# Patient Record
Sex: MALE | Race: WHITE | Employment: OTHER | URBAN - METROPOLITAN AREA
[De-identification: names, ages, dates, MRNs, and addresses within clinical notes are randomized per-mention and may not be internally consistent; named-entity substitution may affect disease eponyms.]

---

## 2023-08-01 ENCOUNTER — OFFICE VISIT (OUTPATIENT)
Age: 78
End: 2023-08-01
Payer: OTHER GOVERNMENT

## 2023-08-01 VITALS
BODY MASS INDEX: 22.98 KG/M2 | HEIGHT: 66 IN | OXYGEN SATURATION: 95 % | DIASTOLIC BLOOD PRESSURE: 66 MMHG | SYSTOLIC BLOOD PRESSURE: 134 MMHG | HEART RATE: 71 BPM | RESPIRATION RATE: 16 BRPM | WEIGHT: 143 LBS

## 2023-08-01 DIAGNOSIS — E11.51 TYPE 2 DIABETES MELLITUS WITH DIABETIC PERIPHERAL ANGIOPATHY WITHOUT GANGRENE, WITHOUT LONG-TERM CURRENT USE OF INSULIN (HCC): ICD-10-CM

## 2023-08-01 DIAGNOSIS — I10 PRIMARY HYPERTENSION: ICD-10-CM

## 2023-08-01 DIAGNOSIS — Z12.5 PROSTATE CANCER SCREENING: ICD-10-CM

## 2023-08-01 DIAGNOSIS — Z00.00 MEDICARE ANNUAL WELLNESS VISIT, SUBSEQUENT: Primary | ICD-10-CM

## 2023-08-01 DIAGNOSIS — Z87.891 PERSONAL HISTORY OF TOBACCO USE: ICD-10-CM

## 2023-08-01 DIAGNOSIS — Z11.59 ENCOUNTER FOR HEPATITIS C SCREENING TEST FOR LOW RISK PATIENT: ICD-10-CM

## 2023-08-01 DIAGNOSIS — I73.9 PAD (PERIPHERAL ARTERY DISEASE) (HCC): ICD-10-CM

## 2023-08-01 DIAGNOSIS — E78.2 MIXED HYPERLIPIDEMIA: ICD-10-CM

## 2023-08-01 DIAGNOSIS — N40.0 BENIGN PROSTATIC HYPERPLASIA WITHOUT LOWER URINARY TRACT SYMPTOMS: ICD-10-CM

## 2023-08-01 DIAGNOSIS — Z79.899 LONG TERM USE OF DRUG: ICD-10-CM

## 2023-08-01 LAB
CREAT UR-MCNC: 53.2 MG/DL
HBA1C MFR BLD: 5.3 %
MICROALBUMIN UR-MCNC: 2.22 MG/DL
MICROALBUMIN/CREAT UR-RTO: 42 MG/G (ref 0–30)

## 2023-08-01 PROCEDURE — G0439 PPPS, SUBSEQ VISIT: HCPCS

## 2023-08-01 PROCEDURE — G0296 VISIT TO DETERM LDCT ELIG: HCPCS

## 2023-08-01 PROCEDURE — 3078F DIAST BP <80 MM HG: CPT

## 2023-08-01 PROCEDURE — 83036 HEMOGLOBIN GLYCOSYLATED A1C: CPT

## 2023-08-01 PROCEDURE — 36415 COLL VENOUS BLD VENIPUNCTURE: CPT

## 2023-08-01 PROCEDURE — 3075F SYST BP GE 130 - 139MM HG: CPT

## 2023-08-01 PROCEDURE — 1123F ACP DISCUSS/DSCN MKR DOCD: CPT

## 2023-08-01 RX ORDER — ATORVASTATIN CALCIUM 40 MG/1
40 TABLET, FILM COATED ORAL DAILY
Qty: 90 TABLET | Refills: 1 | Status: SHIPPED | OUTPATIENT
Start: 2023-08-01

## 2023-08-01 RX ORDER — GABAPENTIN 300 MG/1
CAPSULE ORAL
COMMUNITY
Start: 2021-01-19 | End: 2023-08-01 | Stop reason: SDUPTHER

## 2023-08-01 RX ORDER — CLOPIDOGREL BISULFATE 75 MG/1
75 TABLET ORAL DAILY
Qty: 90 TABLET | Refills: 1
Start: 2023-08-01

## 2023-08-01 RX ORDER — LISINOPRIL 20 MG/1
TABLET ORAL
COMMUNITY
End: 2023-08-01 | Stop reason: SDUPTHER

## 2023-08-01 RX ORDER — TAMSULOSIN HYDROCHLORIDE 0.4 MG/1
0.4 CAPSULE ORAL DAILY
Qty: 90 CAPSULE | Refills: 1
Start: 2023-08-01

## 2023-08-01 RX ORDER — FINASTERIDE 5 MG/1
TABLET, FILM COATED ORAL
COMMUNITY
End: 2023-08-01 | Stop reason: SDUPTHER

## 2023-08-01 RX ORDER — FINASTERIDE 5 MG/1
5 TABLET, FILM COATED ORAL DAILY
Qty: 90 TABLET | Refills: 1
Start: 2023-08-01

## 2023-08-01 RX ORDER — CLOPIDOGREL BISULFATE 75 MG/1
TABLET ORAL
COMMUNITY
End: 2023-08-01 | Stop reason: SDUPTHER

## 2023-08-01 RX ORDER — ATORVASTATIN CALCIUM 40 MG/1
TABLET, FILM COATED ORAL
COMMUNITY
End: 2023-08-01 | Stop reason: SDUPTHER

## 2023-08-01 RX ORDER — TAMSULOSIN HYDROCHLORIDE 0.4 MG/1
0.4 CAPSULE ORAL DAILY
COMMUNITY
End: 2023-08-01 | Stop reason: SDUPTHER

## 2023-08-01 RX ORDER — POLYVINYL ALCOHOL 14 MG/ML
1 SOLUTION/ DROPS OPHTHALMIC PRN
COMMUNITY

## 2023-08-01 RX ORDER — LISINOPRIL 20 MG/1
20 TABLET ORAL DAILY
Qty: 90 TABLET | Refills: 1 | Status: SHIPPED | OUTPATIENT
Start: 2023-08-01

## 2023-08-01 RX ORDER — KETOTIFEN FUMARATE 0.35 MG/ML
1 SOLUTION/ DROPS OPHTHALMIC 2 TIMES DAILY
COMMUNITY

## 2023-08-01 RX ORDER — CALCIUM CARBONATE 750 MG/1
TABLET, CHEWABLE ORAL
COMMUNITY

## 2023-08-01 RX ORDER — GABAPENTIN 300 MG/1
900 CAPSULE ORAL 2 TIMES DAILY
Qty: 540 CAPSULE | Refills: 1
Start: 2023-08-01 | End: 2024-01-28

## 2023-08-01 SDOH — ECONOMIC STABILITY: INCOME INSECURITY: IN THE LAST 12 MONTHS, WAS THERE A TIME WHEN YOU WERE NOT ABLE TO PAY THE MORTGAGE OR RENT ON TIME?: NO

## 2023-08-01 SDOH — ECONOMIC STABILITY: HOUSING INSECURITY
IN THE LAST 12 MONTHS, WAS THERE A TIME WHEN YOU DID NOT HAVE A STEADY PLACE TO SLEEP OR SLEPT IN A SHELTER (INCLUDING NOW)?: NO

## 2023-08-01 SDOH — ECONOMIC STABILITY: FOOD INSECURITY: WITHIN THE PAST 12 MONTHS, THE FOOD YOU BOUGHT JUST DIDN'T LAST AND YOU DIDN'T HAVE MONEY TO GET MORE.: NEVER TRUE

## 2023-08-01 SDOH — ECONOMIC STABILITY: FOOD INSECURITY: WITHIN THE PAST 12 MONTHS, YOU WORRIED THAT YOUR FOOD WOULD RUN OUT BEFORE YOU GOT MONEY TO BUY MORE.: NEVER TRUE

## 2023-08-01 SDOH — ECONOMIC STABILITY: TRANSPORTATION INSECURITY
IN THE PAST 12 MONTHS, HAS LACK OF TRANSPORTATION KEPT YOU FROM MEETINGS, WORK, OR FROM GETTING THINGS NEEDED FOR DAILY LIVING?: NO

## 2023-08-01 SDOH — ECONOMIC STABILITY: TRANSPORTATION INSECURITY
IN THE PAST 12 MONTHS, HAS THE LACK OF TRANSPORTATION KEPT YOU FROM MEDICAL APPOINTMENTS OR FROM GETTING MEDICATIONS?: NO

## 2023-08-01 ASSESSMENT — ENCOUNTER SYMPTOMS
EYES NEGATIVE: 1
WHEEZING: 0
SHORTNESS OF BREATH: 0
BLOOD IN STOOL: 0
CONSTIPATION: 0
RESPIRATORY NEGATIVE: 1
ALLERGIC/IMMUNOLOGIC NEGATIVE: 1
DIARRHEA: 0
COUGH: 0

## 2023-08-01 ASSESSMENT — SOCIAL DETERMINANTS OF HEALTH (SDOH)
WITHIN THE LAST YEAR, HAVE YOU BEEN AFRAID OF YOUR PARTNER OR EX-PARTNER?: NO
WITHIN THE LAST YEAR, HAVE YOU BEEN KICKED, HIT, SLAPPED, OR OTHERWISE PHYSICALLY HURT BY YOUR PARTNER OR EX-PARTNER?: NO
WITHIN THE LAST YEAR, HAVE TO BEEN RAPED OR FORCED TO HAVE ANY KIND OF SEXUAL ACTIVITY BY YOUR PARTNER OR EX-PARTNER?: NO
WITHIN THE LAST YEAR, HAVE YOU BEEN HUMILIATED OR EMOTIONALLY ABUSED IN OTHER WAYS BY YOUR PARTNER OR EX-PARTNER?: NO
HOW HARD IS IT FOR YOU TO PAY FOR THE VERY BASICS LIKE FOOD, HOUSING, MEDICAL CARE, AND HEATING?: NOT VERY HARD

## 2023-08-01 ASSESSMENT — LIFESTYLE VARIABLES
HOW OFTEN DO YOU HAVE A DRINK CONTAINING ALCOHOL: NEVER
HOW MANY STANDARD DRINKS CONTAINING ALCOHOL DO YOU HAVE ON A TYPICAL DAY: PATIENT DOES NOT DRINK

## 2023-08-01 ASSESSMENT — PATIENT HEALTH QUESTIONNAIRE - PHQ9
SUM OF ALL RESPONSES TO PHQ QUESTIONS 1-9: 0
SUM OF ALL RESPONSES TO PHQ QUESTIONS 1-9: 0
1. LITTLE INTEREST OR PLEASURE IN DOING THINGS: 0
SUM OF ALL RESPONSES TO PHQ QUESTIONS 1-9: 0
2. FEELING DOWN, DEPRESSED OR HOPELESS: 0
SUM OF ALL RESPONSES TO PHQ QUESTIONS 1-9: 0
SUM OF ALL RESPONSES TO PHQ9 QUESTIONS 1 & 2: 0

## 2023-08-01 NOTE — PROGRESS NOTES
Chief Complaint   Patient presents with    Medicare AWV    Establish Care       HPI:    Cathie Dorsey is a 69 y.o. male who presents as a new patient for [facility]. He is a   who has been receiving care through the [place]; he moved to this area from the [facility] to be close to his niece. HTN:  Good control on lisinopril; home BP 130s/70s. PAD:  Hx left fem-pop bypass about 3 years ago per his report; no longer follows with vascular specialist but remains on Plavix. DM:  Very well controlled on metformin; does not check sugars at home. Suffers from neuropathy in his feet and legs, managed with gabapentin. HLD:  Compliant with atorvastatin. BPH:  LUTS with nocturia, improved on tamsulosin and finasteride. Allergies   Allergen Reactions    Penicillins Rash       Current Outpatient Medications   Medication Sig Dispense Refill    ASPIRIN 81 PO Take 1 tablet every day by oral route. calcium carbonate (TUMS EX) 750 MG chewable tablet Take by mouth      polyvinyl alcohol (LIQUIFILM TEARS) 1.4 % ophthalmic solution 1 drop as needed      ketotifen ( KETOTIFEN FUMARATE) 0.025 % ophthalmic solution 1 drop 2 times daily      gabapentin (NEURONTIN) 300 MG capsule Take 3 capsules by mouth in the morning and at bedtime for 180 days. Max Daily Amount: 1,800 mg 540 capsule 1    lisinopril (PRINIVIL;ZESTRIL) 20 MG tablet Take 1 tablet by mouth daily 90 tablet 1    atorvastatin (LIPITOR) 40 MG tablet Take 1 tablet by mouth daily 90 tablet 1    metFORMIN (GLUCOPHAGE) 1000 MG tablet Take 1 tablet by mouth 2 times daily (with meals) 180 tablet 1    tamsulosin (FLOMAX) 0.4 MG capsule Take 1 capsule by mouth daily 90 capsule 1    finasteride (PROSCAR) 5 MG tablet Take 1 tablet by mouth daily 90 tablet 1    clopidogrel (PLAVIX) 75 MG tablet Take 1 tablet by mouth daily 90 tablet 1     No current facility-administered medications for this visit. History reviewed.  No pertinent past medical

## 2023-08-01 NOTE — PROGRESS NOTES
1. \"Have you been to the ER, urgent care clinic since your last visit? Hospitalized since your last visit? \" No    2. \"Have you seen or consulted any other health care providers outside of the [address] [place] [address] since your last visit? \" No     3. For patients aged 43-73: Has the patient had a colonoscopy / FIT/ Cologuard? Yes - no Care Gap present     If the patient is female:    4. For patients aged 43-66: Has the patient had a mammogram within the past 2 years? NA - based on age    11. For patients aged 21-65: Has the patient had a pap smear? NA - based on age    Chief Complaint   Patient presents with    Medicare AWV    Establish Care       Vitals:    08/01/23 0755   BP: (!) 178/80   Pulse: 71   Resp: 16   SpO2: 95%     Labs drawn in left arm per Ena's orders. Patient tolerated well.     Results for orders placed or performed in visit on 08/01/23   AMB POC HEMOGLOBIN A1C   Result Value Ref Range    Hemoglobin A1C, POC 5.3 %

## 2023-08-01 NOTE — PROGRESS NOTES
Medicare Annual Wellness Visit    Matheus Fink is here for Medicare AWV and Establish Care    Assessment & Plan   Medicare annual wellness visit, subsequent  Type 2 diabetes mellitus without complication, without long-term current use of insulin ([address])  -     NC COLLECTION VENOUS BLOOD VENIPUNCTURE  -     AMB POC HEMOGLOBIN A1C  -     CBC with Auto Differential; Future  -     Comprehensive Metabolic Panel; Future  -     TSH; Future  -     HM DIABETES FOOT EXAM  -     Microalbumin / Creatinine Urine Ratio; Future  Primary hypertension  -     NC COLLECTION VENOUS BLOOD VENIPUNCTURE  -     CBC with Auto Differential; Future  -     Comprehensive Metabolic Panel; Future  -     TSH; Future  Mixed hyperlipidemia  -     NC COLLECTION VENOUS BLOOD VENIPUNCTURE  -     Lipid Panel; Future  Prostate cancer screening  -     NC COLLECTION VENOUS BLOOD VENIPUNCTURE  -     PSA Screening; Future  Encounter for hepatitis C screening test for low risk patient  -     NC COLLECTION VENOUS BLOOD VENIPUNCTURE  -     Hepatitis C Antibody; Future    Recommendations for Preventive Services Due: see orders and patient instructions/AVS.  Recommended screening schedule for the next 5-10 years is provided to the patient in written form: see Patient Instructions/AVS.     Return in 3 months (on 11/1/2023). Subjective     Patient's complete Health Risk Assessment and screening values have been reviewed and are found in Flowsheets. The following problems were reviewed today and where indicated follow up appointments were made and/or referrals ordered.     Positive Risk Factor Screenings with Interventions:    Fall Risk:  Do you feel unsteady or are you worried about falling? : (!) yes  2 or more falls in past year?: (!) yes  Fall with injury in past year?: no     Interventions:    See AVS for additional education material                 Vision Screen:  Do you have difficulty driving, watching TV, or doing any of your daily activities because of

## 2023-08-01 NOTE — PATIENT INSTRUCTIONS
the doctor takes a sample of tissue from inside your lung so it can be looked at under a microscope. A biopsy is the only way to tell if you have lung cancer. If the biopsy finds cancer, you and your doctor will have to decide how or whether to treat it. Some lung cancers found on CT scans are harmless and would not have caused a problem if they had not been found through screening. But because doctors can't tell which ones will turn out to be harmless, most will be treated. This means that you may get treatment--including surgery, radiation, or chemotherapy--that you don't need. There is a risk of damage to cells or tissue from being exposed to radiation, including the small amounts used in CTs, X-rays, and other medical tests. Over time, exposure to radiation may cause cancer and other health problems. But in most cases, the risk of getting cancer from being exposed to small amounts of radiation is low. It's not a reason to avoid these tests for most people. What are the benefits of screening? Your scan may be normal (negative). For some people who are at higher risk, screening lowers the chance of dying of lung cancer. How much and how long you smoked helps to determine your risk level. Screening can find some cancers early, when treatment may be more likely to work. What happens after screening? The results of your CT scan will be sent to your doctor. Someone from your care team will explain the results of your scan and answer any questions you may have. If you need any follow-up, he or she will help you understand what to do next. After a lung cancer screening, you can go back to your usual activities right away. A lung cancer screening test can't tell if you have lung cancer. If your results are positive, your doctor can't tell whether an abnormal finding is a harmless nodule, cancer, or something else without doing more tests. What can you do to help prevent lung cancer?   Some lung cancers can't be

## 2023-08-02 LAB
ALBUMIN SERPL-MCNC: 4.1 G/DL (ref 3.5–5)
ALBUMIN/GLOB SERPL: 1.2 (ref 1.1–2.2)
ALP SERPL-CCNC: 77 U/L (ref 45–117)
ALT SERPL-CCNC: 15 U/L (ref 12–78)
ANION GAP SERPL CALC-SCNC: 11 MMOL/L (ref 5–15)
AST SERPL-CCNC: 13 U/L (ref 15–37)
BASOPHILS # BLD: 0 K/UL (ref 0–0.1)
BASOPHILS NFR BLD: 1 % (ref 0–1)
BILIRUB SERPL-MCNC: 0.3 MG/DL (ref 0.2–1)
BUN SERPL-MCNC: 16 MG/DL (ref 6–20)
BUN/CREAT SERPL: 10 (ref 12–20)
CALCIUM SERPL-MCNC: 9.8 MG/DL (ref 8.5–10.1)
CHLORIDE SERPL-SCNC: 97 MMOL/L (ref 97–108)
CHOLEST SERPL-MCNC: 145 MG/DL
CO2 SERPL-SCNC: 23 MMOL/L (ref 21–32)
CREAT SERPL-MCNC: 1.67 MG/DL (ref 0.7–1.3)
DIFFERENTIAL METHOD BLD: ABNORMAL
EOSINOPHIL # BLD: 0.1 K/UL (ref 0–0.4)
EOSINOPHIL NFR BLD: 2 % (ref 0–7)
ERYTHROCYTE [DISTWIDTH] IN BLOOD BY AUTOMATED COUNT: 15.9 % (ref 11.5–14.5)
GLOBULIN SER CALC-MCNC: 3.3 G/DL (ref 2–4)
GLUCOSE SERPL-MCNC: 60 MG/DL (ref 65–100)
HCT VFR BLD AUTO: 31.6 % (ref 36.6–50.3)
HCV AB SERPL QL IA: NONREACTIVE
HDLC SERPL-MCNC: 71 MG/DL
HDLC SERPL: 2 (ref 0–5)
HGB BLD-MCNC: 10.1 G/DL (ref 12.1–17)
IMM GRANULOCYTES # BLD AUTO: 0 K/UL (ref 0–0.04)
IMM GRANULOCYTES NFR BLD AUTO: 0 % (ref 0–0.5)
LDLC SERPL CALC-MCNC: 51.6 MG/DL (ref 0–100)
LYMPHOCYTES # BLD: 0.9 K/UL (ref 0.8–3.5)
LYMPHOCYTES NFR BLD: 16 % (ref 12–49)
MCH RBC QN AUTO: 28.5 PG (ref 26–34)
MCHC RBC AUTO-ENTMCNC: 32 G/DL (ref 30–36.5)
MCV RBC AUTO: 89 FL (ref 80–99)
MONOCYTES # BLD: 0.6 K/UL (ref 0–1)
MONOCYTES NFR BLD: 11 % (ref 5–13)
NEUTS SEG # BLD: 3.6 K/UL (ref 1.8–8)
NEUTS SEG NFR BLD: 70 % (ref 32–75)
NRBC # BLD: 0 K/UL (ref 0–0.01)
NRBC BLD-RTO: 0 PER 100 WBC
PLATELET # BLD AUTO: 280 K/UL (ref 150–400)
PMV BLD AUTO: 10 FL (ref 8.9–12.9)
POTASSIUM SERPL-SCNC: 4.8 MMOL/L (ref 3.5–5.1)
PROT SERPL-MCNC: 7.4 G/DL (ref 6.4–8.2)
PSA SERPL-MCNC: 16.3 NG/ML (ref 0.01–4)
RBC # BLD AUTO: 3.55 M/UL (ref 4.1–5.7)
SODIUM SERPL-SCNC: 131 MMOL/L (ref 136–145)
TRIGL SERPL-MCNC: 112 MG/DL
TSH SERPL DL<=0.05 MIU/L-ACNC: 1.67 UIU/ML (ref 0.36–3.74)
VLDLC SERPL CALC-MCNC: 22.4 MG/DL
WBC # BLD AUTO: 5.2 K/UL (ref 4.1–11.1)

## 2023-08-04 DIAGNOSIS — N18.32 STAGE 3B CHRONIC KIDNEY DISEASE (HCC): Primary | ICD-10-CM

## 2023-08-04 DIAGNOSIS — I10 PRIMARY HYPERTENSION: ICD-10-CM

## 2023-08-04 DIAGNOSIS — E11.51 TYPE 2 DIABETES MELLITUS WITH DIABETIC PERIPHERAL ANGIOPATHY WITHOUT GANGRENE, WITHOUT LONG-TERM CURRENT USE OF INSULIN (HCC): ICD-10-CM

## 2023-08-08 ENCOUNTER — TELEPHONE (OUTPATIENT)
Age: 78
End: 2023-08-08

## 2023-08-08 NOTE — TELEPHONE ENCOUNTER
Matthieu Marie picked up his lisinopril 20 mg. He has been taking 1 x daily and on the bottle it states 1/2 a pill daily. He would just like some clarification of what he should be taking.

## 2023-08-31 ENCOUNTER — PATIENT MESSAGE (OUTPATIENT)
Age: 78
End: 2023-08-31

## 2023-08-31 DIAGNOSIS — I10 PRIMARY HYPERTENSION: ICD-10-CM

## 2023-09-01 NOTE — TELEPHONE ENCOUNTER
From: Marita Khan  To: April Nivia  Sent: 8/31/2023 8:33 PM EDT  Subject: LISINOPRIL    THE DOSAGE ON MY RECENT RECEIPT DELIVERY OF THIS MED. IS WRONG. IT SHOULD BE ONE TABLET DAILY NOT ONE HALF TABLET DAILY AS STATED ON THE LABEL. I CALLED THE [facility] ABOUT THIS BUT THEY HAVE NOT MADE THE CORRECTION. SO THEY OWE ME THE OTHER HALF OF THIS MED. I HAVE BEEN TAKING ONE TABLET DAILY SINCE IT WAS PRESCRIBED BY DR. Danisha Evans AT [address], [place].   THANK YOU  KRISTINE ARANA

## 2023-09-05 RX ORDER — LISINOPRIL 40 MG/1
40 TABLET ORAL DAILY
Qty: 90 TABLET | Refills: 1 | Status: SHIPPED | OUTPATIENT
Start: 2023-09-05

## 2023-09-06 ENCOUNTER — HOSPITAL ENCOUNTER (OUTPATIENT)
Facility: HOSPITAL | Age: 78
Discharge: HOME | End: 2023-09-09

## 2023-09-10 DIAGNOSIS — E11.51 TYPE 2 DIABETES MELLITUS WITH DIABETIC PERIPHERAL ANGIOPATHY WITHOUT GANGRENE, WITHOUT LONG-TERM CURRENT USE OF INSULIN (HCC): ICD-10-CM

## 2023-09-11 RX ORDER — HYDROCHLOROTHIAZIDE 25 MG/1
25 TABLET ORAL DAILY
COMMUNITY
End: 2023-09-12 | Stop reason: SDUPTHER

## 2023-09-11 NOTE — TELEPHONE ENCOUNTER
CELSA LE,  I HAD TO REORDER MEDS THIS MORNING AND I ORDERED TWO BUT TWO THAT I NEED TO REORDER I WAS UNABLE  TO ORDER. CALCIUM CARBONATE WOULDN'T ALLOW ME TO ORDER AND THE OTHER ONE HYDROCHLOROTHIAZIDE ISN'T ON THE LIST.   THANK YOU  KRISTINE ARANA

## 2023-09-12 RX ORDER — HYDROCHLOROTHIAZIDE 25 MG/1
12.5 TABLET ORAL DAILY
Qty: 45 TABLET | Refills: 1 | Status: SHIPPED | OUTPATIENT
Start: 2023-09-12

## 2023-09-12 RX ORDER — CALCIUM CARBONATE 750 MG/1
2 TABLET, CHEWABLE ORAL DAILY
Qty: 360 TABLET | Refills: 1 | Status: SHIPPED | OUTPATIENT
Start: 2023-09-12

## 2023-09-14 RX ORDER — GABAPENTIN 300 MG/1
900 CAPSULE ORAL 2 TIMES DAILY
Qty: 540 CAPSULE | Refills: 1 | Status: SHIPPED | OUTPATIENT
Start: 2023-09-14 | End: 2024-03-12

## 2023-09-29 DIAGNOSIS — E11.51 TYPE 2 DIABETES MELLITUS WITH DIABETIC PERIPHERAL ANGIOPATHY WITHOUT GANGRENE, WITHOUT LONG-TERM CURRENT USE OF INSULIN (HCC): ICD-10-CM

## 2023-09-29 DIAGNOSIS — I73.9 PAD (PERIPHERAL ARTERY DISEASE) (HCC): ICD-10-CM

## 2023-10-02 RX ORDER — FINASTERIDE 5 MG/1
5 TABLET, FILM COATED ORAL DAILY
Qty: 90 TABLET | Refills: 1 | Status: SHIPPED | OUTPATIENT
Start: 2023-10-02

## 2023-10-02 RX ORDER — CLOPIDOGREL BISULFATE 75 MG/1
75 TABLET ORAL DAILY
Qty: 90 TABLET | Refills: 1 | Status: SHIPPED | OUTPATIENT
Start: 2023-10-02

## 2023-10-25 ENCOUNTER — TELEPHONE (OUTPATIENT)
Age: 78
End: 2023-10-25

## 2023-10-25 NOTE — TELEPHONE ENCOUNTER
Pt states he has sent 2 my chart messages and neither have been responded to .  Please review and someone get back to patient

## 2023-10-29 DIAGNOSIS — N40.0 BENIGN PROSTATIC HYPERPLASIA WITHOUT LOWER URINARY TRACT SYMPTOMS: ICD-10-CM

## 2023-10-30 RX ORDER — TAMSULOSIN HYDROCHLORIDE 0.4 MG/1
0.4 CAPSULE ORAL DAILY
Qty: 90 CAPSULE | Refills: 1 | Status: SHIPPED | OUTPATIENT
Start: 2023-10-30

## 2023-11-02 ENCOUNTER — TELEPHONE (OUTPATIENT)
Age: 78
End: 2023-11-02

## 2023-11-02 DIAGNOSIS — N40.0 BENIGN PROSTATIC HYPERPLASIA WITHOUT LOWER URINARY TRACT SYMPTOMS: ICD-10-CM

## 2023-11-02 NOTE — TELEPHONE ENCOUNTER
Keny Houser received his RX for tamsulosin. Dosage said take 1 daily. He had been taking 2 per Urologist. [facility] only sent him 30 of the 90 pills due to a shortage. He wants to know should he cut his dosage in half or is there something else that can be called in.

## 2023-11-08 RX ORDER — TAMSULOSIN HYDROCHLORIDE 0.4 MG/1
0.8 CAPSULE ORAL DAILY
Qty: 180 CAPSULE | Refills: 1 | Status: SHIPPED | OUTPATIENT
Start: 2023-11-08 | End: 2023-11-16 | Stop reason: SDUPTHER

## 2023-11-09 NOTE — TELEPHONE ENCOUNTER
JAMES Mcclelland Cisco Pierre, informed of tamsulosin Rx sent in, OK and thanks. He stated he was taking a calcium carbonate antacid tablet for yrs before coming here, but was changed to Tums. He now ends up taking up to 21 Tums within a week.

## 2023-11-16 DIAGNOSIS — E78.2 MIXED HYPERLIPIDEMIA: ICD-10-CM

## 2023-11-16 DIAGNOSIS — N40.0 BENIGN PROSTATIC HYPERPLASIA WITHOUT LOWER URINARY TRACT SYMPTOMS: ICD-10-CM

## 2023-11-17 RX ORDER — ATORVASTATIN CALCIUM 40 MG/1
40 TABLET, FILM COATED ORAL DAILY
Qty: 90 TABLET | Refills: 1 | Status: SHIPPED | OUTPATIENT
Start: 2023-11-17

## 2023-11-17 RX ORDER — TAMSULOSIN HYDROCHLORIDE 0.4 MG/1
0.8 CAPSULE ORAL DAILY
Qty: 180 CAPSULE | Refills: 1 | Status: SHIPPED | OUTPATIENT
Start: 2023-11-17

## 2023-11-22 RX ORDER — ALENDRONATE SODIUM 70 MG/1
70 TABLET ORAL WEEKLY
Qty: 12 TABLET | Refills: 1 | Status: SHIPPED | OUTPATIENT
Start: 2023-11-22

## 2023-11-28 RX ORDER — ERGOCALCIFEROL 1.25 MG/1
50000 CAPSULE ORAL WEEKLY
Qty: 12 CAPSULE | Refills: 0 | Status: SHIPPED | OUTPATIENT
Start: 2023-11-28

## 2023-12-12 ENCOUNTER — TELEPHONE (OUTPATIENT)
Age: 78
End: 2023-12-12

## 2023-12-12 NOTE — TELEPHONE ENCOUNTER
----- Message from Gonzalo Avendaño sent at 12/12/2023  9:10 AM EST -----  Subject: Message to Provider    QUESTIONS  Information for Provider? Patient called states that he has a form that   the Urologist sent that needs to be completed by his PCP prior to his Jan 4th doctor's visit. He would like to know if he can just drop off the   form. ---------------------------------------------------------------------------  --------------  Desean KENNY  [phone]; OK to leave message on voicemail  ---------------------------------------------------------------------------  --------------  SCRIPT ANSWERS  Relationship to Patient?  Self

## 2023-12-12 NOTE — TELEPHONE ENCOUNTER
PC VM left to rt the call. Will attempt to get more information regarding the form &  send to Ena to confirm if an appointment is needed.

## 2023-12-13 ENCOUNTER — TELEPHONE (OUTPATIENT)
Age: 78
End: 2023-12-13

## 2023-12-13 NOTE — TELEPHONE ENCOUNTER
Pt spoke with office on 12.12 requesting RFS from [facility] office for lab services at [facility] for labs ordered from urologist. Was called back on 12.13 and told that because the labs were order by the urologist which he has [address] [place] [address] referral to he will need to have that office submit RFS for those lab services because the [facility] office is not involved with this service. Pt stated that because he was sent here by the [address] [place] [address] he needs it to come from this office. I repeated that because we are not involved in the service it would need to be from the specialist that it was coming from. If he would like to get more information on exactly what needs to be done he could call his community [facility] coordinator with the [address] [place] [address]. Pt states that he was not going to do that and he is just going to get the blood work done at [facility] and if he gets a bill he is going to be upset and will be calling back. I repeated his options on finding out more information on what exactly he needed to do to avoid getting a bill but he just repeated that he was not going to do any of that and that he would be calling if he got a bill.

## 2023-12-15 DIAGNOSIS — E11.51 TYPE 2 DIABETES MELLITUS WITH DIABETIC PERIPHERAL ANGIOPATHY WITHOUT GANGRENE, WITHOUT LONG-TERM CURRENT USE OF INSULIN (HCC): ICD-10-CM

## 2023-12-15 DIAGNOSIS — I73.9 PAD (PERIPHERAL ARTERY DISEASE) (HCC): ICD-10-CM

## 2023-12-15 RX ORDER — CLOPIDOGREL BISULFATE 75 MG/1
75 TABLET ORAL DAILY
Qty: 90 TABLET | Refills: 0 | Status: SHIPPED | OUTPATIENT
Start: 2023-12-15

## 2023-12-15 RX ORDER — GABAPENTIN 300 MG/1
900 CAPSULE ORAL 2 TIMES DAILY
Qty: 540 CAPSULE | Refills: 0 | Status: SHIPPED | OUTPATIENT
Start: 2023-12-15 | End: 2024-03-14

## 2023-12-15 RX ORDER — HYDROCHLOROTHIAZIDE 25 MG/1
12.5 TABLET ORAL DAILY
Qty: 45 TABLET | Refills: 0 | Status: SHIPPED | OUTPATIENT
Start: 2023-12-15

## 2023-12-15 NOTE — TELEPHONE ENCOUNTER
Patient requesting refill on     Requested Prescriptions     Pending Prescriptions Disp Refills    gabapentin (NEURONTIN) 300 MG capsule 540 capsule 1     Sig: Take 3 capsules by mouth in the morning and at bedtime for 180 days.  Max Daily Amount: 1,800 mg    metFORMIN (GLUCOPHAGE) 1000 MG tablet 180 tablet 1     Sig: Take 1 tablet by mouth 2 times daily (with meals)    hydroCHLOROthiazide (HYDRODIURIL) 25 MG tablet 45 tablet 1     Sig: Take 0.5 tablets by mouth daily Take 1/2 tablet daily    clopidogrel (PLAVIX) 75 MG tablet 90 tablet 1     Sig: Take 1 tablet by mouth daily        Last OV 8/1/2023

## 2023-12-27 NOTE — TELEPHONE ENCOUNTER
PC FERNANDEZ Mcclelland Eric Kruger, he had spoken with the urologist office regarding the BW. He did not want to do anything without an order. But did also state, he had received a form with stating a PSA to be done. I did inform him, I believe that would be his order for the PSA BW. He stated OK of understanding, will have it done at [address] [place] [address] on tomorrow, but still would like an appointment with Ena for other issues and a Referral order for a study he needs to have done, transferred to front staff.

## 2023-12-28 ENCOUNTER — HOSPITAL ENCOUNTER (OUTPATIENT)
Facility: HOSPITAL | Age: 78
Discharge: HOME | End: 2023-12-31

## 2024-01-02 ENCOUNTER — TELEPHONE (OUTPATIENT)
Age: 79
End: 2024-01-02

## 2024-01-02 ENCOUNTER — OFFICE VISIT (OUTPATIENT)
Age: 79
End: 2024-01-02
Payer: MEDICARE

## 2024-01-02 VITALS
HEART RATE: 84 BPM | SYSTOLIC BLOOD PRESSURE: 136 MMHG | WEIGHT: 137 LBS | DIASTOLIC BLOOD PRESSURE: 80 MMHG | RESPIRATION RATE: 20 BRPM | HEIGHT: 66 IN | BODY MASS INDEX: 22.02 KG/M2 | OXYGEN SATURATION: 98 % | TEMPERATURE: 97.9 F

## 2024-01-02 DIAGNOSIS — M25.552 LEFT HIP PAIN: ICD-10-CM

## 2024-01-02 DIAGNOSIS — M40.00 ACQUIRED POSTURAL KYPHOSIS: ICD-10-CM

## 2024-01-02 DIAGNOSIS — J40 BRONCHITIS: Primary | ICD-10-CM

## 2024-01-02 DIAGNOSIS — E11.51 TYPE 2 DIABETES MELLITUS WITH DIABETIC PERIPHERAL ANGIOPATHY WITHOUT GANGRENE, WITHOUT LONG-TERM CURRENT USE OF INSULIN (HCC): ICD-10-CM

## 2024-01-02 PROCEDURE — G8420 CALC BMI NORM PARAMETERS: HCPCS

## 2024-01-02 PROCEDURE — 99214 OFFICE O/P EST MOD 30 MIN: CPT

## 2024-01-02 PROCEDURE — 4004F PT TOBACCO SCREEN RCVD TLK: CPT

## 2024-01-02 PROCEDURE — G8427 DOCREV CUR MEDS BY ELIG CLIN: HCPCS

## 2024-01-02 PROCEDURE — G8484 FLU IMMUNIZE NO ADMIN: HCPCS

## 2024-01-02 PROCEDURE — 1123F ACP DISCUSS/DSCN MKR DOCD: CPT

## 2024-01-02 RX ORDER — METHYLPREDNISOLONE 4 MG/1
TABLET ORAL
Qty: 1 KIT | Refills: 0 | Status: SHIPPED | OUTPATIENT
Start: 2024-01-02 | End: 2024-01-04 | Stop reason: SDUPTHER

## 2024-01-02 RX ORDER — ALBUTEROL SULFATE 90 UG/1
2 AEROSOL, METERED RESPIRATORY (INHALATION) EVERY 6 HOURS PRN
Qty: 18 G | Refills: 5 | Status: SHIPPED | OUTPATIENT
Start: 2024-01-02

## 2024-01-02 ASSESSMENT — ENCOUNTER SYMPTOMS
WHEEZING: 1
ALLERGIC/IMMUNOLOGIC NEGATIVE: 1
DIARRHEA: 0
CONSTIPATION: 0
BLOOD IN STOOL: 0
EYES NEGATIVE: 1
COUGH: 1
SHORTNESS OF BREATH: 1

## 2024-01-02 ASSESSMENT — PATIENT HEALTH QUESTIONNAIRE - PHQ9
SUM OF ALL RESPONSES TO PHQ9 QUESTIONS 1 & 2: 0
SUM OF ALL RESPONSES TO PHQ QUESTIONS 1-9: 0
2. FEELING DOWN, DEPRESSED OR HOPELESS: 0
1. LITTLE INTEREST OR PLEASURE IN DOING THINGS: 0
SUM OF ALL RESPONSES TO PHQ QUESTIONS 1-9: 0

## 2024-01-02 NOTE — TELEPHONE ENCOUNTER
----- Message from Nicole Olvio sent at 1/2/2024  1:02 PM EST -----  Subject: Referral Request    Reason for referral request? CT Lung Screen  Provider patient wants to be referred to(if known):     Provider Phone Number(if known):    Additional Information for Provider? Patient needs to change it to   [facility]. Please contact   patient to let him know that is was changed. Please advise.  ---------------------------------------------------------------------------  --------------  CALL BACK INFO    [phone]; OK to leave message on voicemail  ---------------------------------------------------------------------------  --------------

## 2024-01-02 NOTE — PROGRESS NOTES
\"Have you been to the ER, urgent care clinic since your last visit?  Hospitalized since your last visit?\"    NO    “Have you seen or consulted any other health care providers outside of [facility] since your last visit?”      Dr Sofia 9/23 nephrologisy   Dr. Patiño urologist 9/23      Chief Complaint   Patient presents with    Cough     Nasal drainage, body aches, x 2 mnths    Hip Pain     With left leg weakness x 2 wks        Vitals:    01/02/24 1012   BP: 136/80   Pulse: 84   Resp: 20   Temp: 97.9 °F (36.6 °C)   SpO2: 98%     
Yes  Patient Past Records were reviewed:  Yes    Patient aware of plan of care and verbalized understanding. Questions answered. RTC PRN or sooner if needed.    On this date 1/2/2024 I have spent 30 minutes reviewing previous notes, test results and face to face with the patient discussing the diagnosis and importance of compliance with the treatment plan as well as documenting on the day of the visit.    JENNIFER Vazquez - NP

## 2024-01-04 DIAGNOSIS — J40 BRONCHITIS: ICD-10-CM

## 2024-01-04 DIAGNOSIS — M25.552 LEFT HIP PAIN: ICD-10-CM

## 2024-01-04 RX ORDER — METHYLPREDNISOLONE 4 MG/1
TABLET ORAL
Qty: 1 KIT | Refills: 0 | Status: SHIPPED | OUTPATIENT
Start: 2024-01-04 | End: 2024-01-10

## 2024-01-07 DIAGNOSIS — I10 PRIMARY HYPERTENSION: ICD-10-CM

## 2024-01-08 NOTE — TELEPHONE ENCOUNTER
Patient requesting refill on     Requested Prescriptions     Pending Prescriptions Disp Refills    finasteride (PROSCAR) 5 MG tablet 90 tablet 1     Sig: Take 1 tablet by mouth daily        Last OV 1/2/2024

## 2024-01-08 NOTE — TELEPHONE ENCOUNTER
Patient requesting refill on     Requested Prescriptions     Pending Prescriptions Disp Refills    lisinopril (PRINIVIL;ZESTRIL) 40 MG tablet 90 tablet 1     Sig: Take 1 tablet by mouth daily        Last OV Visit date not found

## 2024-01-09 RX ORDER — FINASTERIDE 5 MG/1
5 TABLET, FILM COATED ORAL DAILY
Qty: 90 TABLET | Refills: 1 | Status: SHIPPED | OUTPATIENT
Start: 2024-01-09

## 2024-01-09 RX ORDER — LISINOPRIL 40 MG/1
40 TABLET ORAL DAILY
Qty: 90 TABLET | Refills: 1 | Status: SHIPPED | OUTPATIENT
Start: 2024-01-09

## 2024-01-15 ENCOUNTER — TELEPHONE (OUTPATIENT)
Age: 79
End: 2024-01-15

## 2024-01-15 NOTE — TELEPHONE ENCOUNTER
----- Message from Matthew Gill sent at 1/15/2024 10:15 AM EST -----  Subject: Referral Request    Reason for referral request? lung screen  Provider patient wants to be referred to(if known):     Provider Phone Number(if known):    Additional Information for Provider? pt needs the referral for the CT lung   screen sent to [facility] tel# [phone] FAX:[phone] please   call pt once referral has been sent  ---------------------------------------------------------------------------  --------------  CALL BACK INFO    [phone]; OK to leave message on voicemail  ---------------------------------------------------------------------------  --------------

## 2024-01-16 NOTE — TELEPHONE ENCOUNTER
JAMES PRESLEY pt, informed of lung screen order request has been done, being faxed to [facility].  If he does not hear from them by later this afternoon, pn was given to pt to give them a call to schedule, OK of understanding and thanks.  Fax done with transmisson confirmation received.

## 2024-01-17 ENCOUNTER — TELEPHONE (OUTPATIENT)
Age: 79
End: 2024-01-17

## 2024-01-17 DIAGNOSIS — F17.200 CURRENT SMOKER: Primary | ICD-10-CM

## 2024-01-17 NOTE — TELEPHONE ENCOUNTER
Olga Lidia  states the Lung Screening diagnosis says Z87.891 for history of tobacco use. Please change to [id number] for current smoker and it needs to be just Baseline since this will be the first time they have seen him. Fax back to Olga Lidia at [phone]

## 2024-01-18 ENCOUNTER — PATIENT MESSAGE (OUTPATIENT)
Age: 79
End: 2024-01-18

## 2024-01-18 DIAGNOSIS — J40 BRONCHITIS: Primary | ICD-10-CM

## 2024-01-18 RX ORDER — AZITHROMYCIN 250 MG/1
250 TABLET, FILM COATED ORAL SEE ADMIN INSTRUCTIONS
Qty: 6 TABLET | Refills: 0 | Status: SHIPPED | OUTPATIENT
Start: 2024-01-18 | End: 2024-01-23

## 2024-01-18 NOTE — TELEPHONE ENCOUNTER
From: Scooter Dickerson  To: Ena Henderson  Sent: 1/18/2024 10:06 AM EST  Subject: MEDICINE    IS THERE ANY ANTIBIOTIC YOU CAN PRESCRIBE TO KNOCK THIS STUFF OUT OF MY SYSTEM.  THANKS  KRISTINE DICKERSON

## 2024-01-19 ENCOUNTER — TELEPHONE (OUTPATIENT)
Age: 79
End: 2024-01-19

## 2024-01-19 NOTE — TELEPHONE ENCOUNTER
Several attempts made to reach \"Olga Lidia\" regarding CT Lung screening order. Fax number given is not the correct number.

## 2024-01-19 NOTE — TELEPHONE ENCOUNTER
Olga Lidia states the Lung Screening was changed on 1-17 but it still Initial/Annual. It needs to be changed to just Initial since this is his first Lung Screening. Please fax new order to Attn: Olga Lidia at [phone]

## 2024-02-01 ENCOUNTER — TELEPHONE (OUTPATIENT)
Age: 79
End: 2024-02-01

## 2024-02-01 DIAGNOSIS — Z87.891 PERSONAL HISTORY OF TOBACCO USE: ICD-10-CM

## 2024-02-01 DIAGNOSIS — F17.200 CURRENT SMOKER: Primary | ICD-10-CM

## 2024-02-01 NOTE — TELEPHONE ENCOUNTER
Olga Lidia from [facility] is calling again to have pt ct screening order corrected .. The order should not be annual is needs to be Baseline... he has never had one so therefore it is not a annual order. Once the new order has been but in please fax to Olga Lidia at [facility]  [phone]

## 2024-02-02 ENCOUNTER — OFFICE VISIT (OUTPATIENT)
Age: 79
End: 2024-02-02
Payer: OTHER GOVERNMENT

## 2024-02-02 VITALS
TEMPERATURE: 97.8 F | HEIGHT: 66 IN | SYSTOLIC BLOOD PRESSURE: 110 MMHG | WEIGHT: 139.8 LBS | DIASTOLIC BLOOD PRESSURE: 70 MMHG | OXYGEN SATURATION: 100 % | HEART RATE: 79 BPM | RESPIRATION RATE: 18 BRPM | BODY MASS INDEX: 22.47 KG/M2

## 2024-02-02 DIAGNOSIS — R97.20 ELEVATED PSA, BETWEEN 10 AND LESS THAN 20 NG/ML: ICD-10-CM

## 2024-02-02 DIAGNOSIS — I10 PRIMARY HYPERTENSION: Primary | ICD-10-CM

## 2024-02-02 DIAGNOSIS — E78.2 MIXED HYPERLIPIDEMIA: ICD-10-CM

## 2024-02-02 DIAGNOSIS — J30.89 NON-SEASONAL ALLERGIC RHINITIS DUE TO OTHER ALLERGIC TRIGGER: ICD-10-CM

## 2024-02-02 DIAGNOSIS — Z79.899 LONG TERM USE OF DRUG: ICD-10-CM

## 2024-02-02 DIAGNOSIS — E11.51 TYPE 2 DIABETES MELLITUS WITH DIABETIC PERIPHERAL ANGIOPATHY WITHOUT GANGRENE, WITHOUT LONG-TERM CURRENT USE OF INSULIN (HCC): ICD-10-CM

## 2024-02-02 LAB — HBA1C MFR BLD: 4.9 %

## 2024-02-02 PROCEDURE — 99214 OFFICE O/P EST MOD 30 MIN: CPT

## 2024-02-02 PROCEDURE — 83036 HEMOGLOBIN GLYCOSYLATED A1C: CPT

## 2024-02-02 PROCEDURE — 1123F ACP DISCUSS/DSCN MKR DOCD: CPT

## 2024-02-02 PROCEDURE — 36415 COLL VENOUS BLD VENIPUNCTURE: CPT

## 2024-02-02 PROCEDURE — 3074F SYST BP LT 130 MM HG: CPT

## 2024-02-02 PROCEDURE — 3078F DIAST BP <80 MM HG: CPT

## 2024-02-02 RX ORDER — AZELASTINE 1 MG/ML
1 SPRAY, METERED NASAL 2 TIMES DAILY
Qty: 60 ML | Refills: 1 | Status: SHIPPED | OUTPATIENT
Start: 2024-02-02

## 2024-02-02 ASSESSMENT — PATIENT HEALTH QUESTIONNAIRE - PHQ9
SUM OF ALL RESPONSES TO PHQ QUESTIONS 1-9: 0
SUM OF ALL RESPONSES TO PHQ9 QUESTIONS 1 & 2: 0
SUM OF ALL RESPONSES TO PHQ QUESTIONS 1-9: 0
2. FEELING DOWN, DEPRESSED OR HOPELESS: 0
SUM OF ALL RESPONSES TO PHQ QUESTIONS 1-9: 0
SUM OF ALL RESPONSES TO PHQ QUESTIONS 1-9: 0
1. LITTLE INTEREST OR PLEASURE IN DOING THINGS: 0

## 2024-02-02 ASSESSMENT — ENCOUNTER SYMPTOMS
BLOOD IN STOOL: 0
SHORTNESS OF BREATH: 0
WHEEZING: 0
DIARRHEA: 0
EYES NEGATIVE: 1
CONSTIPATION: 0
COUGH: 0
RESPIRATORY NEGATIVE: 1
ALLERGIC/IMMUNOLOGIC NEGATIVE: 1

## 2024-02-02 NOTE — PROGRESS NOTES
\"Have you been to the ER, urgent care clinic since your last visit?  Hospitalized since your last visit?\"    NO    “Have you seen or consulted any other health care providers outside of [facility] since your last visit?”    NO       Chief Complaint   Patient presents with    Hypertension    Diabetes    Cough     Better with inhaler but still c/o congestion and nasal drainage x 1 mnth       Vitals:    02/02/24 0736   BP: 110/70   Pulse: 79   Resp: 18   Temp: 97.8 °F (36.6 °C)   SpO2: 100%       Labs drawn from left arm per Ena Henderson NP's orders. Patient tolerated well.  
Type 2 diabetes mellitus with diabetic peripheral angiopathy without gangrene, without long-term current use of insulin (HCC)  [place] COLLECTION VENOUS BLOOD VENIPUNCTURE    Basic Metabolic Panel    Compliance Drug Analysis, Urine    AMB POC HEMOGLOBIN A1C    Compliance Drug Analysis, Urine    Basic Metabolic Panel      3. Mixed hyperlipidemia        4. Long term use of drug  HI COLLECTION VENOUS BLOOD VENIPUNCTURE    Compliance Drug Analysis, Urine    Compliance Drug Analysis, Urine      5. Non-seasonal allergic rhinitis due to other allergic trigger  azelastine (ASTELIN) 0.1 % nasal spray      6. Elevated PSA, between 10 and less than 20 ng/ml  ALBERTO - Dustin Jackson MD, Urology, [place]          Normotensive today in office; continue current regimen without changes.  A1C 4.9% in office today; continue current treatment regimen without changes.    Reviewed urology notes and recent PSA; recommend f/u with Dr. Jackson to discuss next steps.      Azelastine spray for chronic rhinitis.    Medication Side Effects and Warnings were discussed with patient: Yes  Patient Labs were reviewed:  Yes  Patient Past Records were reviewed:  Yes    Patient aware of plan of care and verbalized understanding. Questions answered. RTC 6 months or sooner if needed.    On this date 2/2/2024 I have spent 30 minutes reviewing previous notes, test results and face to face with the patient discussing the diagnosis and importance of compliance with the treatment plan as well as documenting on the day of the visit.    JENNIFER Vazquez - CRISTOBAL

## 2024-02-03 LAB
ANION GAP SERPL CALC-SCNC: 8 MMOL/L (ref 5–15)
BUN SERPL-MCNC: 37 MG/DL (ref 6–20)
BUN/CREAT SERPL: 21 (ref 12–20)
CALCIUM SERPL-MCNC: 8.3 MG/DL (ref 8.5–10.1)
CHLORIDE SERPL-SCNC: 100 MMOL/L (ref 97–108)
CO2 SERPL-SCNC: 24 MMOL/L (ref 21–32)
CREAT SERPL-MCNC: 1.77 MG/DL (ref 0.7–1.3)
GLUCOSE SERPL-MCNC: 65 MG/DL (ref 65–100)
POTASSIUM SERPL-SCNC: 5.3 MMOL/L (ref 3.5–5.1)
SODIUM SERPL-SCNC: 132 MMOL/L (ref 136–145)

## 2024-02-05 LAB
COMMENT:: NORMAL
SPECIMEN HOLD: NORMAL

## 2024-02-06 LAB — DRUGS UR: NORMAL

## 2024-02-13 DIAGNOSIS — E78.2 MIXED HYPERLIPIDEMIA: ICD-10-CM

## 2024-02-13 RX ORDER — ALENDRONATE SODIUM 70 MG/1
70 TABLET ORAL WEEKLY
Qty: 12 TABLET | Refills: 0 | Status: SHIPPED | OUTPATIENT
Start: 2024-02-13

## 2024-02-13 RX ORDER — ATORVASTATIN CALCIUM 40 MG/1
40 TABLET, FILM COATED ORAL DAILY
Qty: 90 TABLET | Refills: 0 | Status: SHIPPED | OUTPATIENT
Start: 2024-02-13

## 2024-02-13 RX ORDER — ERGOCALCIFEROL 1.25 MG/1
50000 CAPSULE ORAL WEEKLY
Qty: 12 CAPSULE | Refills: 0 | Status: SHIPPED | OUTPATIENT
Start: 2024-02-13

## 2024-02-22 DIAGNOSIS — E78.2 MIXED HYPERLIPIDEMIA: ICD-10-CM

## 2024-02-23 RX ORDER — ERGOCALCIFEROL 1.25 MG/1
50000 CAPSULE ORAL WEEKLY
Qty: 12 CAPSULE | Refills: 0 | OUTPATIENT
Start: 2024-02-23

## 2024-02-23 RX ORDER — ATORVASTATIN CALCIUM 40 MG/1
40 TABLET, FILM COATED ORAL DAILY
Qty: 90 TABLET | Refills: 0 | OUTPATIENT
Start: 2024-02-23

## 2024-02-23 RX ORDER — ALENDRONATE SODIUM 70 MG/1
70 TABLET ORAL WEEKLY
Qty: 12 TABLET | Refills: 0 | OUTPATIENT
Start: 2024-02-23

## 2024-03-01 NOTE — TELEPHONE ENCOUNTER
Patient requesting refill on     Requested Prescriptions     Pending Prescriptions Disp Refills    calcium carbonate (TUMS EX) 750 MG chewable tablet 360 tablet 1     Sig: Take 2 tablets by mouth daily        Last OV 2/2/2024

## 2024-03-04 RX ORDER — CALCIUM CARBONATE 750 MG/1
2 TABLET, CHEWABLE ORAL DAILY
Qty: 360 TABLET | Refills: 1 | Status: SHIPPED | OUTPATIENT
Start: 2024-03-04

## 2024-03-12 DIAGNOSIS — E11.51 TYPE 2 DIABETES MELLITUS WITH DIABETIC PERIPHERAL ANGIOPATHY WITHOUT GANGRENE, WITHOUT LONG-TERM CURRENT USE OF INSULIN (HCC): ICD-10-CM

## 2024-03-12 NOTE — TELEPHONE ENCOUNTER
Patient requesting refill on     Requested Prescriptions     Pending Prescriptions Disp Refills    metFORMIN (GLUCOPHAGE) 1000 MG tablet 180 tablet 0     Sig: Take 1 tablet by mouth 2 times daily (with meals)        Last OV 2/2/2024

## 2024-03-22 DIAGNOSIS — I73.9 PAD (PERIPHERAL ARTERY DISEASE) (HCC): ICD-10-CM

## 2024-03-22 DIAGNOSIS — E11.51 TYPE 2 DIABETES MELLITUS WITH DIABETIC PERIPHERAL ANGIOPATHY WITHOUT GANGRENE, WITHOUT LONG-TERM CURRENT USE OF INSULIN (HCC): ICD-10-CM

## 2024-03-22 NOTE — TELEPHONE ENCOUNTER
Patient requesting refill on     Requested Prescriptions     Pending Prescriptions Disp Refills    gabapentin (NEURONTIN) 300 MG capsule 540 capsule 0     Sig: Take 3 capsules by mouth in the morning and at bedtime for 90 days. Max Daily Amount: 1,800 mg    hydroCHLOROthiazide (HYDRODIURIL) 25 MG tablet 45 tablet 0     Sig: Take 0.5 tablets by mouth daily    clopidogrel (PLAVIX) 75 MG tablet 90 tablet 0     Sig: Take 1 tablet by mouth daily        Last OV 2/2/2024

## 2024-03-25 RX ORDER — GABAPENTIN 300 MG/1
900 CAPSULE ORAL 2 TIMES DAILY
Qty: 540 CAPSULE | Refills: 0 | Status: SHIPPED | OUTPATIENT
Start: 2024-03-25 | End: 2024-06-23

## 2024-03-25 RX ORDER — HYDROCHLOROTHIAZIDE 25 MG/1
12.5 TABLET ORAL DAILY
Qty: 45 TABLET | Refills: 0 | Status: SHIPPED | OUTPATIENT
Start: 2024-03-25

## 2024-03-25 RX ORDER — CLOPIDOGREL BISULFATE 75 MG/1
75 TABLET ORAL DAILY
Qty: 90 TABLET | Refills: 0 | Status: SHIPPED | OUTPATIENT
Start: 2024-03-25

## 2024-04-18 DIAGNOSIS — I10 PRIMARY HYPERTENSION: ICD-10-CM

## 2024-04-19 RX ORDER — LISINOPRIL 40 MG/1
40 TABLET ORAL DAILY
Qty: 90 TABLET | Refills: 0 | Status: SHIPPED | OUTPATIENT
Start: 2024-04-19

## 2024-04-19 RX ORDER — FINASTERIDE 5 MG/1
5 TABLET, FILM COATED ORAL DAILY
Qty: 90 TABLET | Refills: 0 | Status: SHIPPED | OUTPATIENT
Start: 2024-04-19

## 2024-04-30 RX ORDER — ERGOCALCIFEROL 1.25 MG/1
50000 CAPSULE ORAL WEEKLY
Qty: 12 CAPSULE | Refills: 0 | Status: SHIPPED | OUTPATIENT
Start: 2024-04-30

## 2024-04-30 RX ORDER — ALENDRONATE SODIUM 70 MG/1
70 TABLET ORAL WEEKLY
Qty: 12 TABLET | Refills: 0 | Status: SHIPPED | OUTPATIENT
Start: 2024-04-30

## 2024-04-30 NOTE — TELEPHONE ENCOUNTER
Patient requesting refill on     Requested Prescriptions     Pending Prescriptions Disp Refills    alendronate (FOSAMAX) 70 MG tablet 12 tablet 0     Sig: Take 1 tablet by mouth once a week    vitamin D (ERGOCALCIFEROL) 1.25 MG (52278 UT) CAPS capsule 12 capsule 0     Sig: Take 1 capsule by mouth once a week        Last OV 2/2/2024

## 2024-05-10 ENCOUNTER — TELEPHONE (OUTPATIENT)
Age: 79
End: 2024-05-10

## 2024-05-10 NOTE — TELEPHONE ENCOUNTER
JAMES PRESLEY Dalila regarding Lung screen information result.  Per Dalila, transferred to , per the recording, no one available to take the call at this time, return contact information was left.

## 2024-05-10 NOTE — TELEPHONE ENCOUNTER
----- Message from JENNIFER Vazquez NP sent at 5/10/2024 10:43 AM EDT -----  Regarding: RE: LUNG SCAN  Contact: [phone]  Please call [facility] to request results.  ----- Message -----  From: Peri Bo MA  Sent: 5/9/2024   4:24 PM EDT  To: JENNIFER Vazquez NP  Subject: FW: LUNG SCAN                                      ----- Message -----  From: Scooter Dickerson  Sent: 5/9/2024   4:22 PM EDT  To: #  Subject: LUNG SCAN                                        HAS ANYONE REVIEWED MY RECENT LUNG SCAN YET?  THANKS  KRISTINE DICKERSON

## 2024-05-10 NOTE — TELEPHONE ENCOUNTER
PC to question per Dalila / [facility], when exactly was the test done?  Per Jean-Paul Dickerson, it was 04/03/2023, informed of will be back in touch when Ena has that result, OK of understanding and thanks.

## 2024-05-13 NOTE — TELEPHONE ENCOUNTER
Jose D returned the PC, a note was put in the system today, regarding the MR being faxed, not sure exactly when.  A nurse had called for it as well.

## 2024-05-14 DIAGNOSIS — F17.200 CURRENT SMOKER: ICD-10-CM

## 2024-05-17 DIAGNOSIS — N40.0 BENIGN PROSTATIC HYPERPLASIA WITHOUT LOWER URINARY TRACT SYMPTOMS: ICD-10-CM

## 2024-05-17 DIAGNOSIS — E78.2 MIXED HYPERLIPIDEMIA: ICD-10-CM

## 2024-05-17 RX ORDER — TAMSULOSIN HYDROCHLORIDE 0.4 MG/1
0.8 CAPSULE ORAL DAILY
Qty: 180 CAPSULE | Refills: 0 | Status: SHIPPED | OUTPATIENT
Start: 2024-05-17

## 2024-05-17 RX ORDER — ATORVASTATIN CALCIUM 40 MG/1
40 TABLET, FILM COATED ORAL DAILY
Qty: 90 TABLET | Refills: 0 | Status: SHIPPED | OUTPATIENT
Start: 2024-05-17

## 2024-05-30 ENCOUNTER — TELEPHONE (OUTPATIENT)
Age: 79
End: 2024-05-30

## 2024-05-30 DIAGNOSIS — R93.89 ABNORMAL CHEST CT: Primary | ICD-10-CM

## 2024-05-30 DIAGNOSIS — Z87.891 PERSONAL HISTORY OF TOBACCO USE: ICD-10-CM

## 2024-05-30 DIAGNOSIS — F17.200 CURRENT SMOKER: ICD-10-CM

## 2024-05-30 NOTE — TELEPHONE ENCOUNTER
[facility] called need new order for pt ct lung  he has already had the intial baseline one they need the other ordered not the initial test that the order is for

## 2024-06-03 ENCOUNTER — TELEPHONE (OUTPATIENT)
Age: 79
End: 2024-06-03

## 2024-06-03 ENCOUNTER — HOSPITAL ENCOUNTER (OUTPATIENT)
Facility: HOSPITAL | Age: 79
Discharge: HOME | End: 2024-06-06
Payer: MEDICARE

## 2024-06-03 PROCEDURE — 36415 COLL VENOUS BLD VENIPUNCTURE: CPT

## 2024-06-03 PROCEDURE — 84153 ASSAY OF PSA TOTAL: CPT

## 2024-06-03 NOTE — TELEPHONE ENCOUNTER
----- Message from Linette Merino MA sent at 6/3/2024 11:32 AM EDT -----  Subject: Message to Provider    QUESTIONS  Information for Provider? Patient is calling and states that he received a   message that he needed to schedule a CT chest scan but he had one done on   5/31/24 but never heard anything in regards to results. Does patient need   another one? Please advise   ---------------------------------------------------------------------------  --------------  CALL BACK INFO  [phone]; OK to leave message on voicemail  ---------------------------------------------------------------------------  --------------  SCRIPT ANSWERS  Relationship to Patient? Self

## 2024-06-04 DIAGNOSIS — E11.51 TYPE 2 DIABETES MELLITUS WITH DIABETIC PERIPHERAL ANGIOPATHY WITHOUT GANGRENE, WITHOUT LONG-TERM CURRENT USE OF INSULIN (HCC): ICD-10-CM

## 2024-06-04 LAB — PSA SERPL-MCNC: 15.9 NG/ML (ref 0.01–4)

## 2024-06-06 ENCOUNTER — HOSPITAL ENCOUNTER (OUTPATIENT)
Facility: HOSPITAL | Age: 79
Discharge: HOME | End: 2024-06-06
Payer: MEDICARE

## 2024-06-06 DIAGNOSIS — R93.89 ABNORMAL CHEST CT: ICD-10-CM

## 2024-06-06 DIAGNOSIS — F17.200 CURRENT SMOKER: ICD-10-CM

## 2024-06-06 DIAGNOSIS — Z87.891 PERSONAL HISTORY OF TOBACCO USE: ICD-10-CM

## 2024-06-06 LAB
BUN SERPL-MCNC: 27 MG/DL (ref 6–20)
CREAT SERPL-MCNC: 1.76 MG/DL (ref 0.7–1.3)

## 2024-06-06 PROCEDURE — 36415 COLL VENOUS BLD VENIPUNCTURE: CPT

## 2024-06-06 PROCEDURE — 84520 ASSAY OF UREA NITROGEN: CPT

## 2024-06-06 PROCEDURE — 6360000004 HC RX CONTRAST MEDICATION

## 2024-06-06 PROCEDURE — 82565 ASSAY OF CREATININE: CPT

## 2024-06-06 PROCEDURE — 71260 CT THORAX DX C+: CPT

## 2024-06-06 RX ADMIN — IOPAMIDOL 100 ML: 612 INJECTION, SOLUTION INTRAVENOUS at 08:45

## 2024-06-19 DIAGNOSIS — I73.9 PAD (PERIPHERAL ARTERY DISEASE) (HCC): ICD-10-CM

## 2024-06-19 DIAGNOSIS — E11.51 TYPE 2 DIABETES MELLITUS WITH DIABETIC PERIPHERAL ANGIOPATHY WITHOUT GANGRENE, WITHOUT LONG-TERM CURRENT USE OF INSULIN (HCC): ICD-10-CM

## 2024-06-19 RX ORDER — HYDROCHLOROTHIAZIDE 25 MG/1
12.5 TABLET ORAL DAILY
Qty: 45 TABLET | Refills: 0 | Status: SHIPPED | OUTPATIENT
Start: 2024-06-19

## 2024-06-19 RX ORDER — CLOPIDOGREL BISULFATE 75 MG/1
75 TABLET ORAL DAILY
Qty: 90 TABLET | Refills: 0 | Status: SHIPPED | OUTPATIENT
Start: 2024-06-19

## 2024-06-19 NOTE — TELEPHONE ENCOUNTER
Patient requesting refill on     Requested Prescriptions     Pending Prescriptions Disp Refills    hydroCHLOROthiazide (HYDRODIURIL) 25 MG tablet 45 tablet 0     Sig: Take 0.5 tablets by mouth daily    clopidogrel (PLAVIX) 75 MG tablet 90 tablet 0     Sig: Take 1 tablet by mouth daily        Last OV 2/2/2024

## 2024-06-20 SDOH — HEALTH STABILITY: PHYSICAL HEALTH: ON AVERAGE, HOW MANY DAYS PER WEEK DO YOU ENGAGE IN MODERATE TO STRENUOUS EXERCISE (LIKE A BRISK WALK)?: 0 DAYS

## 2024-06-20 ASSESSMENT — PATIENT HEALTH QUESTIONNAIRE - PHQ9
SUM OF ALL RESPONSES TO PHQ QUESTIONS 1-9: 6
SUM OF ALL RESPONSES TO PHQ QUESTIONS 1-9: 6
9. THOUGHTS THAT YOU WOULD BE BETTER OFF DEAD, OR OF HURTING YOURSELF: NOT AT ALL
1. LITTLE INTEREST OR PLEASURE IN DOING THINGS: NEARLY EVERY DAY
SUM OF ALL RESPONSES TO PHQ9 QUESTIONS 1 & 2: 3
6. FEELING BAD ABOUT YOURSELF - OR THAT YOU ARE A FAILURE OR HAVE LET YOURSELF OR YOUR FAMILY DOWN: NOT AT ALL
SUM OF ALL RESPONSES TO PHQ QUESTIONS 1-9: 6
2. FEELING DOWN, DEPRESSED OR HOPELESS: NOT AT ALL
10. IF YOU CHECKED OFF ANY PROBLEMS, HOW DIFFICULT HAVE THESE PROBLEMS MADE IT FOR YOU TO DO YOUR WORK, TAKE CARE OF THINGS AT HOME, OR GET ALONG WITH OTHER PEOPLE: NOT DIFFICULT AT ALL
SUM OF ALL RESPONSES TO PHQ QUESTIONS 1-9: 6
5. POOR APPETITE OR OVEREATING: SEVERAL DAYS
7. TROUBLE CONCENTRATING ON THINGS, SUCH AS READING THE NEWSPAPER OR WATCHING TELEVISION: NOT AT ALL
8. MOVING OR SPEAKING SO SLOWLY THAT OTHER PEOPLE COULD HAVE NOTICED. OR THE OPPOSITE, BEING SO FIGETY OR RESTLESS THAT YOU HAVE BEEN MOVING AROUND A LOT MORE THAN USUAL: NOT AT ALL
4. FEELING TIRED OR HAVING LITTLE ENERGY: SEVERAL DAYS
3. TROUBLE FALLING OR STAYING ASLEEP: SEVERAL DAYS

## 2024-06-20 ASSESSMENT — LIFESTYLE VARIABLES
HOW MANY STANDARD DRINKS CONTAINING ALCOHOL DO YOU HAVE ON A TYPICAL DAY: PATIENT DOES NOT DRINK
HOW OFTEN DO YOU HAVE A DRINK CONTAINING ALCOHOL: NEVER

## 2024-06-28 ENCOUNTER — OFFICE VISIT (OUTPATIENT)
Age: 79
End: 2024-06-28
Payer: OTHER GOVERNMENT

## 2024-06-28 VITALS
RESPIRATION RATE: 22 BRPM | HEART RATE: 85 BPM | OXYGEN SATURATION: 98 % | TEMPERATURE: 98 F | HEIGHT: 66 IN | WEIGHT: 143 LBS | SYSTOLIC BLOOD PRESSURE: 110 MMHG | BODY MASS INDEX: 22.98 KG/M2 | DIASTOLIC BLOOD PRESSURE: 60 MMHG

## 2024-06-28 DIAGNOSIS — N18.32 CHRONIC KIDNEY DISEASE, STAGE 3B (HCC): ICD-10-CM

## 2024-06-28 DIAGNOSIS — K80.20 GALLSTONES: ICD-10-CM

## 2024-06-28 DIAGNOSIS — J44.9 CHRONIC OBSTRUCTIVE PULMONARY DISEASE, UNSPECIFIED COPD TYPE (HCC): ICD-10-CM

## 2024-06-28 DIAGNOSIS — J44.9 CHRONIC OBSTRUCTIVE PULMONARY DISEASE, UNSPECIFIED COPD TYPE (HCC): Primary | ICD-10-CM

## 2024-06-28 PROCEDURE — 99213 OFFICE O/P EST LOW 20 MIN: CPT

## 2024-06-28 PROCEDURE — 1123F ACP DISCUSS/DSCN MKR DOCD: CPT

## 2024-06-28 RX ORDER — FLUTICASONE FUROATE, UMECLIDINIUM BROMIDE AND VILANTEROL TRIFENATATE 200; 62.5; 25 UG/1; UG/1; UG/1
1 POWDER RESPIRATORY (INHALATION) DAILY
Qty: 60 EACH | Refills: 5 | Status: SHIPPED | OUTPATIENT
Start: 2024-06-28

## 2024-06-28 RX ORDER — TIOTROPIUM BROMIDE 18 UG/1
CAPSULE ORAL; RESPIRATORY (INHALATION)
OUTPATIENT
Start: 2024-06-28

## 2024-06-28 RX ORDER — GUAIFENESIN 600 MG/1
600 TABLET, EXTENDED RELEASE ORAL 2 TIMES DAILY
Qty: 180 TABLET | Refills: 1 | Status: SHIPPED | OUTPATIENT
Start: 2024-06-28

## 2024-06-28 RX ORDER — TIOTROPIUM BROMIDE 18 UG/1
18 CAPSULE ORAL; RESPIRATORY (INHALATION) DAILY
Qty: 90 CAPSULE | Refills: 3 | Status: SHIPPED | OUTPATIENT
Start: 2024-06-28 | End: 2024-07-08

## 2024-06-28 RX ORDER — FLUTICASONE FUROATE, UMECLIDINIUM BROMIDE AND VILANTEROL TRIFENATATE 200; 62.5; 25 UG/1; UG/1; UG/1
POWDER RESPIRATORY (INHALATION)
OUTPATIENT
Start: 2024-06-28

## 2024-06-28 RX ORDER — FLUTICASONE PROPIONATE AND SALMETEROL 250; 50 UG/1; UG/1
1 POWDER RESPIRATORY (INHALATION) EVERY 12 HOURS
Qty: 60 EACH | Refills: 3 | Status: SHIPPED | OUTPATIENT
Start: 2024-06-28 | End: 2024-08-06

## 2024-06-28 ASSESSMENT — ENCOUNTER SYMPTOMS
DIARRHEA: 0
SHORTNESS OF BREATH: 1
ALLERGIC/IMMUNOLOGIC NEGATIVE: 1
WHEEZING: 1
CONSTIPATION: 0
ABDOMINAL PAIN: 0
BLOOD IN STOOL: 0
COUGH: 1
EYES NEGATIVE: 1

## 2024-06-28 ASSESSMENT — LIFESTYLE VARIABLES
HOW MANY STANDARD DRINKS CONTAINING ALCOHOL DO YOU HAVE ON A TYPICAL DAY: 0
HOW OFTEN DO YOU HAVE A DRINK CONTAINING ALCOHOL: 1
HOW OFTEN DO YOU HAVE SIX OR MORE DRINKS ON ONE OCCASION: 1

## 2024-06-28 NOTE — PROGRESS NOTES
\"Have you been to the ER, urgent care clinic since your last visit?  Hospitalized since your last visit?\"    NO    “Have you seen or consulted any other health care providers outside of [facility] since your last visit?”    NO    Chief Complaint   Patient presents with    Results     Discuss CT        Vitals:    06/28/24 0803   BP: 110/60   Pulse: 85   Resp: 22   Temp: 98 °F (36.7 °C)   SpO2: 98%         Click Here for Release of Records Request    
energy 1   Poor appetite or overeating 1   Feeling bad about yourself - or that you are a failure or have let yourself or your family down 0   Trouble concentrating on things, such as reading the newspaper or watching television 0   Moving or speaking so slowly that other people could have noticed. Or the opposite - being so fidgety or restless that you have been moving around a lot more than usual 0   Thoughts that you would be better off dead, or of hurting yourself in some way 0   PHQ-2 Score 3    3   PHQ-9 Total Score 6    6   If you checked off any problems, how difficult have these problems made it for you to do your work, take care of things at home, or get along with other people? 0        Physical Exam  Vitals and nursing note reviewed.   Constitutional:       General: He is not in acute distress.     Appearance: Normal appearance.   HENT:      Head: Normocephalic and atraumatic.   Eyes:      Extraocular Movements: Extraocular movements intact.      Conjunctiva/sclera: Conjunctivae normal.      Pupils: Pupils are equal, round, and reactive to light.   Cardiovascular:      Rate and Rhythm: Normal rate and regular rhythm.      Pulses: Normal pulses.      Heart sounds: Normal heart sounds. No murmur heard.     No friction rub. No gallop.   Pulmonary:      Effort: Pulmonary effort is normal. No respiratory distress.      Breath sounds: Decreased air movement present. Wheezing present. No rhonchi or rales.      Comments: I&E wheezes throughout all lung fields  Skin:     General: Skin is warm and dry.   Neurological:      General: No focal deficit present.      Mental Status: He is alert and oriented to person, place, and time.   Psychiatric:         Mood and Affect: Mood normal.         Behavior: Behavior normal.         Thought Content: Thought content normal.         Judgment: Judgment normal.          ASSESSMENT AND PLAN:     1. Chronic obstructive pulmonary disease, unspecified COPD type (HCC)  -

## 2024-07-08 DIAGNOSIS — E11.51 TYPE 2 DIABETES MELLITUS WITH DIABETIC PERIPHERAL ANGIOPATHY WITHOUT GANGRENE, WITHOUT LONG-TERM CURRENT USE OF INSULIN (HCC): ICD-10-CM

## 2024-07-08 DIAGNOSIS — I10 PRIMARY HYPERTENSION: ICD-10-CM

## 2024-07-08 RX ORDER — FINASTERIDE 5 MG/1
5 TABLET, FILM COATED ORAL DAILY
Qty: 90 TABLET | Refills: 0 | Status: SHIPPED | OUTPATIENT
Start: 2024-07-08

## 2024-07-08 RX ORDER — GABAPENTIN 300 MG/1
900 CAPSULE ORAL 2 TIMES DAILY
Qty: 540 CAPSULE | Refills: 0 | Status: SHIPPED | OUTPATIENT
Start: 2024-07-08 | End: 2024-10-06

## 2024-07-08 RX ORDER — LISINOPRIL 40 MG/1
40 TABLET ORAL DAILY
Qty: 90 TABLET | Refills: 0 | Status: SHIPPED | OUTPATIENT
Start: 2024-07-08

## 2024-07-08 NOTE — TELEPHONE ENCOUNTER
Patient requesting refill on     Requested Prescriptions     Pending Prescriptions Disp Refills    gabapentin (NEURONTIN) 300 MG capsule 540 capsule 0     Sig: Take 3 capsules by mouth in the morning and at bedtime for 90 days. Max Daily Amount: 1,800 mg    lisinopril (PRINIVIL;ZESTRIL) 40 MG tablet 90 tablet 0     Sig: Take 1 tablet by mouth daily    finasteride (PROSCAR) 5 MG tablet 90 tablet 0     Sig: Take 1 tablet by mouth daily        Last OV 6/28/2024

## 2024-07-09 DIAGNOSIS — E11.51 TYPE 2 DIABETES MELLITUS WITH DIABETIC PERIPHERAL ANGIOPATHY WITHOUT GANGRENE, WITHOUT LONG-TERM CURRENT USE OF INSULIN (HCC): ICD-10-CM

## 2024-07-09 RX ORDER — GABAPENTIN 300 MG/1
CAPSULE ORAL
OUTPATIENT
Start: 2024-07-09

## 2024-07-15 DIAGNOSIS — E11.51 TYPE 2 DIABETES MELLITUS WITH DIABETIC PERIPHERAL ANGIOPATHY WITHOUT GANGRENE, WITHOUT LONG-TERM CURRENT USE OF INSULIN (HCC): ICD-10-CM

## 2024-07-16 RX ORDER — GABAPENTIN 300 MG/1
900 CAPSULE ORAL 2 TIMES DAILY
Qty: 540 CAPSULE | Refills: 0 | OUTPATIENT
Start: 2024-07-16 | End: 2024-10-14

## 2024-07-22 ENCOUNTER — TELEPHONE (OUTPATIENT)
Age: 79
End: 2024-07-22

## 2024-07-22 NOTE — TELEPHONE ENCOUNTER
Advised patient [facility] does have the refill, but there was a question which was answered today. They should getting the rx ready to be sent out.

## 2024-07-22 NOTE — TELEPHONE ENCOUNTER
Pt states that [facility] - [place], [place] - [address] - P [phone] - F [phone] [address] [address]  Phone: [phone]  Fax: [phone]    Is telling him that they never received his refill for gabapentin (NEURONTIN) 300 MG capsule that was sent on 7/8. Please advise

## 2024-07-26 ENCOUNTER — APPOINTMENT (OUTPATIENT)
Facility: HOSPITAL | Age: 79
DRG: 872 | End: 2024-07-26
Payer: OTHER GOVERNMENT

## 2024-07-26 ENCOUNTER — HOSPITAL ENCOUNTER (INPATIENT)
Facility: HOSPITAL | Age: 79
LOS: 3 days | Discharge: HOME HEALTH | DRG: 872 | End: 2024-07-29
Attending: EMERGENCY MEDICINE | Admitting: INTERNAL MEDICINE
Payer: OTHER GOVERNMENT

## 2024-07-26 DIAGNOSIS — N17.9 AKI (ACUTE KIDNEY INJURY) (HCC): ICD-10-CM

## 2024-07-26 DIAGNOSIS — R33.8 ACUTE URINARY RETENTION: ICD-10-CM

## 2024-07-26 DIAGNOSIS — A41.9 SEPTIC SHOCK (HCC): Primary | ICD-10-CM

## 2024-07-26 DIAGNOSIS — N40.0 BENIGN PROSTATIC HYPERPLASIA WITHOUT LOWER URINARY TRACT SYMPTOMS: ICD-10-CM

## 2024-07-26 DIAGNOSIS — R65.21 SEPTIC SHOCK (HCC): Primary | ICD-10-CM

## 2024-07-26 LAB
ALBUMIN SERPL-MCNC: 4 G/DL (ref 3.5–5)
ALBUMIN/GLOB SERPL: 1 (ref 1.1–2.2)
ALP SERPL-CCNC: 91 U/L (ref 45–117)
ALT SERPL-CCNC: 11 U/L (ref 12–78)
ANION GAP SERPL CALC-SCNC: 14 MMOL/L (ref 5–15)
APPEARANCE UR: CLEAR
AST SERPL-CCNC: 14 U/L (ref 15–37)
BACTERIA URNS QL MICRO: NEGATIVE /HPF
BASOPHILS # BLD: 0.1 K/UL (ref 0–0.1)
BASOPHILS NFR BLD: 1 % (ref 0–1)
BILIRUB SERPL-MCNC: 0.4 MG/DL (ref 0.2–1)
BILIRUB UR QL: NEGATIVE
BUN SERPL-MCNC: 35 MG/DL (ref 6–20)
BUN/CREAT SERPL: 17 (ref 12–20)
CALCIUM SERPL-MCNC: 9.4 MG/DL (ref 8.5–10.1)
CHLORIDE SERPL-SCNC: 93 MMOL/L (ref 97–108)
CO2 SERPL-SCNC: 22 MMOL/L (ref 21–32)
COLOR UR: NORMAL
CREAT SERPL-MCNC: 2.09 MG/DL (ref 0.7–1.3)
DIFFERENTIAL METHOD BLD: ABNORMAL
EOSINOPHIL # BLD: 0.1 K/UL (ref 0–0.4)
EOSINOPHIL NFR BLD: 1 % (ref 0–7)
EPITH CASTS URNS QL MICRO: NORMAL /LPF
ERYTHROCYTE [DISTWIDTH] IN BLOOD BY AUTOMATED COUNT: 15.9 % (ref 11.5–14.5)
FLUAV RNA SPEC QL NAA+PROBE: NOT DETECTED
FLUBV RNA SPEC QL NAA+PROBE: NOT DETECTED
GLOBULIN SER CALC-MCNC: 3.9 G/DL (ref 2–4)
GLUCOSE BLD STRIP.AUTO-MCNC: 163 MG/DL (ref 65–117)
GLUCOSE BLD STRIP.AUTO-MCNC: 167 MG/DL (ref 65–117)
GLUCOSE BLD STRIP.AUTO-MCNC: 177 MG/DL (ref 65–117)
GLUCOSE SERPL-MCNC: 154 MG/DL (ref 65–100)
GLUCOSE UR STRIP.AUTO-MCNC: NEGATIVE MG/DL
HCT VFR BLD AUTO: 33.1 % (ref 36.6–50.3)
HGB BLD-MCNC: 11 G/DL (ref 12.1–17)
HGB UR QL STRIP: NEGATIVE
IMM GRANULOCYTES # BLD AUTO: 0 K/UL (ref 0–0.04)
IMM GRANULOCYTES NFR BLD AUTO: 0 % (ref 0–0.5)
KETONES UR QL STRIP.AUTO: NEGATIVE MG/DL
LACTATE SERPL-SCNC: 2.9 MMOL/L (ref 0.4–2)
LACTATE SERPL-SCNC: 4.3 MMOL/L (ref 0.4–2)
LEUKOCYTE ESTERASE UR QL STRIP.AUTO: NEGATIVE
LYMPHOCYTES # BLD: 1 K/UL (ref 0.8–3.5)
LYMPHOCYTES NFR BLD: 11 % (ref 12–49)
MAGNESIUM SERPL-MCNC: 1.7 MG/DL (ref 1.6–2.4)
MCH RBC QN AUTO: 27.9 PG (ref 26–34)
MCHC RBC AUTO-ENTMCNC: 33.2 G/DL (ref 30–36.5)
MCV RBC AUTO: 84 FL (ref 80–99)
MONOCYTES # BLD: 0.7 K/UL (ref 0–1)
MONOCYTES NFR BLD: 7 % (ref 5–13)
NEUTS SEG # BLD: 7.3 K/UL (ref 1.8–8)
NEUTS SEG NFR BLD: 80 % (ref 32–75)
NITRITE UR QL STRIP.AUTO: NEGATIVE
NRBC # BLD: 0 K/UL (ref 0–0.01)
NRBC BLD-RTO: 0 PER 100 WBC
PH UR STRIP: 5.5 (ref 5–8)
PLATELET # BLD AUTO: 286 K/UL (ref 150–400)
PMV BLD AUTO: 9.6 FL (ref 8.9–12.9)
POTASSIUM SERPL-SCNC: 4.6 MMOL/L (ref 3.5–5.1)
PROCALCITONIN SERPL-MCNC: <0.05 NG/ML
PROT SERPL-MCNC: 7.9 G/DL (ref 6.4–8.2)
PROT UR STRIP-MCNC: NEGATIVE MG/DL
RBC # BLD AUTO: 3.94 M/UL (ref 4.1–5.7)
RBC #/AREA URNS HPF: NORMAL /HPF (ref 0–5)
SARS-COV-2 RNA RESP QL NAA+PROBE: NOT DETECTED
SERVICE CMNT-IMP: ABNORMAL
SODIUM SERPL-SCNC: 129 MMOL/L (ref 136–145)
SP GR UR REFRACTOMETRY: 1.01 (ref 1–1.03)
TROPONIN I SERPL HS-MCNC: 7 NG/L (ref 0–76)
TSH SERPL DL<=0.05 MIU/L-ACNC: 0.9 UIU/ML (ref 0.36–3.74)
URINE CULTURE IF INDICATED: NORMAL
UROBILINOGEN UR QL STRIP.AUTO: 0.2 EU/DL (ref 0.2–1)
WBC # BLD AUTO: 9.1 K/UL (ref 4.1–11.1)
WBC URNS QL MICRO: NORMAL /HPF (ref 0–4)

## 2024-07-26 PROCEDURE — 87040 BLOOD CULTURE FOR BACTERIA: CPT

## 2024-07-26 PROCEDURE — 36415 COLL VENOUS BLD VENIPUNCTURE: CPT

## 2024-07-26 PROCEDURE — 94640 AIRWAY INHALATION TREATMENT: CPT

## 2024-07-26 PROCEDURE — 71045 X-RAY EXAM CHEST 1 VIEW: CPT

## 2024-07-26 PROCEDURE — 74176 CT ABD & PELVIS W/O CONTRAST: CPT

## 2024-07-26 PROCEDURE — 71250 CT THORAX DX C-: CPT

## 2024-07-26 PROCEDURE — 1100000000 HC RM PRIVATE

## 2024-07-26 PROCEDURE — 80053 COMPREHEN METABOLIC PANEL: CPT

## 2024-07-26 PROCEDURE — 96366 THER/PROPH/DIAG IV INF ADDON: CPT

## 2024-07-26 PROCEDURE — 83605 ASSAY OF LACTIC ACID: CPT

## 2024-07-26 PROCEDURE — 97110 THERAPEUTIC EXERCISES: CPT

## 2024-07-26 PROCEDURE — 93005 ELECTROCARDIOGRAM TRACING: CPT

## 2024-07-26 PROCEDURE — 87636 SARSCOV2 & INF A&B AMP PRB: CPT

## 2024-07-26 PROCEDURE — 2580000003 HC RX 258: Performed by: INTERNAL MEDICINE

## 2024-07-26 PROCEDURE — 81001 URINALYSIS AUTO W/SCOPE: CPT

## 2024-07-26 PROCEDURE — 2580000003 HC RX 258: Performed by: EMERGENCY MEDICINE

## 2024-07-26 PROCEDURE — 85025 COMPLETE CBC W/AUTO DIFF WBC: CPT

## 2024-07-26 PROCEDURE — 82962 GLUCOSE BLOOD TEST: CPT

## 2024-07-26 PROCEDURE — 84484 ASSAY OF TROPONIN QUANT: CPT

## 2024-07-26 PROCEDURE — 87086 URINE CULTURE/COLONY COUNT: CPT

## 2024-07-26 PROCEDURE — 51702 INSERT TEMP BLADDER CATH: CPT

## 2024-07-26 PROCEDURE — 96365 THER/PROPH/DIAG IV INF INIT: CPT

## 2024-07-26 PROCEDURE — 96367 TX/PROPH/DG ADDL SEQ IV INF: CPT

## 2024-07-26 PROCEDURE — 99285 EMERGENCY DEPT VISIT HI MDM: CPT

## 2024-07-26 PROCEDURE — 6370000000 HC RX 637 (ALT 250 FOR IP): Performed by: INTERNAL MEDICINE

## 2024-07-26 PROCEDURE — 97161 PT EVAL LOW COMPLEX 20 MIN: CPT

## 2024-07-26 PROCEDURE — 6360000002 HC RX W HCPCS: Performed by: INTERNAL MEDICINE

## 2024-07-26 PROCEDURE — 6360000002 HC RX W HCPCS: Performed by: EMERGENCY MEDICINE

## 2024-07-26 PROCEDURE — 84443 ASSAY THYROID STIM HORMONE: CPT

## 2024-07-26 PROCEDURE — 84145 PROCALCITONIN (PCT): CPT

## 2024-07-26 PROCEDURE — 83735 ASSAY OF MAGNESIUM: CPT

## 2024-07-26 RX ORDER — GABAPENTIN 300 MG/1
900 CAPSULE ORAL 2 TIMES DAILY
Status: DISCONTINUED | OUTPATIENT
Start: 2024-07-26 | End: 2024-07-29 | Stop reason: HOSPADM

## 2024-07-26 RX ORDER — SODIUM CHLORIDE 0.9 % (FLUSH) 0.9 %
5-40 SYRINGE (ML) INJECTION PRN
Status: DISCONTINUED | OUTPATIENT
Start: 2024-07-26 | End: 2024-07-29 | Stop reason: HOSPADM

## 2024-07-26 RX ORDER — ARFORMOTEROL TARTRATE 15 UG/2ML
15 SOLUTION RESPIRATORY (INHALATION)
Status: DISCONTINUED | OUTPATIENT
Start: 2024-07-26 | End: 2024-07-29 | Stop reason: HOSPADM

## 2024-07-26 RX ORDER — SODIUM CHLORIDE 9 MG/ML
INJECTION, SOLUTION INTRAVENOUS PRN
Status: DISCONTINUED | OUTPATIENT
Start: 2024-07-26 | End: 2024-07-29 | Stop reason: HOSPADM

## 2024-07-26 RX ORDER — ATORVASTATIN CALCIUM 40 MG/1
40 TABLET, FILM COATED ORAL NIGHTLY
Status: DISCONTINUED | OUTPATIENT
Start: 2024-07-26 | End: 2024-07-29 | Stop reason: HOSPADM

## 2024-07-26 RX ORDER — SODIUM CHLORIDE 0.9 % (FLUSH) 0.9 %
5-40 SYRINGE (ML) INJECTION EVERY 12 HOURS SCHEDULED
Status: DISCONTINUED | OUTPATIENT
Start: 2024-07-26 | End: 2024-07-29 | Stop reason: HOSPADM

## 2024-07-26 RX ORDER — FINASTERIDE 5 MG/1
5 TABLET, FILM COATED ORAL DAILY
Status: DISCONTINUED | OUTPATIENT
Start: 2024-07-26 | End: 2024-07-29 | Stop reason: HOSPADM

## 2024-07-26 RX ORDER — SODIUM CHLORIDE 9 MG/ML
INJECTION, SOLUTION INTRAVENOUS CONTINUOUS
Status: DISCONTINUED | OUTPATIENT
Start: 2024-07-26 | End: 2024-07-29

## 2024-07-26 RX ORDER — POLYETHYLENE GLYCOL 3350 17 G/17G
17 POWDER, FOR SOLUTION ORAL DAILY PRN
Status: DISCONTINUED | OUTPATIENT
Start: 2024-07-26 | End: 2024-07-29 | Stop reason: HOSPADM

## 2024-07-26 RX ORDER — ONDANSETRON 4 MG/1
4 TABLET, ORALLY DISINTEGRATING ORAL EVERY 8 HOURS PRN
Status: DISCONTINUED | OUTPATIENT
Start: 2024-07-26 | End: 2024-07-29 | Stop reason: HOSPADM

## 2024-07-26 RX ORDER — ENOXAPARIN SODIUM 100 MG/ML
30 INJECTION SUBCUTANEOUS DAILY
Status: DISCONTINUED | OUTPATIENT
Start: 2024-07-26 | End: 2024-07-29 | Stop reason: HOSPADM

## 2024-07-26 RX ORDER — ASPIRIN 81 MG/1
81 TABLET ORAL DAILY
Status: DISCONTINUED | OUTPATIENT
Start: 2024-07-27 | End: 2024-07-29 | Stop reason: HOSPADM

## 2024-07-26 RX ORDER — CLONIDINE HYDROCHLORIDE 0.1 MG/1
0.1 TABLET ORAL EVERY 8 HOURS PRN
Status: DISCONTINUED | OUTPATIENT
Start: 2024-07-26 | End: 2024-07-29 | Stop reason: HOSPADM

## 2024-07-26 RX ORDER — TAMSULOSIN HYDROCHLORIDE 0.4 MG/1
0.8 CAPSULE ORAL DAILY
Status: DISCONTINUED | OUTPATIENT
Start: 2024-07-27 | End: 2024-07-29 | Stop reason: HOSPADM

## 2024-07-26 RX ORDER — ACETAMINOPHEN 650 MG/1
650 SUPPOSITORY RECTAL EVERY 6 HOURS PRN
Status: DISCONTINUED | OUTPATIENT
Start: 2024-07-26 | End: 2024-07-29 | Stop reason: HOSPADM

## 2024-07-26 RX ORDER — 0.9 % SODIUM CHLORIDE 0.9 %
1000 INTRAVENOUS SOLUTION INTRAVENOUS ONCE
Status: COMPLETED | OUTPATIENT
Start: 2024-07-26 | End: 2024-07-26

## 2024-07-26 RX ORDER — GUAIFENESIN 600 MG/1
600 TABLET, EXTENDED RELEASE ORAL 2 TIMES DAILY
Status: DISCONTINUED | OUTPATIENT
Start: 2024-07-26 | End: 2024-07-29 | Stop reason: HOSPADM

## 2024-07-26 RX ORDER — NICOTINE 21 MG/24HR
1 PATCH, TRANSDERMAL 24 HOURS TRANSDERMAL DAILY PRN
Status: DISCONTINUED | OUTPATIENT
Start: 2024-07-26 | End: 2024-07-29 | Stop reason: HOSPADM

## 2024-07-26 RX ORDER — ALBUTEROL SULFATE 2.5 MG/3ML
2.5 SOLUTION RESPIRATORY (INHALATION) EVERY 6 HOURS PRN
Status: DISCONTINUED | OUTPATIENT
Start: 2024-07-26 | End: 2024-07-29 | Stop reason: HOSPADM

## 2024-07-26 RX ORDER — CLOPIDOGREL BISULFATE 75 MG/1
75 TABLET ORAL DAILY
Status: DISCONTINUED | OUTPATIENT
Start: 2024-07-27 | End: 2024-07-29 | Stop reason: HOSPADM

## 2024-07-26 RX ORDER — GLUCAGON 1 MG/ML
1 KIT INJECTION PRN
Status: DISCONTINUED | OUTPATIENT
Start: 2024-07-26 | End: 2024-07-29 | Stop reason: HOSPADM

## 2024-07-26 RX ORDER — ACETAMINOPHEN 325 MG/1
650 TABLET ORAL EVERY 6 HOURS PRN
Status: DISCONTINUED | OUTPATIENT
Start: 2024-07-26 | End: 2024-07-29 | Stop reason: HOSPADM

## 2024-07-26 RX ORDER — ONDANSETRON 2 MG/ML
4 INJECTION INTRAMUSCULAR; INTRAVENOUS EVERY 6 HOURS PRN
Status: DISCONTINUED | OUTPATIENT
Start: 2024-07-26 | End: 2024-07-29 | Stop reason: HOSPADM

## 2024-07-26 RX ORDER — ALBUTEROL SULFATE 90 UG/1
2 AEROSOL, METERED RESPIRATORY (INHALATION) EVERY 6 HOURS PRN
Status: DISCONTINUED | OUTPATIENT
Start: 2024-07-26 | End: 2024-07-26

## 2024-07-26 RX ORDER — BUDESONIDE 0.5 MG/2ML
0.5 INHALANT ORAL
Status: DISCONTINUED | OUTPATIENT
Start: 2024-07-26 | End: 2024-07-29 | Stop reason: HOSPADM

## 2024-07-26 RX ORDER — INSULIN LISPRO 100 [IU]/ML
0-8 INJECTION, SOLUTION INTRAVENOUS; SUBCUTANEOUS
Status: DISCONTINUED | OUTPATIENT
Start: 2024-07-26 | End: 2024-07-29 | Stop reason: HOSPADM

## 2024-07-26 RX ORDER — DEXTROSE MONOHYDRATE 100 MG/ML
INJECTION, SOLUTION INTRAVENOUS CONTINUOUS PRN
Status: DISCONTINUED | OUTPATIENT
Start: 2024-07-26 | End: 2024-07-29 | Stop reason: HOSPADM

## 2024-07-26 RX ORDER — INSULIN LISPRO 100 [IU]/ML
0-4 INJECTION, SOLUTION INTRAVENOUS; SUBCUTANEOUS NIGHTLY
Status: DISCONTINUED | OUTPATIENT
Start: 2024-07-26 | End: 2024-07-29 | Stop reason: HOSPADM

## 2024-07-26 RX ADMIN — IPRATROPIUM BROMIDE 0.5 MG: 0.5 SOLUTION RESPIRATORY (INHALATION) at 20:03

## 2024-07-26 RX ADMIN — BUDESONIDE 500 MCG: 0.5 SUSPENSION RESPIRATORY (INHALATION) at 14:25

## 2024-07-26 RX ADMIN — ARFORMOTEROL TARTRATE 15 MCG: 15 SOLUTION RESPIRATORY (INHALATION) at 14:25

## 2024-07-26 RX ADMIN — ATORVASTATIN CALCIUM 40 MG: 40 TABLET, FILM COATED ORAL at 21:01

## 2024-07-26 RX ADMIN — SODIUM CHLORIDE 1000 ML: 9 INJECTION, SOLUTION INTRAVENOUS at 10:27

## 2024-07-26 RX ADMIN — IPRATROPIUM BROMIDE 0.5 MG: 0.5 SOLUTION RESPIRATORY (INHALATION) at 14:25

## 2024-07-26 RX ADMIN — SODIUM CHLORIDE 1500 MG: 9 INJECTION, SOLUTION INTRAVENOUS at 11:35

## 2024-07-26 RX ADMIN — CEFEPIME 2000 MG: 2 INJECTION, POWDER, FOR SOLUTION INTRAVENOUS at 10:46

## 2024-07-26 RX ADMIN — GUAIFENESIN 600 MG: 600 TABLET ORAL at 21:00

## 2024-07-26 RX ADMIN — GABAPENTIN 900 MG: 300 CAPSULE ORAL at 21:01

## 2024-07-26 RX ADMIN — SODIUM CHLORIDE 1000 ML: 9 INJECTION, SOLUTION INTRAVENOUS at 11:31

## 2024-07-26 RX ADMIN — SODIUM CHLORIDE 1000 ML: 9 INJECTION, SOLUTION INTRAVENOUS at 12:58

## 2024-07-26 RX ADMIN — SODIUM CHLORIDE, PRESERVATIVE FREE 5 ML: 5 INJECTION INTRAVENOUS at 21:01

## 2024-07-26 ASSESSMENT — PAIN SCALES - GENERAL
PAINLEVEL_OUTOF10: 7
PAINLEVEL_OUTOF10: 0
PAINLEVEL_OUTOF10: 0

## 2024-07-26 ASSESSMENT — PAIN - FUNCTIONAL ASSESSMENT: PAIN_FUNCTIONAL_ASSESSMENT: 0-10

## 2024-07-26 NOTE — H&P
A/P and Chest  Toxic drug monitoring: Monitor CBC, Lytes while on Cefepime    Code Status: DNR/DNI as per patient's own wishes, Jaya West # [phone] is POA  DVT Prophylaxis: Lovenox  Baseline: uses Walker    Subjective:   CHIEF COMPLAINT: Generalized weakness and Chills    HISTORY OF PRESENT ILLNESS:     Scooter Dickerson is a 78 y.o.  male with PMHx significant for HTN, Diabetes mellitus 2, COPD, Smoker, PAD, COPD, BPH, elevated PSA presented to emergency with 2 weeks history of chills, feeling cold, Generalized weakness with trouble walking. Pt also reported decreased urination. Pt in emergency noted to be hypothermic, tachycardic and noted to have elevated lactic acid. CT Chest/A/P done which showed enlarged prostate. He also noted to have more then 650ml of urine in his bladder upon bladder scan. Pt denies any sick contact.   We were asked to admit for work up and evaluation of the above problems.     Past medical history  HTN  Diabetes mellitus 2  COPD  Smoker  PAD  COPD  BPH  elevated PSA    Past surgical history  -Leg surgery for PAD    Social History     Tobacco Use    Smoking status: Every Day     Current packs/day: 0.75     Average packs/day: 0.8 packs/day for 60.0 years (45.0 ttl pk-yrs)     Types: Cigarettes    Smokeless tobacco: Never   Substance Use Topics    Alcohol use: Never        Family history  HTN    Allergies   Allergen Reactions    Vitamin D Analogs Other (See Comments)     \"Stomach upset\"    Penicillins Rash        Prior to Admission medications    Medication Sig Start Date End Date Taking? Authorizing Provider   gabapentin (NEURONTIN) 300 MG capsule Take 3 capsules by mouth in the morning and at bedtime for 90 days. Max Daily Amount: 1,800 mg 7/8/24 10/6/24  Ena Henderson, APRN - CRISTOBAL   lisinopril (PRINIVIL;ZESTRIL) 40 MG tablet Take 1 tablet by mouth daily 7/8/24   Ena Henderson APRN - CRISTOBAL   finasteride (PROSCAR) 5 MG tablet Take 1 tablet by mouth daily 7/8/24   Beatriz  Oral -- -- -- -- --   24 0930 (!) 165/71 -- -- (!) 109 14 100 % 1.676 m (5' 6\") 64.9 kg (143 lb)       Temp (24hrs), Av.3 °F (36.3 °C), Min:95.2 °F (35.1 °C), Max:98.4 °F (36.9 °C)             Wt Readings from Last 12 Encounters:   24 64.9 kg (143 lb)   24 64.9 kg (143 lb)   24 63.4 kg (139 lb 12.8 oz)   24 62.1 kg (137 lb)   23 64.9 kg (143 lb)         PHYSICAL EXAM:  General:    Alert, cooperative, appears stated age.     Lungs:   CTA b/l.  No wheezing or Rhonchi. No rales.  Chest wall:  No tenderness.  No accessory muscle use.  Heart:   Regular  rhythm,  No  Murmur.   No edema  Abdomen:   Soft, NT. ND  BS+  Extremities: No cyanosis.  No clubbing,      Skin turgor normal, Radial dial pulse 2+. Capillary refill normal  Neurologic: No facial asymmetry. No aphasia or slurred speech. Symmetrical strength, Sensation grossly intact. AAOx4.         LAB DATA REVIEWED:    Recent Results (from the past 12 hour(s))   POCT Glucose    Collection Time: 24  9:36 AM   Result Value Ref Range    POC Glucose 163 (H) 65 - 117 mg/dL    Performed by: Khang Sawant    CBC with Auto Differential    Collection Time: 24  9:41 AM   Result Value Ref Range    WBC 9.1 4.1 - 11.1 K/uL    RBC 3.94 (L) 4.10 - 5.70 M/uL    Hemoglobin 11.0 (L) 12.1 - 17.0 g/dL    Hematocrit 33.1 (L) 36.6 - 50.3 %    MCV 84.0 80.0 - 99.0 FL    MCH 27.9 26.0 - 34.0 PG    MCHC 33.2 30.0 - 36.5 g/dL    RDW 15.9 (H) 11.5 - 14.5 %    Platelets 286 150 - 400 K/uL    MPV 9.6 8.9 - 12.9 FL    Nucleated RBCs 0.0 0  WBC    nRBC 0.00 0.00 - 0.01 K/uL    Neutrophils % 80 (H) 32 - 75 %    Lymphocytes % 11 (L) 12 - 49 %    Monocytes % 7 5 - 13 %    Eosinophils % 1 0 - 7 %    Basophils % 1 0 - 1 %    Immature Granulocytes % 0 0.0 - 0.5 %    Neutrophils Absolute 7.3 1.8 - 8.0 K/UL    Lymphocytes Absolute 1.0 0.8 - 3.5 K/UL    Monocytes Absolute 0.7 0.0 - 1.0 K/UL    Eosinophils Absolute 0.1 0.0 - 0.4 K/UL    Basophils

## 2024-07-26 NOTE — ED TRIAGE NOTES
Presents with c/o generalized weakness, body aches, and chills. Patient reports symptoms have been progressing for about a week. Denies chest pain, abd pain, n/v.  Reports oliguria and poor oral intake. No medications taken PTA.

## 2024-07-26 NOTE — ED NOTES
Rectal temp 98.4; warming blanket and rectal thermometer probe removed per MD Buenrostro verbal order.

## 2024-07-26 NOTE — TRANSITION OF CARE
TRANSFER - IN REPORT:    Verbal report received from Luis BARNHART RN on Scooter Dickerson  being received from [facility] ED for admission to med-surg floor.       Report consisted of patient’s Situation, Background, Assessment and   Recommendations(SBAR).     Information from the following report(s)  SBAR, Kardex, ED Summary, MAR, Recent Results and Med Rec Status was reviewed with the receiving nurse.     Opportunity for questions and clarification was provided.      Assessment completed upon patient’s arrival to unit and care assumed.

## 2024-07-26 NOTE — CARE COORDINATION
Care Management Initial Assessment       RUR:12%  Readmission? No  1st IM letter given? Yes - 7/26 by case management   1st  letter given: No       07/26/24 1610   Service Assessment   Patient Orientation Alert and Oriented   Cognition Alert   History Provided By Patient   Primary Caregiver Self   Support Systems Family Members   Patient's Healthcare Decision Maker is: Legal Next of Kin   PCP Verified by CM Yes  (Ena Henderson NP)   Last Visit to PCP Within last 3 months   Prior Functional Level Independent in ADLs/IADLs   Current Functional Level Independent in ADLs/IADLs   Can patient return to prior living arrangement Yes   Ability to make needs known: Good   Family able to assist with home care needs: Yes   Would you like for me to discuss the discharge plan with any other family members/significant others, and if so, who? Yes  (Niece)   Financial Resources Medicare; ([place])   Community Resources Other (Comment)  (Independent Living)   Social/Functional History   Lives With Alone   Type of Home Senior housing apartment  (Independent Living)   Home Equipment Rollator   Active  Yes   Discharge Planning   Type of Residence Independent Living   Living Arrangements Alone   Current Services Prior To Admission None   Patient expects to be discharged to: Independent living facility       Mr. Dickerson lives in a independent living apartment at [facility]. He is normally independent. Still drives. Uses a rollator at home. No Medicare listed on Facesheet but patient states he has Medicare, supplement and VA benefits. He said he went to [facility] once. Patient signed VA paperwork, MD signed and CM signed VA paperwork.Faxed info to VA.  Patient is currently in inpatient status. Important Message from Medicare Letter provided to Scooter Dickerson. Oral explanation was provided and all questions answered. Signed document placed on the bedside chart to be scanned under the media tab. Copy provided

## 2024-07-26 NOTE — ED PROVIDER NOTES
[facility] EMERGENCY DEP  EMERGENCY DEPARTMENT ENCOUNTER      Patient Name: Scooter Dickerson  MRN: [id number]  Birthdate 1945  Date of Evaluation: 7/26/2024  Physician: Jorge Buenrostro MD    CHIEF COMPLAINT       Chief Complaint   Patient presents with    Generalized Body Aches    Chills       HISTORY OF PRESENT ILLNESS   (Location/Symptom, Timing/Onset, Context/Setting, Quality, Duration, Modifying Factors, Severity)   Scooter Dickerson, 78 y.o., male     This is a 78-year-old male patient with past medical history of DM, osteoporosis, cataract surgery presenting to the ED today by POV complaining of 1 week of generalized fatigue and weakness.  He said that symptoms began shortly after visiting his ophthalmologist and getting his pupils dilated, he woke up the next morning with pus, foreign body sensation, difficulty opening his eyes.  He said that he has had low energy, fatigue, decreased appetite, rigors, weakness.  He endorses dizziness and some difficulty with his balance, however he does use a rollator on the daily.    He denies vision changes, fever, lightheadedness, shortness of breath, chest pain, abdominal pain, numbness, tingling, myalgias.        Nursing Notes were reviewed.    REVIEW OF SYSTEMS    (Not required)   Review of Systems    Except as noted above the remainder of the review of systems was reviewed and negative.     PAST MEDICAL HISTORY   History reviewed. No pertinent past medical history.    SURGICAL HISTORY     History reviewed. No pertinent surgical history.    CURRENT MEDICATIONS       Previous Medications    ALBUTEROL SULFATE HFA (PROVENTIL HFA) 108 (90 BASE) MCG/ACT INHALER    Inhale 2 puffs into the lungs every 6 hours as needed for Wheezing    ALENDRONATE (FOSAMAX) 70 MG TABLET    Take 1 tablet by mouth once a week    ASPIRIN 81 PO    Take 1 tablet every day by oral route.    ATORVASTATIN (LIPITOR) 40 MG TABLET    Take 1 tablet by mouth daily    AZELASTINE (ASTELIN) 0.1 % NASAL SPRAY    1

## 2024-07-26 NOTE — ED NOTES
Admission SBAR Note  Situation/Background:     Patient is being transferred to [facility] ([facility]), Room# 124    Patient's Chief Complaint was chills, weakness and is admitted for CRISTINO, Urinary retention, Sepsis.    CODE STATUS: Full  CSSRS: 0 - No Risk    ISOLATION/PRECAUTIONS: No    Is this a behavioral health patient? No  Has wanding been completed No  Are belongings secure? No    Called outstanding consults: Yes    STAT labs collected: Yes    Repeat Lactic Acid DUE? No      All STAT orders are complete: Yes    The following personal items will be sent with the patient during transfer to the floor:     All valuables: Cell phone, glasses      ASSESSMENT:    NEURO:   NIH SCORE: 0,1-4,5-15,15-20,21-42: 0   GEOVANI SWALLOW SCREEN COMPLETE: No  ORIENTATION LEVEL: ORIENTATION LEVEL: Person, Place, Time, and Situation  Cognition:  appropriate decision making, appropriate for age attention/concentration, and appropriate safety awareness  Speech: shows no evidence of impairment    Is patient impulsive? No  Is patient oriented? Yes  Do they follow commands? Yes  Is the patient ambulatory? Yes    FALL RISK? Yes  Interventions: Implemented/recommended use of non-skid footwear and Implemented/recommended assistive devices and encouraged their use    RESPIRATORY:   Is patient on oxygen? No  Oxygen therapy: room air      CARDIAC:   Is cardiac monitoring ordered? Yes    Last Rhythm: Rhythm including paccardio: Sinus Tachycardia 108  Patient to transfer with tele box on? Yes  Infusions: Meds; iv fluids: normal saline  LINE ACCESS: 20G Peripheral IV , Forearm , Iv rate: a bolus of 1 L then      /GI:   Continent Bowel/Bladder? New sosa placed 7/26/24 for urinary retention.     INTEGUMENTARY:  IS THE PATIENT UNDRESSED? Yes  ARE THERE WOUNDS PRESENT? No    RESTRAINTS IN USE: No           Vital Signs:   / Level of Consciousness: Alert (0)    Vitals:    07/26/24 1130 07/26/24
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
## 2024-07-26 NOTE — ACP (ADVANCE CARE PLANNING)
Advance Care Planning     General Advance Care Planning (ACP) Conversation    Date of Conversation: 7/26/2024  Conducted with: Patient with Decision Making Capacity  Other persons present: None    Healthcare Decision Maker:   Primary Decision Maker: Susie West - Niece/Nephew - [phone]  Click here to complete Healthcare Decision Makers including selection of the Healthcare Decision Maker Relationship (ie \"Primary\").   Today we documented Decision Maker(s) consistent with Legal Next of Kin hierarchy.    Content/Action Overview:  Has ACP document(s) NOT on file - requested patient to provide    treatment goals  N/a    Length of Voluntary ACP Conversation in minutes:  <16 minutes (Non-Billable)    Deepa Bazzi

## 2024-07-27 LAB
ALBUMIN SERPL-MCNC: 2.6 G/DL (ref 3.5–5)
ALBUMIN/GLOB SERPL: 1 (ref 1.1–2.2)
ALP SERPL-CCNC: 58 U/L (ref 45–117)
ALT SERPL-CCNC: 9 U/L (ref 12–78)
ANION GAP SERPL CALC-SCNC: 9 MMOL/L (ref 5–15)
AST SERPL-CCNC: 12 U/L (ref 15–37)
BASOPHILS # BLD: 0.1 K/UL (ref 0–0.1)
BASOPHILS NFR BLD: 1 % (ref 0–1)
BILIRUB SERPL-MCNC: 0.3 MG/DL (ref 0.2–1)
BUN SERPL-MCNC: 33 MG/DL (ref 6–20)
BUN/CREAT SERPL: 19 (ref 12–20)
CALCIUM SERPL-MCNC: 7.7 MG/DL (ref 8.5–10.1)
CHLORIDE SERPL-SCNC: 104 MMOL/L (ref 97–108)
CO2 SERPL-SCNC: 23 MMOL/L (ref 21–32)
CREAT SERPL-MCNC: 1.74 MG/DL (ref 0.7–1.3)
DIFFERENTIAL METHOD BLD: ABNORMAL
EOSINOPHIL # BLD: 0 K/UL (ref 0–0.4)
EOSINOPHIL NFR BLD: 0 % (ref 0–7)
ERYTHROCYTE [DISTWIDTH] IN BLOOD BY AUTOMATED COUNT: 16 % (ref 11.5–14.5)
GLOBULIN SER CALC-MCNC: 2.7 G/DL (ref 2–4)
GLUCOSE BLD STRIP.AUTO-MCNC: 108 MG/DL (ref 65–117)
GLUCOSE BLD STRIP.AUTO-MCNC: 118 MG/DL (ref 65–117)
GLUCOSE BLD STRIP.AUTO-MCNC: 128 MG/DL (ref 65–117)
GLUCOSE BLD STRIP.AUTO-MCNC: 129 MG/DL (ref 65–117)
GLUCOSE SERPL-MCNC: 88 MG/DL (ref 65–100)
HCT VFR BLD AUTO: 23.1 % (ref 36.6–50.3)
HGB BLD-MCNC: 7.8 G/DL (ref 12.1–17)
IMM GRANULOCYTES # BLD AUTO: 0 K/UL (ref 0–0.04)
IMM GRANULOCYTES NFR BLD AUTO: 0 % (ref 0–0.5)
LACTATE SERPL-SCNC: 0.7 MMOL/L (ref 0.4–2)
LACTATE SERPL-SCNC: 1.3 MMOL/L (ref 0.4–2)
LYMPHOCYTES # BLD: 0.6 K/UL (ref 0.8–3.5)
LYMPHOCYTES NFR BLD: 9 % (ref 12–49)
MCH RBC QN AUTO: 28.3 PG (ref 26–34)
MCHC RBC AUTO-ENTMCNC: 33.8 G/DL (ref 30–36.5)
MCV RBC AUTO: 83.7 FL (ref 80–99)
MONOCYTES # BLD: 0.1 K/UL (ref 0–1)
MONOCYTES NFR BLD: 2 % (ref 5–13)
NEUTS BAND NFR BLD MANUAL: 6 %
NEUTS SEG # BLD: 5.5 K/UL (ref 1.8–8)
NEUTS SEG NFR BLD: 82 % (ref 32–75)
NRBC # BLD: 0 K/UL (ref 0–0.01)
NRBC BLD-RTO: 0 PER 100 WBC
PLATELET # BLD AUTO: 221 K/UL (ref 150–400)
PLATELET COMMENT: ABNORMAL
PMV BLD AUTO: 10.2 FL (ref 8.9–12.9)
POTASSIUM SERPL-SCNC: 4.6 MMOL/L (ref 3.5–5.1)
PROT SERPL-MCNC: 5.3 G/DL (ref 6.4–8.2)
RBC # BLD AUTO: 2.76 M/UL (ref 4.1–5.7)
RBC MORPH BLD: ABNORMAL
SERVICE CMNT-IMP: ABNORMAL
SERVICE CMNT-IMP: NORMAL
SODIUM SERPL-SCNC: 136 MMOL/L (ref 136–145)
WBC # BLD AUTO: 6.3 K/UL (ref 4.1–11.1)

## 2024-07-27 PROCEDURE — 1100000000 HC RM PRIVATE

## 2024-07-27 PROCEDURE — 36415 COLL VENOUS BLD VENIPUNCTURE: CPT

## 2024-07-27 PROCEDURE — 2580000003 HC RX 258: Performed by: INTERNAL MEDICINE

## 2024-07-27 PROCEDURE — 6360000002 HC RX W HCPCS: Performed by: INTERNAL MEDICINE

## 2024-07-27 PROCEDURE — 85025 COMPLETE CBC W/AUTO DIFF WBC: CPT

## 2024-07-27 PROCEDURE — 6370000000 HC RX 637 (ALT 250 FOR IP): Performed by: INTERNAL MEDICINE

## 2024-07-27 PROCEDURE — 82962 GLUCOSE BLOOD TEST: CPT

## 2024-07-27 PROCEDURE — 83605 ASSAY OF LACTIC ACID: CPT

## 2024-07-27 PROCEDURE — 80053 COMPREHEN METABOLIC PANEL: CPT

## 2024-07-27 PROCEDURE — 94761 N-INVAS EAR/PLS OXIMETRY MLT: CPT

## 2024-07-27 PROCEDURE — 94640 AIRWAY INHALATION TREATMENT: CPT

## 2024-07-27 RX ORDER — 0.9 % SODIUM CHLORIDE 0.9 %
500 INTRAVENOUS SOLUTION INTRAVENOUS ONCE
Status: COMPLETED | OUTPATIENT
Start: 2024-07-27 | End: 2024-07-27

## 2024-07-27 RX ADMIN — SODIUM CHLORIDE, PRESERVATIVE FREE 10 ML: 5 INJECTION INTRAVENOUS at 09:04

## 2024-07-27 RX ADMIN — GABAPENTIN 900 MG: 300 CAPSULE ORAL at 20:45

## 2024-07-27 RX ADMIN — CEFEPIME 1000 MG: 1 INJECTION, POWDER, FOR SOLUTION INTRAMUSCULAR; INTRAVENOUS at 23:16

## 2024-07-27 RX ADMIN — ARFORMOTEROL TARTRATE 15 MCG: 15 SOLUTION RESPIRATORY (INHALATION) at 08:36

## 2024-07-27 RX ADMIN — CEFEPIME 1000 MG: 1 INJECTION, POWDER, FOR SOLUTION INTRAMUSCULAR; INTRAVENOUS at 11:50

## 2024-07-27 RX ADMIN — GABAPENTIN 900 MG: 300 CAPSULE ORAL at 09:03

## 2024-07-27 RX ADMIN — IPRATROPIUM BROMIDE 0.5 MG: 0.5 SOLUTION RESPIRATORY (INHALATION) at 14:32

## 2024-07-27 RX ADMIN — BUDESONIDE 500 MCG: 0.5 SUSPENSION RESPIRATORY (INHALATION) at 20:00

## 2024-07-27 RX ADMIN — SODIUM CHLORIDE: 9 INJECTION, SOLUTION INTRAVENOUS at 18:59

## 2024-07-27 RX ADMIN — IPRATROPIUM BROMIDE 0.5 MG: 0.5 SOLUTION RESPIRATORY (INHALATION) at 08:36

## 2024-07-27 RX ADMIN — ATORVASTATIN CALCIUM 40 MG: 40 TABLET, FILM COATED ORAL at 20:45

## 2024-07-27 RX ADMIN — ENOXAPARIN SODIUM 30 MG: 100 INJECTION SUBCUTANEOUS at 09:03

## 2024-07-27 RX ADMIN — CEFEPIME 1000 MG: 1 INJECTION, POWDER, FOR SOLUTION INTRAMUSCULAR; INTRAVENOUS at 00:28

## 2024-07-27 RX ADMIN — CLOPIDOGREL BISULFATE 75 MG: 75 TABLET ORAL at 09:03

## 2024-07-27 RX ADMIN — ASPIRIN 81 MG: 81 TABLET, COATED ORAL at 09:03

## 2024-07-27 RX ADMIN — GUAIFENESIN 600 MG: 600 TABLET ORAL at 09:03

## 2024-07-27 RX ADMIN — FINASTERIDE 5 MG: 5 TABLET, FILM COATED ORAL at 09:03

## 2024-07-27 RX ADMIN — SODIUM CHLORIDE 126 ML/HR: 9 INJECTION, SOLUTION INTRAVENOUS at 07:17

## 2024-07-27 RX ADMIN — SODIUM CHLORIDE 500 ML: 9 INJECTION, SOLUTION INTRAVENOUS at 02:49

## 2024-07-27 RX ADMIN — SODIUM CHLORIDE, PRESERVATIVE FREE 5 ML: 5 INJECTION INTRAVENOUS at 20:45

## 2024-07-27 RX ADMIN — GUAIFENESIN 600 MG: 600 TABLET ORAL at 20:45

## 2024-07-27 RX ADMIN — BUDESONIDE 500 MCG: 0.5 SUSPENSION RESPIRATORY (INHALATION) at 08:36

## 2024-07-27 RX ADMIN — TAMSULOSIN HYDROCHLORIDE 0.8 MG: 0.4 CAPSULE ORAL at 09:03

## 2024-07-27 RX ADMIN — SODIUM CHLORIDE: 9 INJECTION, SOLUTION INTRAVENOUS at 00:26

## 2024-07-27 RX ADMIN — ARFORMOTEROL TARTRATE 15 MCG: 15 SOLUTION RESPIRATORY (INHALATION) at 20:00

## 2024-07-27 RX ADMIN — IPRATROPIUM BROMIDE 0.5 MG: 0.5 SOLUTION RESPIRATORY (INHALATION) at 20:00

## 2024-07-27 ASSESSMENT — PAIN SCALES - GENERAL
PAINLEVEL_OUTOF10: 0

## 2024-07-27 NOTE — PLAN OF CARE
Problem: Pain  Goal: Verbalizes/displays adequate comfort level or baseline comfort level  7/27/2024 0930 by Yamile Rios RN  Outcome: Progressing  7/27/2024 0343 by Kaya Doyle RN  Outcome: Progressing     Problem: ABCDS Injury Assessment  Goal: Absence of physical injury  7/27/2024 0930 by Yamile Rios RN  Outcome: Progressing  7/27/2024 0343 by Kaya Doyle RN  Outcome: Progressing     Problem: Safety - Adult  Goal: Free from fall injury  7/27/2024 0930 by Yamile Rios RN  Outcome: Progressing  7/27/2024 0343 by Kaya Doyle RN  Outcome: Progressing     Problem: Respiratory - Adult  Goal: Achieves optimal ventilation and oxygenation  7/27/2024 0840 by Liliam Maxwell, RT  Outcome: Progressing

## 2024-07-27 NOTE — H&P
Hospitalist Admission Note    NAME:   Scooter Dickerson   : 1945   MRN: [id number]     Date/Time: 2024 6:10 PM    Patient PCP: nEa Henderson APRN - NP    ______________________________________________________________________  Given the patient's current clinical presentation, I have a high level of concern for decompensation if discharged from the emergency department.  Complex decision making was performed, which includes reviewing the patient's available past medical records, laboratory results, and x-ray films.       My assessment of this patient's clinical condition and my plan of care is as follows.    Assessment / Plan:    Severe Sepsis with Hypothermia, tachycardia and lactic acidosis POA  -General Weakness, Chills at home  -Suspect UTI considering Markedly enlarged bladder on CT Scan and > 650ml of urine noted in bladder scan.  -Sosa's cath ordered in ED, to be placed  -UA not done, I have ordered, followup with results  -Cefepime given in ED, will place 1gm Q8H  -COVID19, FLU negative in ED  -IVF with NS 126ml/hr for now  -Serial Lactic Acid  -bear hugger until normal temp  -CT chest/Abd/Pelvis with enlarged prostate  -PT OT  -TSH WNL    Urinary Retention with history of BPH and Elevated PSA  -Sosa to be placed in Emergency  -Likely will need to go home with sosa's with outpatient urology followup  -Continue Finasteride and Tamsulosin    Acute on chronic hyponatremia  Acute on Chronic Renal Failure with baseline CKD3b  -IVF and monitor lytes  -Avoid nephrotoxins    HTN  -Hold HCTZ and Lisinopril  -Place PRN Clonidine 0.1mg Q8HPRN for now    Diabetes Mellitus 2  -Hold Metformin  -Start Sliding scale insulin and hypoglycemic protocol    COPD  -Substitute home inhaler with formulary nebs  -Continue PRN albuterol    PAD  -Continue ASA/Plavix    Smoker  -Counseled  -Daily PRN nicotine patch    Medical Decision Making:   I personally reviewed labs: CBC, CMP  I personally reviewed imaging:CT  detected without intravenous contrast. STOMACH: Unremarkable. SMALL BOWEL: No dilatation or wall thickening. COLON: No dilatation or wall thickening. Rectal catheter in place. APPENDIX: Not well visualized but no acute appendiceal abnormality is suspected. PERITONEUM: No ascites or pneumoperitoneum. RETROPERITONEUM: No lymphadenopathy or aortic aneurysm. Heavy arterial calcification. REPRODUCTIVE ORGANS: The prostate gland is massively enlarged. URINARY BLADDER: No mass or calculus. Moderately distended BONES: No destructive bone lesion. Metallic pin in the right hip. Stable L1-2 fusion with degenerative change. ADDITIONAL COMMENTS: N/A     1. No acute chest, abdominal or pelvic abnormality. Numerous incidental and postoperative changes described above. 2. Marked prostatic enlargement. 3. Rectal catheter. Electronically signed by KIM MARK    XR CHEST PORTABLE    Result Date: 7/26/2024  EXAM:  XR CHEST PORTABLE INDICATION: Sepsis COMPARISON: none TECHNIQUE: 949 portable chest AP view FINDINGS: The cardiac silhouette is within normal limits. The pulmonary vasculature is within normal limits. The lungs and pleural spaces are clear. The visualized bones and upper abdomen are age-appropriate.     No acute abnormality identified. Electronically signed by Dustin Whitmore     _______________________________________________________________________    TOTAL TIME:  76 Minutes    Critical Care Provided     Minutes non procedure based    Signed: Ele Ramírez MD    Procedures: see electronic medical records for all procedures/Xrays and details which were not copied into this note but were reviewed prior to creation of Plan.

## 2024-07-27 NOTE — PROGRESS NOTES
Pt refused CHG bath at this time but agreeable to complete sosa care. Pt educated on importance of CHG bath but still refuses at this time.

## 2024-07-28 LAB
ANION GAP SERPL CALC-SCNC: 9 MMOL/L (ref 5–15)
BASOPHILS # BLD: 0.1 K/UL (ref 0–0.1)
BASOPHILS NFR BLD: 1 % (ref 0–1)
BUN SERPL-MCNC: 27 MG/DL (ref 6–20)
BUN/CREAT SERPL: 14 (ref 12–20)
CALCIUM SERPL-MCNC: 7.6 MG/DL (ref 8.5–10.1)
CHLORIDE SERPL-SCNC: 103 MMOL/L (ref 97–108)
CO2 SERPL-SCNC: 24 MMOL/L (ref 21–32)
CREAT SERPL-MCNC: 1.87 MG/DL (ref 0.7–1.3)
DIFFERENTIAL METHOD BLD: ABNORMAL
EOSINOPHIL # BLD: 0.1 K/UL (ref 0–0.4)
EOSINOPHIL NFR BLD: 1 % (ref 0–7)
ERYTHROCYTE [DISTWIDTH] IN BLOOD BY AUTOMATED COUNT: 16.3 % (ref 11.5–14.5)
GLUCOSE BLD STRIP.AUTO-MCNC: 126 MG/DL (ref 65–117)
GLUCOSE BLD STRIP.AUTO-MCNC: 127 MG/DL (ref 65–117)
GLUCOSE BLD STRIP.AUTO-MCNC: 176 MG/DL (ref 65–117)
GLUCOSE BLD STRIP.AUTO-MCNC: 184 MG/DL (ref 65–117)
GLUCOSE SERPL-MCNC: 215 MG/DL (ref 65–100)
HCT VFR BLD AUTO: 24.6 % (ref 36.6–50.3)
HGB BLD-MCNC: 8.2 G/DL (ref 12.1–17)
IMM GRANULOCYTES # BLD AUTO: 0 K/UL (ref 0–0.04)
IMM GRANULOCYTES NFR BLD AUTO: 0 % (ref 0–0.5)
LYMPHOCYTES # BLD: 0.6 K/UL (ref 0.8–3.5)
LYMPHOCYTES NFR BLD: 9 % (ref 12–49)
MCH RBC QN AUTO: 28.9 PG (ref 26–34)
MCHC RBC AUTO-ENTMCNC: 33.3 G/DL (ref 30–36.5)
MCV RBC AUTO: 86.6 FL (ref 80–99)
METAMYELOCYTES NFR BLD MANUAL: 1 %
MONOCYTES # BLD: 0.3 K/UL (ref 0–1)
MONOCYTES NFR BLD: 4 % (ref 5–13)
NEUTS SEG # BLD: 5.9 K/UL (ref 1.8–8)
NEUTS SEG NFR BLD: 84 % (ref 32–75)
NRBC # BLD: 0 K/UL (ref 0–0.01)
NRBC BLD-RTO: 0 PER 100 WBC
PLATELET # BLD AUTO: 226 K/UL (ref 150–400)
PLATELET COMMENT: ABNORMAL
PMV BLD AUTO: 9.8 FL (ref 8.9–12.9)
POTASSIUM SERPL-SCNC: 4.2 MMOL/L (ref 3.5–5.1)
RBC # BLD AUTO: 2.84 M/UL (ref 4.1–5.7)
RBC MORPH BLD: ABNORMAL
SERVICE CMNT-IMP: ABNORMAL
SODIUM SERPL-SCNC: 136 MMOL/L (ref 136–145)
WBC # BLD AUTO: 7 K/UL (ref 4.1–11.1)

## 2024-07-28 PROCEDURE — 6370000000 HC RX 637 (ALT 250 FOR IP): Performed by: INTERNAL MEDICINE

## 2024-07-28 PROCEDURE — 94640 AIRWAY INHALATION TREATMENT: CPT

## 2024-07-28 PROCEDURE — 6360000002 HC RX W HCPCS: Performed by: INTERNAL MEDICINE

## 2024-07-28 PROCEDURE — 82962 GLUCOSE BLOOD TEST: CPT

## 2024-07-28 PROCEDURE — 36415 COLL VENOUS BLD VENIPUNCTURE: CPT

## 2024-07-28 PROCEDURE — 2580000003 HC RX 258: Performed by: INTERNAL MEDICINE

## 2024-07-28 PROCEDURE — 80048 BASIC METABOLIC PNL TOTAL CA: CPT

## 2024-07-28 PROCEDURE — 85025 COMPLETE CBC W/AUTO DIFF WBC: CPT

## 2024-07-28 PROCEDURE — 1100000000 HC RM PRIVATE

## 2024-07-28 PROCEDURE — 94761 N-INVAS EAR/PLS OXIMETRY MLT: CPT

## 2024-07-28 RX ORDER — GUAIFENESIN/DEXTROMETHORPHAN 100-10MG/5
5 SYRUP ORAL EVERY 8 HOURS PRN
Status: DISCONTINUED | OUTPATIENT
Start: 2024-07-28 | End: 2024-07-29 | Stop reason: HOSPADM

## 2024-07-28 RX ADMIN — SODIUM CHLORIDE 126 ML/HR: 9 INJECTION, SOLUTION INTRAVENOUS at 06:51

## 2024-07-28 RX ADMIN — ASPIRIN 81 MG: 81 TABLET, COATED ORAL at 09:31

## 2024-07-28 RX ADMIN — GABAPENTIN 900 MG: 300 CAPSULE ORAL at 20:54

## 2024-07-28 RX ADMIN — SODIUM CHLORIDE, PRESERVATIVE FREE 5 ML: 5 INJECTION INTRAVENOUS at 20:54

## 2024-07-28 RX ADMIN — GUAIFENESIN SYRUP AND DEXTROMETHORPHAN 5 ML: 100; 10 SYRUP ORAL at 11:56

## 2024-07-28 RX ADMIN — SODIUM CHLORIDE 126 ML/HR: 9 INJECTION, SOLUTION INTRAVENOUS at 22:48

## 2024-07-28 RX ADMIN — ARFORMOTEROL TARTRATE 15 MCG: 15 SOLUTION RESPIRATORY (INHALATION) at 08:18

## 2024-07-28 RX ADMIN — ARFORMOTEROL TARTRATE 15 MCG: 15 SOLUTION RESPIRATORY (INHALATION) at 19:49

## 2024-07-28 RX ADMIN — GUAIFENESIN 600 MG: 600 TABLET ORAL at 20:54

## 2024-07-28 RX ADMIN — GUAIFENESIN 600 MG: 600 TABLET ORAL at 09:31

## 2024-07-28 RX ADMIN — GABAPENTIN 900 MG: 300 CAPSULE ORAL at 09:31

## 2024-07-28 RX ADMIN — CEFEPIME 1000 MG: 1 INJECTION, POWDER, FOR SOLUTION INTRAMUSCULAR; INTRAVENOUS at 22:55

## 2024-07-28 RX ADMIN — SODIUM CHLORIDE: 9 INJECTION, SOLUTION INTRAVENOUS at 14:57

## 2024-07-28 RX ADMIN — FINASTERIDE 5 MG: 5 TABLET, FILM COATED ORAL at 09:31

## 2024-07-28 RX ADMIN — IPRATROPIUM BROMIDE 0.5 MG: 0.5 SOLUTION RESPIRATORY (INHALATION) at 19:49

## 2024-07-28 RX ADMIN — BUDESONIDE 500 MCG: 0.5 SUSPENSION RESPIRATORY (INHALATION) at 08:18

## 2024-07-28 RX ADMIN — IPRATROPIUM BROMIDE 0.5 MG: 0.5 SOLUTION RESPIRATORY (INHALATION) at 08:18

## 2024-07-28 RX ADMIN — CLOPIDOGREL BISULFATE 75 MG: 75 TABLET ORAL at 09:31

## 2024-07-28 RX ADMIN — TAMSULOSIN HYDROCHLORIDE 0.8 MG: 0.4 CAPSULE ORAL at 09:31

## 2024-07-28 RX ADMIN — CEFEPIME 1000 MG: 1 INJECTION, POWDER, FOR SOLUTION INTRAMUSCULAR; INTRAVENOUS at 11:58

## 2024-07-28 RX ADMIN — BUDESONIDE 500 MCG: 0.5 SUSPENSION RESPIRATORY (INHALATION) at 19:50

## 2024-07-28 RX ADMIN — GUAIFENESIN SYRUP AND DEXTROMETHORPHAN 5 ML: 100; 10 SYRUP ORAL at 02:37

## 2024-07-28 RX ADMIN — IPRATROPIUM BROMIDE 0.5 MG: 0.5 SOLUTION RESPIRATORY (INHALATION) at 15:04

## 2024-07-28 RX ADMIN — POLYETHYLENE GLYCOL 3350 17 G: 17 POWDER, FOR SOLUTION ORAL at 09:32

## 2024-07-28 RX ADMIN — ATORVASTATIN CALCIUM 40 MG: 40 TABLET, FILM COATED ORAL at 20:54

## 2024-07-28 RX ADMIN — ENOXAPARIN SODIUM 30 MG: 100 INJECTION SUBCUTANEOUS at 09:32

## 2024-07-28 RX ADMIN — SODIUM CHLORIDE, PRESERVATIVE FREE 10 ML: 5 INJECTION INTRAVENOUS at 09:32

## 2024-07-28 ASSESSMENT — PAIN SCALES - GENERAL: PAINLEVEL_OUTOF10: 0

## 2024-07-28 NOTE — PROGRESS NOTES
Hospitalist Progress Note    NAME:   Scooter Dickerson   : 1945   MRN: [id number]     Date/Time: 2024 11:30 AM  Patient PCP: Ena Henderson APRN - NP      Assessment / Plan:    Severe Sepsis with Hypothermia, tachycardia and lactic acidosis POA  -RESOLVED  -suspected source was urinary tract infection given urinary retention on admission  -blood cultures x 2 obtained on admission are negative for growth  -Sosa catheter placed in the ED  -initiated on Tamsulosin + Flomax on this admission  -will continue IV antibiotics today, attempt voiding trial in am before discharge       Urinary Retention with history of BPH and Elevated PSA  -Sosa placed in Emergency  -Likely will need to go home with sosa's with outpatient urology followup  -Continue Finasteride and Tamsulosin, per patient's request will attempt a voiding trial in am     Acute on chronic hyponatremia  Acute on Chronic Renal Failure with baseline CKD3b  Resolved, patient at baseline     HTN  -Holding HCTZ and Lisinopril  -PRN Clonidine 0.1mg Q8HPRN for now     Diabetes Mellitus 2  -Sliding scale insulin and hypoglycemic protocol     COPD  -Substitute home inhaler with formulary nebs  -Continue PRN albuterol     PAD  -Continue ASA/Plavix     Smoker  -Counseled  -Daily PRN nicotine patch      Code Status: DNR/DNI as per patient's own wishes, [facility] # [phone] is POA  DVT Prophylaxis: Lovenox  Baseline: uses Walker        Subjective:     Chief Complaint / Reason for Physician Visit  Patient had no new complaints overnight.  No nursing concerns.      Objective:     VITALS:   Last 24hrs VS reviewed since prior progress note. Most recent are:  Patient Vitals for the past 24 hrs:   BP Temp Temp src Pulse Resp SpO2   24 0800 -- -- -- 83 -- --   24 0759 -- -- -- 88 -- --   24 0759 (!) 144/69 98.1 °F (36.7 °C) Oral 81 20 96 %   24 0430 (!) 144/70 97.5 °F (36.4 °C) Oral 83 20 97 %   24 0400 -- --

## 2024-07-28 NOTE — PLAN OF CARE
Problem: Respiratory - Adult  Goal: Achieves optimal ventilation and oxygenation  7/27/2024 2002 by Dane Bloom RCP  Outcome: Progressing

## 2024-07-28 NOTE — PLAN OF CARE
Problem: Respiratory - Adult  Goal: Achieves optimal ventilation and oxygenation  7/28/2024 0821 by Liliam Maxwell,   Outcome: Progressing  7/27/2024 2002 by Dane Bloom RCP  Outcome: Progressing

## 2024-07-28 NOTE — PLAN OF CARE
Problem: Respiratory - Adult  Goal: Achieves optimal ventilation and oxygenation  7/28/2024 1953 by Dane Bloom RCP  Outcome: Progressing

## 2024-07-28 NOTE — PLAN OF CARE
Problem: Pain  Goal: Verbalizes/displays adequate comfort level or baseline comfort level  Outcome: Progressing     Problem: ABCDS Injury Assessment  Goal: Absence of physical injury  Outcome: Progressing     Problem: Safety - Adult  Goal: Free from fall injury  Outcome: Progressing     Problem: Respiratory - Adult  Goal: Achieves optimal ventilation and oxygenation  7/28/2024 0949 by Yamile Rios, RN  Outcome: Progressing  7/28/2024 0821 by Liliam Maxwell, RT  Outcome: Progressing  7/27/2024 2002 by Dane Bloom RCP  Outcome: Progressing     Problem: Skin/Tissue Integrity  Goal: Absence of new skin breakdown  Description: 1.  Monitor for areas of redness and/or skin breakdown  2.  Assess vascular access sites hourly  3.  Every 4-6 hours minimum:  Change oxygen saturation probe site  4.  Every 4-6 hours:  If on nasal continuous positive airway pressure, respiratory therapy assess nares and determine need for appliance change or resting period.  Outcome: Progressing

## 2024-07-29 ENCOUNTER — TELEPHONE (OUTPATIENT)
Age: 79
End: 2024-07-29

## 2024-07-29 VITALS
TEMPERATURE: 98.1 F | RESPIRATION RATE: 17 BRPM | HEART RATE: 88 BPM | DIASTOLIC BLOOD PRESSURE: 68 MMHG | WEIGHT: 138.6 LBS | OXYGEN SATURATION: 98 % | HEIGHT: 66 IN | BODY MASS INDEX: 22.28 KG/M2 | SYSTOLIC BLOOD PRESSURE: 152 MMHG

## 2024-07-29 LAB
ANION GAP SERPL CALC-SCNC: 8 MMOL/L (ref 5–15)
BACTERIA SPEC CULT: NORMAL
BASOPHILS # BLD: 0.1 K/UL (ref 0–0.1)
BASOPHILS NFR BLD: 1 % (ref 0–1)
BUN SERPL-MCNC: 24 MG/DL (ref 6–20)
BUN/CREAT SERPL: 15 (ref 12–20)
CALCIUM SERPL-MCNC: 7.7 MG/DL (ref 8.5–10.1)
CHLORIDE SERPL-SCNC: 106 MMOL/L (ref 97–108)
CO2 SERPL-SCNC: 25 MMOL/L (ref 21–32)
CREAT SERPL-MCNC: 1.62 MG/DL (ref 0.7–1.3)
DIFFERENTIAL METHOD BLD: ABNORMAL
EOSINOPHIL # BLD: 0.3 K/UL (ref 0–0.4)
EOSINOPHIL NFR BLD: 4 % (ref 0–7)
ERYTHROCYTE [DISTWIDTH] IN BLOOD BY AUTOMATED COUNT: 16.5 % (ref 11.5–14.5)
GLUCOSE BLD STRIP.AUTO-MCNC: 146 MG/DL (ref 65–117)
GLUCOSE BLD STRIP.AUTO-MCNC: 185 MG/DL (ref 65–117)
GLUCOSE SERPL-MCNC: 120 MG/DL (ref 65–100)
HCT VFR BLD AUTO: 23.9 % (ref 36.6–50.3)
HGB BLD-MCNC: 7.6 G/DL (ref 12.1–17)
IMM GRANULOCYTES # BLD AUTO: 0.1 K/UL (ref 0–0.04)
IMM GRANULOCYTES NFR BLD AUTO: 1 % (ref 0–0.5)
LYMPHOCYTES # BLD: 0.6 K/UL (ref 0.8–3.5)
LYMPHOCYTES NFR BLD: 9 % (ref 12–49)
MCH RBC QN AUTO: 27.6 PG (ref 26–34)
MCHC RBC AUTO-ENTMCNC: 31.8 G/DL (ref 30–36.5)
MCV RBC AUTO: 86.9 FL (ref 80–99)
MONOCYTES # BLD: 0.7 K/UL (ref 0–1)
MONOCYTES NFR BLD: 11 % (ref 5–13)
NEUTS SEG # BLD: 4.7 K/UL (ref 1.8–8)
NEUTS SEG NFR BLD: 74 % (ref 32–75)
NRBC # BLD: 0 K/UL (ref 0–0.01)
NRBC BLD-RTO: 0 PER 100 WBC
PLATELET # BLD AUTO: 205 K/UL (ref 150–400)
PMV BLD AUTO: 10 FL (ref 8.9–12.9)
POTASSIUM SERPL-SCNC: 4.5 MMOL/L (ref 3.5–5.1)
RBC # BLD AUTO: 2.75 M/UL (ref 4.1–5.7)
RBC MORPH BLD: ABNORMAL
SERVICE CMNT-IMP: ABNORMAL
SERVICE CMNT-IMP: ABNORMAL
SERVICE CMNT-IMP: NORMAL
SODIUM SERPL-SCNC: 139 MMOL/L (ref 136–145)
WBC # BLD AUTO: 6.5 K/UL (ref 4.1–11.1)

## 2024-07-29 PROCEDURE — 2580000003 HC RX 258: Performed by: INTERNAL MEDICINE

## 2024-07-29 PROCEDURE — 6360000002 HC RX W HCPCS: Performed by: INTERNAL MEDICINE

## 2024-07-29 PROCEDURE — 36415 COLL VENOUS BLD VENIPUNCTURE: CPT

## 2024-07-29 PROCEDURE — 94761 N-INVAS EAR/PLS OXIMETRY MLT: CPT

## 2024-07-29 PROCEDURE — 85025 COMPLETE CBC W/AUTO DIFF WBC: CPT

## 2024-07-29 PROCEDURE — 97110 THERAPEUTIC EXERCISES: CPT

## 2024-07-29 PROCEDURE — 97165 OT EVAL LOW COMPLEX 30 MIN: CPT

## 2024-07-29 PROCEDURE — 6370000000 HC RX 637 (ALT 250 FOR IP): Performed by: INTERNAL MEDICINE

## 2024-07-29 PROCEDURE — 82962 GLUCOSE BLOOD TEST: CPT

## 2024-07-29 PROCEDURE — 94640 AIRWAY INHALATION TREATMENT: CPT

## 2024-07-29 PROCEDURE — 97164 PT RE-EVAL EST PLAN CARE: CPT

## 2024-07-29 PROCEDURE — 80048 BASIC METABOLIC PNL TOTAL CA: CPT

## 2024-07-29 RX ORDER — FINASTERIDE 5 MG/1
5 TABLET, FILM COATED ORAL DAILY
Qty: 30 TABLET | Refills: 0 | Status: SHIPPED | OUTPATIENT
Start: 2024-07-29

## 2024-07-29 RX ORDER — TAMSULOSIN HYDROCHLORIDE 0.4 MG/1
0.8 CAPSULE ORAL DAILY
Qty: 60 CAPSULE | Refills: 0 | Status: SHIPPED | OUTPATIENT
Start: 2024-07-29

## 2024-07-29 RX ADMIN — ASPIRIN 81 MG: 81 TABLET, COATED ORAL at 08:28

## 2024-07-29 RX ADMIN — TAMSULOSIN HYDROCHLORIDE 0.8 MG: 0.4 CAPSULE ORAL at 08:28

## 2024-07-29 RX ADMIN — IPRATROPIUM BROMIDE 0.5 MG: 0.5 SOLUTION RESPIRATORY (INHALATION) at 07:16

## 2024-07-29 RX ADMIN — BUDESONIDE 500 MCG: 0.5 SUSPENSION RESPIRATORY (INHALATION) at 07:16

## 2024-07-29 RX ADMIN — CLOPIDOGREL BISULFATE 75 MG: 75 TABLET ORAL at 08:27

## 2024-07-29 RX ADMIN — ARFORMOTEROL TARTRATE 15 MCG: 15 SOLUTION RESPIRATORY (INHALATION) at 07:16

## 2024-07-29 RX ADMIN — SODIUM CHLORIDE, PRESERVATIVE FREE 10 ML: 5 INJECTION INTRAVENOUS at 08:31

## 2024-07-29 RX ADMIN — GUAIFENESIN 600 MG: 600 TABLET ORAL at 08:27

## 2024-07-29 RX ADMIN — ENOXAPARIN SODIUM 30 MG: 100 INJECTION SUBCUTANEOUS at 08:28

## 2024-07-29 RX ADMIN — FINASTERIDE 5 MG: 5 TABLET, FILM COATED ORAL at 08:28

## 2024-07-29 RX ADMIN — GABAPENTIN 900 MG: 300 CAPSULE ORAL at 08:27

## 2024-07-29 ASSESSMENT — PAIN SCALES - GENERAL: PAINLEVEL_OUTOF10: 0

## 2024-07-29 NOTE — PLAN OF CARE
Problem: Respiratory - Adult  Goal: Achieves optimal ventilation and oxygenation  7/29/2024 0720 by Hilton Meza Sr., RCP  Outcome: Progressing  7/28/2024 1953 by Dane Bloom RCP  Outcome: Progressing

## 2024-07-29 NOTE — DISCHARGE SUMMARY
Discharge plan of care/case management plan validated with provider discharge order. Discussed with the patient and all questioned fully answered. He will call me if any problems arise.  Sosa care complete and educated on how to take care of sosa at home.     Discharge Summary    Scooter Dickerson  :  1945  MRN:  [id number]    ADMIT DATE:  2024  DISCHARGE DATE:  2024      Discharge instruction reviewed with Patient and Daughter    Home med's returned No    Personal belongings returned No    Patient Wheeled to front entrance via Wheelchair with Nurse        SIGNED:    Jojo Messina RN

## 2024-07-29 NOTE — DISCHARGE SUMMARY
Discharge Summary    Name: Scooter Dickerson  [id number]  YOB: 1945 (Age: 78 y.o.)   Date of Admission: 7/26/2024  Date of Discharge: 7/29/2024  Attending Physician: Ele Ramírez MD    Discharge Diagnosis:   Severe sepsis - RESOLVED  Acute urinary retention -patient discharged with a Elias catheter  BPH with Elevated PSA  Acute on chronic hyponatremia    Secondary Diagnosis  CKD stabe 3b  DM Type 2  Hypertension  COPD  PAD  Active tobacco abuse    Consultations:  None      Brief Admission History/Reason for Admission   Scooter Dickerson is a 78 y.o.  male with PMHx significant for HTN, Diabetes mellitus 2, COPD, Smoker, PAD, COPD, BPH, elevated PSA presented to emergency with 2 weeks history of chills, feeling cold, Generalized weakness with trouble walking. Pt also reported decreased urination.     In emergency noted to be hypothermic, tachycardic and noted to have elevated lactic acid. CT Chest/A/P done which showed enlarged prostate. He also noted to have more then 650ml of urine in his bladder upon bladder scan. Elias catheter was placed, he was initiated on IV antibiotics and is being admitted under the Hospitalist service for further management.       Brief Hospital Course by Main Problems:     Severe Sepsis with Hypothermia, tachycardia and lactic acidosis POA  -RESOLVED  -suspected source was urinary tract infection given urinary retention on admission  -blood cultures x 2 obtained on admission are negative for growth  -Elias catheter placed in the ED  -initiated on Tamsulosin + Flomax on this admission  -Patient received 3 days of IV Cefepime and completed his antibiotic course while inpatient.  He is being discharged with a Elias catheter for persistent urinary retention.  Case management has arranged for Home Health for Cathter change monthly until his Urology follow up.        Urinary Retention with history of BPH and Elevated PSA  -Elias placed in  tablet by mouth once a week     ASPIRIN 81 PO     atorvastatin 40 MG tablet  Commonly known as: LIPITOR  Take 1 tablet by mouth daily     calcium carbonate 750 MG chewable tablet  Commonly known as: TUMS EX  Take 2 tablets by mouth daily     clopidogrel 75 MG tablet  Commonly known as: PLAVIX  Take 1 tablet by mouth daily     finasteride 5 MG tablet  Commonly known as: PROSCAR  Take 1 tablet by mouth daily     gabapentin 300 MG capsule  Commonly known as: NEURONTIN  Take 3 capsules by mouth in the morning and at bedtime for 90 days. Max Daily Amount: 1,800 mg     hydroCHLOROthiazide 25 MG tablet  Commonly known as: HYDRODIURIL  Take 0.5 tablets by mouth daily     lisinopril 40 MG tablet  Commonly known as: PRINIVIL;ZESTRIL  Take 1 tablet by mouth daily     metFORMIN 1000 MG tablet  Commonly known as: GLUCOPHAGE  Take 1 tablet by mouth 2 times daily (with meals)     tamsulosin 0.4 MG capsule  Commonly known as: FLOMAX  Take 2 capsules by mouth daily     vitamin D 1.25 MG (69812 UT) Caps capsule  Commonly known as: ERGOCALCIFEROL  Take 1 capsule by mouth once a week            STOP taking these medications      KP Ketotifen Fumarate 0.025 % ophthalmic solution  Generic drug: ketotifen     polyvinyl alcohol 1.4 % ophthalmic solution  Commonly known as: LIQUIFILM TEARS            ASK your doctor about these medications      albuterol sulfate  (90 Base) MCG/ACT inhaler  Commonly known as: Proventil HFA  Inhale 2 puffs into the lungs every 6 hours as needed for Wheezing     azelastine 0.1 % nasal spray  Commonly known as: ASTELIN  1 spray by Nasal route 2 times daily Use in each nostril as directed     fluticasone-salmeterol 250-50 MCG/ACT Aepb diskus inhaler  Commonly known as: ADVAIR  Inhale 1 puff into the lungs in the morning and 1 puff in the evening.     guaiFENesin 600 MG extended release tablet  Commonly known as: MUCINEX  Take 1 tablet by mouth 2 times daily     tiotropium 2.5 MCG/ACT Aers  Laps, needles and instruments count was reported as correct.

## 2024-07-29 NOTE — CARE COORDINATION
07/29/24 1139   Services At/After Discharge   Transition of Care Consult (CM Consult) Home Health   Internal Home Health No   Reason Outside Agency Chosen Out of service area   Services At/After Discharge None   [facility] Resource Information Provided? No   Mode of Transport at Discharge Self   Confirm Follow Up Transport Family   Condition of Participation: Discharge Planning   The Plan for Transition of Care is related to the following treatment goals: Medical monitoring, sosa change--Home Health through [email or web address].   The Patient and/or Patient Representative was provided with a Choice of Provider? Patient   The Patient and/Or Patient Representative agree with the Discharge Plan? Yes   Freedom of Choice list was provided with basic dialogue that supports the patient's individualized plan of care/goals, treatment preferences, and shares the quality data associated with the providers?  Yes     Mr. Dickerson is being [place] ([place]) with home health through [email or web address]. They will monitor him medically and change sosa. Sosa changed in ED on 7/26. Spoke with Avinash from [email or web address]. She states they can see him either tomorrow 7/30 or Wednesday 7/31. Patient verbalized understanding and gave permission for possible discharge within 4 hours of receiving IMM  Mr. Dickerson is aware and agrees with the discharge plan. He is aware to contact CM for any questions or concerns even after discharge.     Transition of Care Plan:    RUR: 16%  Prior Level of Functioning: Independent   Disposition: Home   If SNF or IPR: Date FOC offered:   Date FOC received:   Accepting facility:   Date authorization started with reference number:   Date authorization received and expires:   Follow up appointments:   Ena Henderson NP 8/6 at 11:10am   2Jean-Paul Jackson 9/26 at 11:10am   DME needed:   Transportation at discharge:   IM/IMM Medicare/ letter given: 1st IMM obtained 7/26, 2nd obtained 7/29   Is patient a  and connected

## 2024-07-29 NOTE — PROGRESS NOTES
-This patient has the following follow-up appointments scheduled:    -PCP follow up scheduled for Tuesday, August 6 @11:10a.m.  -Urology follow up scheduled for Thursday, September 26 @11:10a.m.

## 2024-07-29 NOTE — PLAN OF CARE
assistance, Ambulation Assistance: Independent, Transfer Assistance: Independent, Active : Yes     Expanded or extensive additional review of patient history:   Social/Functional History  Lives With: Alone  Type of Home: Senior housing apartment ([employer] in [place])  Home Layout: One level  Home Access: Level entry  Bathroom Shower/Tub: Walk-in shower  Bathroom Equipment: Grab bars in shower, Shower chair  Home Equipment: Rollator, Cane  Has the patient had two or more falls in the past year or any fall with injury in the past year?: No  Receives Help From: Other (comment) ([employer] staff)  ADL Assistance: Independent  Homemaking Assistance: Needs assistance  Homemaking Responsibilities: Yes  Ambulation Assistance: Independent  Transfer Assistance: Independent  Active : Yes  Mode of Transportation: Truck    Hand Dominance: right     EXAMINATION OF PERFORMANCE DEFICITS:    Cognitive/Behavioral Status:    Orientation  Overall Orientation Status: Within Normal Limits  Orientation Level: Oriented X4  Cognition  Overall Cognitive Status: WNL    Skin: intact    Edema: not observed    Hearing:   Hearing  Hearing: Exceptions to WFL  Hearing Exceptions: Hard of hearing/hearing concerns, Bilateral hearing aid    Vision/Perceptual:          Vision  Vision: Impaired  Vision Exceptions: Wears glasses for reading  Perception  Overall Perceptual Status: WFL    Range of Motion:   AROM: Generally decreased, functional (RLE)  PROM: Within functional limits      Strength:  Strength: Generally decreased, functional (RLE)      Coordination:  Coordination: Within functional limits            Tone & Sensation:   Tone: Normal  Sensation: Intact      Functional Mobility and Transfers for ADLs:  Bed Mobility:     Bed Mobility Training  Bed Mobility Training: No    Transfers:     Transfer Training  Transfer Training: No    Balance:      Balance  Sitting: Intact      ADL Assessment:     Feeding: Setup;Based on clinical  needed.  5. Usually the patient's performance over the preceding 24-48 hours is important, but occasionally longer periods will be relevant.  6. Middle categories imply that the patient supplies over 50 per cent of the effort.  7. Use of aids to be independent is allowed.    Score Interpretation (from Sinoff 1997)    Independent   60-79 Minimally independent   40-59 Partially dependent   20-39 Very dependent   <20 Totally dependent     Elena Manzano., BarthelCORETTA. (1965). Functional evaluation: the Barthel Index. Md State Med J (14)2.  HAIDER Rich, AYLIN Black., JOE Ozuna., JOSE Hansen. (1999). Measuring the change indisability after inpatient rehabilitation; comparison of the responsiveness of the Barthel Index and Functional [place] Measure. Journal of Neurology, Neurosurgery, and Psychiatry, 66(4), 480-484.  Van Joseph, NJean-PaulJ.A, JIMMY Shoemaker, & Ezequiel MOSCAR. (2004.) Assessment of post-stroke quality of life in cost-effectiveness studies: The usefulness of the Barthel Index and the EuroQoL-5D. Quality of Life Research, 13, 427-43       Pain Ratin/10   Pain Intervention(s):    Activity Tolerance:   Fair  and SpO2 stable on room air    After treatment:   Patient left in no apparent distress in bed, Call bell within reach, and Bed/ chair alarm activated    COMMUNICATION/EDUCATION:   The patient's plan of care was discussed with: physical therapist, registered nurse, physician, and     Patient Education  Education Given To: Patient  Education Provided: Role of Therapy;Plan of Care  Education Method: Verbal  Barriers to Learning: None  Education Outcome: Verbalized understanding    Thank you for this referral.  Martha Dennis OT  Minutes: 16    Occupational Therapy Evaluation Charge Determination   History Examination Decision-Making   LOW Complexity : Brief history review  LOW Complexity: 1-3 Performance deficits relating to physical, cognitive, or

## 2024-07-29 NOTE — TELEPHONE ENCOUNTER
Please call Scooter Dickerson . Pt is being discharged from [facility] later today This is a [facility] follow up  Appointment  is on 8/6     Call back number is [phone]

## 2024-07-29 NOTE — PLAN OF CARE
Problem: Pain  Goal: Verbalizes/displays adequate comfort level or baseline comfort level  Outcome: Progressing     Problem: Respiratory - Adult  Goal: Achieves optimal ventilation and oxygenation  7/29/2024 0720 by Hilton Meza Sr., [facility]  Outcome: Progressing

## 2024-07-30 LAB
EKG ATRIAL RATE: 111 BPM
EKG DIAGNOSIS: NORMAL
EKG Q-T INTERVAL: 378 MS
EKG QRS DURATION: 120 MS
EKG QTC CALCULATION (BAZETT): 509 MS
EKG R AXIS: 85 DEGREES
EKG T AXIS: 32 DEGREES
EKG VENTRICULAR RATE: 109 BPM

## 2024-07-30 NOTE — TELEPHONE ENCOUNTER
Care Transitions Initial Follow Up Call    Outreach made within 2 business days of discharge: Yes    Patient: Scooter Dickerson Patient : 1945   MRN: [id number]  Reason for Admission: septic shock  Discharge Date: 24       Spoke with: no answer attempt #1    Discharge department/facility: [facility]            Scheduled appointment with PCP within 7-14 days    Follow Up  Future Appointments   Date Time Provider Department Center   2024 11:10 AM Ena Henderson, APRN - NP [facility] MAIN BS JANET Shahid RN

## 2024-07-30 NOTE — TELEPHONE ENCOUNTER
Care Transitions Initial Follow Up Call    Outreach made within 2 business days of discharge: Yes    Patient: Scooter Dickerson Patient : 1945   MRN: [id number]  Reason for Admission: sepsis,UTI  Discharge Date: 24       Spoke with: patient    Discharge department/facility: [facility] Interactive Patient Contact:  Was patient able to fill all prescriptions: Yes  Was patient instructed to bring all medications to the follow-up visit: Yes  Is patient taking all medications as directed in the discharge summary? Yes  Does patient understand their discharge instructions: Yes  Does patient have questions or concerns that need addressed prior to 7-14 day follow up office visit: yes -  He is wanting his catheter out on f/u visit. He does have a urologist appointment 2024 but doesn't want to wait this long    Additional needs identified to be addressed with provider  Standard priority: patient wants to know if his catheter will come out on his f/u visit with you             Scheduled appointment with PCP within 7-14 days    Follow Up  Future Appointments   Date Time Provider Department Center   2024 11:10 AM Ena Henderson, APRN - NP [facility] PHILLIP BS JAN[facility] Shahid RN

## 2024-07-31 NOTE — TELEPHONE ENCOUNTER
SW Mr. Dickerson, informed of Ena advice, stated so he has to keep the catheter in until 09/26/2024.  Informed of to give his urology office a call for their recommendation, possibly maybe sooner for the removal, they will instruct, OK of understanding and thanks.  He did question if to keep his August appointment with Ena, yes \[facility]\"".

## 2024-08-01 ENCOUNTER — TELEPHONE (OUTPATIENT)
Age: 79
End: 2024-08-01

## 2024-08-01 RX ORDER — ALENDRONATE SODIUM 70 MG/1
70 TABLET ORAL WEEKLY
Qty: 12 TABLET | Refills: 0 | Status: SHIPPED | OUTPATIENT
Start: 2024-08-01

## 2024-08-01 RX ORDER — ERGOCALCIFEROL 1.25 MG/1
50000 CAPSULE ORAL WEEKLY
Qty: 12 CAPSULE | Refills: 0 | Status: SHIPPED | OUTPATIENT
Start: 2024-08-01

## 2024-08-01 NOTE — TELEPHONE ENCOUNTER
Sylvia states they will be able to fill 12 wks worth of  the RX for Vit D. After that they would need an updated Vit D level faxed to them in order for Scooter to receive any more. After lab is done please fax results to [phone]

## 2024-08-02 LAB
BACTERIA SPEC CULT: NORMAL
BACTERIA SPEC CULT: NORMAL
SERVICE CMNT-IMP: NORMAL
SERVICE CMNT-IMP: NORMAL

## 2024-08-03 SDOH — ECONOMIC STABILITY: FOOD INSECURITY: WITHIN THE PAST 12 MONTHS, THE FOOD YOU BOUGHT JUST DIDN'T LAST AND YOU DIDN'T HAVE MONEY TO GET MORE.: NEVER TRUE

## 2024-08-03 SDOH — ECONOMIC STABILITY: FOOD INSECURITY: WITHIN THE PAST 12 MONTHS, YOU WORRIED THAT YOUR FOOD WOULD RUN OUT BEFORE YOU GOT MONEY TO BUY MORE.: NEVER TRUE

## 2024-08-03 SDOH — ECONOMIC STABILITY: INCOME INSECURITY: HOW HARD IS IT FOR YOU TO PAY FOR THE VERY BASICS LIKE FOOD, HOUSING, MEDICAL CARE, AND HEATING?: SOMEWHAT HARD

## 2024-08-06 ENCOUNTER — OFFICE VISIT (OUTPATIENT)
Age: 79
End: 2024-08-06

## 2024-08-06 VITALS
HEIGHT: 66 IN | WEIGHT: 140.4 LBS | OXYGEN SATURATION: 96 % | DIASTOLIC BLOOD PRESSURE: 62 MMHG | SYSTOLIC BLOOD PRESSURE: 134 MMHG | BODY MASS INDEX: 22.56 KG/M2 | HEART RATE: 91 BPM | RESPIRATION RATE: 18 BRPM

## 2024-08-06 DIAGNOSIS — N18.32 CHRONIC KIDNEY DISEASE, STAGE 3B (HCC): ICD-10-CM

## 2024-08-06 DIAGNOSIS — Z09 HOSPITAL DISCHARGE FOLLOW-UP: Primary | ICD-10-CM

## 2024-08-06 DIAGNOSIS — K52.9 CHRONIC DIARRHEA: ICD-10-CM

## 2024-08-06 DIAGNOSIS — Z97.8 FOLEY CATHETER IN PLACE: ICD-10-CM

## 2024-08-06 DIAGNOSIS — D64.9 ANEMIA, UNSPECIFIED TYPE: ICD-10-CM

## 2024-08-06 DIAGNOSIS — R33.9 URINARY RETENTION: ICD-10-CM

## 2024-08-06 DIAGNOSIS — Z86.19 HX OF SEPSIS: ICD-10-CM

## 2024-08-06 DIAGNOSIS — E87.5 HYPERKALEMIA: ICD-10-CM

## 2024-08-06 ASSESSMENT — PATIENT HEALTH QUESTIONNAIRE - PHQ9
SUM OF ALL RESPONSES TO PHQ9 QUESTIONS 1 & 2: 0
SUM OF ALL RESPONSES TO PHQ QUESTIONS 1-9: 0
SUM OF ALL RESPONSES TO PHQ QUESTIONS 1-9: 0
1. LITTLE INTEREST OR PLEASURE IN DOING THINGS: NOT AT ALL
SUM OF ALL RESPONSES TO PHQ QUESTIONS 1-9: 0
SUM OF ALL RESPONSES TO PHQ QUESTIONS 1-9: 0
2. FEELING DOWN, DEPRESSED OR HOPELESS: NOT AT ALL

## 2024-08-06 NOTE — PROGRESS NOTES
Scooter Dickerson is a 78 y.o. male presenting for/with:    Chief Complaint   Patient presents with    Follow-Up from Hospital     D/C on Monday - Has sosa in place with Urology next week. Only C/O is he would like his cath out sooner. Denies any concerns at this time.        Vitals:    08/06/24 1112   BP: 134/62   Site: Left Upper Arm   Position: Sitting   Cuff Size: Small Adult   Pulse: 91   Resp: 18   SpO2: 96%   Weight: 63.7 kg (140 lb 6.4 oz)   Height: 1.676 m (5' 6\")       Pain Scale: 0 - No pain/10  Pain Location:     \"Have you been to the ER, urgent care clinic since your last visit?  Hospitalized since your last visit?\"    NO    “Have you seen or consulted any other health care providers outside of [facility] since your last visit?”    NO               8/6/2024    11:07 AM   PHQ-9    Little interest or pleasure in doing things 0   Feeling down, depressed, or hopeless 0   PHQ-2 Score 0   PHQ-9 Total Score 0           8/6/2024    11:10 AM   [facility] AMB LEARNING ASSESSMENT   Primary Learner Patient   Primary Language ENGLISH   Learning Preference DEMONSTRATION   Answered By self   Relationship to Learner SELF            8/6/2024    11:07 AM   Amb Fall Risk Assessment and TUG Test   Do you feel unsteady or are you worried about falling?  yes   2 or more falls in past year? no   Fall with injury in past year? no           8/6/2024    11:00 AM   ADL ASSESSMENT   Feeding yourself No Help Needed   Getting from bed to chair No Help Needed   Getting dressed No Help Needed   Bathing or showering No Help Needed   Walk across the room (includes cane/walker) No Help Needed   Using the telphone No Help Needed   Taking your medications No Help Needed   Preparing meals No Help Needed   Managing money (expenses/bills) No Help Needed   Moderately strenuous housework (laundry) No Help Needed   Shopping for personal items (toiletries/medicines) No Help Needed   Shopping for groceries No Help Needed   Driving No Help

## 2024-08-06 NOTE — PROGRESS NOTES
Post-Discharge Transitional Care  Follow Up      Scooter Dickerson   YOB: 1945    Date of Office Visit:  8/6/2024  Date of Hospital Admission: 7/26/24  Date of Hospital Discharge: 7/29/24  Risk of hospital readmission (high >=14%. Medium >=10%) :Readmission Risk Score: 15.7      Care management risk score Rising risk (score 2-5) and Complex Care (Scores >=6): No Risk Score On File     Non face to face  following discharge, date last encounter closed (first attempt may have been earlier): 07/29/2024    Call initiated 2 business days of discharge: Yes    ASSESSMENT/PLAN:   Hospital discharge follow-up  -     NV DISCHARGE MEDS RECONCILED W/ CURRENT OUTPATIENT MED LIST  Hx of sepsis  Urinary retention  Elias catheter in place  Chronic kidney disease, stage 3b (HCC)  -     NV COLLECTION VENOUS BLOOD VENIPUNCTURE  -     CBC with Auto Differential; Future  -     Basic Metabolic Panel; Future  Anemia, unspecified type  -     NV COLLECTION VENOUS BLOOD VENIPUNCTURE  -     CBC with Auto Differential; Future  -     Basic Metabolic Panel; Future  -     Ambulatory referral to General Surgery  Chronic diarrhea  -     Ambulatory referral to General Surgery  Hyperkalemia  -     Basic Metabolic Panel; Future    Sepsis resolved.  Catheter draining well; he is scheduled with Dr. Jackson in 2 weeks with anticipated discontinuation of catheter.  Anemia at discharge, presumably dilutional; recheck today.      Medical Decision Making: moderate complexity  Return if symptoms worsen or fail to improve.    On this date 8/6/2024 I have spent 30 minutes reviewing previous notes, test results and face to face with the patient discussing the diagnosis and importance of compliance with the treatment plan as well as documenting on the day of the visit.       Subjective:   HPI:  Follow up of Hospital problems/diagnosis(es): Admitted for sepsis presumed to be 2/2 UTI 2/2 urinary retention in setting of BPH; sepsis resolved with ABX

## 2024-08-07 ENCOUNTER — TELEPHONE (OUTPATIENT)
Age: 79
End: 2024-08-07

## 2024-08-07 LAB
ANION GAP SERPL CALC-SCNC: 4 MMOL/L (ref 5–15)
BASOPHILS # BLD: 0.1 K/UL (ref 0–0.1)
BASOPHILS NFR BLD: 1 % (ref 0–1)
BUN SERPL-MCNC: 28 MG/DL (ref 6–20)
BUN/CREAT SERPL: 15 (ref 12–20)
CALCIUM SERPL-MCNC: 9.6 MG/DL (ref 8.5–10.1)
CHLORIDE SERPL-SCNC: 99 MMOL/L (ref 97–108)
CO2 SERPL-SCNC: 28 MMOL/L (ref 21–32)
CREAT SERPL-MCNC: 1.82 MG/DL (ref 0.7–1.3)
DIFFERENTIAL METHOD BLD: ABNORMAL
EOSINOPHIL # BLD: 0.2 K/UL (ref 0–0.4)
EOSINOPHIL NFR BLD: 3 % (ref 0–7)
ERYTHROCYTE [DISTWIDTH] IN BLOOD BY AUTOMATED COUNT: 16.5 % (ref 11.5–14.5)
GLUCOSE SERPL-MCNC: 138 MG/DL (ref 65–100)
HCT VFR BLD AUTO: 28.8 % (ref 36.6–50.3)
HGB BLD-MCNC: 9 G/DL (ref 12.1–17)
IMM GRANULOCYTES # BLD AUTO: 0.1 K/UL (ref 0–0.04)
IMM GRANULOCYTES NFR BLD AUTO: 1 % (ref 0–0.5)
LYMPHOCYTES # BLD: 0.8 K/UL (ref 0.8–3.5)
LYMPHOCYTES NFR BLD: 12 % (ref 12–49)
MCH RBC QN AUTO: 28 PG (ref 26–34)
MCHC RBC AUTO-ENTMCNC: 31.3 G/DL (ref 30–36.5)
MCV RBC AUTO: 89.4 FL (ref 80–99)
MONOCYTES # BLD: 0.6 K/UL (ref 0–1)
MONOCYTES NFR BLD: 9 % (ref 5–13)
NEUTS SEG # BLD: 4.5 K/UL (ref 1.8–8)
NEUTS SEG NFR BLD: 74 % (ref 32–75)
NRBC # BLD: 0 K/UL (ref 0–0.01)
NRBC BLD-RTO: 0 PER 100 WBC
PLATELET # BLD AUTO: 327 K/UL (ref 150–400)
PMV BLD AUTO: 9.4 FL (ref 8.9–12.9)
POTASSIUM SERPL-SCNC: 6 MMOL/L (ref 3.5–5.1)
RBC # BLD AUTO: 3.22 M/UL (ref 4.1–5.7)
RBC MORPH BLD: ABNORMAL
RBC MORPH BLD: ABNORMAL
SODIUM SERPL-SCNC: 131 MMOL/L (ref 136–145)
WBC # BLD AUTO: 6.3 K/UL (ref 4.1–11.1)

## 2024-08-07 NOTE — TELEPHONE ENCOUNTER
PC x 1 for more information, several rings, call dropped.  PC 2nd SW [facility] does not approve of Vit D high dose unless under 20.   They recommend OTC daily dose if over 20 and will approve of daily dose up to 4,000 units daily.  I did not see recent a Vit D level.

## 2024-08-07 NOTE — TELEPHONE ENCOUNTER
[facility] that [facility] need Vitamin D lab. Call back # [phone]

## 2024-08-08 ENCOUNTER — HOSPITAL ENCOUNTER (OUTPATIENT)
Facility: HOSPITAL | Age: 79
Discharge: HOME | End: 2024-08-08
Payer: MEDICARE

## 2024-08-08 LAB
ANION GAP SERPL CALC-SCNC: 6 MMOL/L (ref 5–15)
BUN SERPL-MCNC: 26 MG/DL (ref 6–20)
BUN/CREAT SERPL: 16 (ref 12–20)
CALCIUM SERPL-MCNC: 8.7 MG/DL (ref 8.5–10.1)
CHLORIDE SERPL-SCNC: 94 MMOL/L (ref 97–108)
CO2 SERPL-SCNC: 29 MMOL/L (ref 21–32)
CREAT SERPL-MCNC: 1.66 MG/DL (ref 0.7–1.3)
GLUCOSE SERPL-MCNC: 128 MG/DL (ref 65–100)
POTASSIUM SERPL-SCNC: 5.2 MMOL/L (ref 3.5–5.1)
SODIUM SERPL-SCNC: 129 MMOL/L (ref 136–145)

## 2024-08-08 PROCEDURE — 80048 BASIC METABOLIC PNL TOTAL CA: CPT

## 2024-08-08 PROCEDURE — 36415 COLL VENOUS BLD VENIPUNCTURE: CPT

## 2024-08-09 ENCOUNTER — TELEPHONE (OUTPATIENT)
Age: 79
End: 2024-08-09

## 2024-08-09 NOTE — TELEPHONE ENCOUNTER
Pt states he received a stool test when he came in and he is supposed to collect samples from 3 days but he does not think he will be able to get 3 days. He would like to know what he should do. Please advise.

## 2024-08-13 LAB
FECAL OCCULT BLOOD #2, POC: NEGATIVE
FECAL OCCULT BLOOD #3, POC: NEGATIVE
HEMOCCULT STL QL: NEGATIVE
VALID INTERNAL CONTROL: YES

## 2024-08-17 ENCOUNTER — PATIENT MESSAGE (OUTPATIENT)
Age: 79
End: 2024-08-17

## 2024-08-17 DIAGNOSIS — I10 PRIMARY HYPERTENSION: ICD-10-CM

## 2024-08-17 DIAGNOSIS — E55.9 VITAMIN D DEFICIENCY: Primary | ICD-10-CM

## 2024-08-17 DIAGNOSIS — N40.0 BENIGN PROSTATIC HYPERPLASIA WITHOUT LOWER URINARY TRACT SYMPTOMS: ICD-10-CM

## 2024-08-19 NOTE — TELEPHONE ENCOUNTER
Patient requesting refill on     Requested Prescriptions     Pending Prescriptions Disp Refills    finasteride (PROSCAR) 5 MG tablet 30 tablet 0     Sig: Take 1 tablet by mouth daily    lisinopril (PRINIVIL;ZESTRIL) 40 MG tablet 90 tablet 0     Sig: Take 1 tablet by mouth daily    tamsulosin (FLOMAX) 0.4 MG capsule 60 capsule 0     Sig: Take 2 capsules by mouth daily    vitamin D (ERGOCALCIFEROL) 1.25 MG (84368 UT) CAPS capsule 12 capsule 0     Sig: Take 1 capsule by mouth once a week        Last OV 8/6/2024

## 2024-08-23 RX ORDER — FINASTERIDE 5 MG/1
5 TABLET, FILM COATED ORAL DAILY
Qty: 90 TABLET | Refills: 0 | Status: SHIPPED | OUTPATIENT
Start: 2024-08-23

## 2024-08-23 RX ORDER — ERGOCALCIFEROL 1.25 MG/1
50000 CAPSULE ORAL WEEKLY
Qty: 12 CAPSULE | Refills: 0 | OUTPATIENT
Start: 2024-08-23

## 2024-08-23 RX ORDER — TAMSULOSIN HYDROCHLORIDE 0.4 MG/1
0.8 CAPSULE ORAL DAILY
Qty: 180 CAPSULE | Refills: 0 | Status: SHIPPED | OUTPATIENT
Start: 2024-08-23

## 2024-08-23 RX ORDER — CHOLECALCIFEROL (VITAMIN D3) 25 MCG
2000 TABLET ORAL DAILY
Qty: 180 TABLET | Refills: 0 | Status: SHIPPED | OUTPATIENT
Start: 2024-08-23

## 2024-08-23 RX ORDER — LISINOPRIL 40 MG/1
40 TABLET ORAL DAILY
Qty: 90 TABLET | Refills: 0 | Status: SHIPPED | OUTPATIENT
Start: 2024-08-23

## 2024-09-07 DIAGNOSIS — E78.2 MIXED HYPERLIPIDEMIA: ICD-10-CM

## 2024-09-08 ENCOUNTER — APPOINTMENT (OUTPATIENT)
Facility: HOSPITAL | Age: 79
End: 2024-09-08
Payer: OTHER GOVERNMENT

## 2024-09-08 ENCOUNTER — HOSPITAL ENCOUNTER (OUTPATIENT)
Facility: HOSPITAL | Age: 79
Setting detail: OBSERVATION
Discharge: HOME HEALTH | End: 2024-09-10
Attending: EMERGENCY MEDICINE | Admitting: INTERNAL MEDICINE
Payer: OTHER GOVERNMENT

## 2024-09-08 DIAGNOSIS — R55 SYNCOPE AND COLLAPSE: Primary | ICD-10-CM

## 2024-09-08 PROBLEM — N40.1 BENIGN PROSTATIC HYPERPLASIA WITH LOWER URINARY TRACT SYMPTOMS: Status: ACTIVE | Noted: 2024-09-08

## 2024-09-08 PROBLEM — J42 CHRONIC BRONCHITIS (HCC): Chronic | Status: ACTIVE | Noted: 2024-09-08

## 2024-09-08 PROBLEM — I10 HTN (HYPERTENSION): Status: ACTIVE | Noted: 2024-09-08

## 2024-09-08 PROBLEM — N40.1 BENIGN PROSTATIC HYPERPLASIA WITH LOWER URINARY TRACT SYMPTOMS: Chronic | Status: ACTIVE | Noted: 2024-09-08

## 2024-09-08 PROBLEM — N17.9 AKI (ACUTE KIDNEY INJURY) (HCC): Status: ACTIVE | Noted: 2024-09-08

## 2024-09-08 PROBLEM — E11.9 T2DM (TYPE 2 DIABETES MELLITUS) (HCC): Chronic | Status: ACTIVE | Noted: 2024-09-08

## 2024-09-08 PROBLEM — J42 CHRONIC BRONCHITIS (HCC): Status: ACTIVE | Noted: 2024-09-08

## 2024-09-08 PROBLEM — I10 HTN (HYPERTENSION): Chronic | Status: ACTIVE | Noted: 2024-09-08

## 2024-09-08 PROBLEM — E86.0 DEHYDRATION: Status: ACTIVE | Noted: 2024-09-08

## 2024-09-08 LAB
ALBUMIN SERPL-MCNC: 3.5 G/DL (ref 3.5–5)
ALBUMIN/GLOB SERPL: 1.1 (ref 1.1–2.2)
ALP SERPL-CCNC: 94 U/L (ref 45–117)
ALT SERPL-CCNC: 13 U/L (ref 12–78)
ANION GAP SERPL CALC-SCNC: 15 MMOL/L (ref 2–12)
APPEARANCE UR: ABNORMAL
AST SERPL-CCNC: 13 U/L (ref 15–37)
BACTERIA URNS QL MICRO: ABNORMAL /HPF
BASOPHILS # BLD: 0.1 K/UL (ref 0–0.1)
BASOPHILS NFR BLD: 1 % (ref 0–1)
BILIRUB SERPL-MCNC: 0.3 MG/DL (ref 0.2–1)
BILIRUB UR QL: NEGATIVE
BUN SERPL-MCNC: 37 MG/DL (ref 6–20)
BUN/CREAT SERPL: 16 (ref 12–20)
CALCIUM SERPL-MCNC: 9.1 MG/DL (ref 8.5–10.1)
CHLORIDE SERPL-SCNC: 93 MMOL/L (ref 97–108)
CO2 SERPL-SCNC: 22 MMOL/L (ref 21–32)
COLOR UR: ABNORMAL
CREAT SERPL-MCNC: 2.26 MG/DL (ref 0.7–1.3)
DIFFERENTIAL METHOD BLD: ABNORMAL
EOSINOPHIL # BLD: 0.2 K/UL (ref 0–0.4)
EOSINOPHIL NFR BLD: 2 % (ref 0–7)
EPITH CASTS URNS QL MICRO: ABNORMAL /LPF
ERYTHROCYTE [DISTWIDTH] IN BLOOD BY AUTOMATED COUNT: 16.3 % (ref 11.5–14.5)
FLUAV RNA SPEC QL NAA+PROBE: NOT DETECTED
FLUBV RNA SPEC QL NAA+PROBE: NOT DETECTED
GLOBULIN SER CALC-MCNC: 3.3 G/DL (ref 2–4)
GLUCOSE SERPL-MCNC: 172 MG/DL (ref 65–100)
GLUCOSE UR STRIP.AUTO-MCNC: NEGATIVE MG/DL
HCT VFR BLD AUTO: 29.7 % (ref 36.6–50.3)
HGB BLD-MCNC: 9.6 G/DL (ref 12.1–17)
HGB UR QL STRIP: ABNORMAL
IMM GRANULOCYTES # BLD AUTO: 0 K/UL (ref 0–0.04)
IMM GRANULOCYTES NFR BLD AUTO: 0 % (ref 0–0.5)
KETONES UR QL STRIP.AUTO: NEGATIVE MG/DL
LEUKOCYTE ESTERASE UR QL STRIP.AUTO: ABNORMAL
LYMPHOCYTES # BLD: 0.5 K/UL (ref 0.8–3.5)
LYMPHOCYTES NFR BLD: 5 % (ref 12–49)
MAGNESIUM SERPL-MCNC: 1.4 MG/DL (ref 1.6–2.4)
MCH RBC QN AUTO: 28.2 PG (ref 26–34)
MCHC RBC AUTO-ENTMCNC: 32.3 G/DL (ref 30–36.5)
MCV RBC AUTO: 87.4 FL (ref 80–99)
MONOCYTES # BLD: 0.1 K/UL (ref 0–1)
MONOCYTES NFR BLD: 1 % (ref 5–13)
NEUTS SEG # BLD: 8.3 K/UL (ref 1.8–8)
NEUTS SEG NFR BLD: 91 % (ref 32–75)
NITRITE UR QL STRIP.AUTO: POSITIVE
NRBC # BLD: 0 K/UL (ref 0–0.01)
NRBC BLD-RTO: 0 PER 100 WBC
PH UR STRIP: 6 (ref 5–8)
PLATELET # BLD AUTO: 367 K/UL (ref 150–400)
PLATELET COMMENT: ABNORMAL
PMV BLD AUTO: 9.5 FL (ref 8.9–12.9)
POTASSIUM SERPL-SCNC: 5.4 MMOL/L (ref 3.5–5.1)
PROT SERPL-MCNC: 6.8 G/DL (ref 6.4–8.2)
PROT UR STRIP-MCNC: 30 MG/DL
RBC # BLD AUTO: 3.4 M/UL (ref 4.1–5.7)
RBC #/AREA URNS HPF: ABNORMAL /HPF (ref 0–5)
RBC MORPH BLD: ABNORMAL
RBC MORPH BLD: ABNORMAL
SARS-COV-2 RNA RESP QL NAA+PROBE: NOT DETECTED
SODIUM SERPL-SCNC: 130 MMOL/L (ref 136–145)
SOURCE: NORMAL
SP GR UR REFRACTOMETRY: 1.01 (ref 1–1.03)
TROPONIN I SERPL HS-MCNC: 7 NG/L (ref 0–76)
URINE CULTURE IF INDICATED: ABNORMAL
UROBILINOGEN UR QL STRIP.AUTO: 0.2 EU/DL (ref 0.2–1)
WBC # BLD AUTO: 9.2 K/UL (ref 4.1–11.1)
WBC URNS QL MICRO: ABNORMAL /HPF (ref 0–4)

## 2024-09-08 PROCEDURE — G0378 HOSPITAL OBSERVATION PER HR: HCPCS

## 2024-09-08 PROCEDURE — 99285 EMERGENCY DEPT VISIT HI MDM: CPT

## 2024-09-08 PROCEDURE — 85025 COMPLETE CBC W/AUTO DIFF WBC: CPT

## 2024-09-08 PROCEDURE — 2580000003 HC RX 258: Performed by: INTERNAL MEDICINE

## 2024-09-08 PROCEDURE — 96361 HYDRATE IV INFUSION ADD-ON: CPT

## 2024-09-08 PROCEDURE — 70450 CT HEAD/BRAIN W/O DYE: CPT

## 2024-09-08 PROCEDURE — 96372 THER/PROPH/DIAG INJ SC/IM: CPT

## 2024-09-08 PROCEDURE — 83735 ASSAY OF MAGNESIUM: CPT

## 2024-09-08 PROCEDURE — 93005 ELECTROCARDIOGRAM TRACING: CPT | Performed by: EMERGENCY MEDICINE

## 2024-09-08 PROCEDURE — 94640 AIRWAY INHALATION TREATMENT: CPT

## 2024-09-08 PROCEDURE — 51702 INSERT TEMP BLADDER CATH: CPT

## 2024-09-08 PROCEDURE — 36415 COLL VENOUS BLD VENIPUNCTURE: CPT

## 2024-09-08 PROCEDURE — 6360000002 HC RX W HCPCS: Performed by: INTERNAL MEDICINE

## 2024-09-08 PROCEDURE — 84484 ASSAY OF TROPONIN QUANT: CPT

## 2024-09-08 PROCEDURE — 72125 CT NECK SPINE W/O DYE: CPT

## 2024-09-08 PROCEDURE — 87186 SC STD MICRODIL/AGAR DIL: CPT

## 2024-09-08 PROCEDURE — 87088 URINE BACTERIA CULTURE: CPT

## 2024-09-08 PROCEDURE — 80053 COMPREHEN METABOLIC PANEL: CPT

## 2024-09-08 PROCEDURE — 87086 URINE CULTURE/COLONY COUNT: CPT

## 2024-09-08 PROCEDURE — 81001 URINALYSIS AUTO W/SCOPE: CPT

## 2024-09-08 PROCEDURE — 96360 HYDRATION IV INFUSION INIT: CPT

## 2024-09-08 PROCEDURE — 87636 SARSCOV2 & INF A&B AMP PRB: CPT

## 2024-09-08 PROCEDURE — 6370000000 HC RX 637 (ALT 250 FOR IP): Performed by: INTERNAL MEDICINE

## 2024-09-08 PROCEDURE — 2580000003 HC RX 258: Performed by: EMERGENCY MEDICINE

## 2024-09-08 RX ORDER — SODIUM CHLORIDE 9 MG/ML
INJECTION, SOLUTION INTRAVENOUS CONTINUOUS
Status: DISCONTINUED | OUTPATIENT
Start: 2024-09-08 | End: 2024-09-09

## 2024-09-08 RX ORDER — ATORVASTATIN CALCIUM 40 MG/1
40 TABLET, FILM COATED ORAL NIGHTLY
Status: DISCONTINUED | OUTPATIENT
Start: 2024-09-08 | End: 2024-09-10 | Stop reason: HOSPADM

## 2024-09-08 RX ORDER — BUDESONIDE 0.5 MG/2ML
0.25 INHALANT ORAL
Status: DISCONTINUED | OUTPATIENT
Start: 2024-09-08 | End: 2024-09-10 | Stop reason: HOSPADM

## 2024-09-08 RX ORDER — GABAPENTIN 300 MG/1
600 CAPSULE ORAL 2 TIMES DAILY
Status: DISCONTINUED | OUTPATIENT
Start: 2024-09-08 | End: 2024-09-10 | Stop reason: HOSPADM

## 2024-09-08 RX ORDER — CLOPIDOGREL BISULFATE 75 MG/1
75 TABLET ORAL DAILY
Status: DISCONTINUED | OUTPATIENT
Start: 2024-09-08 | End: 2024-09-08

## 2024-09-08 RX ORDER — 0.9 % SODIUM CHLORIDE 0.9 %
1000 INTRAVENOUS SOLUTION INTRAVENOUS ONCE
Status: COMPLETED | OUTPATIENT
Start: 2024-09-08 | End: 2024-09-08

## 2024-09-08 RX ORDER — CLOPIDOGREL BISULFATE 75 MG/1
75 TABLET ORAL DAILY
Status: DISCONTINUED | OUTPATIENT
Start: 2024-09-09 | End: 2024-09-10 | Stop reason: HOSPADM

## 2024-09-08 RX ORDER — VITAMIN B COMPLEX
2000 TABLET ORAL DAILY
Status: DISCONTINUED | OUTPATIENT
Start: 2024-09-08 | End: 2024-09-10 | Stop reason: HOSPADM

## 2024-09-08 RX ORDER — ENOXAPARIN SODIUM 100 MG/ML
30 INJECTION SUBCUTANEOUS EVERY 24 HOURS
Status: DISCONTINUED | OUTPATIENT
Start: 2024-09-08 | End: 2024-09-09

## 2024-09-08 RX ORDER — ONDANSETRON 4 MG/1
4 TABLET, ORALLY DISINTEGRATING ORAL EVERY 8 HOURS PRN
Status: DISCONTINUED | OUTPATIENT
Start: 2024-09-08 | End: 2024-09-10 | Stop reason: HOSPADM

## 2024-09-08 RX ORDER — NICOTINE 21 MG/24HR
1 PATCH, TRANSDERMAL 24 HOURS TRANSDERMAL DAILY
Status: DISCONTINUED | OUTPATIENT
Start: 2024-09-08 | End: 2024-09-10 | Stop reason: HOSPADM

## 2024-09-08 RX ORDER — ACETAMINOPHEN 650 MG/1
650 SUPPOSITORY RECTAL EVERY 6 HOURS PRN
Status: DISCONTINUED | OUTPATIENT
Start: 2024-09-08 | End: 2024-09-10 | Stop reason: HOSPADM

## 2024-09-08 RX ORDER — GUAIFENESIN 600 MG/1
600 TABLET, EXTENDED RELEASE ORAL 2 TIMES DAILY
Status: DISCONTINUED | OUTPATIENT
Start: 2024-09-08 | End: 2024-09-10 | Stop reason: HOSPADM

## 2024-09-08 RX ORDER — ONDANSETRON 2 MG/ML
4 INJECTION INTRAMUSCULAR; INTRAVENOUS EVERY 6 HOURS PRN
Status: DISCONTINUED | OUTPATIENT
Start: 2024-09-08 | End: 2024-09-10 | Stop reason: HOSPADM

## 2024-09-08 RX ORDER — SODIUM CHLORIDE 0.9 % (FLUSH) 0.9 %
5-40 SYRINGE (ML) INJECTION EVERY 12 HOURS SCHEDULED
Status: DISCONTINUED | OUTPATIENT
Start: 2024-09-08 | End: 2024-09-10 | Stop reason: HOSPADM

## 2024-09-08 RX ORDER — SODIUM CHLORIDE 9 MG/ML
INJECTION, SOLUTION INTRAVENOUS PRN
Status: DISCONTINUED | OUTPATIENT
Start: 2024-09-08 | End: 2024-09-10 | Stop reason: HOSPADM

## 2024-09-08 RX ORDER — TAMSULOSIN HYDROCHLORIDE 0.4 MG/1
0.8 CAPSULE ORAL DAILY
Status: DISCONTINUED | OUTPATIENT
Start: 2024-09-09 | End: 2024-09-10 | Stop reason: HOSPADM

## 2024-09-08 RX ORDER — SODIUM CHLORIDE 0.9 % (FLUSH) 0.9 %
5-40 SYRINGE (ML) INJECTION PRN
Status: DISCONTINUED | OUTPATIENT
Start: 2024-09-08 | End: 2024-09-10 | Stop reason: HOSPADM

## 2024-09-08 RX ORDER — ASPIRIN 81 MG/1
81 TABLET ORAL DAILY
Status: DISCONTINUED | OUTPATIENT
Start: 2024-09-08 | End: 2024-09-10 | Stop reason: HOSPADM

## 2024-09-08 RX ORDER — ACETAMINOPHEN 325 MG/1
650 TABLET ORAL EVERY 6 HOURS PRN
Status: DISCONTINUED | OUTPATIENT
Start: 2024-09-08 | End: 2024-09-10 | Stop reason: HOSPADM

## 2024-09-08 RX ORDER — ARFORMOTEROL TARTRATE 15 UG/2ML
15 SOLUTION RESPIRATORY (INHALATION)
Status: DISCONTINUED | OUTPATIENT
Start: 2024-09-08 | End: 2024-09-10 | Stop reason: HOSPADM

## 2024-09-08 RX ORDER — FINASTERIDE 5 MG/1
5 TABLET, FILM COATED ORAL DAILY
Status: DISCONTINUED | OUTPATIENT
Start: 2024-09-08 | End: 2024-09-10 | Stop reason: HOSPADM

## 2024-09-08 RX ORDER — POLYETHYLENE GLYCOL 3350 17 G/17G
17 POWDER, FOR SOLUTION ORAL DAILY PRN
Status: DISCONTINUED | OUTPATIENT
Start: 2024-09-08 | End: 2024-09-10 | Stop reason: HOSPADM

## 2024-09-08 RX ADMIN — GUAIFENESIN 600 MG: 600 TABLET ORAL at 20:58

## 2024-09-08 RX ADMIN — ATORVASTATIN CALCIUM 40 MG: 40 TABLET, FILM COATED ORAL at 20:58

## 2024-09-08 RX ADMIN — GABAPENTIN 600 MG: 300 CAPSULE ORAL at 20:58

## 2024-09-08 RX ADMIN — FINASTERIDE 5 MG: 5 TABLET, FILM COATED ORAL at 18:38

## 2024-09-08 RX ADMIN — BUDESONIDE 250 MCG: 0.5 SUSPENSION RESPIRATORY (INHALATION) at 19:38

## 2024-09-08 RX ADMIN — ENOXAPARIN SODIUM 30 MG: 100 INJECTION SUBCUTANEOUS at 18:38

## 2024-09-08 RX ADMIN — SODIUM CHLORIDE: 9 INJECTION, SOLUTION INTRAVENOUS at 18:40

## 2024-09-08 RX ADMIN — IPRATROPIUM BROMIDE 0.5 MG: 0.5 SOLUTION RESPIRATORY (INHALATION) at 19:30

## 2024-09-08 RX ADMIN — ARFORMOTEROL TARTRATE 15 MCG: 15 SOLUTION RESPIRATORY (INHALATION) at 19:34

## 2024-09-08 RX ADMIN — Medication 2000 UNITS: at 18:37

## 2024-09-08 RX ADMIN — SODIUM CHLORIDE 1000 ML: 9 INJECTION, SOLUTION INTRAVENOUS at 12:44

## 2024-09-08 RX ADMIN — SODIUM CHLORIDE 1000 ML: 9 INJECTION, SOLUTION INTRAVENOUS at 11:49

## 2024-09-09 LAB
ANION GAP SERPL CALC-SCNC: 9 MMOL/L (ref 2–12)
BASOPHILS # BLD: 0.1 K/UL (ref 0–0.1)
BASOPHILS NFR BLD: 1 % (ref 0–1)
BUN SERPL-MCNC: 26 MG/DL (ref 6–20)
BUN/CREAT SERPL: 17 (ref 12–20)
CALCIUM SERPL-MCNC: 7.9 MG/DL (ref 8.5–10.1)
CHLORIDE SERPL-SCNC: 101 MMOL/L (ref 97–108)
CO2 SERPL-SCNC: 24 MMOL/L (ref 21–32)
CREAT SERPL-MCNC: 1.57 MG/DL (ref 0.7–1.3)
DIFFERENTIAL METHOD BLD: ABNORMAL
EOSINOPHIL # BLD: 0.2 K/UL (ref 0–0.4)
EOSINOPHIL NFR BLD: 3 % (ref 0–7)
ERYTHROCYTE [DISTWIDTH] IN BLOOD BY AUTOMATED COUNT: 16.2 % (ref 11.5–14.5)
GLUCOSE SERPL-MCNC: 81 MG/DL (ref 65–100)
HCT VFR BLD AUTO: 21.9 % (ref 36.6–50.3)
HGB BLD-MCNC: 7.3 G/DL (ref 12.1–17)
IMM GRANULOCYTES # BLD AUTO: 0.1 K/UL (ref 0–0.04)
IMM GRANULOCYTES NFR BLD AUTO: 1 % (ref 0–0.5)
LYMPHOCYTES # BLD: 0.8 K/UL (ref 0.8–3.5)
LYMPHOCYTES NFR BLD: 15 % (ref 12–49)
MCH RBC QN AUTO: 28.6 PG (ref 26–34)
MCHC RBC AUTO-ENTMCNC: 33.3 G/DL (ref 30–36.5)
MCV RBC AUTO: 85.9 FL (ref 80–99)
MONOCYTES # BLD: 0.5 K/UL (ref 0–1)
MONOCYTES NFR BLD: 10 % (ref 5–13)
NEUTS SEG # BLD: 3.6 K/UL (ref 1.8–8)
NEUTS SEG NFR BLD: 70 % (ref 32–75)
NRBC # BLD: 0 K/UL (ref 0–0.01)
NRBC BLD-RTO: 0 PER 100 WBC
PLATELET # BLD AUTO: 282 K/UL (ref 150–400)
PLATELET COMMENT: ABNORMAL
PMV BLD AUTO: 9.6 FL (ref 8.9–12.9)
POTASSIUM SERPL-SCNC: 4 MMOL/L (ref 3.5–5.1)
RBC # BLD AUTO: 2.55 M/UL (ref 4.1–5.7)
RBC MORPH BLD: ABNORMAL
SODIUM SERPL-SCNC: 134 MMOL/L (ref 136–145)
WBC # BLD AUTO: 5.3 K/UL (ref 4.1–11.1)

## 2024-09-09 PROCEDURE — 6370000000 HC RX 637 (ALT 250 FOR IP): Performed by: INTERNAL MEDICINE

## 2024-09-09 PROCEDURE — 94640 AIRWAY INHALATION TREATMENT: CPT

## 2024-09-09 PROCEDURE — G0378 HOSPITAL OBSERVATION PER HR: HCPCS

## 2024-09-09 PROCEDURE — 85025 COMPLETE CBC W/AUTO DIFF WBC: CPT

## 2024-09-09 PROCEDURE — 36415 COLL VENOUS BLD VENIPUNCTURE: CPT

## 2024-09-09 PROCEDURE — 6360000002 HC RX W HCPCS: Performed by: INTERNAL MEDICINE

## 2024-09-09 PROCEDURE — 96372 THER/PROPH/DIAG INJ SC/IM: CPT

## 2024-09-09 PROCEDURE — 2580000003 HC RX 258: Performed by: INTERNAL MEDICINE

## 2024-09-09 PROCEDURE — 94760 N-INVAS EAR/PLS OXIMETRY 1: CPT

## 2024-09-09 PROCEDURE — 97161 PT EVAL LOW COMPLEX 20 MIN: CPT

## 2024-09-09 PROCEDURE — 97165 OT EVAL LOW COMPLEX 30 MIN: CPT

## 2024-09-09 PROCEDURE — 80048 BASIC METABOLIC PNL TOTAL CA: CPT

## 2024-09-09 PROCEDURE — 97535 SELF CARE MNGMENT TRAINING: CPT

## 2024-09-09 PROCEDURE — 97110 THERAPEUTIC EXERCISES: CPT

## 2024-09-09 RX ADMIN — Medication 2000 UNITS: at 08:58

## 2024-09-09 RX ADMIN — ENOXAPARIN SODIUM 30 MG: 100 INJECTION SUBCUTANEOUS at 17:00

## 2024-09-09 RX ADMIN — IPRATROPIUM BROMIDE 0.5 MG: 0.5 SOLUTION RESPIRATORY (INHALATION) at 07:40

## 2024-09-09 RX ADMIN — IPRATROPIUM BROMIDE 0.5 MG: 0.5 SOLUTION RESPIRATORY (INHALATION) at 01:50

## 2024-09-09 RX ADMIN — ARFORMOTEROL TARTRATE 15 MCG: 15 SOLUTION RESPIRATORY (INHALATION) at 07:39

## 2024-09-09 RX ADMIN — GABAPENTIN 600 MG: 300 CAPSULE ORAL at 21:18

## 2024-09-09 RX ADMIN — GABAPENTIN 600 MG: 300 CAPSULE ORAL at 08:57

## 2024-09-09 RX ADMIN — ASPIRIN 81 MG: 81 TABLET, COATED ORAL at 08:58

## 2024-09-09 RX ADMIN — TAMSULOSIN HYDROCHLORIDE 0.8 MG: 0.4 CAPSULE ORAL at 08:58

## 2024-09-09 RX ADMIN — GUAIFENESIN 600 MG: 600 TABLET ORAL at 21:18

## 2024-09-09 RX ADMIN — SODIUM CHLORIDE 100 ML/HR: 9 INJECTION, SOLUTION INTRAVENOUS at 04:45

## 2024-09-09 RX ADMIN — ATORVASTATIN CALCIUM 40 MG: 40 TABLET, FILM COATED ORAL at 21:18

## 2024-09-09 RX ADMIN — CLOPIDOGREL BISULFATE 75 MG: 75 TABLET ORAL at 08:58

## 2024-09-09 RX ADMIN — BUDESONIDE 250 MCG: 0.5 SUSPENSION RESPIRATORY (INHALATION) at 07:39

## 2024-09-09 RX ADMIN — GUAIFENESIN 600 MG: 600 TABLET ORAL at 08:57

## 2024-09-09 RX ADMIN — SODIUM CHLORIDE: 9 INJECTION, SOLUTION INTRAVENOUS at 14:51

## 2024-09-09 RX ADMIN — IPRATROPIUM BROMIDE 0.5 MG: 0.5 SOLUTION RESPIRATORY (INHALATION) at 13:58

## 2024-09-10 ENCOUNTER — TELEPHONE (OUTPATIENT)
Age: 79
End: 2024-09-10

## 2024-09-10 VITALS
BODY MASS INDEX: 22.87 KG/M2 | SYSTOLIC BLOOD PRESSURE: 116 MMHG | HEIGHT: 66 IN | TEMPERATURE: 97.9 F | WEIGHT: 142.3 LBS | DIASTOLIC BLOOD PRESSURE: 59 MMHG | RESPIRATION RATE: 20 BRPM | OXYGEN SATURATION: 94 % | HEART RATE: 73 BPM

## 2024-09-10 LAB
ANION GAP SERPL CALC-SCNC: 9 MMOL/L (ref 2–12)
BUN SERPL-MCNC: 22 MG/DL (ref 6–20)
BUN/CREAT SERPL: 14 (ref 12–20)
CALCIUM SERPL-MCNC: 8 MG/DL (ref 8.5–10.1)
CHLORIDE SERPL-SCNC: 102 MMOL/L (ref 97–108)
CO2 SERPL-SCNC: 24 MMOL/L (ref 21–32)
CREAT SERPL-MCNC: 1.6 MG/DL (ref 0.7–1.3)
ERYTHROCYTE [DISTWIDTH] IN BLOOD BY AUTOMATED COUNT: 16.6 % (ref 11.5–14.5)
GLUCOSE SERPL-MCNC: 99 MG/DL (ref 65–100)
HCT VFR BLD AUTO: 21.3 % (ref 36.6–50.3)
HGB BLD-MCNC: 7.3 G/DL (ref 12.1–17)
MCH RBC QN AUTO: 28.6 PG (ref 26–34)
MCHC RBC AUTO-ENTMCNC: 34.3 G/DL (ref 30–36.5)
MCV RBC AUTO: 83.5 FL (ref 80–99)
NRBC # BLD: 0 K/UL (ref 0–0.01)
NRBC BLD-RTO: 0 PER 100 WBC
PLATELET # BLD AUTO: 278 K/UL (ref 150–400)
PMV BLD AUTO: 9.4 FL (ref 8.9–12.9)
POTASSIUM SERPL-SCNC: 3.9 MMOL/L (ref 3.5–5.1)
RBC # BLD AUTO: 2.55 M/UL (ref 4.1–5.7)
SODIUM SERPL-SCNC: 135 MMOL/L (ref 136–145)
WBC # BLD AUTO: 6.5 K/UL (ref 4.1–11.1)

## 2024-09-10 PROCEDURE — G0378 HOSPITAL OBSERVATION PER HR: HCPCS

## 2024-09-10 PROCEDURE — 6370000000 HC RX 637 (ALT 250 FOR IP): Performed by: INTERNAL MEDICINE

## 2024-09-10 PROCEDURE — 85027 COMPLETE CBC AUTOMATED: CPT

## 2024-09-10 PROCEDURE — 94640 AIRWAY INHALATION TREATMENT: CPT

## 2024-09-10 PROCEDURE — 94761 N-INVAS EAR/PLS OXIMETRY MLT: CPT

## 2024-09-10 PROCEDURE — 36415 COLL VENOUS BLD VENIPUNCTURE: CPT

## 2024-09-10 PROCEDURE — 6360000002 HC RX W HCPCS: Performed by: INTERNAL MEDICINE

## 2024-09-10 PROCEDURE — 80048 BASIC METABOLIC PNL TOTAL CA: CPT

## 2024-09-10 RX ADMIN — GUAIFENESIN 600 MG: 600 TABLET ORAL at 09:45

## 2024-09-10 RX ADMIN — ARFORMOTEROL TARTRATE 15 MCG: 15 SOLUTION RESPIRATORY (INHALATION) at 07:10

## 2024-09-10 RX ADMIN — Medication 2000 UNITS: at 09:45

## 2024-09-10 RX ADMIN — GABAPENTIN 600 MG: 300 CAPSULE ORAL at 09:44

## 2024-09-10 RX ADMIN — IPRATROPIUM BROMIDE 0.5 MG: 0.5 SOLUTION RESPIRATORY (INHALATION) at 07:10

## 2024-09-10 RX ADMIN — CLOPIDOGREL BISULFATE 75 MG: 75 TABLET ORAL at 09:45

## 2024-09-10 RX ADMIN — TAMSULOSIN HYDROCHLORIDE 0.8 MG: 0.4 CAPSULE ORAL at 09:45

## 2024-09-10 RX ADMIN — FINASTERIDE 5 MG: 5 TABLET, FILM COATED ORAL at 09:45

## 2024-09-10 RX ADMIN — ASPIRIN 81 MG: 81 TABLET, COATED ORAL at 09:45

## 2024-09-10 RX ADMIN — BUDESONIDE 250 MCG: 0.5 SUSPENSION RESPIRATORY (INHALATION) at 07:10

## 2024-09-11 LAB
BACTERIA SPEC CULT: ABNORMAL
CC UR VC: ABNORMAL
EKG ATRIAL RATE: 97 BPM
EKG DIAGNOSIS: NORMAL
EKG P AXIS: 66 DEGREES
EKG P-R INTERVAL: 94 MS
EKG Q-T INTERVAL: 382 MS
EKG QRS DURATION: 124 MS
EKG QTC CALCULATION (BAZETT): 485 MS
EKG R AXIS: 83 DEGREES
EKG T AXIS: 56 DEGREES
EKG VENTRICULAR RATE: 97 BPM
SERVICE CMNT-IMP: ABNORMAL

## 2024-09-11 RX ORDER — ATORVASTATIN CALCIUM 40 MG/1
40 TABLET, FILM COATED ORAL DAILY
Qty: 90 TABLET | Refills: 0 | Status: SHIPPED | OUTPATIENT
Start: 2024-09-11

## 2024-09-16 ENCOUNTER — TELEPHONE (OUTPATIENT)
Age: 79
End: 2024-09-16

## 2024-09-16 ENCOUNTER — OFFICE VISIT (OUTPATIENT)
Age: 79
End: 2024-09-16

## 2024-09-16 VITALS
RESPIRATION RATE: 18 BRPM | DIASTOLIC BLOOD PRESSURE: 67 MMHG | TEMPERATURE: 97.9 F | HEART RATE: 95 BPM | WEIGHT: 139 LBS | SYSTOLIC BLOOD PRESSURE: 128 MMHG | OXYGEN SATURATION: 96 % | BODY MASS INDEX: 22.44 KG/M2

## 2024-09-16 DIAGNOSIS — L89.890 PRESSURE INJURY OF TOE OF RIGHT FOOT, UNSTAGEABLE (HCC): ICD-10-CM

## 2024-09-16 DIAGNOSIS — R33.8 BENIGN PROSTATIC HYPERPLASIA WITH URINARY RETENTION: Chronic | ICD-10-CM

## 2024-09-16 DIAGNOSIS — Z09 HOSPITAL DISCHARGE FOLLOW-UP: Primary | ICD-10-CM

## 2024-09-16 DIAGNOSIS — I10 PRIMARY HYPERTENSION: ICD-10-CM

## 2024-09-16 DIAGNOSIS — N40.1 BENIGN PROSTATIC HYPERPLASIA WITH URINARY RETENTION: Chronic | ICD-10-CM

## 2024-09-16 DIAGNOSIS — L89.890 PRESSURE INJURY OF TOE OF LEFT FOOT, UNSTAGEABLE (HCC): ICD-10-CM

## 2024-09-17 ENCOUNTER — TELEPHONE (OUTPATIENT)
Age: 79
End: 2024-09-17

## 2024-09-17 DIAGNOSIS — R53.81 DEBILITY: Primary | ICD-10-CM

## 2024-09-19 DIAGNOSIS — E11.51 TYPE 2 DIABETES MELLITUS WITH DIABETIC PERIPHERAL ANGIOPATHY WITHOUT GANGRENE, WITHOUT LONG-TERM CURRENT USE OF INSULIN (HCC): ICD-10-CM

## 2024-09-24 ENCOUNTER — HOSPITAL ENCOUNTER (OUTPATIENT)
Facility: HOSPITAL | Age: 79
Discharge: HOME | End: 2024-09-27
Payer: OTHER GOVERNMENT

## 2024-09-24 ENCOUNTER — HOSPITAL ENCOUNTER (EMERGENCY)
Facility: HOSPITAL | Age: 79
Discharge: HOME | End: 2024-09-24
Attending: EMERGENCY MEDICINE
Payer: OTHER GOVERNMENT

## 2024-09-24 VITALS
HEIGHT: 66 IN | BODY MASS INDEX: 22.82 KG/M2 | SYSTOLIC BLOOD PRESSURE: 162 MMHG | TEMPERATURE: 98.8 F | OXYGEN SATURATION: 99 % | RESPIRATION RATE: 17 BRPM | DIASTOLIC BLOOD PRESSURE: 85 MMHG | WEIGHT: 142 LBS | HEART RATE: 90 BPM

## 2024-09-24 DIAGNOSIS — L89.890 PRESSURE INJURY OF TOE OF LEFT FOOT, UNSTAGEABLE (HCC): ICD-10-CM

## 2024-09-24 DIAGNOSIS — N30.00 ACUTE CYSTITIS WITHOUT HEMATURIA: Primary | ICD-10-CM

## 2024-09-24 DIAGNOSIS — T83.9XXA URINARY CATHETER COMPLICATION, INITIAL ENCOUNTER (HCC): Primary | ICD-10-CM

## 2024-09-24 DIAGNOSIS — L89.890 PRESSURE INJURY OF TOE OF RIGHT FOOT, UNSTAGEABLE (HCC): ICD-10-CM

## 2024-09-24 LAB
APPEARANCE UR: CLEAR
BACTERIA URNS QL MICRO: NEGATIVE /HPF
BILIRUB UR QL: NEGATIVE
COLOR UR: ABNORMAL
ECHO BSA: 1.73 M2
EPITH CASTS URNS QL MICRO: ABNORMAL /LPF
GLUCOSE UR STRIP.AUTO-MCNC: NEGATIVE MG/DL
HGB UR QL STRIP: ABNORMAL
KETONES UR QL STRIP.AUTO: ABNORMAL MG/DL
LEUKOCYTE ESTERASE UR QL STRIP.AUTO: ABNORMAL
NITRITE UR QL STRIP.AUTO: POSITIVE
PH UR STRIP: 6 (ref 5–8)
PROT UR STRIP-MCNC: 30 MG/DL
RBC #/AREA URNS HPF: ABNORMAL /HPF (ref 0–5)
SP GR UR REFRACTOMETRY: 1.01 (ref 1–1.03)
URINE CULTURE IF INDICATED: ABNORMAL
UROBILINOGEN UR QL STRIP.AUTO: 0.2 EU/DL (ref 0.2–1)
VAS LEFT ABI: 0.52
VAS LEFT ARM BP: 173 MMHG
VAS LEFT DORSALIS PEDIS BP: 88 MMHG
VAS LEFT PTA BP: 92 MMHG
VAS RIGHT ABI: 0.49
VAS RIGHT ARM BP: 178 MMHG
VAS RIGHT DORSALIS PEDIS BP: 83 MMHG
VAS RIGHT PTA BP: 88 MMHG
WBC URNS QL MICRO: >100 /HPF (ref 0–4)

## 2024-09-24 PROCEDURE — 81001 URINALYSIS AUTO W/SCOPE: CPT

## 2024-09-24 PROCEDURE — 99283 EMERGENCY DEPT VISIT LOW MDM: CPT

## 2024-09-24 PROCEDURE — 51702 INSERT TEMP BLADDER CATH: CPT

## 2024-09-24 PROCEDURE — 87186 SC STD MICRODIL/AGAR DIL: CPT

## 2024-09-24 PROCEDURE — 87088 URINE BACTERIA CULTURE: CPT

## 2024-09-24 PROCEDURE — 93922 UPR/L XTREMITY ART 2 LEVELS: CPT

## 2024-09-24 PROCEDURE — 87086 URINE CULTURE/COLONY COUNT: CPT

## 2024-09-24 RX ORDER — CIPROFLOXACIN 500 MG/1
500 TABLET, FILM COATED ORAL 2 TIMES DAILY
Qty: 20 TABLET | Refills: 0 | Status: SHIPPED | OUTPATIENT
Start: 2024-09-24 | End: 2024-10-04

## 2024-09-24 ASSESSMENT — PAIN - FUNCTIONAL ASSESSMENT: PAIN_FUNCTIONAL_ASSESSMENT: NONE - DENIES PAIN

## 2024-09-24 NOTE — ED TRIAGE NOTES
Leakage from torn sosa starting last night. Pt denies trauma or pain but states he does not wear a leg bag so the catheter is hanging freely and is no longer a closed system . Education provided by myself and the physician. Understanding verbalized

## 2024-09-24 NOTE — DISCHARGE INSTRUCTIONS
Please take the antibiotics as prescribed by your primary care doctor.    Follow-up with your urologist for a further evaluation.

## 2024-09-24 NOTE — ED PROVIDER NOTES
[facility] EMERGENCY DEP  EMERGENCY DEPARTMENT ENCOUNTER       Pt Name: Scooter Dickerson  MRN: [id number]  Birthdate 1945  Date of evaluation: 9/24/2024  Provider: Gladis Hoffman MD   PCP: Ena Henderson APRN - NP  Note Started: 9:44 AM EDT 9/24/24     CHIEF COMPLAINT       Chief Complaint   Patient presents with    Urinary Catheter        HISTORY OF PRESENT ILLNESS: 1 or more elements      History From: patient, History limited by: none     Scooter Dickerson is a 78 y.o. male presents to the emergency department due to a leaking catheter.  Patient reports that he woke up this morning and noted that there was urine leaking from the side tubing of his catheter.       Please See MDM for Additional Details of the HPI/PMH  Nursing Notes were all reviewed and agreed with or any disagreements were addressed in the HPI.     REVIEW OF SYSTEMS        Positives and Pertinent negatives as per HPI.    PAST HISTORY     Past Medical History:  History reviewed. No pertinent past medical history.    Past Surgical History:  History reviewed. No pertinent surgical history.    Family History:  History reviewed. No pertinent family history.    Social History:  Social History     Tobacco Use    Smoking status: Every Day     Current packs/day: 0.75     Average packs/day: 0.8 packs/day for 60.0 years (45.0 ttl pk-yrs)     Types: Cigarettes    Smokeless tobacco: Never   Vaping Use    Vaping status: Never Used   Substance Use Topics    Alcohol use: Never    Drug use: Never       Allergies:  Allergies   Allergen Reactions    Penicillins Rash       CURRENT MEDICATIONS      Previous Medications    ALBUTEROL SULFATE HFA (PROVENTIL HFA) 108 (90 BASE) MCG/ACT INHALER    Inhale 2 puffs into the lungs every 6 hours as needed for Wheezing    ALENDRONATE (FOSAMAX) 70 MG TABLET    Take 1 tablet by mouth once a week    ASPIRIN 81 PO    Take 1 tablet every day by oral route.    ATORVASTATIN (LIPITOR) 40 MG TABLET    Take 1 tablet by mouth daily

## 2024-09-25 DIAGNOSIS — I73.9 PAD (PERIPHERAL ARTERY DISEASE) (HCC): Primary | ICD-10-CM

## 2024-09-26 LAB
BACTERIA SPEC CULT: ABNORMAL
CC UR VC: ABNORMAL
SERVICE CMNT-IMP: ABNORMAL

## 2024-10-02 ENCOUNTER — PATIENT MESSAGE (OUTPATIENT)
Age: 79
End: 2024-10-02

## 2024-10-08 PROBLEM — E86.0 DEHYDRATION: Status: RESOLVED | Noted: 2024-09-08 | Resolved: 2024-10-08

## 2024-10-12 ENCOUNTER — PATIENT MESSAGE (OUTPATIENT)
Age: 79
End: 2024-10-12

## 2024-10-12 DIAGNOSIS — I73.9 PAD (PERIPHERAL ARTERY DISEASE) (HCC): ICD-10-CM

## 2024-10-12 DIAGNOSIS — E11.51 TYPE 2 DIABETES MELLITUS WITH DIABETIC PERIPHERAL ANGIOPATHY WITHOUT GANGRENE, WITHOUT LONG-TERM CURRENT USE OF INSULIN (HCC): ICD-10-CM

## 2024-10-12 DIAGNOSIS — E55.9 VITAMIN D DEFICIENCY: ICD-10-CM

## 2024-10-14 RX ORDER — CLOPIDOGREL BISULFATE 75 MG/1
75 TABLET ORAL DAILY
Qty: 90 TABLET | Refills: 0 | Status: SHIPPED | OUTPATIENT
Start: 2024-10-14

## 2024-10-14 RX ORDER — CHOLECALCIFEROL (VITAMIN D3) 25 MCG
2000 TABLET ORAL DAILY
Qty: 180 TABLET | Refills: 0 | Status: SHIPPED | OUTPATIENT
Start: 2024-10-14

## 2024-10-14 NOTE — TELEPHONE ENCOUNTER
Patient requesting refill on     Requested Prescriptions     Pending Prescriptions Disp Refills    clopidogrel (PLAVIX) 75 MG tablet 90 tablet 0     Sig: Take 1 tablet by mouth daily    vitamin D3 (CHOLECALCIFEROL) 25 MCG (1000 UT) TABS tablet 180 tablet 0     Sig: Take 2 tablets by mouth daily        Last OV 09/16/2024

## 2024-10-14 NOTE — TELEPHONE ENCOUNTER
JAMES Lees  to [facility] to request the updated vaccines.  Per Yoana, pt had Covid, Comirnaty, Flu and RSV, 10/02/204, will fax over hard copy.

## 2024-10-21 ENCOUNTER — PATIENT MESSAGE (OUTPATIENT)
Age: 79
End: 2024-10-21

## 2024-10-21 DIAGNOSIS — L89.890 PRESSURE INJURY OF TOE OF LEFT FOOT, UNSTAGEABLE (HCC): Primary | ICD-10-CM

## 2024-10-21 DIAGNOSIS — J44.9 CHRONIC OBSTRUCTIVE PULMONARY DISEASE, UNSPECIFIED COPD TYPE (HCC): ICD-10-CM

## 2024-10-21 DIAGNOSIS — L89.890 PRESSURE INJURY OF TOE OF RIGHT FOOT, UNSTAGEABLE (HCC): ICD-10-CM

## 2024-10-22 RX ORDER — GUAIFENESIN 600 MG/1
600 TABLET, EXTENDED RELEASE ORAL 2 TIMES DAILY
Qty: 180 TABLET | Refills: 0 | Status: SHIPPED | OUTPATIENT
Start: 2024-10-22

## 2024-10-24 DIAGNOSIS — E11.51 TYPE 2 DIABETES MELLITUS WITH DIABETIC PERIPHERAL ANGIOPATHY WITHOUT GANGRENE, WITHOUT LONG-TERM CURRENT USE OF INSULIN (HCC): ICD-10-CM

## 2024-10-24 DIAGNOSIS — J40 BRONCHITIS: ICD-10-CM

## 2024-10-25 RX ORDER — ALENDRONATE SODIUM 70 MG/1
70 TABLET ORAL WEEKLY
Qty: 12 TABLET | Refills: 0 | Status: SHIPPED | OUTPATIENT
Start: 2024-10-25

## 2024-10-25 RX ORDER — GABAPENTIN 300 MG/1
900 CAPSULE ORAL 2 TIMES DAILY
Qty: 540 CAPSULE | Refills: 0 | Status: SHIPPED | OUTPATIENT
Start: 2024-10-25 | End: 2025-01-23

## 2024-10-25 RX ORDER — ALBUTEROL SULFATE 90 UG/1
2 INHALANT RESPIRATORY (INHALATION) EVERY 6 HOURS PRN
Qty: 18 G | Refills: 5 | Status: SHIPPED | OUTPATIENT
Start: 2024-10-25

## 2024-11-05 ENCOUNTER — OFFICE VISIT (OUTPATIENT)
Age: 79
End: 2024-11-05
Payer: OTHER GOVERNMENT

## 2024-11-05 VITALS
SYSTOLIC BLOOD PRESSURE: 166 MMHG | HEART RATE: 91 BPM | DIASTOLIC BLOOD PRESSURE: 81 MMHG | TEMPERATURE: 97.8 F | HEIGHT: 66 IN | OXYGEN SATURATION: 97 % | WEIGHT: 133 LBS | RESPIRATION RATE: 16 BRPM | BODY MASS INDEX: 21.38 KG/M2

## 2024-11-05 DIAGNOSIS — Z12.11 COLON CANCER SCREENING: Primary | ICD-10-CM

## 2024-11-05 DIAGNOSIS — D64.9 ANEMIA, UNSPECIFIED TYPE: ICD-10-CM

## 2024-11-05 PROCEDURE — 3077F SYST BP >= 140 MM HG: CPT | Performed by: SURGERY

## 2024-11-05 PROCEDURE — 3079F DIAST BP 80-89 MM HG: CPT | Performed by: SURGERY

## 2024-11-05 PROCEDURE — 99203 OFFICE O/P NEW LOW 30 MIN: CPT | Performed by: SURGERY

## 2024-11-05 PROCEDURE — 1126F AMNT PAIN NOTED NONE PRSNT: CPT | Performed by: SURGERY

## 2024-11-05 PROCEDURE — 1123F ACP DISCUSS/DSCN MKR DOCD: CPT | Performed by: SURGERY

## 2024-11-05 PROCEDURE — 1159F MED LIST DOCD IN RCRD: CPT | Performed by: SURGERY

## 2024-11-05 PROCEDURE — 1160F RVW MEDS BY RX/DR IN RCRD: CPT | Performed by: SURGERY

## 2024-11-05 ASSESSMENT — PATIENT HEALTH QUESTIONNAIRE - PHQ9
SUM OF ALL RESPONSES TO PHQ QUESTIONS 1-9: 0
1. LITTLE INTEREST OR PLEASURE IN DOING THINGS: NOT AT ALL
SUM OF ALL RESPONSES TO PHQ QUESTIONS 1-9: 0
2. FEELING DOWN, DEPRESSED OR HOPELESS: NOT AT ALL
SUM OF ALL RESPONSES TO PHQ9 QUESTIONS 1 & 2: 0

## 2024-11-05 NOTE — PROGRESS NOTES
Scooter Dickerson is a 78 y.o. male who presents today with the following:  Chief Complaint   Patient presents with    New Patient    Colonoscopy       HPI    78-year-old male who presents as referral from Ena Henderson for possible colonoscopy.  He believes he had a colonoscopy over 10 years ago and may have had some polyps removed.  He denies any family history of colon cancer.  He denies any personal history of melena or hematochezia.  He states he used to have diarrhea for years but this resolved about 6 months ago.  He denies any abdominal pain.  Over the last few years he states he is lost 45 pounds but this has been because he has decreased his eating and he feels like this is expected.  He had stool occult fecal blood testing cards x 3 2 months ago that were negative.    He has had 2 hospital evaluations or admissions for presumptive sepsis in July and September of this year.  They have been associated with urinary retention.  He has a Elias catheter in place and he has been evaluated by urology and is actually scheduled for urologic procedure which sounds like a possible TURP on November 8.  In reviewing those admissions it appears that he was anemic with a hemoglobin in the 7 range both in July and in September.  He states he is not aware that he is anemic.  He did not require transfusions.    He is on Plavix that he states he was placed on because he had blood clots in his legs after traumatic injury to his right lower leg that had extensive reconstruction including ORIF's of the tibia and fibula as well as femur and hip.  He also takes an 81 mg aspirin a day.  He has not had a stroke or an MI.    He smokes about a pack a day and has done so for the last 58 years.  He rarely drinks alcohol.  No past medical history on file.    No past surgical history on file.    Social History     Socioeconomic History    Marital status:      Spouse name: Not on file    Number of children: Not on file    Years of

## 2024-11-05 NOTE — PROGRESS NOTES
Identified pt with two pt identifiers (name and ). Reviewed chart in preparation for visit and have obtained necessary documentation.    Scooter Dickerson is a 78 y.o. male New Patient and Colonoscopy  .    Vitals:    24 1056 24 1057   BP: (!) 161/81 (!) 166/81   Site: Right Upper Arm    Position: Sitting    Cuff Size: Small Adult    Pulse: 91    Resp: 16    Temp: 97.8 °F (36.6 °C)    TempSrc: Oral    SpO2: 97%    Weight: 60.3 kg (133 lb)    Height: 1.676 m (5' 6\")           1. Have you been to the ER, urgent care clinic since your last visit?  Hospitalized since your last visit?  no     2. Have you seen or consulted any other health care providers outside of the [facility] since your last visit?  Include any pap smears or colon screening.  No    BP elevated. Patient advised to  follow up with PCP and check BP twice a day at home.

## 2024-12-11 DIAGNOSIS — E55.9 VITAMIN D DEFICIENCY: ICD-10-CM

## 2024-12-11 DIAGNOSIS — E78.2 MIXED HYPERLIPIDEMIA: ICD-10-CM

## 2024-12-11 RX ORDER — CHOLECALCIFEROL (VITAMIN D3) 25 MCG
2000 TABLET ORAL DAILY
Qty: 180 TABLET | Refills: 0 | Status: SHIPPED | OUTPATIENT
Start: 2024-12-11

## 2024-12-11 RX ORDER — ATORVASTATIN CALCIUM 40 MG/1
40 TABLET, FILM COATED ORAL DAILY
Qty: 90 TABLET | Refills: 0 | Status: SHIPPED | OUTPATIENT
Start: 2024-12-11

## 2024-12-12 ENCOUNTER — TELEPHONE (OUTPATIENT)
Age: 79
End: 2024-12-12

## 2024-12-12 NOTE — TELEPHONE ENCOUNTER
Patient was consulted on 11/5/2024 for anemia and wants to know after checking his lab work do you feel a colonoscopy is necessary at this time.

## 2024-12-13 ENCOUNTER — TELEPHONE (OUTPATIENT)
Age: 79
End: 2024-12-13

## 2024-12-13 NOTE — TELEPHONE ENCOUNTER
Called patient to schedule f/u appointment. Patient was okay to be scheduled on 12/30/2024 at 9:00 am in order to be scheduled for procedure.

## 2024-12-16 ENCOUNTER — TELEPHONE (OUTPATIENT)
Age: 79
End: 2024-12-16

## 2024-12-16 NOTE — TELEPHONE ENCOUNTER
Patient called. Last CBC was completed while inpatient and he was below normal levels. Patient has not been rechecked by this office. Appt set up to discuss with PCP.

## 2024-12-16 NOTE — TELEPHONE ENCOUNTER
Scooter had a consultation with Dr. Argueta and is scheduled for a colonoscopy Dec. 30th. During consultation he was told by Dr. Argueta that he was anemic. Scooter knows nothing about this and would like to talk to Ena.

## 2024-12-18 ENCOUNTER — OFFICE VISIT (OUTPATIENT)
Age: 79
End: 2024-12-18
Payer: MEDICARE

## 2024-12-18 VITALS
DIASTOLIC BLOOD PRESSURE: 70 MMHG | OXYGEN SATURATION: 100 % | TEMPERATURE: 97.5 F | SYSTOLIC BLOOD PRESSURE: 136 MMHG | HEART RATE: 65 BPM | WEIGHT: 133 LBS | BODY MASS INDEX: 21.47 KG/M2 | RESPIRATION RATE: 18 BRPM

## 2024-12-18 DIAGNOSIS — E78.00 HYPERCHOLESTEREMIA: ICD-10-CM

## 2024-12-18 DIAGNOSIS — J44.9 CHRONIC OBSTRUCTIVE PULMONARY DISEASE, UNSPECIFIED COPD TYPE (HCC): ICD-10-CM

## 2024-12-18 DIAGNOSIS — E11.51 TYPE 2 DIABETES MELLITUS WITH DIABETIC PERIPHERAL ANGIOPATHY WITHOUT GANGRENE, WITHOUT LONG-TERM CURRENT USE OF INSULIN (HCC): ICD-10-CM

## 2024-12-18 DIAGNOSIS — N18.32 CHRONIC KIDNEY DISEASE, STAGE 3B (HCC): ICD-10-CM

## 2024-12-18 DIAGNOSIS — R00.2 PALPITATIONS: ICD-10-CM

## 2024-12-18 DIAGNOSIS — I73.9 PAD (PERIPHERAL ARTERY DISEASE) (HCC): Primary | ICD-10-CM

## 2024-12-18 DIAGNOSIS — E11.49 OTHER DIABETIC NEUROLOGICAL COMPLICATION ASSOCIATED WITH TYPE 2 DIABETES MELLITUS (HCC): ICD-10-CM

## 2024-12-18 DIAGNOSIS — D64.9 ANEMIA, UNSPECIFIED TYPE: ICD-10-CM

## 2024-12-18 LAB — HBA1C MFR BLD: 5.3 %

## 2024-12-18 PROCEDURE — 99214 OFFICE O/P EST MOD 30 MIN: CPT

## 2024-12-18 PROCEDURE — 1159F MED LIST DOCD IN RCRD: CPT

## 2024-12-18 PROCEDURE — G8484 FLU IMMUNIZE NO ADMIN: HCPCS

## 2024-12-18 PROCEDURE — 83036 HEMOGLOBIN GLYCOSYLATED A1C: CPT

## 2024-12-18 PROCEDURE — 3075F SYST BP GE 130 - 139MM HG: CPT

## 2024-12-18 PROCEDURE — 36415 COLL VENOUS BLD VENIPUNCTURE: CPT

## 2024-12-18 PROCEDURE — 1123F ACP DISCUSS/DSCN MKR DOCD: CPT

## 2024-12-18 PROCEDURE — 4004F PT TOBACCO SCREEN RCVD TLK: CPT

## 2024-12-18 PROCEDURE — 1126F AMNT PAIN NOTED NONE PRSNT: CPT

## 2024-12-18 PROCEDURE — G8427 DOCREV CUR MEDS BY ELIG CLIN: HCPCS

## 2024-12-18 PROCEDURE — 3078F DIAST BP <80 MM HG: CPT

## 2024-12-18 PROCEDURE — G8420 CALC BMI NORM PARAMETERS: HCPCS

## 2024-12-18 PROCEDURE — 3023F SPIROM DOC REV: CPT

## 2024-12-18 RX ORDER — GUAIFENESIN 600 MG/1
1200 TABLET, EXTENDED RELEASE ORAL 2 TIMES DAILY
Qty: 360 TABLET | Refills: 0 | Status: SHIPPED | OUTPATIENT
Start: 2024-12-18

## 2024-12-18 RX ORDER — BENZONATATE 100 MG/1
100 CAPSULE ORAL 3 TIMES DAILY PRN
Qty: 90 CAPSULE | Refills: 0 | Status: SHIPPED | OUTPATIENT
Start: 2024-12-18

## 2024-12-18 NOTE — PROGRESS NOTES
\"Have you been to the ER, urgent care clinic since your last visit?  Hospitalized since your last visit?\"    NO    “Have you seen or consulted any other health care providers outside our system since your last visit?”    NO    Patient declined Medicare Wellness Exam      Chief Complaint   Patient presents with    Anemia       Vitals:    12/18/24 1147   BP: 136/70   Pulse: 65   Resp: 18   Temp: 97.5 °F (36.4 °C)   SpO2: 100%       Labs drawn from left per Ena Henderson NP's orders. Patient tolerated well.

## 2024-12-19 DIAGNOSIS — E11.51 TYPE 2 DIABETES MELLITUS WITH DIABETIC PERIPHERAL ANGIOPATHY WITHOUT GANGRENE, WITHOUT LONG-TERM CURRENT USE OF INSULIN (HCC): ICD-10-CM

## 2024-12-19 LAB
ANION GAP SERPL CALC-SCNC: 4 MMOL/L (ref 2–12)
BASOPHILS # BLD: 0.1 K/UL (ref 0–0.1)
BASOPHILS NFR BLD: 1 % (ref 0–1)
BUN SERPL-MCNC: 34 MG/DL (ref 6–20)
BUN/CREAT SERPL: 20 (ref 12–20)
CALCIUM SERPL-MCNC: 9.6 MG/DL (ref 8.5–10.1)
CHLORIDE SERPL-SCNC: 108 MMOL/L (ref 97–108)
CHOLEST SERPL-MCNC: 148 MG/DL
CO2 SERPL-SCNC: 25 MMOL/L (ref 21–32)
CREAT SERPL-MCNC: 1.7 MG/DL (ref 0.7–1.3)
CREAT UR-MCNC: 78.7 MG/DL
DIFFERENTIAL METHOD BLD: ABNORMAL
EOSINOPHIL # BLD: 0.2 K/UL (ref 0–0.4)
EOSINOPHIL NFR BLD: 3 % (ref 0–7)
ERYTHROCYTE [DISTWIDTH] IN BLOOD BY AUTOMATED COUNT: 16.7 % (ref 11.5–14.5)
FERRITIN SERPL-MCNC: 13 NG/ML (ref 26–388)
FOLATE SERPL-MCNC: 8.7 NG/ML (ref 5–21)
GLUCOSE SERPL-MCNC: 90 MG/DL (ref 65–100)
HCT VFR BLD AUTO: 30.5 % (ref 36.6–50.3)
HDLC SERPL-MCNC: 82 MG/DL
HDLC SERPL: 1.8 (ref 0–5)
HGB BLD-MCNC: 9.4 G/DL (ref 12.1–17)
IMM GRANULOCYTES # BLD AUTO: 0 K/UL (ref 0–0.04)
IMM GRANULOCYTES NFR BLD AUTO: 1 % (ref 0–0.5)
IRON SATN MFR SERPL: 8 % (ref 20–50)
IRON SERPL-MCNC: 29 UG/DL (ref 35–150)
LDLC SERPL CALC-MCNC: 54.6 MG/DL (ref 0–100)
LYMPHOCYTES # BLD: 1.2 K/UL (ref 0.8–3.5)
LYMPHOCYTES NFR BLD: 20 % (ref 12–49)
MCH RBC QN AUTO: 27.8 PG (ref 26–34)
MCHC RBC AUTO-ENTMCNC: 30.8 G/DL (ref 30–36.5)
MCV RBC AUTO: 90.2 FL (ref 80–99)
MICROALBUMIN UR-MCNC: 44.6 MG/DL
MICROALBUMIN/CREAT UR-RTO: 567 MG/G (ref 0–30)
MONOCYTES # BLD: 0.6 K/UL (ref 0–1)
MONOCYTES NFR BLD: 9 % (ref 5–13)
NEUTS SEG # BLD: 4.2 K/UL (ref 1.8–8)
NEUTS SEG NFR BLD: 66 % (ref 32–75)
NRBC # BLD: 0 K/UL (ref 0–0.01)
NRBC BLD-RTO: 0 PER 100 WBC
PLATELET # BLD AUTO: 315 K/UL (ref 150–400)
PMV BLD AUTO: 10.4 FL (ref 8.9–12.9)
POTASSIUM SERPL-SCNC: 4.7 MMOL/L (ref 3.5–5.1)
RBC # BLD AUTO: 3.38 M/UL (ref 4.1–5.7)
SODIUM SERPL-SCNC: 137 MMOL/L (ref 136–145)
TIBC SERPL-MCNC: 348 UG/DL (ref 250–450)
TRIGL SERPL-MCNC: 57 MG/DL
VIT B12 SERPL-MCNC: 924 PG/ML (ref 193–986)
VLDLC SERPL CALC-MCNC: 11.4 MG/DL
WBC # BLD AUTO: 6.2 K/UL (ref 4.1–11.1)

## 2024-12-20 NOTE — TELEPHONE ENCOUNTER
Patient requesting refill on     Requested Prescriptions     Pending Prescriptions Disp Refills    metFORMIN (GLUCOPHAGE) 1000 MG tablet 180 tablet 0     Sig: Take 1 tablet by mouth 2 times daily (with meals)        Last OV 12/18/2024

## 2024-12-23 NOTE — PROGRESS NOTES
Chief Complaint   Patient presents with    Anemia       HPI:     is a 79 y.o. male who presents for follow-up.      He returns for f/u worsening anemia; hx chronic anemia but worse over the past 6 months without discernible cause.  He is on Plavix for 30+ years following hx \"blood clots\" (details are unavailable), as well as ASA 81.  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  Denies abdominal bloating, early satiety, changes in appetite, weight loss, melena, hematochezia, hematemesis, fever, chills; he reports chronic fatigue.    HTN:  Currently holding all meds after syncopal episode in 09/2024; no dizziness, CP, headache.  He endorses palpitations that first began a few months ago; feels like \"a nervous feeling\" in his chest.  These episodes last seconds to minutes and are not associated with other s/s, including CP, dizziness, SOB, leg swelling.      DM:  Excellent control on metformin; A1C 5.3-->4.9%.  Neuropathic pain in both legs is well controlled on gabapentin.    PAD:  Areas of necrosis noted on ends of both great toes 09/2024; TREY found moderate-to-severely decreased flow bilaterally and he is scheduled with Dr. Alessio More next month.  Receiving wound care with Dr. Sun in [place].    HLD:  Compliant with atorvastatin.    COPD:  60 pack-year smoking history and no interest in quitting; he endorses worsening productive cough over the past two years, not well controlled on Spiriva.    Allergies   Allergen Reactions    Penicillins Rash       Current Outpatient Medications   Medication Sig Dispense Refill    guaiFENesin (MUCINEX) 600 MG extended release tablet Take 2 tablets by mouth 2 times daily 360 tablet 0    benzonatate (TESSALON) 100 MG capsule Take 1 capsule by mouth 3 times daily as needed

## 2024-12-23 NOTE — ASSESSMENT & PLAN NOTE
Hemoglobin A1C, POC   Date Value Ref Range Status   12/18/2024 5.3 % Final   Chronic, at goal (stable), consider holding metformin.  Orders:    COLLECTION VENOUS BLOOD,VENIPUNCTURE    CBC with Auto Differential; Future    Basic Metabolic Panel; Future    Albumin/Creatinine Ratio, Urine; Future    AMB POC HEMOGLOBIN A1C    Albumin/Creatinine Ratio, Urine    Basic Metabolic Panel    CBC with Auto Differential

## 2024-12-23 NOTE — ASSESSMENT & PLAN NOTE
Stable; discussed importance of hydration.  Orders:    COLLECTION VENOUS BLOOD,VENIPUNCTURE    CBC with Auto Differential; Future    Iron and TIBC; Future    Ferritin; Future    Basic Metabolic Panel; Future    Vitamin B12 & Folate; Future    Vitamin B12 & Folate    Basic Metabolic Panel    Ferritin    Iron and TIBC    CBC with Auto Differential

## 2024-12-31 ENCOUNTER — HOSPITAL ENCOUNTER (OUTPATIENT)
Facility: HOSPITAL | Age: 79
Discharge: HOME | End: 2025-01-02
Payer: MEDICARE

## 2024-12-31 DIAGNOSIS — R00.2 PALPITATIONS: ICD-10-CM

## 2024-12-31 PROCEDURE — 93246 EXT ECG>7D<15D RECORDING: CPT

## 2024-12-31 NOTE — NURSING NOTE
Pt educated on Zio cardiac event/holter monitor device.  Questions answered.  Skin prepped and device applied.

## 2025-01-01 ENCOUNTER — PREP FOR PROCEDURE (OUTPATIENT)
Age: 80
End: 2025-01-01

## 2025-01-01 DIAGNOSIS — I25.10 DISEASE OF CARDIOVASCULAR SYSTEM: ICD-10-CM

## 2025-01-09 ENCOUNTER — OFFICE VISIT (OUTPATIENT)
Age: 80
End: 2025-01-09
Payer: OTHER GOVERNMENT

## 2025-01-09 VITALS
DIASTOLIC BLOOD PRESSURE: 70 MMHG | HEART RATE: 80 BPM | BODY MASS INDEX: 21.69 KG/M2 | WEIGHT: 135 LBS | SYSTOLIC BLOOD PRESSURE: 174 MMHG | OXYGEN SATURATION: 96 % | HEIGHT: 66 IN | TEMPERATURE: 98.1 F | RESPIRATION RATE: 20 BRPM

## 2025-01-09 DIAGNOSIS — Z86.0101 PERSONAL HISTORY OF ADENOMATOUS AND SERRATED COLON POLYPS: ICD-10-CM

## 2025-01-09 DIAGNOSIS — D64.9 ANEMIA, UNSPECIFIED TYPE: Primary | ICD-10-CM

## 2025-01-09 DIAGNOSIS — E11.52 TYPE 2 DIABETES MELLITUS WITH DIABETIC PERIPHERAL ANGIOPATHY AND GANGRENE, WITHOUT LONG-TERM CURRENT USE OF INSULIN (HCC): Chronic | ICD-10-CM

## 2025-01-09 PROCEDURE — 1126F AMNT PAIN NOTED NONE PRSNT: CPT | Performed by: SURGERY

## 2025-01-09 PROCEDURE — 3078F DIAST BP <80 MM HG: CPT | Performed by: SURGERY

## 2025-01-09 PROCEDURE — 1159F MED LIST DOCD IN RCRD: CPT | Performed by: SURGERY

## 2025-01-09 PROCEDURE — 3077F SYST BP >= 140 MM HG: CPT | Performed by: SURGERY

## 2025-01-09 PROCEDURE — 1160F RVW MEDS BY RX/DR IN RCRD: CPT | Performed by: SURGERY

## 2025-01-09 PROCEDURE — 1123F ACP DISCUSS/DSCN MKR DOCD: CPT | Performed by: SURGERY

## 2025-01-09 PROCEDURE — 99214 OFFICE O/P EST MOD 30 MIN: CPT | Performed by: SURGERY

## 2025-01-09 ASSESSMENT — PATIENT HEALTH QUESTIONNAIRE - PHQ9
SUM OF ALL RESPONSES TO PHQ QUESTIONS 1-9: 0
SUM OF ALL RESPONSES TO PHQ QUESTIONS 1-9: 0
2. FEELING DOWN, DEPRESSED OR HOPELESS: NOT AT ALL
SUM OF ALL RESPONSES TO PHQ QUESTIONS 1-9: 0
1. LITTLE INTEREST OR PLEASURE IN DOING THINGS: NOT AT ALL
SUM OF ALL RESPONSES TO PHQ9 QUESTIONS 1 & 2: 0
SUM OF ALL RESPONSES TO PHQ QUESTIONS 1-9: 0

## 2025-01-10 ENCOUNTER — PREP FOR PROCEDURE (OUTPATIENT)
Age: 80
End: 2025-01-10

## 2025-01-10 DIAGNOSIS — Z86.0101 PERSONAL HISTORY OF ADENOMATOUS AND SERRATED COLON POLYPS: ICD-10-CM

## 2025-01-10 PROBLEM — D64.9 ANEMIA, UNSPECIFIED: Status: ACTIVE | Noted: 2025-01-10

## 2025-01-14 DIAGNOSIS — I73.9 PAD (PERIPHERAL ARTERY DISEASE) (HCC): ICD-10-CM

## 2025-01-14 DIAGNOSIS — E11.51 TYPE 2 DIABETES MELLITUS WITH DIABETIC PERIPHERAL ANGIOPATHY WITHOUT GANGRENE, WITHOUT LONG-TERM CURRENT USE OF INSULIN (HCC): ICD-10-CM

## 2025-01-15 RX ORDER — CLOPIDOGREL BISULFATE 75 MG/1
75 TABLET ORAL DAILY
Qty: 90 TABLET | Refills: 0 | Status: SHIPPED | OUTPATIENT
Start: 2025-01-15

## 2025-01-15 RX ORDER — ALENDRONATE SODIUM 70 MG/1
70 TABLET ORAL WEEKLY
Qty: 12 TABLET | Refills: 0 | Status: SHIPPED | OUTPATIENT
Start: 2025-01-15

## 2025-01-23 DIAGNOSIS — N40.0 BENIGN PROSTATIC HYPERPLASIA WITHOUT LOWER URINARY TRACT SYMPTOMS: ICD-10-CM

## 2025-01-24 RX ORDER — FINASTERIDE 5 MG/1
5 TABLET, FILM COATED ORAL DAILY
Qty: 90 TABLET | Refills: 0 | Status: SHIPPED | OUTPATIENT
Start: 2025-01-24

## 2025-01-27 ENCOUNTER — OFFICE VISIT (OUTPATIENT)
Age: 80
End: 2025-01-27
Payer: OTHER GOVERNMENT

## 2025-01-27 ENCOUNTER — TELEPHONE (OUTPATIENT)
Age: 80
End: 2025-01-27

## 2025-01-27 VITALS
WEIGHT: 139.6 LBS | BODY MASS INDEX: 22.53 KG/M2 | HEART RATE: 72 BPM | DIASTOLIC BLOOD PRESSURE: 70 MMHG | RESPIRATION RATE: 22 BRPM | OXYGEN SATURATION: 99 % | SYSTOLIC BLOOD PRESSURE: 148 MMHG | TEMPERATURE: 98 F

## 2025-01-27 DIAGNOSIS — D50.9 IRON DEFICIENCY ANEMIA, UNSPECIFIED IRON DEFICIENCY ANEMIA TYPE: ICD-10-CM

## 2025-01-27 DIAGNOSIS — N18.32 CHRONIC KIDNEY DISEASE, STAGE 3B (HCC): ICD-10-CM

## 2025-01-27 DIAGNOSIS — J44.9 CHRONIC OBSTRUCTIVE PULMONARY DISEASE, UNSPECIFIED COPD TYPE (HCC): ICD-10-CM

## 2025-01-27 DIAGNOSIS — E11.51 TYPE 2 DIABETES MELLITUS WITH DIABETIC PERIPHERAL ANGIOPATHY WITHOUT GANGRENE, WITHOUT LONG-TERM CURRENT USE OF INSULIN (HCC): ICD-10-CM

## 2025-01-27 DIAGNOSIS — I10 PRIMARY HYPERTENSION: ICD-10-CM

## 2025-01-27 DIAGNOSIS — I48.0 PAF (PAROXYSMAL ATRIAL FIBRILLATION) (HCC): Primary | ICD-10-CM

## 2025-01-27 PROCEDURE — 99214 OFFICE O/P EST MOD 30 MIN: CPT

## 2025-01-27 PROCEDURE — G8427 DOCREV CUR MEDS BY ELIG CLIN: HCPCS

## 2025-01-27 PROCEDURE — G8420 CALC BMI NORM PARAMETERS: HCPCS

## 2025-01-27 PROCEDURE — 3077F SYST BP >= 140 MM HG: CPT

## 2025-01-27 PROCEDURE — 1123F ACP DISCUSS/DSCN MKR DOCD: CPT

## 2025-01-27 PROCEDURE — 3078F DIAST BP <80 MM HG: CPT

## 2025-01-27 PROCEDURE — 3023F SPIROM DOC REV: CPT

## 2025-01-27 PROCEDURE — 4004F PT TOBACCO SCREEN RCVD TLK: CPT

## 2025-01-27 RX ORDER — HYDROCORTISONE SODIUM SUCCINATE 100 MG/2ML
100 INJECTION INTRAMUSCULAR; INTRAVENOUS
OUTPATIENT
Start: 2025-01-27

## 2025-01-27 RX ORDER — SODIUM CHLORIDE 0.9 % (FLUSH) 0.9 %
5-40 SYRINGE (ML) INJECTION PRN
OUTPATIENT
Start: 2025-01-27

## 2025-01-27 RX ORDER — DIPHENHYDRAMINE HYDROCHLORIDE 50 MG/ML
50 INJECTION INTRAMUSCULAR; INTRAVENOUS
OUTPATIENT
Start: 2025-01-27

## 2025-01-27 RX ORDER — SODIUM CHLORIDE 9 MG/ML
5-250 INJECTION, SOLUTION INTRAVENOUS PRN
OUTPATIENT
Start: 2025-01-27

## 2025-01-27 RX ORDER — EPINEPHRINE 1 MG/ML
0.3 INJECTION, SOLUTION, CONCENTRATE INTRAVENOUS PRN
OUTPATIENT
Start: 2025-01-27

## 2025-01-27 RX ORDER — FAMOTIDINE 10 MG/ML
20 INJECTION, SOLUTION INTRAVENOUS
OUTPATIENT
Start: 2025-01-27

## 2025-01-27 RX ORDER — ONDANSETRON 2 MG/ML
8 INJECTION INTRAMUSCULAR; INTRAVENOUS
OUTPATIENT
Start: 2025-01-27

## 2025-01-27 RX ORDER — SODIUM CHLORIDE 9 MG/ML
INJECTION, SOLUTION INTRAVENOUS CONTINUOUS
OUTPATIENT
Start: 2025-01-27

## 2025-01-27 RX ORDER — ALBUTEROL SULFATE 90 UG/1
4 INHALANT RESPIRATORY (INHALATION) PRN
OUTPATIENT
Start: 2025-01-27

## 2025-01-27 RX ORDER — ACETAMINOPHEN 325 MG/1
650 TABLET ORAL
OUTPATIENT
Start: 2025-01-27

## 2025-01-27 RX ORDER — HEPARIN SODIUM (PORCINE) LOCK FLUSH IV SOLN 100 UNIT/ML 100 UNIT/ML
500 SOLUTION INTRAVENOUS PRN
OUTPATIENT
Start: 2025-01-27

## 2025-01-27 ASSESSMENT — ENCOUNTER SYMPTOMS
ALLERGIC/IMMUNOLOGIC NEGATIVE: 1
COUGH: 1
EYES NEGATIVE: 1
CONSTIPATION: 0
BLOOD IN STOOL: 0
SHORTNESS OF BREATH: 0
DIARRHEA: 0
WHEEZING: 0

## 2025-01-27 NOTE — TELEPHONE ENCOUNTER
Patient called stating that he was seen by Ena Henderson today and she wants him to hold off on having the colonoscopy on 02/12/2025 due to his holter monitor results. No reschedule at this time.

## 2025-01-27 NOTE — PROGRESS NOTES
\"Have you been to the ER, urgent care clinic since your last visit?  Hospitalized since your last visit?\"    NO    “Have you seen or consulted any other health care providers outside our system since your last visit?”    NO             Chief Complaint   Patient presents with    Pre-op Exam    Cough     Chest congestion x over 2 mnths     Results     Discuss holter monitor results        Vitals:    01/27/25 1041   BP: (!) 148/70   Pulse: 72   Resp: 22   Temp: 98 °F (36.7 °C)   SpO2: 99%     
APRN - NP

## 2025-01-29 ENCOUNTER — TELEPHONE (OUTPATIENT)
Age: 80
End: 2025-01-29

## 2025-01-29 NOTE — TELEPHONE ENCOUNTER
Attempted to call patient regarding questions. No answer. Message left. Patient scheduled for Cardio F/U on 2/2/25

## 2025-01-30 NOTE — TELEPHONE ENCOUNTER
Pt called back he needs a [facility] referral for [facility] appt that was not sent in.     I have filled out form and [facility] to the [facility]

## 2025-02-11 DIAGNOSIS — E11.51 TYPE 2 DIABETES MELLITUS WITH DIABETIC PERIPHERAL ANGIOPATHY WITHOUT GANGRENE, WITHOUT LONG-TERM CURRENT USE OF INSULIN (HCC): ICD-10-CM

## 2025-02-12 PROBLEM — A41.9 SEPSIS (HCC): Status: RESOLVED | Noted: 2024-07-26 | Resolved: 2025-02-12

## 2025-02-12 PROBLEM — A41.9 SEPTIC SHOCK (HCC): Status: RESOLVED | Noted: 2024-07-26 | Resolved: 2025-02-12

## 2025-02-12 PROBLEM — I25.10 CORONARY ARTERY DISEASE INVOLVING NATIVE CORONARY ARTERY OF NATIVE HEART WITHOUT ANGINA PECTORIS: Status: ACTIVE | Noted: 2025-02-12

## 2025-02-12 PROBLEM — Z72.0 TOBACCO ABUSE: Status: ACTIVE | Noted: 2025-02-12

## 2025-02-12 PROBLEM — I48.0 PAROXYSMAL ATRIAL FIBRILLATION (HCC): Status: ACTIVE | Noted: 2025-02-12

## 2025-02-12 PROBLEM — E78.00 HYPERCHOLESTEROLEMIA: Status: ACTIVE | Noted: 2025-02-12

## 2025-02-12 PROBLEM — R65.21 SEPTIC SHOCK (HCC): Status: RESOLVED | Noted: 2024-07-26 | Resolved: 2025-02-12

## 2025-02-12 PROBLEM — N17.9 AKI (ACUTE KIDNEY INJURY): Status: RESOLVED | Noted: 2024-09-08 | Resolved: 2025-02-12

## 2025-02-12 PROBLEM — Z91.89 AT HIGH RISK FOR CORONARY ARTERY DISEASE: Status: RESOLVED | Noted: 2025-02-12 | Resolved: 2025-02-12

## 2025-02-12 PROBLEM — D50.9 IRON DEFICIENCY ANEMIA: Status: ACTIVE | Noted: 2025-02-12

## 2025-02-12 PROBLEM — R55 SYNCOPE AND COLLAPSE: Status: RESOLVED | Noted: 2024-09-08 | Resolved: 2025-02-12

## 2025-02-12 PROBLEM — Z91.89 AT HIGH RISK FOR CORONARY ARTERY DISEASE: Status: ACTIVE | Noted: 2025-02-12

## 2025-02-12 PROBLEM — I73.9 PERIPHERAL ARTERIAL DISEASE: Status: ACTIVE | Noted: 2025-02-12

## 2025-02-12 RX ORDER — GABAPENTIN 300 MG/1
600 CAPSULE ORAL 2 TIMES DAILY
Qty: 360 CAPSULE | Refills: 1 | Status: SHIPPED | OUTPATIENT
Start: 2025-02-12 | End: 2025-08-11

## 2025-02-16 ENCOUNTER — APPOINTMENT (OUTPATIENT)
Facility: HOSPITAL | Age: 80
End: 2025-02-16
Payer: OTHER GOVERNMENT

## 2025-02-16 ENCOUNTER — HOSPITAL ENCOUNTER (EMERGENCY)
Facility: HOSPITAL | Age: 80
Discharge: HOME | End: 2025-02-16
Attending: EMERGENCY MEDICINE
Payer: OTHER GOVERNMENT

## 2025-02-16 VITALS
WEIGHT: 137 LBS | HEIGHT: 66 IN | TEMPERATURE: 97.9 F | RESPIRATION RATE: 22 BRPM | SYSTOLIC BLOOD PRESSURE: 155 MMHG | BODY MASS INDEX: 22.02 KG/M2 | OXYGEN SATURATION: 96 % | DIASTOLIC BLOOD PRESSURE: 74 MMHG | HEART RATE: 83 BPM

## 2025-02-16 DIAGNOSIS — J44.1 COPD EXACERBATION (HCC): Primary | ICD-10-CM

## 2025-02-16 LAB
ALBUMIN SERPL-MCNC: 3.3 G/DL (ref 3.5–5)
ALBUMIN/GLOB SERPL: 0.9 (ref 1.1–2.2)
ALP SERPL-CCNC: 84 U/L (ref 45–117)
ALT SERPL-CCNC: 11 U/L (ref 12–78)
ANION GAP SERPL CALC-SCNC: 13 MMOL/L (ref 2–12)
AST SERPL-CCNC: 12 U/L (ref 15–37)
BASOPHILS # BLD: 0.06 K/UL (ref 0–0.1)
BASOPHILS NFR BLD: 1.1 % (ref 0–1)
BILIRUB SERPL-MCNC: 0.5 MG/DL (ref 0.2–1)
BUN SERPL-MCNC: 30 MG/DL (ref 6–20)
BUN/CREAT SERPL: 16 (ref 12–20)
CALCIUM SERPL-MCNC: 9 MG/DL (ref 8.5–10.1)
CHLORIDE SERPL-SCNC: 102 MMOL/L (ref 97–108)
CO2 SERPL-SCNC: 24 MMOL/L (ref 21–32)
CREAT SERPL-MCNC: 1.85 MG/DL (ref 0.7–1.3)
DIFFERENTIAL METHOD BLD: ABNORMAL
EOSINOPHIL # BLD: 0.19 K/UL (ref 0–0.4)
EOSINOPHIL NFR BLD: 3.6 % (ref 0–7)
ERYTHROCYTE [DISTWIDTH] IN BLOOD BY AUTOMATED COUNT: 16.8 % (ref 11.5–14.5)
FLUAV RNA SPEC QL NAA+PROBE: NOT DETECTED
FLUBV RNA SPEC QL NAA+PROBE: NOT DETECTED
GLOBULIN SER CALC-MCNC: 3.7 G/DL (ref 2–4)
GLUCOSE SERPL-MCNC: 92 MG/DL (ref 65–100)
HCT VFR BLD AUTO: 26.5 % (ref 36.6–50.3)
HGB BLD-MCNC: 8.5 G/DL (ref 12.1–17)
IMM GRANULOCYTES # BLD AUTO: 0.01 K/UL (ref 0–0.04)
IMM GRANULOCYTES NFR BLD AUTO: 0.2 % (ref 0–0.5)
LACTATE SERPL-SCNC: 1.5 MMOL/L (ref 0.4–2)
LYMPHOCYTES # BLD: 0.67 K/UL (ref 0.8–3.5)
LYMPHOCYTES NFR BLD: 12.7 % (ref 12–49)
MAGNESIUM SERPL-MCNC: 1.4 MG/DL (ref 1.6–2.4)
MCH RBC QN AUTO: 27.3 PG (ref 26–34)
MCHC RBC AUTO-ENTMCNC: 32.1 G/DL (ref 30–36.5)
MCV RBC AUTO: 85.2 FL (ref 80–99)
MONOCYTES # BLD: 0.4 K/UL (ref 0–1)
MONOCYTES NFR BLD: 7.6 % (ref 5–13)
NEUTS SEG # BLD: 3.96 K/UL (ref 1.8–8)
NEUTS SEG NFR BLD: 74.8 % (ref 32–75)
NRBC # BLD: 0 K/UL (ref 0–0.01)
NRBC BLD-RTO: 0 PER 100 WBC
NT PRO BNP: 1863 PG/ML (ref 0–450)
PLATELET # BLD AUTO: 260 K/UL (ref 150–400)
PLATELET COMMENT: ABNORMAL
PMV BLD AUTO: 9.8 FL (ref 8.9–12.9)
POTASSIUM SERPL-SCNC: 4.4 MMOL/L (ref 3.5–5.1)
PROCALCITONIN SERPL-MCNC: <0.05 NG/ML
PROT SERPL-MCNC: 7 G/DL (ref 6.4–8.2)
RBC # BLD AUTO: 3.11 M/UL (ref 4.1–5.7)
RBC MORPH BLD: ABNORMAL
SARS-COV-2 RNA RESP QL NAA+PROBE: NOT DETECTED
SODIUM SERPL-SCNC: 139 MMOL/L (ref 136–145)
SOURCE: NORMAL
TROPONIN I SERPL HS-MCNC: 9 NG/L (ref 0–76)
WBC # BLD AUTO: 5.3 K/UL (ref 4.1–11.1)

## 2025-02-16 PROCEDURE — 83735 ASSAY OF MAGNESIUM: CPT

## 2025-02-16 PROCEDURE — 87636 SARSCOV2 & INF A&B AMP PRB: CPT

## 2025-02-16 PROCEDURE — 87040 BLOOD CULTURE FOR BACTERIA: CPT

## 2025-02-16 PROCEDURE — 93005 ELECTROCARDIOGRAM TRACING: CPT | Performed by: EMERGENCY MEDICINE

## 2025-02-16 PROCEDURE — 71275 CT ANGIOGRAPHY CHEST: CPT

## 2025-02-16 PROCEDURE — 84145 PROCALCITONIN (PCT): CPT

## 2025-02-16 PROCEDURE — 83880 ASSAY OF NATRIURETIC PEPTIDE: CPT

## 2025-02-16 PROCEDURE — 99285 EMERGENCY DEPT VISIT HI MDM: CPT

## 2025-02-16 PROCEDURE — 94640 AIRWAY INHALATION TREATMENT: CPT

## 2025-02-16 PROCEDURE — 2500000003 HC RX 250 WO HCPCS: Performed by: EMERGENCY MEDICINE

## 2025-02-16 PROCEDURE — 6360000002 HC RX W HCPCS: Performed by: EMERGENCY MEDICINE

## 2025-02-16 PROCEDURE — 36415 COLL VENOUS BLD VENIPUNCTURE: CPT

## 2025-02-16 PROCEDURE — 96365 THER/PROPH/DIAG IV INF INIT: CPT

## 2025-02-16 PROCEDURE — 84484 ASSAY OF TROPONIN QUANT: CPT

## 2025-02-16 PROCEDURE — 85025 COMPLETE CBC W/AUTO DIFF WBC: CPT

## 2025-02-16 PROCEDURE — 96375 TX/PRO/DX INJ NEW DRUG ADDON: CPT

## 2025-02-16 PROCEDURE — 71045 X-RAY EXAM CHEST 1 VIEW: CPT

## 2025-02-16 PROCEDURE — 6360000004 HC RX CONTRAST MEDICATION: Performed by: EMERGENCY MEDICINE

## 2025-02-16 PROCEDURE — 96374 THER/PROPH/DIAG INJ IV PUSH: CPT

## 2025-02-16 PROCEDURE — 83605 ASSAY OF LACTIC ACID: CPT

## 2025-02-16 PROCEDURE — 6370000000 HC RX 637 (ALT 250 FOR IP): Performed by: EMERGENCY MEDICINE

## 2025-02-16 PROCEDURE — 2580000003 HC RX 258: Performed by: EMERGENCY MEDICINE

## 2025-02-16 PROCEDURE — 80053 COMPREHEN METABOLIC PANEL: CPT

## 2025-02-16 RX ORDER — IPRATROPIUM BROMIDE AND ALBUTEROL SULFATE 2.5; .5 MG/3ML; MG/3ML
1 SOLUTION RESPIRATORY (INHALATION)
Status: COMPLETED | OUTPATIENT
Start: 2025-02-16 | End: 2025-02-16

## 2025-02-16 RX ORDER — DOXYCYCLINE HYCLATE 100 MG
100 TABLET ORAL 2 TIMES DAILY
Qty: 14 TABLET | Refills: 0 | Status: SHIPPED | OUTPATIENT
Start: 2025-02-16 | End: 2025-02-23

## 2025-02-16 RX ORDER — IOPAMIDOL 755 MG/ML
100 INJECTION, SOLUTION INTRAVASCULAR
Status: COMPLETED | OUTPATIENT
Start: 2025-02-16 | End: 2025-02-16

## 2025-02-16 RX ORDER — PREDNISONE 10 MG/1
TABLET ORAL
Qty: 42 TABLET | Refills: 0 | Status: SHIPPED | OUTPATIENT
Start: 2025-02-16 | End: 2025-02-27

## 2025-02-16 RX ADMIN — IOPAMIDOL 100 ML: 755 INJECTION, SOLUTION INTRAVENOUS at 09:39

## 2025-02-16 RX ADMIN — METHYLPREDNISOLONE SODIUM SUCCINATE 125 MG: 125 INJECTION INTRAMUSCULAR; INTRAVENOUS at 08:43

## 2025-02-16 RX ADMIN — WATER 1000 MG: 1 INJECTION INTRAMUSCULAR; INTRAVENOUS; SUBCUTANEOUS at 09:09

## 2025-02-16 RX ADMIN — IPRATROPIUM BROMIDE AND ALBUTEROL SULFATE 1 DOSE: 2.5; .5 SOLUTION RESPIRATORY (INHALATION) at 08:24

## 2025-02-16 RX ADMIN — AZITHROMYCIN MONOHYDRATE 500 MG: 500 INJECTION, POWDER, LYOPHILIZED, FOR SOLUTION INTRAVENOUS at 09:19

## 2025-02-16 ASSESSMENT — PAIN - FUNCTIONAL ASSESSMENT: PAIN_FUNCTIONAL_ASSESSMENT: NONE - DENIES PAIN

## 2025-02-16 NOTE — ED PROVIDER NOTES
[facility] EMERGENCY DEPARTMENT  EMERGENCY DEPARTMENT ENCOUNTER       Pt Name: Scooter Dickerson  MRN: [id number]  Birthdate 1945  Date of evaluation: 2/16/2025  Provider: Oleg Pineda DO   PCP: Ena Henderson APRN - NP  Note Started: 8:11 AM EST 2/16/25     CHIEF COMPLAINT       Chief Complaint   Patient presents with    Shortness of Breath        HISTORY OF PRESENT ILLNESS: 1 or more elements      History From: Patient, History limited by: none     Scooter Dickerson is a 79 y.o. male past medical history significant for COPD, paroxysmal atrial fibrillation, hypertension, tobacco abuse presenting the emergency department complaining of shortness of breath.  Shortness of breath started last night.  Its associated with a chronic cough.  Reports some mild lower extremity edema.  No fever.       Please See MDM for Additional Details of the HPI/PMH  Nursing Notes were all reviewed and agreed with or any disagreements were addressed in the HPI.     REVIEW OF SYSTEMS        Positives and Pertinent negatives as per HPI.    PAST HISTORY     Past Medical History:  History reviewed. No pertinent past medical history.    Past Surgical History:  History reviewed. No pertinent surgical history.    Family History:  History reviewed. No pertinent family history.    Social History:  Social History     Tobacco Use    Smoking status: Every Day     Current packs/day: 0.75     Average packs/day: 0.8 packs/day for 60.1 years (45.1 ttl pk-yrs)     Types: Cigarettes     Start date: 1965    Smokeless tobacco: Never   Vaping Use    Vaping status: Never Used   Substance Use Topics    Alcohol use: Never    Drug use: Never       Allergies:  Allergies   Allergen Reactions    Penicillins Rash       CURRENT MEDICATIONS      Discharge Medication List as of 2/16/2025 10:51 AM        CONTINUE these medications which have NOT CHANGED    Details   gabapentin (NEURONTIN) 300 MG capsule Take 2 capsules by mouth in the morning and at

## 2025-02-16 NOTE — ED TRIAGE NOTES
Shortness of breath when laying flat and REBOLLAR starting last night.No pain , denies n/v/d , afebrile

## 2025-02-18 LAB
EKG ATRIAL RATE: 74 BPM
EKG DIAGNOSIS: NORMAL
EKG Q-T INTERVAL: 402 MS
EKG QRS DURATION: 142 MS
EKG QTC CALCULATION (BAZETT): 460 MS
EKG R AXIS: 95 DEGREES
EKG T AXIS: 22 DEGREES
EKG VENTRICULAR RATE: 79 BPM

## 2025-02-21 DIAGNOSIS — E78.2 MIXED HYPERLIPIDEMIA: ICD-10-CM

## 2025-02-21 DIAGNOSIS — E55.9 VITAMIN D DEFICIENCY: ICD-10-CM

## 2025-02-21 RX ORDER — CHOLECALCIFEROL (VITAMIN D3) 25 MCG
2000 TABLET ORAL DAILY
Qty: 180 TABLET | Refills: 0 | Status: SHIPPED | OUTPATIENT
Start: 2025-02-21

## 2025-02-21 RX ORDER — ATORVASTATIN CALCIUM 40 MG/1
40 TABLET, FILM COATED ORAL DAILY
Qty: 90 TABLET | Refills: 0 | Status: SHIPPED | OUTPATIENT
Start: 2025-02-21

## 2025-02-24 ENCOUNTER — TELEPHONE (OUTPATIENT)
Age: 80
End: 2025-02-24

## 2025-02-24 ENCOUNTER — OFFICE VISIT (OUTPATIENT)
Age: 80
End: 2025-02-24
Payer: OTHER GOVERNMENT

## 2025-02-24 VITALS
SYSTOLIC BLOOD PRESSURE: 144 MMHG | HEIGHT: 66 IN | WEIGHT: 131 LBS | TEMPERATURE: 97.5 F | BODY MASS INDEX: 21.05 KG/M2 | HEART RATE: 85 BPM | DIASTOLIC BLOOD PRESSURE: 86 MMHG | OXYGEN SATURATION: 100 %

## 2025-02-24 DIAGNOSIS — I10 PRIMARY HYPERTENSION: ICD-10-CM

## 2025-02-24 DIAGNOSIS — Z72.0 TOBACCO ABUSE: ICD-10-CM

## 2025-02-24 DIAGNOSIS — I73.9 PERIPHERAL ARTERIAL DISEASE: ICD-10-CM

## 2025-02-24 DIAGNOSIS — D50.8 OTHER IRON DEFICIENCY ANEMIA: ICD-10-CM

## 2025-02-24 DIAGNOSIS — I25.10 CORONARY ARTERY DISEASE INVOLVING NATIVE CORONARY ARTERY OF NATIVE HEART WITHOUT ANGINA PECTORIS: ICD-10-CM

## 2025-02-24 DIAGNOSIS — Z01.810 PREOP CARDIOVASCULAR EXAM: ICD-10-CM

## 2025-02-24 DIAGNOSIS — I48.0 PAROXYSMAL ATRIAL FIBRILLATION (HCC): Primary | ICD-10-CM

## 2025-02-24 DIAGNOSIS — E78.00 HYPERCHOLESTEROLEMIA: ICD-10-CM

## 2025-02-24 PROCEDURE — 4004F PT TOBACCO SCREEN RCVD TLK: CPT | Performed by: INTERNAL MEDICINE

## 2025-02-24 PROCEDURE — G8428 CUR MEDS NOT DOCUMENT: HCPCS | Performed by: INTERNAL MEDICINE

## 2025-02-24 PROCEDURE — G8420 CALC BMI NORM PARAMETERS: HCPCS | Performed by: INTERNAL MEDICINE

## 2025-02-24 PROCEDURE — 3079F DIAST BP 80-89 MM HG: CPT | Performed by: INTERNAL MEDICINE

## 2025-02-24 PROCEDURE — 3077F SYST BP >= 140 MM HG: CPT | Performed by: INTERNAL MEDICINE

## 2025-02-24 PROCEDURE — 1126F AMNT PAIN NOTED NONE PRSNT: CPT | Performed by: INTERNAL MEDICINE

## 2025-02-24 PROCEDURE — 99204 OFFICE O/P NEW MOD 45 MIN: CPT | Performed by: INTERNAL MEDICINE

## 2025-02-24 PROCEDURE — 1123F ACP DISCUSS/DSCN MKR DOCD: CPT | Performed by: INTERNAL MEDICINE

## 2025-02-24 RX ORDER — METOPROLOL SUCCINATE 25 MG/1
25 TABLET, EXTENDED RELEASE ORAL DAILY
Qty: 30 TABLET | Refills: 3 | Status: SHIPPED | OUTPATIENT
Start: 2025-02-24

## 2025-02-24 ASSESSMENT — PATIENT HEALTH QUESTIONNAIRE - PHQ9
SUM OF ALL RESPONSES TO PHQ QUESTIONS 1-9: 0
SUM OF ALL RESPONSES TO PHQ QUESTIONS 1-9: 0
2. FEELING DOWN, DEPRESSED OR HOPELESS: NOT AT ALL
SUM OF ALL RESPONSES TO PHQ QUESTIONS 1-9: 0
SUM OF ALL RESPONSES TO PHQ QUESTIONS 1-9: 0
SUM OF ALL RESPONSES TO PHQ9 QUESTIONS 1 & 2: 0
1. LITTLE INTEREST OR PLEASURE IN DOING THINGS: NOT AT ALL

## 2025-02-24 NOTE — PATIENT INSTRUCTIONS
Continue your current medications and begin metoprolol succinate 25 mg daily.  I have ordered a thyroid panel, echocardiogram and Lexiscan Cardiolite stress test for further evaluation of your heart.

## 2025-02-24 NOTE — PROGRESS NOTES
Identified pt with two pt identifiers(name and ). Reviewed record in preparation for visit and have obtained necessary documentation.  Chief Complaint   Patient presents with    New Patient    Atrial Fibrillation      BP (!) 144/86 (Site: Left Upper Arm, Position: Sitting, Cuff Size: Medium Adult)   Pulse 85   Temp 97.5 °F (36.4 °C) (Temporal)   Ht 1.676 m (5' 6\")   Wt 59.4 kg (131 lb)   SpO2 100%   BMI 21.14 kg/m²       Medications reviewed/approved by provider.      Health Maintenance Review: Patient reminded of \"due or due soon\" health maintenance. I have asked the patient to contact his/her primary care provider (PCP) for follow-up on his/her health maintenance.    Coordination of Care Questionnaire:  :   1) Have you been to an emergency room, urgent care, or hospitalized since your last visit?  If yes, where when, and reason for visit? no       2. Have seen or consulted any other health care provider since your last visit?   If yes, where when, and reason for visit?  no      Patient is accompanied by self I have received verbal consent from Scooter Dickerson to discuss any/all medical information while they are present in the room.    
previous visit.          Mathieu Painting MD   2/24/2025  11:04 AM

## 2025-02-24 NOTE — TELEPHONE ENCOUNTER
Patient:  Scooter Dickerson  :  1945    Patient identified with two identifiers.    I called Mr. Dickerson.  He came into today for a new patient visit with Dr. Painting.  [facility] is a [facility].  We did not get a referral from his PCP (Ena Henderson NP) for this visit.  I advised Mr. Dickerson that he will have to call the [facility] in [place] to get a referral for the visit and for the testing that Dr. Painting has ordered.  Once we get that referral it will be in the system and the [facility] should pay for services.  Patient agreed.  He will call the [facility] today.

## 2025-02-25 ENCOUNTER — HOSPITAL ENCOUNTER (OUTPATIENT)
Facility: HOSPITAL | Age: 80
Discharge: HOME | End: 2025-02-28
Payer: OTHER GOVERNMENT

## 2025-02-25 LAB
T4 FREE SERPL-MCNC: 1.4 NG/DL (ref 0.8–1.5)
TSH SERPL DL<=0.05 MIU/L-ACNC: 4.07 UIU/ML (ref 0.36–3.74)

## 2025-02-25 PROCEDURE — 36415 COLL VENOUS BLD VENIPUNCTURE: CPT

## 2025-02-25 PROCEDURE — 84443 ASSAY THYROID STIM HORMONE: CPT

## 2025-02-25 PROCEDURE — 84439 ASSAY OF FREE THYROXINE: CPT

## 2025-02-26 NOTE — RESULT ENCOUNTER NOTE
TSH is slightly increased but free T4 is normal.  No changes necessary.  He should have this rechecked by his PCP in a few months.

## 2025-02-28 ENCOUNTER — TELEPHONE (OUTPATIENT)
Facility: HOSPITAL | Age: 80
End: 2025-02-28

## 2025-02-28 NOTE — TELEPHONE ENCOUNTER
Spoke with pt via phone, discussed prep/education for stress test Tuesday 3/4, questions answered.

## 2025-03-04 ENCOUNTER — HOSPITAL ENCOUNTER (OUTPATIENT)
Facility: HOSPITAL | Age: 80
Discharge: HOME | End: 2025-03-06
Attending: INTERNAL MEDICINE
Payer: MEDICARE

## 2025-03-04 ENCOUNTER — HOSPITAL ENCOUNTER (OUTPATIENT)
Facility: HOSPITAL | Age: 80
Setting detail: RECURRING SERIES
Discharge: HOME | End: 2025-03-07
Attending: INTERNAL MEDICINE
Payer: OTHER GOVERNMENT

## 2025-03-04 ENCOUNTER — HOSPITAL ENCOUNTER (OUTPATIENT)
Facility: HOSPITAL | Age: 80
Discharge: HOME | End: 2025-03-07
Attending: INTERNAL MEDICINE
Payer: MEDICARE

## 2025-03-04 VITALS — RESPIRATION RATE: 20 BRPM | SYSTOLIC BLOOD PRESSURE: 165 MMHG | DIASTOLIC BLOOD PRESSURE: 74 MMHG | HEART RATE: 100 BPM

## 2025-03-04 DIAGNOSIS — I25.10 CORONARY ARTERY DISEASE INVOLVING NATIVE CORONARY ARTERY OF NATIVE HEART WITHOUT ANGINA PECTORIS: ICD-10-CM

## 2025-03-04 DIAGNOSIS — I48.0 PAROXYSMAL ATRIAL FIBRILLATION (HCC): ICD-10-CM

## 2025-03-04 LAB
ECHO AO ASC DIAM: 3.3 CM
ECHO AO ROOT DIAM: 2.1 CM
ECHO AV AREA PEAK VELOCITY: 1.8 CM2
ECHO AV AREA VTI: 1.8 CM2
ECHO AV MEAN GRADIENT: 10 MMHG
ECHO AV MEAN VELOCITY: 1.5 M/S
ECHO AV PEAK GRADIENT: 20 MMHG
ECHO AV PEAK VELOCITY: 2.2 M/S
ECHO AV VELOCITY RATIO: 0.68
ECHO AV VTI: 39.5 CM
ECHO LA DIAMETER: 4.2 CM
ECHO LA TO AORTIC ROOT RATIO: 2
ECHO LA VOL A-L A2C: 68 ML (ref 18–58)
ECHO LA VOL A-L A4C: 78 ML (ref 18–58)
ECHO LA VOL MOD A2C: 63 ML (ref 18–58)
ECHO LA VOL MOD A4C: 72 ML (ref 18–58)
ECHO LA VOLUME AREA LENGTH: 80 ML
ECHO LV E' LATERAL VELOCITY: 8.35 CM/S
ECHO LV E' SEPTAL VELOCITY: 6.67 CM/S
ECHO LV EF PHYSICIAN: 65 %
ECHO LV FRACTIONAL SHORTENING: 45 % (ref 28–44)
ECHO LV INTERNAL DIMENSION DIASTOLIC: 2.9 CM (ref 4.2–5.9)
ECHO LV INTERNAL DIMENSION SYSTOLIC: 1.6 CM
ECHO LV IVSD: 1.1 CM (ref 0.6–1)
ECHO LV MASS 2D: 104.2 G (ref 88–224)
ECHO LV POSTERIOR WALL DIASTOLIC: 1.3 CM (ref 0.6–1)
ECHO LV RELATIVE WALL THICKNESS RATIO: 0.9
ECHO LVOT AREA: 2.8 CM2
ECHO LVOT AV VTI INDEX: 0.68
ECHO LVOT DIAM: 1.9 CM
ECHO LVOT MEAN GRADIENT: 5 MMHG
ECHO LVOT PEAK GRADIENT: 8 MMHG
ECHO LVOT PEAK VELOCITY: 1.5 M/S
ECHO LVOT SV: 75.9 ML
ECHO LVOT VTI: 26.8 CM
ECHO MV A VELOCITY: 1.43 M/S
ECHO MV AREA PHT: 2.6 CM2
ECHO MV AREA VTI: 2.1 CM2
ECHO MV E DECELERATION TIME (DT): 223.5 MS
ECHO MV E VELOCITY: 1.15 M/S
ECHO MV E/A RATIO: 0.8
ECHO MV E/E' LATERAL: 13.77
ECHO MV E/E' RATIO (AVERAGED): 15.51
ECHO MV E/E' SEPTAL: 17.24
ECHO MV LVOT VTI INDEX: 1.34
ECHO MV MAX VELOCITY: 1.4 M/S
ECHO MV MEAN GRADIENT: 4 MMHG
ECHO MV MEAN VELOCITY: 0.9 M/S
ECHO MV PEAK GRADIENT: 8 MMHG
ECHO MV PRESSURE HALF TIME (PHT): 84.7 MS
ECHO MV VTI: 36 CM
ECHO PULMONARY ARTERY SYSTOLIC PRESSURE (PASP): 26 MMHG
ECHO PV MAX VELOCITY: 0.9 M/S
ECHO PV PEAK GRADIENT: 3 MMHG
ECHO RA AREA 4C: 11.1 CM2
ECHO RV BASAL DIMENSION: 3.1 CM
ECHO RV TAPSE: 1.9 CM (ref 1.7–?)
ECHO TV REGURGITANT MAX VELOCITY: 2.41 M/S
ECHO TV REGURGITANT PEAK GRADIENT: 23 MMHG
NUC STRESS EJECTION FRACTION: 79 %
STRESS BASELINE DIAS BP: 91 MMHG
STRESS BASELINE HR: 89 BPM
STRESS BASELINE SYS BP: 162 MMHG
STRESS ESTIMATED WORKLOAD: 1 METS
STRESS EXERCISE DUR MIN: 2 MIN
STRESS EXERCISE DUR SEC: 33 SEC
STRESS PEAK DIAS BP: 84 MMHG
STRESS PEAK SYS BP: 158 MMHG
STRESS PERCENT HR ACHIEVED: 77 %
STRESS POST PEAK HR: 109 BPM
STRESS RATE PRESSURE PRODUCT: NORMAL BPM*MMHG
STRESS STAGE 1 BP: NORMAL MMHG
STRESS STAGE 1 DURATION: 1 MIN:SEC
STRESS STAGE 1 HR: 100 BPM
STRESS STAGE 2 BP: NORMAL MMHG
STRESS STAGE 2 DURATION: 1 MIN:SEC
STRESS STAGE 2 HR: 109 BPM
STRESS STAGE 3 DURATION: NORMAL MIN:SEC
STRESS STAGE 3 HR: 103 BPM
STRESS STAGE RECOVERY 1 BP: NORMAL MMHG
STRESS STAGE RECOVERY 1 DURATION: NORMAL MIN:SEC
STRESS STAGE RECOVERY 1 HR: 100 BPM
STRESS TARGET HR: 142 BPM
TID: 1.02

## 2025-03-04 PROCEDURE — 3430000000 HC RX DIAGNOSTIC RADIOPHARMACEUTICAL: Performed by: INTERNAL MEDICINE

## 2025-03-04 PROCEDURE — 6360000002 HC RX W HCPCS: Performed by: INTERNAL MEDICINE

## 2025-03-04 PROCEDURE — 93017 CV STRESS TEST TRACING ONLY: CPT

## 2025-03-04 PROCEDURE — 93306 TTE W/DOPPLER COMPLETE: CPT

## 2025-03-04 PROCEDURE — A9500 TC99M SESTAMIBI: HCPCS | Performed by: INTERNAL MEDICINE

## 2025-03-04 RX ORDER — REGADENOSON 0.08 MG/ML
0.4 INJECTION, SOLUTION INTRAVENOUS
Status: COMPLETED | OUTPATIENT
Start: 2025-03-04 | End: 2025-03-04

## 2025-03-04 RX ORDER — AMINOPHYLLINE 25 MG/ML
500 INJECTION, SOLUTION INTRAVENOUS ONCE
Status: COMPLETED | OUTPATIENT
Start: 2025-03-04 | End: 2025-03-04

## 2025-03-04 RX ORDER — TETRAKIS(2-METHOXYISOBUTYLISOCYANIDE)COPPER(I) TETRAFLUOROBORATE 1 MG/ML
33 INJECTION, POWDER, LYOPHILIZED, FOR SOLUTION INTRAVENOUS
Status: COMPLETED | OUTPATIENT
Start: 2025-03-04 | End: 2025-03-04

## 2025-03-04 RX ORDER — TETRAKIS(2-METHOXYISOBUTYLISOCYANIDE)COPPER(I) TETRAFLUOROBORATE 1 MG/ML
11 INJECTION, POWDER, LYOPHILIZED, FOR SOLUTION INTRAVENOUS
Status: COMPLETED | OUTPATIENT
Start: 2025-03-04 | End: 2025-03-04

## 2025-03-04 RX ADMIN — TETRAKIS(2-METHOXYISOBUTYLISOCYANIDE)COPPER(I) TETRAFLUOROBORATE 33 MILLICURIE: 1 INJECTION, POWDER, LYOPHILIZED, FOR SOLUTION INTRAVENOUS at 08:23

## 2025-03-04 RX ADMIN — TETRAKIS(2-METHOXYISOBUTYLISOCYANIDE)COPPER(I) TETRAFLUOROBORATE 11 MILLICURIE: 1 INJECTION, POWDER, LYOPHILIZED, FOR SOLUTION INTRAVENOUS at 07:03

## 2025-03-04 RX ADMIN — AMINOPHYLLINE 75 MG: 25 INJECTION, SOLUTION INTRAVENOUS at 08:30

## 2025-03-04 RX ADMIN — REGADENOSON 0.4 MG: 0.08 INJECTION, SOLUTION INTRAVENOUS at 08:22

## 2025-03-04 NOTE — NURSING NOTE
Pt having chest pain at end of lexiscan stress test.  Did not worsen over approx. 3 minutes but did not improve.  Aminophylline order received from Dr. Painting.

## 2025-03-05 ENCOUNTER — TELEPHONE (OUTPATIENT)
Age: 80
End: 2025-03-05

## 2025-03-05 DIAGNOSIS — I10 PRIMARY HYPERTENSION: ICD-10-CM

## 2025-03-05 DIAGNOSIS — I25.10 CORONARY ARTERY DISEASE INVOLVING NATIVE CORONARY ARTERY OF NATIVE HEART WITHOUT ANGINA PECTORIS: ICD-10-CM

## 2025-03-05 DIAGNOSIS — I48.0 PAF (PAROXYSMAL ATRIAL FIBRILLATION) (HCC): Primary | ICD-10-CM

## 2025-03-05 DIAGNOSIS — I25.6: Primary | ICD-10-CM

## 2025-03-05 DIAGNOSIS — R00.2 PALPITATIONS: ICD-10-CM

## 2025-03-05 DIAGNOSIS — I48.0 PAROXYSMAL ATRIAL FIBRILLATION (HCC): ICD-10-CM

## 2025-03-05 DIAGNOSIS — Z01.812 PRE-PROCEDURE LAB EXAM: ICD-10-CM

## 2025-03-05 DIAGNOSIS — R94.39 ABNORMAL NUCLEAR STRESS TEST: ICD-10-CM

## 2025-03-05 DIAGNOSIS — R94.39 ABNORMAL STRESS TEST: ICD-10-CM

## 2025-03-05 NOTE — TELEPHONE ENCOUNTER
----- Message from Dr. Mathieu Painting MD sent at 3/4/2025  6:59 PM EST -----  Called patient and reviewed stress test and echo results.    Difficult situation with multiple CAD risk factors, coronary atherosclerosis manifest as coronary calcification on chest CT, known PAD, ischemic ST segment changes on ZIO monitor and ST segment depression during regadenoson infusion which may be a marker of higher risk irrespective of the MPI results.  He is not experiencing chest pain but his functional status is compromised by orthopedic issues and neuropathy.    Comorbidities include iron deficiency anemia awaiting colonoscopy, stage III CKD, current DAPT use and paroxysmal atrial fibrillation (he presented to the stress test in atrial fibrillation)    Case discussed with Dr. Huddleston.  Recommend cath for definitive evaluation of his coronary arteries.  Discussed with patient and he was agreeable.    Plan:   - Continue clopidogrel, discontinue aspirin and begin Eliquis 2.5 mg twice daily (dosed for age 79, weight 59.4 kg and creatinine 1.85 on labs 2/16/2025) for stroke prophylaxis.   - Begin metoprolol succinate 25 mg daily as previously instructed.  Continue statin.   - Schedule outpatient cardiac catheterization with limited contrast.   - If there is no significant disease at cath requiring intervention, will discontinue clopidogrel and reinstitute daily aspirin (along with Eliquis) and proceed with colonoscopy for evaluation of his anemia.    [place], please schedule outpatient cardiac catheterization with Dr. Huddleston.  The patient is aware it may be a couple weeks from now.  Will need to hold the metformin before and after cath.    TEMO Sutherland.

## 2025-03-05 NOTE — TELEPHONE ENCOUNTER
This nurse reached out to Whitney Xiong regarding the [facility] referral that was placed on 2/3 by Beatriz for Dr. Painting consultation.     Once the referral is processed/received this nurse will place a request for outpatient cardiac cath.

## 2025-03-05 NOTE — TELEPHONE ENCOUNTER
CPT: 59257  ICD-10: I25.10, I48.0, I25.6, R94.39  Ordering MD: MD Garima  *With Dr. Huddleston*  Special Instructions: Hold metformin 48 hrs prior to procedure and day of. Hold metoprolol morning of.  Hold eliquis day prior and morning of procedure.

## 2025-03-05 NOTE — TELEPHONE ENCOUNTER
Pt states Dr Zaman is changing pts medication to eliquis 2.5 mg and he called it in for pt but  [facility] called and told the patient that the pcp has to make that change request so pt is asking for this medication to be re ordered by pcp so he can get and start this medication per dr zaman orders

## 2025-03-05 NOTE — TELEPHONE ENCOUNTER
Called patient and attempted to schedule and patient refused at this time, states he needs to talk to his PCP and figure out if Dr. Painting made them aware of what was going on, and figure out the referral situation. At this point I'm not sure what to do.

## 2025-03-06 ENCOUNTER — CLINICAL DOCUMENTATION (OUTPATIENT)
Age: 80
End: 2025-03-06

## 2025-03-06 NOTE — PROGRESS NOTES
Authorization for Dr. Painting referral received from the [facility].  #[id number]  Effective: 2-24-25  Ex: 9-2-2025    3-6-25 2:40pm Request for [facility] to the [facility] with Dr[facility] Huddleston at [facility].  CPT: 46005  ICD-10: I25.10, I48.0, I25.6, R94.39    3-10-25 Authorization received for cardiac cath under same Auth # w/ [facility] listed.

## 2025-03-10 ENCOUNTER — HOSPITAL ENCOUNTER (OUTPATIENT)
Facility: HOSPITAL | Age: 80
Setting detail: INFUSION SERIES
Discharge: HOME | End: 2025-03-10
Payer: MEDICARE

## 2025-03-10 VITALS
RESPIRATION RATE: 18 BRPM | WEIGHT: 132 LBS | DIASTOLIC BLOOD PRESSURE: 76 MMHG | TEMPERATURE: 98.3 F | HEART RATE: 69 BPM | SYSTOLIC BLOOD PRESSURE: 165 MMHG | OXYGEN SATURATION: 96 % | BODY MASS INDEX: 21.31 KG/M2

## 2025-03-10 DIAGNOSIS — D50.9 IRON DEFICIENCY ANEMIA, UNSPECIFIED IRON DEFICIENCY ANEMIA TYPE: Primary | ICD-10-CM

## 2025-03-10 PROBLEM — R94.39 ABNORMAL STRESS TEST: Status: ACTIVE | Noted: 2025-03-05

## 2025-03-10 PROBLEM — I25.10 CORONARY ATHEROSCLEROSIS OF NATIVE CORONARY ARTERY: Status: ACTIVE | Noted: 2025-03-05

## 2025-03-10 PROCEDURE — 96374 THER/PROPH/DIAG INJ IV PUSH: CPT

## 2025-03-10 PROCEDURE — 2580000003 HC RX 258

## 2025-03-10 PROCEDURE — 6360000002 HC RX W HCPCS

## 2025-03-10 RX ORDER — HEPARIN 100 UNIT/ML
500 SYRINGE INTRAVENOUS PRN
Status: CANCELLED | OUTPATIENT
Start: 2025-03-17

## 2025-03-10 RX ORDER — SODIUM CHLORIDE 9 MG/ML
INJECTION, SOLUTION INTRAVENOUS CONTINUOUS
Status: DISCONTINUED | OUTPATIENT
Start: 2025-03-10 | End: 2025-03-11 | Stop reason: HOSPADM

## 2025-03-10 RX ORDER — DIPHENHYDRAMINE HYDROCHLORIDE 50 MG/ML
50 INJECTION, SOLUTION INTRAMUSCULAR; INTRAVENOUS
Status: DISCONTINUED | OUTPATIENT
Start: 2025-03-10 | End: 2025-03-11 | Stop reason: HOSPADM

## 2025-03-10 RX ORDER — ACETAMINOPHEN 325 MG/1
650 TABLET ORAL
Status: CANCELLED | OUTPATIENT
Start: 2025-03-17

## 2025-03-10 RX ORDER — ONDANSETRON 2 MG/ML
8 INJECTION INTRAMUSCULAR; INTRAVENOUS
Status: DISCONTINUED | OUTPATIENT
Start: 2025-03-10 | End: 2025-03-11 | Stop reason: HOSPADM

## 2025-03-10 RX ORDER — ALBUTEROL SULFATE 90 UG/1
4 INHALANT RESPIRATORY (INHALATION) PRN
Status: CANCELLED | OUTPATIENT
Start: 2025-03-17

## 2025-03-10 RX ORDER — SODIUM CHLORIDE 9 MG/ML
5-250 INJECTION, SOLUTION INTRAVENOUS PRN
Status: DISCONTINUED | OUTPATIENT
Start: 2025-03-10 | End: 2025-03-11 | Stop reason: HOSPADM

## 2025-03-10 RX ORDER — HYDROCORTISONE SODIUM SUCCINATE 100 MG/2ML
100 INJECTION INTRAMUSCULAR; INTRAVENOUS
Status: CANCELLED | OUTPATIENT
Start: 2025-03-17

## 2025-03-10 RX ORDER — DIPHENHYDRAMINE HYDROCHLORIDE 50 MG/ML
50 INJECTION, SOLUTION INTRAMUSCULAR; INTRAVENOUS
Status: CANCELLED | OUTPATIENT
Start: 2025-03-17

## 2025-03-10 RX ORDER — SODIUM CHLORIDE 0.9 % (FLUSH) 0.9 %
5-40 SYRINGE (ML) INJECTION PRN
Status: CANCELLED | OUTPATIENT
Start: 2025-03-17

## 2025-03-10 RX ORDER — ACETAMINOPHEN 325 MG/1
650 TABLET ORAL
Status: DISCONTINUED | OUTPATIENT
Start: 2025-03-10 | End: 2025-03-11 | Stop reason: HOSPADM

## 2025-03-10 RX ORDER — ONDANSETRON 2 MG/ML
8 INJECTION INTRAMUSCULAR; INTRAVENOUS
Status: CANCELLED | OUTPATIENT
Start: 2025-03-17

## 2025-03-10 RX ORDER — EPINEPHRINE 1 MG/ML
0.3 INJECTION, SOLUTION, CONCENTRATE INTRAVENOUS PRN
Status: DISCONTINUED | OUTPATIENT
Start: 2025-03-10 | End: 2025-03-11 | Stop reason: HOSPADM

## 2025-03-10 RX ORDER — ALBUTEROL SULFATE 90 UG/1
4 INHALANT RESPIRATORY (INHALATION) PRN
Status: DISCONTINUED | OUTPATIENT
Start: 2025-03-10 | End: 2025-03-11 | Stop reason: HOSPADM

## 2025-03-10 RX ORDER — SODIUM CHLORIDE 9 MG/ML
5-250 INJECTION, SOLUTION INTRAVENOUS PRN
Status: CANCELLED | OUTPATIENT
Start: 2025-03-17

## 2025-03-10 RX ORDER — HYDROCORTISONE SODIUM SUCCINATE 100 MG/2ML
100 INJECTION INTRAMUSCULAR; INTRAVENOUS
Status: DISCONTINUED | OUTPATIENT
Start: 2025-03-10 | End: 2025-03-11 | Stop reason: HOSPADM

## 2025-03-10 RX ORDER — EPINEPHRINE 1 MG/ML
0.3 INJECTION, SOLUTION, CONCENTRATE INTRAVENOUS PRN
Status: CANCELLED | OUTPATIENT
Start: 2025-03-17

## 2025-03-10 RX ORDER — SODIUM CHLORIDE 9 MG/ML
INJECTION, SOLUTION INTRAVENOUS CONTINUOUS
Status: CANCELLED | OUTPATIENT
Start: 2025-03-17

## 2025-03-10 RX ADMIN — FERRIC CARBOXYMALTOSE INJECTION 750 MG: 50 INJECTION, SOLUTION INTRAVENOUS at 09:09

## 2025-03-10 RX ADMIN — SODIUM CHLORIDE 250 ML: 0.9 INJECTION, SOLUTION INTRAVENOUS at 09:02

## 2025-03-10 NOTE — PROGRESS NOTES
0845 Pt arrived ambulatory and in no distress for Injectafer.  Assessment completed. No new complaints voiced.Patient does get SOB with exertion and says he occasionally gets dizzy.    IV started. N/S up as flush line    Medications:  Injectafer given over 10 mins    Patient observed for 30 mins post injection VSS  IV d/c'd    0916 Discharged home ambulatory and in no distress, accompanied by a family member. Next appointment 3/17/25

## 2025-03-10 NOTE — TELEPHONE ENCOUNTER
Spoke to patient and scheduled him for [facility] with Dr. Huddleston on 4/2 @ 10:45 am with 8:45 am arrival. Instructions sent to patient via my chart and advised to have labs by 3/26, advised to Hold Metformin 48 hours prior to procedure and the day of procedure, Hold Metoprolol the morning of the procedure, Hold Eliquis day prior and the morning of procedure. Patient verbalized understanding and had no questions at the time of call.    Patient identification verified x2.        Patient Instructions    Catheterization   Pre-procedure instructions  Lab work: Please do by 3/26  [facility] Draw Sites:  [facility] [place]: [address] [facility] [address]  [place]: [address] [address] [address]  [place]: [address] [facility] [address]  [facility]: [address] [facility] [address]  [place]: [address] [facility] [address]  [facility]: [address] [facility] [address]  [place] [address] [facility] [address]  [facility]: [address]. [place] [address]  You may have clear liquids up to 2 hours prior to your procedure and a light meal up to 6 hours prior to your procedure.   Drink ten 8oz. glasses of water for 3-days prior-to and 3-days post cardiac cath.  Stop blood thinners 2 days prior to procedure EXCEPT: Brilinta, Plavix or Aspirin  Medications restrictions: Hold Metformin 48 hours prior to procedure and the day of procedure, Hold Metoprolol the morning of the procedure, Hold Eliquis the day prior and the morning of procedure.    Procedure day  Have a  that will bring you and take you home (6-8 hours)  Have a responsible adult that can stay with you for 24 hours  Bring ID and insurance card  Wear comfortable clothing  Leave valuables at home,   Bring: dentures, hearing aids, or glasses  Bring overnight bag (just in case of an overnight

## 2025-03-10 NOTE — TELEPHONE ENCOUNTER
Cardiac catheter[facility] approved by the [facility] for [place]. (Scanned in)  Pt is ready to schedule.      Request sent to Karen Blount.

## 2025-03-12 ENCOUNTER — TELEPHONE (OUTPATIENT)
Age: 80
End: 2025-03-12

## 2025-03-12 NOTE — TELEPHONE ENCOUNTER
Call returned. NPI for [facility] provided.  Updated auth will be faxed from the [facility].

## 2025-03-12 NOTE — TELEPHONE ENCOUNTER
Erum' with [facility] is needing your assistance with this patient's appointment and doctor Dorian order and NPI. Thanks    Erum' # [phone]

## 2025-03-17 ENCOUNTER — HOSPITAL ENCOUNTER (OUTPATIENT)
Facility: HOSPITAL | Age: 80
Setting detail: INFUSION SERIES
Discharge: HOME | End: 2025-03-17
Payer: MEDICARE

## 2025-03-17 VITALS
HEART RATE: 71 BPM | BODY MASS INDEX: 21.31 KG/M2 | TEMPERATURE: 98.6 F | SYSTOLIC BLOOD PRESSURE: 138 MMHG | DIASTOLIC BLOOD PRESSURE: 71 MMHG | WEIGHT: 132 LBS | OXYGEN SATURATION: 96 % | RESPIRATION RATE: 16 BRPM

## 2025-03-17 DIAGNOSIS — D50.9 IRON DEFICIENCY ANEMIA, UNSPECIFIED IRON DEFICIENCY ANEMIA TYPE: Primary | ICD-10-CM

## 2025-03-17 PROCEDURE — 96374 THER/PROPH/DIAG INJ IV PUSH: CPT

## 2025-03-17 PROCEDURE — 6360000002 HC RX W HCPCS

## 2025-03-17 PROCEDURE — 2580000003 HC RX 258

## 2025-03-17 RX ORDER — HYDROCORTISONE SODIUM SUCCINATE 100 MG/2ML
100 INJECTION INTRAMUSCULAR; INTRAVENOUS
OUTPATIENT
Start: 2025-03-17

## 2025-03-17 RX ORDER — SODIUM CHLORIDE 0.9 % (FLUSH) 0.9 %
5-40 SYRINGE (ML) INJECTION PRN
OUTPATIENT
Start: 2025-03-17

## 2025-03-17 RX ORDER — ONDANSETRON 2 MG/ML
8 INJECTION INTRAMUSCULAR; INTRAVENOUS
OUTPATIENT
Start: 2025-03-17

## 2025-03-17 RX ORDER — EPINEPHRINE 1 MG/ML
0.3 INJECTION, SOLUTION, CONCENTRATE INTRAVENOUS PRN
OUTPATIENT
Start: 2025-03-17

## 2025-03-17 RX ORDER — DIPHENHYDRAMINE HYDROCHLORIDE 50 MG/ML
50 INJECTION, SOLUTION INTRAMUSCULAR; INTRAVENOUS
OUTPATIENT
Start: 2025-03-17

## 2025-03-17 RX ORDER — SODIUM CHLORIDE 9 MG/ML
5-250 INJECTION, SOLUTION INTRAVENOUS PRN
Status: DISCONTINUED | OUTPATIENT
Start: 2025-03-17 | End: 2025-03-18 | Stop reason: HOSPADM

## 2025-03-17 RX ORDER — ACETAMINOPHEN 325 MG/1
650 TABLET ORAL
OUTPATIENT
Start: 2025-03-17

## 2025-03-17 RX ORDER — SODIUM CHLORIDE 9 MG/ML
INJECTION, SOLUTION INTRAVENOUS CONTINUOUS
OUTPATIENT
Start: 2025-03-17

## 2025-03-17 RX ORDER — SODIUM CHLORIDE 9 MG/ML
5-250 INJECTION, SOLUTION INTRAVENOUS PRN
Status: CANCELLED | OUTPATIENT
Start: 2025-03-17

## 2025-03-17 RX ORDER — SODIUM CHLORIDE 9 MG/ML
5-250 INJECTION, SOLUTION INTRAVENOUS PRN
OUTPATIENT
Start: 2025-03-17

## 2025-03-17 RX ORDER — SODIUM CHLORIDE 0.9 % (FLUSH) 0.9 %
5-40 SYRINGE (ML) INJECTION PRN
Status: DISCONTINUED | OUTPATIENT
Start: 2025-03-17 | End: 2025-03-18 | Stop reason: HOSPADM

## 2025-03-17 RX ORDER — ALBUTEROL SULFATE 90 UG/1
4 INHALANT RESPIRATORY (INHALATION) PRN
OUTPATIENT
Start: 2025-03-17

## 2025-03-17 RX ORDER — HEPARIN 100 UNIT/ML
500 SYRINGE INTRAVENOUS PRN
OUTPATIENT
Start: 2025-03-17

## 2025-03-17 RX ADMIN — FERRIC CARBOXYMALTOSE INJECTION 750 MG: 50 INJECTION, SOLUTION INTRAVENOUS at 09:03

## 2025-03-17 RX ADMIN — SODIUM CHLORIDE 250 ML: 0.9 INJECTION, SOLUTION INTRAVENOUS at 09:03

## 2025-03-18 SDOH — HEALTH STABILITY: PHYSICAL HEALTH: ON AVERAGE, HOW MANY DAYS PER WEEK DO YOU ENGAGE IN MODERATE TO STRENUOUS EXERCISE (LIKE A BRISK WALK)?: 0 DAYS

## 2025-03-18 SDOH — HEALTH STABILITY: PHYSICAL HEALTH: ON AVERAGE, HOW MANY MINUTES DO YOU ENGAGE IN EXERCISE AT THIS LEVEL?: 0 MIN

## 2025-03-18 ASSESSMENT — LIFESTYLE VARIABLES
HOW MANY STANDARD DRINKS CONTAINING ALCOHOL DO YOU HAVE ON A TYPICAL DAY: PATIENT DOES NOT DRINK
HOW OFTEN DO YOU HAVE A DRINK CONTAINING ALCOHOL: NEVER
HOW OFTEN DO YOU HAVE SIX OR MORE DRINKS ON ONE OCCASION: 1
HOW MANY STANDARD DRINKS CONTAINING ALCOHOL DO YOU HAVE ON A TYPICAL DAY: 0
HOW OFTEN DO YOU HAVE A DRINK CONTAINING ALCOHOL: 1

## 2025-03-18 ASSESSMENT — PATIENT HEALTH QUESTIONNAIRE - PHQ9
1. LITTLE INTEREST OR PLEASURE IN DOING THINGS: MORE THAN HALF THE DAYS
2. FEELING DOWN, DEPRESSED OR HOPELESS: NOT AT ALL
SUM OF ALL RESPONSES TO PHQ QUESTIONS 1-9: 2

## 2025-03-20 ENCOUNTER — HOSPITAL ENCOUNTER (OUTPATIENT)
Facility: HOSPITAL | Age: 80
Discharge: HOME | End: 2025-03-23
Payer: MEDICARE

## 2025-03-20 ENCOUNTER — RESULTS FOLLOW-UP (OUTPATIENT)
Facility: HOSPITAL | Age: 80
End: 2025-03-20

## 2025-03-20 LAB
ALBUMIN SERPL-MCNC: 3.2 G/DL (ref 3.5–5)
ALBUMIN/GLOB SERPL: 0.9 (ref 1.1–2.2)
ALP SERPL-CCNC: 86 U/L (ref 45–117)
ALT SERPL-CCNC: 16 U/L (ref 12–78)
ANION GAP SERPL CALC-SCNC: 11 MMOL/L (ref 2–12)
AST SERPL-CCNC: 11 U/L (ref 15–37)
BASOPHILS # BLD: 0.05 K/UL (ref 0–0.1)
BASOPHILS NFR BLD: 1 % (ref 0–1)
BILIRUB SERPL-MCNC: 0.4 MG/DL (ref 0.2–1)
BUN SERPL-MCNC: 21 MG/DL (ref 6–20)
BUN/CREAT SERPL: 15 (ref 12–20)
CALCIUM SERPL-MCNC: 8.7 MG/DL (ref 8.5–10.1)
CHLORIDE SERPL-SCNC: 102 MMOL/L (ref 97–108)
CO2 SERPL-SCNC: 25 MMOL/L (ref 21–32)
CREAT SERPL-MCNC: 1.4 MG/DL (ref 0.7–1.3)
DIFFERENTIAL METHOD BLD: ABNORMAL
EOSINOPHIL # BLD: 0.22 K/UL (ref 0–0.4)
EOSINOPHIL NFR BLD: 4.3 % (ref 0–7)
ERYTHROCYTE [DISTWIDTH] IN BLOOD BY AUTOMATED COUNT: 18.6 % (ref 11.5–14.5)
GLOBULIN SER CALC-MCNC: 3.4 G/DL (ref 2–4)
GLUCOSE SERPL-MCNC: 72 MG/DL (ref 65–100)
HCT VFR BLD AUTO: 28.5 % (ref 36.6–50.3)
HGB BLD-MCNC: 9.1 G/DL (ref 12.1–17)
IMM GRANULOCYTES # BLD AUTO: 0.06 K/UL (ref 0–0.04)
IMM GRANULOCYTES NFR BLD AUTO: 1.2 % (ref 0–0.5)
INR PPP: 1.1 (ref 0.9–1.1)
LYMPHOCYTES # BLD: 0.76 K/UL (ref 0.8–3.5)
LYMPHOCYTES NFR BLD: 14.9 % (ref 12–49)
MCH RBC QN AUTO: 28.5 PG (ref 26–34)
MCHC RBC AUTO-ENTMCNC: 31.9 G/DL (ref 30–36.5)
MCV RBC AUTO: 89.3 FL (ref 80–99)
MONOCYTES # BLD: 0.49 K/UL (ref 0–1)
MONOCYTES NFR BLD: 9.6 % (ref 5–13)
NEUTS SEG # BLD: 3.52 K/UL (ref 1.8–8)
NEUTS SEG NFR BLD: 69 % (ref 32–75)
NRBC # BLD: 0 K/UL (ref 0–0.01)
NRBC BLD-RTO: 0 PER 100 WBC
PLATELET # BLD AUTO: 322 K/UL (ref 150–400)
PMV BLD AUTO: 8.8 FL (ref 8.9–12.9)
POTASSIUM SERPL-SCNC: 4.3 MMOL/L (ref 3.5–5.1)
PROT SERPL-MCNC: 6.6 G/DL (ref 6.4–8.2)
PROTHROMBIN TIME: 11.4 SEC (ref 9.2–11.2)
RBC # BLD AUTO: 3.19 M/UL (ref 4.1–5.7)
RBC MORPH BLD: ABNORMAL
SODIUM SERPL-SCNC: 138 MMOL/L (ref 136–145)
WBC # BLD AUTO: 5.1 K/UL (ref 4.1–11.1)

## 2025-03-20 PROCEDURE — 85610 PROTHROMBIN TIME: CPT

## 2025-03-20 PROCEDURE — 36415 COLL VENOUS BLD VENIPUNCTURE: CPT

## 2025-03-20 PROCEDURE — 85025 COMPLETE CBC W/AUTO DIFF WBC: CPT

## 2025-03-20 PROCEDURE — 80053 COMPREHEN METABOLIC PANEL: CPT

## 2025-03-21 ENCOUNTER — OFFICE VISIT (OUTPATIENT)
Age: 80
End: 2025-03-21

## 2025-03-21 VITALS
SYSTOLIC BLOOD PRESSURE: 128 MMHG | BODY MASS INDEX: 21.18 KG/M2 | HEART RATE: 78 BPM | WEIGHT: 131.2 LBS | TEMPERATURE: 97.8 F | DIASTOLIC BLOOD PRESSURE: 70 MMHG | OXYGEN SATURATION: 97 % | RESPIRATION RATE: 20 BRPM

## 2025-03-21 DIAGNOSIS — I48.0 PAF (PAROXYSMAL ATRIAL FIBRILLATION) (HCC): ICD-10-CM

## 2025-03-21 DIAGNOSIS — R94.39 ABNORMAL NUCLEAR STRESS TEST: ICD-10-CM

## 2025-03-21 DIAGNOSIS — D50.9 IRON DEFICIENCY ANEMIA, UNSPECIFIED IRON DEFICIENCY ANEMIA TYPE: ICD-10-CM

## 2025-03-21 DIAGNOSIS — N40.0 PROSTATE ENLARGEMENT: ICD-10-CM

## 2025-03-21 DIAGNOSIS — I25.10 CORONARY ARTERY DISEASE INVOLVING NATIVE CORONARY ARTERY OF NATIVE HEART WITHOUT ANGINA PECTORIS: ICD-10-CM

## 2025-03-21 DIAGNOSIS — Z00.00 MEDICARE ANNUAL WELLNESS VISIT, SUBSEQUENT: Primary | ICD-10-CM

## 2025-03-21 ASSESSMENT — PATIENT HEALTH QUESTIONNAIRE - PHQ9
SUM OF ALL RESPONSES TO PHQ QUESTIONS 1-9: 0
SUM OF ALL RESPONSES TO PHQ QUESTIONS 1-9: 0
2. FEELING DOWN, DEPRESSED OR HOPELESS: NOT AT ALL
1. LITTLE INTEREST OR PLEASURE IN DOING THINGS: NOT AT ALL
SUM OF ALL RESPONSES TO PHQ QUESTIONS 1-9: 0
SUM OF ALL RESPONSES TO PHQ QUESTIONS 1-9: 0

## 2025-03-21 NOTE — PATIENT INSTRUCTIONS
appointments, and call your doctor if you are having problems. It's also a good idea to know your test results and keep a list of the medicines you take.  How can you care for yourself at home?  Diet    Use less salt when you cook and eat. This helps lower your blood pressure. Taste food before salting. Add only a little salt when you think you need it. With time, your taste buds will adjust to less salt.     Eat fewer snack items, fast foods, canned soups, and other high-salt, high-fat, processed foods.     Read food labels and try to avoid saturated and trans fats. They increase your risk of heart disease by raising cholesterol levels.     Limit the amount of solid fat--butter, margarine, and shortening--you eat. Use olive, peanut, or canola oil when you cook. Bake, broil, and steam foods instead of frying them.     Eat a variety of fruit and vegetables every day. Dark green, deep orange, red, or yellow fruits and vegetables are especially good for you. Examples include spinach, carrots, peaches, and berries.     Foods high in fiber can reduce your cholesterol and provide important vitamins and minerals. High-fiber foods include whole-grain cereals and breads, oatmeal, beans, brown rice, citrus fruits, and apples.     Eat lean proteins. Heart-healthy proteins include seafood, lean meats and poultry, eggs, beans, peas, nuts, seeds, and soy products.     Limit drinks and foods with added sugar. These include candy, desserts, and soda pop.   Heart-healthy lifestyle    If your doctor recommends it, get more exercise. For many people, walking is a good choice. Or you may want to swim, bike, or do other activities. Bit by bit, increase the time you're active every day. Try for at least 30 minutes on most days of the week.     Try to quit or cut back on using tobacco and other nicotine products. This includes smoking and vaping. If you need help quitting, talk to your doctor about stop-smoking programs and medicines.

## 2025-03-21 NOTE — ACP (ADVANCE CARE PLANNING)
Discussed importance of advanced medical directives with patient.  Patient is capable of making decisions.    Discussed advanced directives 3/21/2025. [place] advance directive form discussed with pt. Pt will consider options and give us written form back for inclusion in chart.    JENNIFER Vazquez - NP

## 2025-03-21 NOTE — PROGRESS NOTES
Medicare Annual Wellness Visit    Scooter Dickerson is here for Medicare AWV    Assessment & Plan   Medicare annual wellness visit, subsequent  Iron deficiency anemia, unspecified iron deficiency anemia type  -     COLLECTION VENOUS BLOOD,VENIPUNCTURE  -     Iron and TIBC; Future  -     Ferritin; Future  PAF (paroxysmal atrial fibrillation) (HCC)  Coronary artery disease involving native coronary artery of native heart without angina pectoris  Abnormal nuclear stress test  Prostate enlargement    Lengthy discussion regarding recent health changes; discussed red flags for which he should seek immediate care, including palpitations, SOB, CP, dizziness, fever.      In review of last urology notes from 11/2024, pathology was pending and no other notes are available, but he was expected to f/u 02/205; encourage him to schedule f/u appt to discuss pathology results and next steps.       Return in 6 months (on 9/21/2025).     Subjective   The following acute and/or chronic problems were also addressed today:    He is scheduled for left heart cath next month after failing nuclear stress test which was obtained during workup for new finding of PAF; PAF discovered a couple of months ago on extended cardiac monitor after c/o palpitations.  On metoprolol for rate and rhythm control, as well as Eliquis; followed by Dr. Painting.    He is concerned about his prostate biopsy; underwent prostate enucleation 09/2024 for prostate enlargement causing urinary retention.  His niece says that the surgeon pulled her aside following the procedure and said the the prostate looked suspicious for malignancy, but no one has ever called him with the pathology results and he has been unable to reach the office via phone.  He is not having any difficulty voiding.    His niece is concerned that he doesn't eat regular meals and that he is not sleeping well.  He tells me that he eats when he is hungry, but doesn't have a great deal of hunger; his

## 2025-03-22 DIAGNOSIS — I73.9 PAD (PERIPHERAL ARTERY DISEASE): ICD-10-CM

## 2025-03-22 DIAGNOSIS — E11.51 TYPE 2 DIABETES MELLITUS WITH DIABETIC PERIPHERAL ANGIOPATHY WITHOUT GANGRENE, WITHOUT LONG-TERM CURRENT USE OF INSULIN (HCC): ICD-10-CM

## 2025-03-22 LAB
FERRITIN SERPL-MCNC: 807 NG/ML (ref 26–388)
IRON SATN MFR SERPL: 49 % (ref 20–50)
IRON SERPL-MCNC: 141 UG/DL (ref 35–150)
TIBC SERPL-MCNC: 287 UG/DL (ref 250–450)

## 2025-03-24 RX ORDER — CLOPIDOGREL BISULFATE 75 MG/1
75 TABLET ORAL DAILY
Qty: 90 TABLET | Refills: 0 | Status: SHIPPED | OUTPATIENT
Start: 2025-03-24

## 2025-03-25 ENCOUNTER — RESULTS FOLLOW-UP (OUTPATIENT)
Age: 80
End: 2025-03-25

## 2025-03-31 DIAGNOSIS — J40 BRONCHITIS: ICD-10-CM

## 2025-03-31 DIAGNOSIS — J44.9 CHRONIC OBSTRUCTIVE PULMONARY DISEASE, UNSPECIFIED COPD TYPE (HCC): ICD-10-CM

## 2025-03-31 RX ORDER — ALBUTEROL SULFATE 90 UG/1
2 INHALANT RESPIRATORY (INHALATION) EVERY 6 HOURS PRN
Qty: 18 G | Refills: 5 | Status: SHIPPED | OUTPATIENT
Start: 2025-03-31

## 2025-04-01 NOTE — PROGRESS NOTES
Orders placed for cardiac catheterization that is scheduled for 4/2. Verbal orders per provider.  Orders repeated and verified twice.

## 2025-04-01 NOTE — H&P
Patient seen and examined by me with the above nurse practitioner.  I personally performed all components of the history, physical, and medical decision making and agree with the assessment and plan with minor modifications as noted.     Today the patient presents with dyspnea on exertion when walking with his rollator for longer distances that could be consistent with class III angina pectoris along with coronary calcification seen on CAT scan, and ST depressions on Lexiscan Cardiolite as well as on extended Holter monitor that are concerning for high risk myocardial ischemia.    General PE  Gen:  NAD  Mental Status - Alert. General Appearance - Not in acute distress.   HEENT:  PERRL, no carotid bruits or JVD  Chest and Lung Exam   Inspection: Accessory muscles - No use of accessory muscles in breathing.   Auscultation:   Breath sounds: - Normal.   Cardiovascular   Inspection: Jugular vein - Bilateral - Inspection Normal.   Palpation/Percussion:   Apical Impulse: - Normal.   Auscultation: Rhythm - Regular. Heart Sounds - S1 WNL and S2 WNL. No S3 or S4.   Murmurs & Other Heart Sounds: Auscultation of the heart reveals - No Murmurs.   Peripheral Vascular   Upper Extremity: Inspection - Bilateral - No Cyanotic nailbeds or Digital clubbing.   Lower Extremity:   Palpation: Edema - Bilateral - No edema.  Abdomen:   Soft, non-tender, bowel sounds are active.  Neuro: A&O times 3, CN and motor grossly WNL    Today the patient presents with dyspnea on exertion when walking with his rollator for longer distances that could be consistent with class III angina pectoris along with coronary calcification seen on CAT scan, and ST depressions on Lexiscan Cardiolite as well as on extended Holter monitor that are concerning for high risk myocardial ischemia.  I discussed the risks and benefits of cardiac catheterization +/- PCI with the patient who expressed understanding and wishes to proceed.     ADDENDUM:  03/04/25    NM STRESS

## 2025-04-02 ENCOUNTER — APPOINTMENT (OUTPATIENT)
Facility: HOSPITAL | Age: 80
End: 2025-04-02
Attending: INTERNAL MEDICINE
Payer: OTHER GOVERNMENT

## 2025-04-02 ENCOUNTER — HOSPITAL ENCOUNTER (OUTPATIENT)
Facility: HOSPITAL | Age: 80
Discharge: HOME | End: 2025-04-02
Attending: INTERNAL MEDICINE | Admitting: INTERNAL MEDICINE
Payer: OTHER GOVERNMENT

## 2025-04-02 VITALS
DIASTOLIC BLOOD PRESSURE: 68 MMHG | RESPIRATION RATE: 26 BRPM | SYSTOLIC BLOOD PRESSURE: 148 MMHG | HEART RATE: 103 BPM | WEIGHT: 134 LBS | TEMPERATURE: 97.7 F | BODY MASS INDEX: 21.53 KG/M2 | OXYGEN SATURATION: 97 % | HEIGHT: 66 IN

## 2025-04-02 DIAGNOSIS — I25.10 CORONARY ATHEROSCLEROSIS OF NATIVE CORONARY ARTERY: ICD-10-CM

## 2025-04-02 DIAGNOSIS — I48.0 PAROXYSMAL ATRIAL FIBRILLATION (HCC): ICD-10-CM

## 2025-04-02 DIAGNOSIS — I25.6: ICD-10-CM

## 2025-04-02 DIAGNOSIS — R94.39 ABNORMAL STRESS TEST: ICD-10-CM

## 2025-04-02 DIAGNOSIS — I25.10 DISEASE OF CARDIOVASCULAR SYSTEM: Primary | ICD-10-CM

## 2025-04-02 PROBLEM — I25.9 MYOCARDIAL ISCHEMIA: Status: ACTIVE | Noted: 2025-03-05

## 2025-04-02 PROBLEM — I20.9 ANGINA, CLASS III: Status: ACTIVE | Noted: 2025-04-02

## 2025-04-02 LAB
ECHO BSA: 1.68 M2
GLUCOSE BLD STRIP.AUTO-MCNC: 110 MG/DL (ref 65–117)
SERVICE CMNT-IMP: NORMAL

## 2025-04-02 PROCEDURE — 99153 MOD SED SAME PHYS/QHP EA: CPT | Performed by: INTERNAL MEDICINE

## 2025-04-02 PROCEDURE — 2500000003 HC RX 250 WO HCPCS: Performed by: INTERNAL MEDICINE

## 2025-04-02 PROCEDURE — 6360000004 HC RX CONTRAST MEDICATION: Performed by: INTERNAL MEDICINE

## 2025-04-02 PROCEDURE — C1894 INTRO/SHEATH, NON-LASER: HCPCS | Performed by: INTERNAL MEDICINE

## 2025-04-02 PROCEDURE — C1713 ANCHOR/SCREW BN/BN,TIS/BN: HCPCS | Performed by: INTERNAL MEDICINE

## 2025-04-02 PROCEDURE — 99152 MOD SED SAME PHYS/QHP 5/>YRS: CPT | Performed by: INTERNAL MEDICINE

## 2025-04-02 PROCEDURE — 6360000002 HC RX W HCPCS: Performed by: INTERNAL MEDICINE

## 2025-04-02 PROCEDURE — 76937 US GUIDE VASCULAR ACCESS: CPT | Performed by: INTERNAL MEDICINE

## 2025-04-02 PROCEDURE — 93922 UPR/L XTREMITY ART 2 LEVELS: CPT

## 2025-04-02 PROCEDURE — 93458 L HRT ARTERY/VENTRICLE ANGIO: CPT | Performed by: INTERNAL MEDICINE

## 2025-04-02 PROCEDURE — 2580000003 HC RX 258: Performed by: INTERNAL MEDICINE

## 2025-04-02 PROCEDURE — 82962 GLUCOSE BLOOD TEST: CPT

## 2025-04-02 PROCEDURE — 93880 EXTRACRANIAL BILAT STUDY: CPT

## 2025-04-02 PROCEDURE — 93005 ELECTROCARDIOGRAM TRACING: CPT | Performed by: INTERNAL MEDICINE

## 2025-04-02 PROCEDURE — 2709999900 HC NON-CHARGEABLE SUPPLY: Performed by: INTERNAL MEDICINE

## 2025-04-02 PROCEDURE — 93970 EXTREMITY STUDY: CPT

## 2025-04-02 RX ORDER — FENTANYL CITRATE 50 UG/ML
INJECTION, SOLUTION INTRAMUSCULAR; INTRAVENOUS PRN
Status: DISCONTINUED | OUTPATIENT
Start: 2025-04-02 | End: 2025-04-02 | Stop reason: HOSPADM

## 2025-04-02 RX ORDER — SODIUM CHLORIDE 0.9 % (FLUSH) 0.9 %
5-40 SYRINGE (ML) INJECTION PRN
Status: DISCONTINUED | OUTPATIENT
Start: 2025-04-02 | End: 2025-04-02 | Stop reason: HOSPADM

## 2025-04-02 RX ORDER — IOPAMIDOL 755 MG/ML
INJECTION, SOLUTION INTRAVASCULAR PRN
Status: DISCONTINUED | OUTPATIENT
Start: 2025-04-02 | End: 2025-04-02 | Stop reason: HOSPADM

## 2025-04-02 RX ORDER — HEPARIN SODIUM 1000 [USP'U]/ML
INJECTION, SOLUTION INTRAVENOUS; SUBCUTANEOUS PRN
Status: DISCONTINUED | OUTPATIENT
Start: 2025-04-02 | End: 2025-04-02 | Stop reason: HOSPADM

## 2025-04-02 RX ORDER — VERAPAMIL HYDROCHLORIDE 2.5 MG/ML
INJECTION, SOLUTION INTRAVENOUS PRN
Status: DISCONTINUED | OUTPATIENT
Start: 2025-04-02 | End: 2025-04-02 | Stop reason: HOSPADM

## 2025-04-02 RX ORDER — SODIUM CHLORIDE 9 MG/ML
INJECTION, SOLUTION INTRAVENOUS CONTINUOUS
Status: DISCONTINUED | OUTPATIENT
Start: 2025-04-02 | End: 2025-04-02 | Stop reason: HOSPADM

## 2025-04-02 RX ORDER — MIDAZOLAM HYDROCHLORIDE 1 MG/ML
INJECTION, SOLUTION INTRAMUSCULAR; INTRAVENOUS PRN
Status: DISCONTINUED | OUTPATIENT
Start: 2025-04-02 | End: 2025-04-02 | Stop reason: HOSPADM

## 2025-04-02 RX ORDER — ACETAMINOPHEN 325 MG/1
650 TABLET ORAL EVERY 4 HOURS PRN
Status: DISCONTINUED | OUTPATIENT
Start: 2025-04-02 | End: 2025-04-02 | Stop reason: HOSPADM

## 2025-04-02 RX ORDER — LIDOCAINE HYDROCHLORIDE 10 MG/ML
INJECTION, SOLUTION INFILTRATION; PERINEURAL PRN
Status: DISCONTINUED | OUTPATIENT
Start: 2025-04-02 | End: 2025-04-02 | Stop reason: HOSPADM

## 2025-04-02 RX ORDER — SODIUM CHLORIDE 0.9 % (FLUSH) 0.9 %
5-40 SYRINGE (ML) INJECTION EVERY 12 HOURS SCHEDULED
Status: DISCONTINUED | OUTPATIENT
Start: 2025-04-02 | End: 2025-04-02 | Stop reason: HOSPADM

## 2025-04-02 RX ORDER — SODIUM CHLORIDE 9 MG/ML
INJECTION, SOLUTION INTRAVENOUS PRN
Status: DISCONTINUED | OUTPATIENT
Start: 2025-04-02 | End: 2025-04-02 | Stop reason: HOSPADM

## 2025-04-02 RX ADMIN — SODIUM CHLORIDE: 0.9 INJECTION, SOLUTION INTRAVENOUS at 09:41

## 2025-04-02 NOTE — PROGRESS NOTES
Cardiac Cath Lab Procedure Area Arrival Note:    Scooter Dickerson arrived to Cardiac Cath Lab, Procedure Area. Patient identifiers verified with NAME and DATE OF BIRTH. Procedure verified with patient. Consent forms verified. Allergies verified. Patient informed of procedure and plan of care. Questions answered with review. Patient voiced understanding of procedure and plan of care.    Patient on cardiac monitor, non-invasive blood pressure, SPO2 monitor. On O2 @ 2 lpm via NC.  IV of NS on pump at 125 ml/hr. Patient status doing well without problems. Patient is A&Ox 4. Patient reports no complaints.     Patient medicated during procedure with orders obtained and verified by Dr. Leyva.    Refer to patients Cardiac Cath Lab PROCEDURE REPORT for vital signs, assessment, status, and response during procedure, printed at end of case. Printed report on chart or scanned into chart.

## 2025-04-02 NOTE — PROGRESS NOTES
Cardiac Cath Lab Recovery Arrival Note:      Scooter Dickerson arrived to Cardiac Cath Lab, Recovery Area. Staff introduced to patient. Patient identifiers verified with NAME and DATE OF BIRTH. Procedure verified with patient. Consent forms reviewed and signed by patient or authorized representative and verified. Allergies verified.     Patient and family oriented to department. Patient and family informed of procedure and plan of care.     Questions answered with review. Patient prepped for procedure, per orders from physician, prior to arrival.    Patient on cardiac monitor, non-invasive blood pressure, SPO2 monitor. On Room Air. Patient is A&Ox 4. Patient reports No Pain.     Patient in stretcher, in low position, with side rails up, call bell within reach, patient instructed to call if assistance as needed.    Patient prep in: [facility] Recovery Area, [place] [address].   Patient family : Jenna/niece [phone]  Family in: Waiting room .   Prep by: Martha Maynard RN

## 2025-04-02 NOTE — PROGRESS NOTES
TRANSFER - IN REPORT:    Verbal report received from Joanne COOMBS on Scooter Dickerson  being received from procedure area for routine progression of patient care. Report consisted of patient’s Situation, Background, Assessment and Recommendations(SBAR). Information from the following report(s)SBAR, Procedure Summary, MAR, Recent Results, and Cardiac Rhythm SR with PAC  was reviewed with the receiving clinician. Opportunity for questions and clarification was provided. Assessment completed upon patient’s arrival to Cardiac Cath Lab RECOVERY AREA and care assumed.       Cardiac Cath Lab Recovery Arrival Note:    Scooter Dickerson arrived to CCL recovery area.  Patient procedure= LHC. Patient on cardiac monitor, non-invasive blood pressure, SPO2 monitor. On Room Air .  IV  of NS on pump at 125 ml/hr. Patient status doing well without problems. Patient is A&Ox 4. Patient reports No pain.    PROCEDURE SITE CHECK:    Procedure site:without any bleeding and No Hematoma, No pain/discomfort reported at procedure site.     No change in patient status. Continue to monitor patient and status.

## 2025-04-02 NOTE — PERIOP NOTE
CSS/PAT: 4-9 @ 8;00     Message  Received: Mai Schrader Ashaki N; Kelsey Ramirez  PAT REQUEST 4/9@ 8AM SURGERY 4/15

## 2025-04-02 NOTE — PROGRESS NOTES
Ambulated patient to the bathroom with a steady gait, voided without difficulty. Patient denies chest pain, shortness of breath, or dizziness. Returned to stretcher. Vital signs stable.     Procedural site is clean, dry, and intact. No bleeding, no hematoma.     Assisted patient in dressing/Patient dressed self.     Educated patient about their sedation precautions such as not driving, operating any machinery, or signing legal documents 24 hours post procedure.     Reviewed discharge instructions, including medications and site care using the teach back method.  Answered all questions. Verbalized understanding.     Removed peripheral IV.    Escorted to discharge area in a wheelchair with all of their belongings including hearing aids,discharge instructions and rolator.    Patient's Niece/Susie present to take patient home. Reviewed discharge instructions with patients' Niece/Susie, they verbalized understanding.

## 2025-04-02 NOTE — PROGRESS NOTES
CATH LAB to RECOVERY ROOM REPORT    Procedure: [facility]    MD: BYRON Huddleston MD    The procedure was diagnostic     Verbal Report given to Recovery Nurse on patient being transferred to Cardiac Cath Lab  for routine post-op. Patient stable upon transfer to .  Vitals, mental status, MAR, procedural summary discussed with recovery RN.    Medication given during procedure:    Contrast:40mL                          Heparin:2500 units     Versed:2 mg                                                               Fentanyl: 50 mcg                                                             Other Meds/Drips given:          Sheaths:    Right radial sheath pulled at 1:54 pm, band at 10 mL of air        .

## 2025-04-02 NOTE — CARDIO/PULMONARY
Chart reviewed: Patient is 79 y.o. male admitted with Abnormal stress test [R94.39]  Asymptomatic myocardial ischemia [I25.6]  Coronary atherosclerosis of native coronary artery [I25.10]  Paroxysmal atrial fibrillation (HCC) [I48.0]    Patient is s/p [facility] with CAD. Dr. Brand in to see patient and CABG scheduled for 4/15/25. We will follow for enrollment after the CABG surgery.    KWAN LI RN

## 2025-04-03 ENCOUNTER — TELEPHONE (OUTPATIENT)
Age: 80
End: 2025-04-03

## 2025-04-03 LAB
ECHO BSA: 1.68 M2
VAS LEFT CCA DIST EDV: 9.7 CM/S
VAS LEFT CCA DIST PSV: 51 CM/S
VAS LEFT CCA PROX EDV: 14.9 CM/S
VAS LEFT CCA PROX PSV: 58.9 CM/S
VAS LEFT DORSALIS PEDIS BP: 57 MMHG
VAS LEFT ECA EDV: 6.2 CM/S
VAS LEFT ECA PSV: 84.6 CM/S
VAS LEFT GSV ANKLE DIAM: 2.6 MM
VAS LEFT GSV AT KNEE DIAM: 2.7 MM
VAS LEFT GSV BK PROX DIAM: 2.5 MM
VAS LEFT GSV THIGH DIST DIAM: 2.7 MM
VAS LEFT GSV THIGH MID DIAM: 3 MM
VAS LEFT GSV THIGH PROX DIAM: 4.9 MM
VAS LEFT ICA DIST EDV: 17.9 CM/S
VAS LEFT ICA DIST PSV: 74.5 CM/S
VAS LEFT ICA MID EDV: 28.5 CM/S
VAS LEFT ICA MID PSV: 108.8 CM/S
VAS LEFT ICA PROX EDV: 35.8 CM/S
VAS LEFT ICA PROX PSV: 143.5 CM/S
VAS LEFT ICA/CCA PSV: 2.8 NO UNITS
VAS LEFT PTA BP: 72 MMHG
VAS LEFT SSV DIST DIAM: 2 MM
VAS LEFT SSV PROX DIAM: 2.6 MM
VAS LEFT VERTEBRAL EDV: 15.4 CM/S
VAS LEFT VERTEBRAL PSV: 48.8 CM/S
VAS RIGHT CCA DIST EDV: 9.1 CM/S
VAS RIGHT CCA DIST PSV: 62 CM/S
VAS RIGHT CCA PROX EDV: 6.2 CM/S
VAS RIGHT CCA PROX PSV: 63.3 CM/S
VAS RIGHT DORSALIS PEDIS BP: 63 MMHG
VAS RIGHT ECA EDV: 9.3 CM/S
VAS RIGHT ECA PSV: 101.4 CM/S
VAS RIGHT GSV ANKLE DIAM: 1.3 MM
VAS RIGHT GSV AT KNEE DIAM: 1.4 MM
VAS RIGHT GSV BK PROX DIAM: 2.3 MM
VAS RIGHT GSV THIGH DIST DIAM: 2.5 MM
VAS RIGHT GSV THIGH MID DIAM: 2.1 MM
VAS RIGHT GSV THIGH PROX DIAM: 2 MM
VAS RIGHT ICA DIST EDV: 17 CM/S
VAS RIGHT ICA DIST PSV: 53.3 CM/S
VAS RIGHT ICA MID EDV: 21.5 CM/S
VAS RIGHT ICA MID PSV: 64.2 CM/S
VAS RIGHT ICA PROX EDV: 14 CM/S
VAS RIGHT ICA PROX PSV: 42.4 CM/S
VAS RIGHT ICA/CCA PSV: 1 NO UNITS
VAS RIGHT PTA BP: 64 MMHG
VAS RIGHT VERTEBRAL EDV: 21.6 CM/S
VAS RIGHT VERTEBRAL PSV: 79.4 CM/S

## 2025-04-03 NOTE — TELEPHONE ENCOUNTER
Spoke with the pt.  Verified patient with two patient identifiers.    Pt states that he cannot tolerate the eliquis due to rash/itching side effect. The rash/itching subsided shortly after he stopped the medication on 3/26.  Asking for alternative.  Also, patient takes plavix. Pt wants to know if he should also be taking asa 81 mg daily?    Scheduled for cabg 4/15.

## 2025-04-03 NOTE — TELEPHONE ENCOUNTER
Patient:  Scooter Dickerson  :  1945    Patient identified with two identifiers.    Patient called. He states he went to see Dr. Mcclain yesterday and found out he needs a double bypass.  He has questions regarding his medications.  Should he stop the Apixaban?  Should he continue the aspirin and clopigrel.  He would like Dr. Painting's nurse to call him please.    [phone]
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
## 2025-04-04 DIAGNOSIS — I25.10 CAD IN NATIVE ARTERY: Primary | ICD-10-CM

## 2025-04-04 LAB
EKG ATRIAL RATE: 86 BPM
EKG DIAGNOSIS: NORMAL
EKG P AXIS: 33 DEGREES
EKG P-R INTERVAL: 110 MS
EKG Q-T INTERVAL: 380 MS
EKG QRS DURATION: 128 MS
EKG QTC CALCULATION (BAZETT): 454 MS
EKG R AXIS: 81 DEGREES
EKG T AXIS: 29 DEGREES
EKG VENTRICULAR RATE: 86 BPM

## 2025-04-04 PROCEDURE — 93010 ELECTROCARDIOGRAM REPORT: CPT | Performed by: INTERNAL MEDICINE

## 2025-04-04 NOTE — TELEPHONE ENCOUNTER
Advised by Dr. Painting:  Can replace the Eliquis with Xarelto 15 mg daily (dosed for GFR <50).       Advised by Deb Timmons NP:  He should continue the Plavix for now. Surgery tentatively scheduled for 4/15 but he may ultimately not be a surgical candidate. We will see him again on 4/9 following some testing to make definitive decision. If we move forward with surgery, at that time I will intrust him when to stop Plavix and start asa 81 mg. Thank you.     Verbal order per provider.  Order (medication, dose, route, frequency, amount, refills) repeated and verified twice.    ([employer]knot message sent after no answer when I called)

## 2025-04-09 ENCOUNTER — OFFICE VISIT (OUTPATIENT)
Age: 80
End: 2025-04-09

## 2025-04-09 ENCOUNTER — HOSPITAL ENCOUNTER (OUTPATIENT)
Facility: HOSPITAL | Age: 80
Discharge: HOME | End: 2025-04-12
Payer: MEDICARE

## 2025-04-09 VITALS
TEMPERATURE: 97.7 F | HEART RATE: 79 BPM | SYSTOLIC BLOOD PRESSURE: 178 MMHG | BODY MASS INDEX: 22.34 KG/M2 | HEIGHT: 66 IN | DIASTOLIC BLOOD PRESSURE: 70 MMHG | OXYGEN SATURATION: 98 % | WEIGHT: 139 LBS

## 2025-04-09 DIAGNOSIS — I25.10 CAD IN NATIVE ARTERY: ICD-10-CM

## 2025-04-09 DIAGNOSIS — I25.10 CORONARY ARTERY DISEASE INVOLVING NATIVE CORONARY ARTERY OF NATIVE HEART WITHOUT ANGINA PECTORIS: Primary | ICD-10-CM

## 2025-04-09 LAB
ABO + RH BLD: NORMAL
ALBUMIN SERPL-MCNC: 3.4 G/DL (ref 3.5–5)
ALBUMIN/GLOB SERPL: 1.1 (ref 1.1–2.2)
ALP SERPL-CCNC: 107 U/L (ref 45–117)
ALT SERPL-CCNC: 17 U/L (ref 12–78)
ANION GAP SERPL CALC-SCNC: 3 MMOL/L (ref 2–12)
APPEARANCE UR: CLEAR
APTT PPP: 29.5 SEC (ref 22.1–31)
ARTERIAL PATENCY WRIST A: POSITIVE
AST SERPL-CCNC: 14 U/L (ref 15–37)
BACTERIA URNS QL MICRO: NEGATIVE /HPF
BASE DEFICIT BLD-SCNC: 6.3 MMOL/L
BASOPHILS # BLD: 0.06 K/UL (ref 0–0.1)
BASOPHILS NFR BLD: 1 % (ref 0–1)
BDY SITE: ABNORMAL
BILIRUB SERPL-MCNC: 0.4 MG/DL (ref 0.2–1)
BILIRUB UR QL: NEGATIVE
BLOOD GROUP ANTIBODIES SERPL: NORMAL
BUN SERPL-MCNC: 22 MG/DL (ref 6–20)
BUN/CREAT SERPL: 14 (ref 12–20)
CALCIUM SERPL-MCNC: 8.7 MG/DL (ref 8.5–10.1)
CHLORIDE SERPL-SCNC: 106 MMOL/L (ref 97–108)
CO2 SERPL-SCNC: 26 MMOL/L (ref 21–32)
COLOR UR: ABNORMAL
CREAT SERPL-MCNC: 1.53 MG/DL (ref 0.7–1.3)
DIFFERENTIAL METHOD BLD: ABNORMAL
EKG ATRIAL RATE: 72 BPM
EKG DIAGNOSIS: NORMAL
EKG P AXIS: 3 DEGREES
EKG P-R INTERVAL: 90 MS
EKG Q-T INTERVAL: 400 MS
EKG QRS DURATION: 138 MS
EKG QTC CALCULATION (BAZETT): 438 MS
EKG R AXIS: 77 DEGREES
EKG T AXIS: 27 DEGREES
EKG VENTRICULAR RATE: 72 BPM
EOSINOPHIL # BLD: 0.26 K/UL (ref 0–0.4)
EOSINOPHIL NFR BLD: 4.5 % (ref 0–7)
EPITH CASTS URNS QL MICRO: ABNORMAL /LPF
ERYTHROCYTE [DISTWIDTH] IN BLOOD BY AUTOMATED COUNT: 21.3 % (ref 11.5–14.5)
EST. AVERAGE GLUCOSE BLD GHB EST-MCNC: 108 MG/DL
GAS FLOW.O2 O2 DELIVERY SYS: ABNORMAL
GLOBULIN SER CALC-MCNC: 3 G/DL (ref 2–4)
GLUCOSE SERPL-MCNC: 78 MG/DL (ref 65–100)
GLUCOSE UR STRIP.AUTO-MCNC: NEGATIVE MG/DL
HBA1C MFR BLD: 5.4 % (ref 4–5.6)
HCO3 BLD-SCNC: 18.6 MMOL/L (ref 21–28)
HCT VFR BLD AUTO: 29.2 % (ref 36.6–50.3)
HGB BLD-MCNC: 9.3 G/DL (ref 12.1–17)
HGB UR QL STRIP: NEGATIVE
HISTORY CHECK: NORMAL
HYALINE CASTS URNS QL MICRO: ABNORMAL /LPF (ref 0–5)
IMM GRANULOCYTES # BLD AUTO: 0.03 K/UL (ref 0–0.04)
IMM GRANULOCYTES NFR BLD AUTO: 0.5 % (ref 0–0.5)
INR PPP: 1.1 (ref 0.9–1.1)
KETONES UR QL STRIP.AUTO: NEGATIVE MG/DL
LEUKOCYTE ESTERASE UR QL STRIP.AUTO: NEGATIVE
LYMPHOCYTES # BLD: 0.67 K/UL (ref 0.8–3.5)
LYMPHOCYTES NFR BLD: 11.5 % (ref 12–49)
MAGNESIUM SERPL-MCNC: 1.5 MG/DL (ref 1.6–2.4)
MCH RBC QN AUTO: 29.3 PG (ref 26–34)
MCHC RBC AUTO-ENTMCNC: 31.8 G/DL (ref 30–36.5)
MCV RBC AUTO: 92.1 FL (ref 80–99)
MONOCYTES # BLD: 0.53 K/UL (ref 0–1)
MONOCYTES NFR BLD: 9.2 % (ref 5–13)
NEUTS SEG # BLD: 4.25 K/UL (ref 1.8–8)
NEUTS SEG NFR BLD: 73.3 % (ref 32–75)
NITRITE UR QL STRIP.AUTO: NEGATIVE
NRBC # BLD: 0 K/UL (ref 0–0.01)
NRBC BLD-RTO: 0 PER 100 WBC
NT PRO BNP: 2063 PG/ML
O2/TOTAL GAS SETTING VFR VENT: 21 %
PCO2 BLD: 33.8 MMHG (ref 35–48)
PH BLD: 7.35 (ref 7.35–7.45)
PH UR STRIP: 5 (ref 5–8)
PLATELET # BLD AUTO: 238 K/UL (ref 150–400)
PMV BLD AUTO: 9.7 FL (ref 8.9–12.9)
PO2 BLD: 91 MMHG (ref 83–108)
POTASSIUM SERPL-SCNC: 4.8 MMOL/L (ref 3.5–5.1)
PROT SERPL-MCNC: 6.4 G/DL (ref 6.4–8.2)
PROT UR STRIP-MCNC: ABNORMAL MG/DL
PROTHROMBIN TIME: 11.4 SEC (ref 9.2–11.2)
RBC # BLD AUTO: 3.17 M/UL (ref 4.1–5.7)
RBC #/AREA URNS HPF: ABNORMAL /HPF (ref 0–5)
RBC MORPH BLD: ABNORMAL
SAO2 % BLD: 96.7 % (ref 92–97)
SARS-COV-2 RNA RESP QL NAA+PROBE: NOT DETECTED
SODIUM SERPL-SCNC: 135 MMOL/L (ref 136–145)
SOURCE: NORMAL
SP GR UR REFRACTOMETRY: 1.01 (ref 1–1.03)
SPECIMEN EXP DATE BLD: NORMAL
SPECIMEN TYPE: ABNORMAL
THERAPEUTIC RANGE: NORMAL SECS (ref 58–77)
TSH SERPL DL<=0.05 MIU/L-ACNC: 4.55 UIU/ML (ref 0.36–3.74)
URINE CULTURE IF INDICATED: ABNORMAL
UROBILINOGEN UR QL STRIP.AUTO: 0.2 EU/DL (ref 0.2–1)
WBC # BLD AUTO: 5.8 K/UL (ref 4.1–11.1)
WBC URNS QL MICRO: ABNORMAL /HPF (ref 0–4)

## 2025-04-09 PROCEDURE — 81001 URINALYSIS AUTO W/SCOPE: CPT

## 2025-04-09 PROCEDURE — 93005 ELECTROCARDIOGRAM TRACING: CPT | Performed by: THORACIC SURGERY (CARDIOTHORACIC VASCULAR SURGERY)

## 2025-04-09 PROCEDURE — 85730 THROMBOPLASTIN TIME PARTIAL: CPT

## 2025-04-09 PROCEDURE — 85025 COMPLETE CBC W/AUTO DIFF WBC: CPT

## 2025-04-09 PROCEDURE — 84443 ASSAY THYROID STIM HORMONE: CPT

## 2025-04-09 PROCEDURE — 36600 WITHDRAWAL OF ARTERIAL BLOOD: CPT

## 2025-04-09 PROCEDURE — 83036 HEMOGLOBIN GLYCOSYLATED A1C: CPT

## 2025-04-09 PROCEDURE — 86901 BLOOD TYPING SEROLOGIC RH(D): CPT

## 2025-04-09 PROCEDURE — 86850 RBC ANTIBODY SCREEN: CPT

## 2025-04-09 PROCEDURE — 83735 ASSAY OF MAGNESIUM: CPT

## 2025-04-09 PROCEDURE — 94726 PLETHYSMOGRAPHY LUNG VOLUMES: CPT

## 2025-04-09 PROCEDURE — 80053 COMPREHEN METABOLIC PANEL: CPT

## 2025-04-09 PROCEDURE — 71046 X-RAY EXAM CHEST 2 VIEWS: CPT

## 2025-04-09 PROCEDURE — 83880 ASSAY OF NATRIURETIC PEPTIDE: CPT

## 2025-04-09 PROCEDURE — 94010 BREATHING CAPACITY TEST: CPT

## 2025-04-09 PROCEDURE — 86900 BLOOD TYPING SEROLOGIC ABO: CPT

## 2025-04-09 PROCEDURE — 87635 SARS-COV-2 COVID-19 AMP PRB: CPT

## 2025-04-09 PROCEDURE — 94729 DIFFUSING CAPACITY: CPT

## 2025-04-09 PROCEDURE — 94060 EVALUATION OF WHEEZING: CPT

## 2025-04-09 PROCEDURE — 85610 PROTHROMBIN TIME: CPT

## 2025-04-09 PROCEDURE — 82803 BLOOD GASES ANY COMBINATION: CPT

## 2025-04-09 RX ORDER — MUPIROCIN 20 MG/G
OINTMENT TOPICAL 2 TIMES DAILY
Qty: 1 EACH | Refills: 0 | Status: ON HOLD | OUTPATIENT
Start: 2025-04-13 | End: 2025-04-15

## 2025-04-09 RX ORDER — ASPIRIN 81 MG/1
81 TABLET ORAL DAILY
Qty: 90 TABLET | Refills: 0 | Status: ON HOLD
Start: 2025-04-10

## 2025-04-09 RX ORDER — AMIODARONE HYDROCHLORIDE 200 MG/1
400 TABLET ORAL 2 TIMES DAILY
Qty: 20 TABLET | Refills: 0 | Status: ON HOLD | OUTPATIENT
Start: 2025-04-09 | End: 2025-04-14

## 2025-04-09 RX ORDER — CHLORHEXIDINE GLUCONATE ORAL RINSE 1.2 MG/ML
15 SOLUTION DENTAL 2 TIMES DAILY
Qty: 60 ML | Refills: 0 | Status: ON HOLD | OUTPATIENT
Start: 2025-04-13 | End: 2025-04-15

## 2025-04-09 NOTE — CONSENT
Informed Consent for Blood Component Transfusion Note    I have discussed with the patient the rationale for blood component transfusion; its benefits in treating or preventing fatigue, organ damage, or death; and its risk which includes mild transfusion reactions, rare risk of blood borne infection, or more serious but rare reactions. I have discussed the alternatives to transfusion, including the risk and consequences of not receiving transfusion. The patient had an opportunity to ask questions and had agreed to proceed with transfusion of blood components.    Electronically signed by JENNIFER Garcia NP on 4/9/25 at 5:14 PM EDT

## 2025-04-09 NOTE — PROGRESS NOTES
H&P encounter only.    Test results reviewed with patient.  Consents discussed and signed.    Discussed starting:  Bactroban on 4/13  Peridex on 4/13  Amiodarone 400 mg BID on 4/10  Aspirin 81 mg on 4/10    Discussed stopping:  Plavix, none after today on 4/9  Xarelto, last dose 4/12 (pt reports he has not received this medication and has not been taking this)  Pt denies taking Eliquis  Metformin, last dose 4/12    Verbal and written instruction provided. Pt verbalized understanding and all questions answered.

## 2025-04-09 NOTE — PERIOP NOTE
[address] [address]      MAIN PRE OP             [phone]                                                                                AMBULATORY PRE OP          [phone]     PRE-ADMISSION TESTING    [phone]       Surgery Date:  4/15/25       [facility] staff will call you between 4 and 7pm the day before your surgery with your arrival time. (If your surgery is on a Monday, we will call you the Friday before.)    Call [phone] after 7pm Monday-Friday if you did not receive this call.    INSTRUCTIONS BEFORE YOUR SURGERY   When You  Arrive Arrive at [facility] Patient Access on 1st floor the day of your surgery.  Have your insurance card, photo ID,living will/advanced directive/POA (if applicable),  and any copayment (if needed)   Food   and   Drink NO solid food after midnight the night before surgery. You can drink clear liquids from midnight until ONE hour prior to your arrival at the hospital on the day of your surgery. Clear liquids include:  Water  Apple juice (no sediment)  Carbonated beverages  Black coffee(no cream/milk)  Tea(no cream/milk)  Gatorade    No alcohol (beer, wine, liquor) or marijuana (smoking) 24 hours prior to surgery.   No edibles for 3 days prior to surgery.    Stop smoking cigarettes 14 days before surgery (helps w/healing and breathing).   Bathing Clothing  Jewelry  Valuables     When you shower the morning of surgery, please do not apply anything to your skin, such as lotions, powders or makeup, (especially mascara).   Remove fingernail polish except for clear.  Do not shave or trim anywhere 24 hours before surgery  Wear your hair loose or down; no pony-tails, buns, or metal hair clips  Wear loose, comfortable, clean clothes  Wear glasses instead of contacts. Bring a case to keep your glasses safe.  Leave money, valuables, and jewelry, including body piercings, at home  If you use inhalers or CPAP
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
## 2025-04-14 ENCOUNTER — ANESTHESIA EVENT (OUTPATIENT)
Facility: HOSPITAL | Age: 80
End: 2025-04-14
Payer: MEDICARE

## 2025-04-14 RX ORDER — HYDRALAZINE HYDROCHLORIDE 20 MG/ML
10 INJECTION INTRAMUSCULAR; INTRAVENOUS ONCE
Status: CANCELLED | OUTPATIENT
Start: 2025-04-14 | End: 2025-04-14

## 2025-04-14 RX ORDER — PROCHLORPERAZINE EDISYLATE 5 MG/ML
5 INJECTION INTRAMUSCULAR; INTRAVENOUS
Status: CANCELLED | OUTPATIENT
Start: 2025-04-14

## 2025-04-14 RX ORDER — OXYCODONE HYDROCHLORIDE 5 MG/1
5 TABLET ORAL
Refills: 0 | Status: CANCELLED | OUTPATIENT
Start: 2025-04-14

## 2025-04-14 RX ORDER — SODIUM CHLORIDE 0.9 % (FLUSH) 0.9 %
5-40 SYRINGE (ML) INJECTION EVERY 12 HOURS SCHEDULED
Status: CANCELLED | OUTPATIENT
Start: 2025-04-14

## 2025-04-14 RX ORDER — SODIUM CHLORIDE 0.9 % (FLUSH) 0.9 %
5-40 SYRINGE (ML) INJECTION PRN
Status: CANCELLED | OUTPATIENT
Start: 2025-04-14

## 2025-04-14 RX ORDER — SODIUM CHLORIDE 9 MG/ML
INJECTION, SOLUTION INTRAVENOUS PRN
Status: CANCELLED | OUTPATIENT
Start: 2025-04-14

## 2025-04-14 RX ORDER — FENTANYL CITRATE 50 UG/ML
25 INJECTION, SOLUTION INTRAMUSCULAR; INTRAVENOUS EVERY 5 MIN PRN
Refills: 0 | Status: CANCELLED | OUTPATIENT
Start: 2025-04-14

## 2025-04-14 RX ORDER — HYDROMORPHONE HYDROCHLORIDE 1 MG/ML
0.5 INJECTION, SOLUTION INTRAMUSCULAR; INTRAVENOUS; SUBCUTANEOUS EVERY 5 MIN PRN
Refills: 0 | Status: CANCELLED | OUTPATIENT
Start: 2025-04-14

## 2025-04-14 RX ORDER — NALOXONE HYDROCHLORIDE 0.4 MG/ML
INJECTION, SOLUTION INTRAMUSCULAR; INTRAVENOUS; SUBCUTANEOUS PRN
Status: CANCELLED | OUTPATIENT
Start: 2025-04-14

## 2025-04-14 RX ORDER — ONDANSETRON 2 MG/ML
4 INJECTION INTRAMUSCULAR; INTRAVENOUS
Status: CANCELLED | OUTPATIENT
Start: 2025-04-14

## 2025-04-15 ENCOUNTER — HOSPITAL ENCOUNTER (INPATIENT)
Facility: HOSPITAL | Age: 80
LOS: 18 days | Discharge: HOSPICE | End: 2025-05-03
Attending: THORACIC SURGERY (CARDIOTHORACIC VASCULAR SURGERY) | Admitting: THORACIC SURGERY (CARDIOTHORACIC VASCULAR SURGERY)
Payer: MEDICARE

## 2025-04-15 ENCOUNTER — HOSPITAL ENCOUNTER (OUTPATIENT)
Facility: HOSPITAL | Age: 80
Discharge: HOME | End: 2025-04-17
Attending: THORACIC SURGERY (CARDIOTHORACIC VASCULAR SURGERY)

## 2025-04-15 ENCOUNTER — APPOINTMENT (OUTPATIENT)
Facility: HOSPITAL | Age: 80
End: 2025-04-15
Attending: THORACIC SURGERY (CARDIOTHORACIC VASCULAR SURGERY)
Payer: MEDICARE

## 2025-04-15 ENCOUNTER — ANESTHESIA (OUTPATIENT)
Facility: HOSPITAL | Age: 80
End: 2025-04-15
Payer: MEDICARE

## 2025-04-15 DIAGNOSIS — Z95.1 POSTSURGICAL AORTOCORONARY BYPASS STATUS: Primary | ICD-10-CM

## 2025-04-15 DIAGNOSIS — Z95.1 S/P CABG X 2: ICD-10-CM

## 2025-04-15 DIAGNOSIS — Z51.5 HOSPICE CARE: ICD-10-CM

## 2025-04-15 DIAGNOSIS — J96.01 ACUTE HYPOXEMIC RESPIRATORY FAILURE (HCC): ICD-10-CM

## 2025-04-15 DIAGNOSIS — I25.10 DISEASE OF CARDIOVASCULAR SYSTEM: Primary | ICD-10-CM

## 2025-04-15 LAB
ACUTE KIDNEY INJURY RISK NEPHROCHECK: 0.28 (ref 0–0.3)
ALBUMIN SERPL-MCNC: 2.9 G/DL (ref 3.5–5)
ALBUMIN SERPL-MCNC: 3.5 G/DL (ref 3.5–5)
ALBUMIN/GLOB SERPL: 1.5 (ref 1.1–2.2)
ALBUMIN/GLOB SERPL: 1.9 (ref 1.1–2.2)
ALP SERPL-CCNC: 53 U/L (ref 45–117)
ALP SERPL-CCNC: 60 U/L (ref 45–117)
ALT SERPL-CCNC: 16 U/L (ref 12–78)
ALT SERPL-CCNC: 21 U/L (ref 12–78)
ANION GAP BLD CALC-SCNC: 10 (ref 10–20)
ANION GAP BLD CALC-SCNC: 13 (ref 10–20)
ANION GAP BLD CALC-SCNC: 7 (ref 10–20)
ANION GAP BLD CALC-SCNC: 8 (ref 10–20)
ANION GAP BLD CALC-SCNC: 9 (ref 10–20)
ANION GAP SERPL CALC-SCNC: 2 MMOL/L (ref 2–12)
ANION GAP SERPL CALC-SCNC: 5 MMOL/L (ref 2–12)
APTT PPP: 42.9 SEC (ref 22.1–31)
AST SERPL-CCNC: 18 U/L (ref 15–37)
AST SERPL-CCNC: 21 U/L (ref 15–37)
BASE DEFICIT BLD-SCNC: 0.8 MMOL/L
BASE DEFICIT BLD-SCNC: 1.9 MMOL/L
BASE DEFICIT BLD-SCNC: 2.8 MMOL/L
BASE DEFICIT BLD-SCNC: 2.9 MMOL/L
BASE DEFICIT BLD-SCNC: 3.6 MMOL/L
BASE DEFICIT BLD-SCNC: 4.7 MMOL/L
BASE DEFICIT BLD-SCNC: 5.2 MMOL/L
BDY SITE: ABNORMAL
BILIRUB SERPL-MCNC: 0.4 MG/DL (ref 0.2–1)
BILIRUB SERPL-MCNC: 0.5 MG/DL (ref 0.2–1)
BUN SERPL-MCNC: 35 MG/DL (ref 6–20)
BUN SERPL-MCNC: 36 MG/DL (ref 6–20)
BUN/CREAT SERPL: 20 (ref 12–20)
BUN/CREAT SERPL: 22 (ref 12–20)
CA-I BLD-MCNC: 0.99 MMOL/L (ref 1.15–1.33)
CA-I BLD-MCNC: 1.2 MMOL/L (ref 1.15–1.33)
CA-I BLD-MCNC: 1.23 MMOL/L (ref 1.15–1.33)
CA-I BLD-MCNC: 1.24 MMOL/L (ref 1.15–1.33)
CA-I BLD-MCNC: 1.27 MMOL/L (ref 1.15–1.33)
CA-I BLD-SCNC: 1.25 MMOL/L (ref 1.12–1.32)
CA-I BLD-SCNC: 1.28 MMOL/L (ref 1.12–1.32)
CALCIUM SERPL-MCNC: 8.1 MG/DL (ref 8.5–10.1)
CALCIUM SERPL-MCNC: 8.4 MG/DL (ref 8.5–10.1)
CHLORIDE BLD-SCNC: 102 MMOL/L (ref 100–111)
CHLORIDE BLD-SCNC: 104 MMOL/L (ref 100–111)
CHLORIDE BLD-SCNC: 105 MMOL/L (ref 100–111)
CHLORIDE BLD-SCNC: 106 MMOL/L (ref 100–111)
CHLORIDE BLD-SCNC: 110 MMOL/L (ref 100–111)
CHLORIDE SERPL-SCNC: 110 MMOL/L (ref 97–108)
CHLORIDE SERPL-SCNC: 111 MMOL/L (ref 97–108)
CO2 BLD-SCNC: 20 MMOL/L (ref 22–29)
CO2 BLD-SCNC: 21 MMOL/L (ref 22–29)
CO2 BLD-SCNC: 22 MMOL/L (ref 22–29)
CO2 BLD-SCNC: 23 MMOL/L (ref 22–29)
CO2 BLD-SCNC: 23 MMOL/L (ref 22–29)
CO2 SERPL-SCNC: 24 MMOL/L (ref 21–32)
CO2 SERPL-SCNC: 24 MMOL/L (ref 21–32)
CREAT SERPL-MCNC: 1.67 MG/DL (ref 0.7–1.3)
CREAT SERPL-MCNC: 1.75 MG/DL (ref 0.7–1.3)
CREAT UR-MCNC: 1.3 MG/DL (ref 0.6–1.3)
CREAT UR-MCNC: 1.5 MG/DL (ref 0.6–1.3)
CREAT UR-MCNC: 1.8 MG/DL (ref 0.6–1.3)
CREAT UR-MCNC: 1.9 MG/DL (ref 0.6–1.3)
CREAT UR-MCNC: 2 MG/DL (ref 0.6–1.3)
ECHO BSA: 1.71 M2
EKG ATRIAL RATE: 54 BPM
EKG DIAGNOSIS: NORMAL
EKG P AXIS: 29 DEGREES
EKG P-R INTERVAL: 136 MS
EKG Q-T INTERVAL: 480 MS
EKG QRS DURATION: 152 MS
EKG QTC CALCULATION (BAZETT): 455 MS
EKG R AXIS: 64 DEGREES
EKG T AXIS: 41 DEGREES
EKG VENTRICULAR RATE: 54 BPM
ERYTHROCYTE [DISTWIDTH] IN BLOOD BY AUTOMATED COUNT: 18.6 % (ref 11.5–14.5)
ERYTHROCYTE [DISTWIDTH] IN BLOOD BY AUTOMATED COUNT: 18.8 % (ref 11.5–14.5)
GLOBULIN SER CALC-MCNC: 1.8 G/DL (ref 2–4)
GLOBULIN SER CALC-MCNC: 2 G/DL (ref 2–4)
GLUCOSE BLD STRIP.AUTO-MCNC: 100 MG/DL (ref 65–117)
GLUCOSE BLD STRIP.AUTO-MCNC: 104 MG/DL (ref 74–99)
GLUCOSE BLD STRIP.AUTO-MCNC: 106 MG/DL (ref 74–99)
GLUCOSE BLD STRIP.AUTO-MCNC: 107 MG/DL (ref 65–117)
GLUCOSE BLD STRIP.AUTO-MCNC: 117 MG/DL (ref 74–99)
GLUCOSE BLD STRIP.AUTO-MCNC: 128 MG/DL (ref 65–117)
GLUCOSE BLD STRIP.AUTO-MCNC: 130 MG/DL (ref 74–99)
GLUCOSE BLD STRIP.AUTO-MCNC: 138 MG/DL (ref 65–117)
GLUCOSE BLD STRIP.AUTO-MCNC: 147 MG/DL (ref 65–117)
GLUCOSE BLD STRIP.AUTO-MCNC: 163 MG/DL (ref 65–117)
GLUCOSE BLD STRIP.AUTO-MCNC: 163 MG/DL (ref 74–99)
GLUCOSE BLD STRIP.AUTO-MCNC: 83 MG/DL (ref 65–117)
GLUCOSE BLD STRIP.AUTO-MCNC: 83 MG/DL (ref 65–117)
GLUCOSE BLD STRIP.AUTO-MCNC: 88 MG/DL (ref 65–117)
GLUCOSE BLD STRIP.AUTO-MCNC: 90 MG/DL (ref 65–117)
GLUCOSE BLD STRIP.AUTO-MCNC: 98 MG/DL (ref 65–117)
GLUCOSE SERPL-MCNC: 117 MG/DL (ref 65–100)
GLUCOSE SERPL-MCNC: 97 MG/DL (ref 65–100)
HCO3 BLD-SCNC: 23.3 MMOL/L (ref 21–28)
HCO3 BLD-SCNC: 23.6 MMOL/L (ref 21–28)
HCO3 BLDA-SCNC: 21 MMOL/L
HCO3 BLDA-SCNC: 22 MMOL/L
HCO3 BLDA-SCNC: 23 MMOL/L
HCO3 BLDA-SCNC: 23 MMOL/L
HCO3 BLDA-SCNC: 24 MMOL/L
HCT VFR BLD AUTO: 23.5 % (ref 36.6–50.3)
HCT VFR BLD AUTO: 25.9 % (ref 36.6–50.3)
HGB BLD-MCNC: 7.7 G/DL (ref 12.1–17)
HGB BLD-MCNC: 8.4 G/DL (ref 12.1–17)
HISTORY CHECK: NORMAL
INR PPP: 1.3 (ref 0.9–1.1)
LACTATE BLD-SCNC: 0.47 MMOL/L (ref 0.4–2)
LACTATE BLD-SCNC: 2.04 MMOL/L (ref 0.4–2)
LACTATE BLD-SCNC: <0.4 MMOL/L (ref 0.4–2)
MAGNESIUM SERPL-MCNC: 1.9 MG/DL (ref 1.6–2.4)
MCH RBC QN AUTO: 29.2 PG (ref 26–34)
MCH RBC QN AUTO: 29.8 PG (ref 26–34)
MCHC RBC AUTO-ENTMCNC: 32.4 G/DL (ref 30–36.5)
MCHC RBC AUTO-ENTMCNC: 32.8 G/DL (ref 30–36.5)
MCV RBC AUTO: 89.9 FL (ref 80–99)
MCV RBC AUTO: 91.1 FL (ref 80–99)
NRBC # BLD: 0 K/UL (ref 0–0.01)
NRBC # BLD: 0 K/UL (ref 0–0.01)
NRBC BLD-RTO: 0 PER 100 WBC
NRBC BLD-RTO: 0 PER 100 WBC
NT PRO BNP: 2180 PG/ML
PCO2 BLD: 39.2 MMHG (ref 35–48)
PCO2 BLD: 40 MMHG (ref 35–48)
PCO2 BLD: 42.7 MMHG (ref 35–48)
PCO2 BLD: 45.6 MMHG (ref 35–48)
PCO2 BLD: 46.6 MMHG (ref 35–48)
PCO2 BLD: 48 MMHG (ref 35–48)
PCO2 BLD: 51.9 MMHG (ref 35–48)
PH BLD: 7.26 (ref 7.35–7.45)
PH BLD: 7.28 (ref 7.35–7.45)
PH BLD: 7.3 (ref 7.35–7.45)
PH BLD: 7.3 (ref 7.35–7.45)
PH BLD: 7.31 (ref 7.35–7.45)
PH BLD: 7.37 (ref 7.35–7.45)
PH BLD: 7.4 (ref 7.35–7.45)
PLATELET # BLD AUTO: 156 K/UL (ref 150–400)
PLATELET # BLD AUTO: 169 K/UL (ref 150–400)
PMV BLD AUTO: 9.7 FL (ref 8.9–12.9)
PMV BLD AUTO: 9.9 FL (ref 8.9–12.9)
PO2 BLD: 374 MMHG (ref 83–108)
PO2 BLD: 493 MMHG (ref 83–108)
PO2 BLD: 506 MMHG (ref 83–108)
PO2 BLD: 70 MMHG (ref 83–108)
PO2 BLD: >515 MMHG (ref 83–108)
POTASSIUM BLD-SCNC: 4.7 MMOL/L (ref 3.5–5.5)
POTASSIUM BLD-SCNC: 4.8 MMOL/L (ref 3.5–5.5)
POTASSIUM BLD-SCNC: 4.9 MMOL/L (ref 3.5–5.5)
POTASSIUM BLD-SCNC: 5.1 MMOL/L (ref 3.5–5.5)
POTASSIUM BLD-SCNC: 5.1 MMOL/L (ref 3.5–5.5)
POTASSIUM SERPL-SCNC: 4.5 MMOL/L (ref 3.5–5.1)
POTASSIUM SERPL-SCNC: 4.5 MMOL/L (ref 3.5–5.1)
PROT SERPL-MCNC: 4.9 G/DL (ref 6.4–8.2)
PROT SERPL-MCNC: 5.3 G/DL (ref 6.4–8.2)
PROTHROMBIN TIME: 13.5 SEC (ref 9.2–11.2)
RBC # BLD AUTO: 2.58 M/UL (ref 4.1–5.7)
RBC # BLD AUTO: 2.88 M/UL (ref 4.1–5.7)
SAO2 % BLD: 100 % (ref 94–98)
SAO2 % BLD: 100 % (ref 94–98)
SAO2 % BLD: 90.6 % (ref 92–97)
SAO2 % BLD: 99.9 % (ref 92–97)
SERVICE CMNT-IMP: ABNORMAL
SERVICE CMNT-IMP: NORMAL
SODIUM BLD-SCNC: 134 MMOL/L (ref 136–145)
SODIUM BLD-SCNC: 135 MMOL/L (ref 136–145)
SODIUM BLD-SCNC: 137 MMOL/L (ref 136–145)
SODIUM BLD-SCNC: 138 MMOL/L (ref 136–145)
SODIUM BLD-SCNC: 139 MMOL/L (ref 136–145)
SODIUM SERPL-SCNC: 137 MMOL/L (ref 136–145)
SODIUM SERPL-SCNC: 139 MMOL/L (ref 136–145)
SPECIMEN SITE: ABNORMAL
SPECIMEN TYPE: ABNORMAL
SPECIMEN TYPE: ABNORMAL
THERAPEUTIC RANGE: ABNORMAL SECS (ref 58–77)
WBC # BLD AUTO: 7.8 K/UL (ref 4.1–11.1)
WBC # BLD AUTO: 9.5 K/UL (ref 4.1–11.1)

## 2025-04-15 PROCEDURE — 33517 CABG ARTERY-VEIN SINGLE: CPT

## 2025-04-15 PROCEDURE — 2500000003 HC RX 250 WO HCPCS

## 2025-04-15 PROCEDURE — 80053 COMPREHEN METABOLIC PANEL: CPT

## 2025-04-15 PROCEDURE — 3600000008 HC SURGERY OHS BASE: Performed by: THORACIC SURGERY (CARDIOTHORACIC VASCULAR SURGERY)

## 2025-04-15 PROCEDURE — 5A1221Z PERFORMANCE OF CARDIAC OUTPUT, CONTINUOUS: ICD-10-PCS | Performed by: THORACIC SURGERY (CARDIOTHORACIC VASCULAR SURGERY)

## 2025-04-15 PROCEDURE — P9045 ALBUMIN (HUMAN), 5%, 250 ML: HCPCS

## 2025-04-15 PROCEDURE — 33508 ENDOSCOPIC VEIN HARVEST: CPT

## 2025-04-15 PROCEDURE — 6360000002 HC RX W HCPCS: Performed by: THORACIC SURGERY (CARDIOTHORACIC VASCULAR SURGERY)

## 2025-04-15 PROCEDURE — 02100Z9 BYPASS CORONARY ARTERY, ONE ARTERY FROM LEFT INTERNAL MAMMARY, OPEN APPROACH: ICD-10-PCS | Performed by: THORACIC SURGERY (CARDIOTHORACIC VASCULAR SURGERY)

## 2025-04-15 PROCEDURE — 84132 ASSAY OF SERUM POTASSIUM: CPT

## 2025-04-15 PROCEDURE — 6360000002 HC RX W HCPCS: Performed by: NURSE PRACTITIONER

## 2025-04-15 PROCEDURE — 6360000002 HC RX W HCPCS: Performed by: STUDENT IN AN ORGANIZED HEALTH CARE EDUCATION/TRAINING PROGRAM

## 2025-04-15 PROCEDURE — 3E043XZ INTRODUCTION OF VASOPRESSOR INTO CENTRAL VEIN, PERCUTANEOUS APPROACH: ICD-10-PCS | Performed by: ANESTHESIOLOGY

## 2025-04-15 PROCEDURE — 85018 HEMOGLOBIN: CPT

## 2025-04-15 PROCEDURE — 2709999900 HC NON-CHARGEABLE SUPPLY: Performed by: THORACIC SURGERY (CARDIOTHORACIC VASCULAR SURGERY)

## 2025-04-15 PROCEDURE — C1729 CATH, DRAINAGE: HCPCS | Performed by: THORACIC SURGERY (CARDIOTHORACIC VASCULAR SURGERY)

## 2025-04-15 PROCEDURE — 85730 THROMBOPLASTIN TIME PARTIAL: CPT

## 2025-04-15 PROCEDURE — 021009W BYPASS CORONARY ARTERY, ONE ARTERY FROM AORTA WITH AUTOLOGOUS VENOUS TISSUE, OPEN APPROACH: ICD-10-PCS | Performed by: THORACIC SURGERY (CARDIOTHORACIC VASCULAR SURGERY)

## 2025-04-15 PROCEDURE — 93010 ELECTROCARDIOGRAM REPORT: CPT | Performed by: INTERNAL MEDICINE

## 2025-04-15 PROCEDURE — 2580000003 HC RX 258: Performed by: ANESTHESIOLOGY

## 2025-04-15 PROCEDURE — 94640 AIRWAY INHALATION TREATMENT: CPT

## 2025-04-15 PROCEDURE — 2580000003 HC RX 258: Performed by: NURSE ANESTHETIST, CERTIFIED REGISTERED

## 2025-04-15 PROCEDURE — 2500000003 HC RX 250 WO HCPCS: Performed by: ANESTHESIOLOGY

## 2025-04-15 PROCEDURE — 33533 CABG ARTERIAL SINGLE: CPT

## 2025-04-15 PROCEDURE — 84295 ASSAY OF SERUM SODIUM: CPT

## 2025-04-15 PROCEDURE — 2720000010 HC SURG SUPPLY STERILE: Performed by: THORACIC SURGERY (CARDIOTHORACIC VASCULAR SURGERY)

## 2025-04-15 PROCEDURE — 06BP4ZZ EXCISION OF RIGHT SAPHENOUS VEIN, PERCUTANEOUS ENDOSCOPIC APPROACH: ICD-10-PCS | Performed by: THORACIC SURGERY (CARDIOTHORACIC VASCULAR SURGERY)

## 2025-04-15 PROCEDURE — 83880 ASSAY OF NATRIURETIC PEPTIDE: CPT

## 2025-04-15 PROCEDURE — 3700000001 HC ADD 15 MINUTES (ANESTHESIA): Performed by: THORACIC SURGERY (CARDIOTHORACIC VASCULAR SURGERY)

## 2025-04-15 PROCEDURE — 94660 CPAP INITIATION&MGMT: CPT

## 2025-04-15 PROCEDURE — 3700000000 HC ANESTHESIA ATTENDED CARE: Performed by: THORACIC SURGERY (CARDIOTHORACIC VASCULAR SURGERY)

## 2025-04-15 PROCEDURE — C1713 ANCHOR/SCREW BN/BN,TIS/BN: HCPCS | Performed by: THORACIC SURGERY (CARDIOTHORACIC VASCULAR SURGERY)

## 2025-04-15 PROCEDURE — 82803 BLOOD GASES ANY COMBINATION: CPT

## 2025-04-15 PROCEDURE — 93005 ELECTROCARDIOGRAM TRACING: CPT

## 2025-04-15 PROCEDURE — 6370000000 HC RX 637 (ALT 250 FOR IP): Performed by: ANESTHESIOLOGY

## 2025-04-15 PROCEDURE — 85027 COMPLETE CBC AUTOMATED: CPT

## 2025-04-15 PROCEDURE — 6360000002 HC RX W HCPCS: Performed by: ANESTHESIOLOGY

## 2025-04-15 PROCEDURE — 33268 EXCL LAA OPN OTH PX ANY METH: CPT

## 2025-04-15 PROCEDURE — 0DH67UZ INSERTION OF FEEDING DEVICE INTO STOMACH, VIA NATURAL OR ARTIFICIAL OPENING: ICD-10-PCS

## 2025-04-15 PROCEDURE — 2000000000 HC ICU R&B

## 2025-04-15 PROCEDURE — 6370000000 HC RX 637 (ALT 250 FOR IP): Performed by: NURSE PRACTITIONER

## 2025-04-15 PROCEDURE — 2580000003 HC RX 258

## 2025-04-15 PROCEDURE — 85610 PROTHROMBIN TIME: CPT

## 2025-04-15 PROCEDURE — 82962 GLUCOSE BLOOD TEST: CPT

## 2025-04-15 PROCEDURE — 3600000018 HC SURGERY OHS ADDTL 15MIN: Performed by: THORACIC SURGERY (CARDIOTHORACIC VASCULAR SURGERY)

## 2025-04-15 PROCEDURE — C1889 IMPLANT/INSERT DEVICE, NOC: HCPCS | Performed by: THORACIC SURGERY (CARDIOTHORACIC VASCULAR SURGERY)

## 2025-04-15 PROCEDURE — 6370000000 HC RX 637 (ALT 250 FOR IP): Performed by: NURSE ANESTHETIST, CERTIFIED REGISTERED

## 2025-04-15 PROCEDURE — P9045 ALBUMIN (HUMAN), 5%, 250 ML: HCPCS | Performed by: NURSE ANESTHETIST, CERTIFIED REGISTERED

## 2025-04-15 PROCEDURE — 71045 X-RAY EXAM CHEST 1 VIEW: CPT

## 2025-04-15 PROCEDURE — P9045 ALBUMIN (HUMAN), 5%, 250 ML: HCPCS | Performed by: ANESTHESIOLOGY

## 2025-04-15 PROCEDURE — 2580000003 HC RX 258: Performed by: THORACIC SURGERY (CARDIOTHORACIC VASCULAR SURGERY)

## 2025-04-15 PROCEDURE — 2500000003 HC RX 250 WO HCPCS: Performed by: NURSE ANESTHETIST, CERTIFIED REGISTERED

## 2025-04-15 PROCEDURE — 6370000000 HC RX 637 (ALT 250 FOR IP)

## 2025-04-15 PROCEDURE — 82947 ASSAY GLUCOSE BLOOD QUANT: CPT

## 2025-04-15 PROCEDURE — 6360000002 HC RX W HCPCS

## 2025-04-15 PROCEDURE — 6360000002 HC RX W HCPCS: Performed by: NURSE ANESTHETIST, CERTIFIED REGISTERED

## 2025-04-15 PROCEDURE — 94002 VENT MGMT INPAT INIT DAY: CPT

## 2025-04-15 PROCEDURE — 83735 ASSAY OF MAGNESIUM: CPT

## 2025-04-15 PROCEDURE — B24BZZ4 ULTRASONOGRAPHY OF HEART WITH AORTA, TRANSESOPHAGEAL: ICD-10-PCS | Performed by: ANESTHESIOLOGY

## 2025-04-15 PROCEDURE — 82330 ASSAY OF CALCIUM: CPT

## 2025-04-15 PROCEDURE — 02L70ZK OCCLUSION OF LEFT ATRIAL APPENDAGE, OPEN APPROACH: ICD-10-PCS | Performed by: THORACIC SURGERY (CARDIOTHORACIC VASCULAR SURGERY)

## 2025-04-15 DEVICE — CLIP MED SUTURE LESS 50 MM SYS 1 HND STRL ATRICLIP FLX V: Type: IMPLANTABLE DEVICE | Site: HEART | Status: FUNCTIONAL

## 2025-04-15 RX ORDER — SODIUM CHLORIDE, SODIUM LACTATE, POTASSIUM CHLORIDE, CALCIUM CHLORIDE 600; 310; 30; 20 MG/100ML; MG/100ML; MG/100ML; MG/100ML
INJECTION, SOLUTION INTRAVENOUS CONTINUOUS
Status: DISCONTINUED | OUTPATIENT
Start: 2025-04-15 | End: 2025-04-19 | Stop reason: ALTCHOICE

## 2025-04-15 RX ORDER — SODIUM CHLORIDE 0.9 % (FLUSH) 0.9 %
5-40 SYRINGE (ML) INJECTION EVERY 12 HOURS SCHEDULED
Status: DISCONTINUED | OUTPATIENT
Start: 2025-04-15 | End: 2025-05-03 | Stop reason: HOSPADM

## 2025-04-15 RX ORDER — INSULIN GLARGINE 100 [IU]/ML
1-50 INJECTION, SOLUTION SUBCUTANEOUS
Status: COMPLETED | OUTPATIENT
Start: 2025-04-17 | End: 2025-04-17

## 2025-04-15 RX ORDER — ALBUMIN HUMAN 50 G/1000ML
12.5 SOLUTION INTRAVENOUS PRN
Status: COMPLETED | OUTPATIENT
Start: 2025-04-15 | End: 2025-04-15

## 2025-04-15 RX ORDER — SODIUM CHLORIDE 0.9 % (FLUSH) 0.9 %
5-40 SYRINGE (ML) INJECTION PRN
Status: DISCONTINUED | OUTPATIENT
Start: 2025-04-15 | End: 2025-04-15 | Stop reason: HOSPADM

## 2025-04-15 RX ORDER — POLYETHYLENE GLYCOL 3350 17 G/17G
17 POWDER, FOR SOLUTION ORAL DAILY
Status: DISCONTINUED | OUTPATIENT
Start: 2025-04-15 | End: 2025-05-01

## 2025-04-15 RX ORDER — LIDOCAINE HYDROCHLORIDE 20 MG/ML
INJECTION, SOLUTION EPIDURAL; INFILTRATION; INTRACAUDAL; PERINEURAL
Status: DISCONTINUED | OUTPATIENT
Start: 2025-04-15 | End: 2025-04-15 | Stop reason: SDUPTHER

## 2025-04-15 RX ORDER — NOREPINEPHRINE BITARTRATE 0.06 MG/ML
1-100 INJECTION, SOLUTION INTRAVENOUS CONTINUOUS
Status: DISCONTINUED | OUTPATIENT
Start: 2025-04-15 | End: 2025-04-18

## 2025-04-15 RX ORDER — POTASSIUM CHLORIDE 29.8 MG/ML
20 INJECTION INTRAVENOUS PRN
Status: DISCONTINUED | OUTPATIENT
Start: 2025-04-15 | End: 2025-05-01

## 2025-04-15 RX ORDER — OXYCODONE HYDROCHLORIDE 5 MG/1
5 TABLET ORAL EVERY 4 HOURS PRN
Status: DISCONTINUED | OUTPATIENT
Start: 2025-04-15 | End: 2025-04-23

## 2025-04-15 RX ORDER — HYDRALAZINE HYDROCHLORIDE 20 MG/ML
10 INJECTION INTRAMUSCULAR; INTRAVENOUS EVERY 6 HOURS PRN
Status: DISCONTINUED | OUTPATIENT
Start: 2025-04-15 | End: 2025-05-01

## 2025-04-15 RX ORDER — CHLORHEXIDINE GLUCONATE ORAL RINSE 1.2 MG/ML
15 SOLUTION DENTAL 2 TIMES DAILY
Status: DISCONTINUED | OUTPATIENT
Start: 2025-04-15 | End: 2025-04-27

## 2025-04-15 RX ORDER — IPRATROPIUM BROMIDE AND ALBUTEROL SULFATE 2.5; .5 MG/3ML; MG/3ML
1 SOLUTION RESPIRATORY (INHALATION) EVERY 4 HOURS PRN
Status: DISCONTINUED | OUTPATIENT
Start: 2025-04-15 | End: 2025-05-03 | Stop reason: HOSPADM

## 2025-04-15 RX ORDER — DEXTROSE MONOHYDRATE 100 MG/ML
INJECTION, SOLUTION INTRAVENOUS CONTINUOUS PRN
Status: DISCONTINUED | OUTPATIENT
Start: 2025-04-15 | End: 2025-05-01

## 2025-04-15 RX ORDER — GABAPENTIN 100 MG/1
200 CAPSULE ORAL 3 TIMES DAILY
Status: DISCONTINUED | OUTPATIENT
Start: 2025-04-15 | End: 2025-04-16

## 2025-04-15 RX ORDER — SODIUM CHLORIDE 9 MG/ML
INJECTION, SOLUTION INTRAVENOUS CONTINUOUS
Status: DISCONTINUED | OUTPATIENT
Start: 2025-04-15 | End: 2025-04-20

## 2025-04-15 RX ORDER — SODIUM CHLORIDE 9 MG/ML
INJECTION, SOLUTION INTRAVENOUS PRN
Status: DISCONTINUED | OUTPATIENT
Start: 2025-04-15 | End: 2025-04-15 | Stop reason: HOSPADM

## 2025-04-15 RX ORDER — FENTANYL CITRATE 50 UG/ML
100 INJECTION, SOLUTION INTRAMUSCULAR; INTRAVENOUS
Status: COMPLETED | OUTPATIENT
Start: 2025-04-15 | End: 2025-04-15

## 2025-04-15 RX ORDER — LIDOCAINE HYDROCHLORIDE 10 MG/ML
1 INJECTION, SOLUTION EPIDURAL; INFILTRATION; INTRACAUDAL; PERINEURAL
Status: DISCONTINUED | OUTPATIENT
Start: 2025-04-15 | End: 2025-04-15 | Stop reason: HOSPADM

## 2025-04-15 RX ORDER — ACETAMINOPHEN 500 MG
1000 TABLET ORAL EVERY 6 HOURS
Status: DISCONTINUED | OUTPATIENT
Start: 2025-04-15 | End: 2025-05-01

## 2025-04-15 RX ORDER — PHENYLEPHRINE HCL IN 0.9% NACL 0.4MG/10ML
SYRINGE (ML) INTRAVENOUS
Status: DISCONTINUED | OUTPATIENT
Start: 2025-04-15 | End: 2025-04-15 | Stop reason: SDUPTHER

## 2025-04-15 RX ORDER — ALBUMIN HUMAN 50 G/1000ML
12.5 SOLUTION INTRAVENOUS ONCE
Status: COMPLETED | OUTPATIENT
Start: 2025-04-15 | End: 2025-04-15

## 2025-04-15 RX ORDER — ATORVASTATIN CALCIUM 40 MG/1
40 TABLET, FILM COATED ORAL NIGHTLY
Status: DISCONTINUED | OUTPATIENT
Start: 2025-04-15 | End: 2025-05-01

## 2025-04-15 RX ORDER — DIPHENHYDRAMINE HCL 25 MG
25 CAPSULE ORAL NIGHTLY PRN
Status: DISCONTINUED | OUTPATIENT
Start: 2025-04-16 | End: 2025-05-01

## 2025-04-15 RX ORDER — ACETAMINOPHEN 500 MG
1000 TABLET ORAL ONCE
Status: COMPLETED | OUTPATIENT
Start: 2025-04-15 | End: 2025-04-15

## 2025-04-15 RX ORDER — ARFORMOTEROL TARTRATE 15 UG/2ML
15 SOLUTION RESPIRATORY (INHALATION)
Status: DISCONTINUED | OUTPATIENT
Start: 2025-04-15 | End: 2025-05-01

## 2025-04-15 RX ORDER — LIDOCAINE 4 G/G
2 PATCH TOPICAL DAILY
Status: DISCONTINUED | OUTPATIENT
Start: 2025-04-15 | End: 2025-05-01

## 2025-04-15 RX ORDER — ONDANSETRON 2 MG/ML
4 INJECTION INTRAMUSCULAR; INTRAVENOUS EVERY 4 HOURS PRN
Status: DISCONTINUED | OUTPATIENT
Start: 2025-04-15 | End: 2025-05-03 | Stop reason: HOSPADM

## 2025-04-15 RX ORDER — SODIUM CHLORIDE 0.9 % (FLUSH) 0.9 %
5-40 SYRINGE (ML) INJECTION PRN
Status: DISCONTINUED | OUTPATIENT
Start: 2025-04-15 | End: 2025-05-03 | Stop reason: HOSPADM

## 2025-04-15 RX ORDER — OXYCODONE HYDROCHLORIDE 5 MG/1
10 TABLET ORAL EVERY 4 HOURS PRN
Status: DISCONTINUED | OUTPATIENT
Start: 2025-04-15 | End: 2025-04-21

## 2025-04-15 RX ORDER — LANOLIN ALCOHOL/MO/W.PET/CERES
400 CREAM (GRAM) TOPICAL 2 TIMES DAILY
Status: DISCONTINUED | OUTPATIENT
Start: 2025-04-16 | End: 2025-04-18

## 2025-04-15 RX ORDER — CEFAZOLIN SODIUM 1 G/3ML
INJECTION, POWDER, FOR SOLUTION INTRAMUSCULAR; INTRAVENOUS PRN
Status: DISCONTINUED | OUTPATIENT
Start: 2025-04-15 | End: 2025-04-15 | Stop reason: HOSPADM

## 2025-04-15 RX ORDER — BISACODYL 10 MG
10 SUPPOSITORY, RECTAL RECTAL DAILY PRN
Status: DISCONTINUED | OUTPATIENT
Start: 2025-04-15 | End: 2025-05-03 | Stop reason: HOSPADM

## 2025-04-15 RX ORDER — ONDANSETRON 2 MG/ML
4 INJECTION INTRAMUSCULAR; INTRAVENOUS ONCE
Status: COMPLETED | OUTPATIENT
Start: 2025-04-15 | End: 2025-04-15

## 2025-04-15 RX ORDER — PROTAMINE SULFATE 10 MG/ML
INJECTION, SOLUTION INTRAVENOUS
Status: DISCONTINUED | OUTPATIENT
Start: 2025-04-15 | End: 2025-04-15 | Stop reason: SDUPTHER

## 2025-04-15 RX ORDER — ONDANSETRON 2 MG/ML
4 INJECTION INTRAMUSCULAR; INTRAVENOUS AS NEEDED
Status: DISCONTINUED | OUTPATIENT
Start: 2025-04-15 | End: 2025-04-15 | Stop reason: HOSPADM

## 2025-04-15 RX ORDER — SODIUM CHLORIDE 450 MG/100ML
INJECTION, SOLUTION INTRAVENOUS CONTINUOUS
Status: DISCONTINUED | OUTPATIENT
Start: 2025-04-15 | End: 2025-04-20

## 2025-04-15 RX ORDER — MAGNESIUM SULFATE IN WATER 40 MG/ML
2000 INJECTION, SOLUTION INTRAVENOUS PRN
Status: DISCONTINUED | OUTPATIENT
Start: 2025-04-15 | End: 2025-05-01

## 2025-04-15 RX ORDER — INSULIN LISPRO 100 [IU]/ML
0-6 INJECTION, SOLUTION INTRAVENOUS; SUBCUTANEOUS NIGHTLY
Status: DISCONTINUED | OUTPATIENT
Start: 2025-04-17 | End: 2025-04-20

## 2025-04-15 RX ORDER — HEPARIN SODIUM 10000 [USP'U]/ML
INJECTION, SOLUTION INTRAVENOUS; SUBCUTANEOUS PRN
Status: DISCONTINUED | OUTPATIENT
Start: 2025-04-15 | End: 2025-04-15 | Stop reason: HOSPADM

## 2025-04-15 RX ORDER — GABAPENTIN 300 MG/1
300 CAPSULE ORAL ONCE
Status: COMPLETED | OUTPATIENT
Start: 2025-04-15 | End: 2025-04-15

## 2025-04-15 RX ORDER — SODIUM CHLORIDE 9 MG/ML
INJECTION, SOLUTION INTRAVENOUS PRN
Status: DISCONTINUED | OUTPATIENT
Start: 2025-04-15 | End: 2025-04-17

## 2025-04-15 RX ORDER — SODIUM CHLORIDE 0.9 % (FLUSH) 0.9 %
5-40 SYRINGE (ML) INJECTION EVERY 12 HOURS SCHEDULED
Status: DISCONTINUED | OUTPATIENT
Start: 2025-04-15 | End: 2025-04-15 | Stop reason: HOSPADM

## 2025-04-15 RX ORDER — FAMOTIDINE 20 MG/1
20 TABLET, FILM COATED ORAL DAILY
Status: COMPLETED | OUTPATIENT
Start: 2025-04-15 | End: 2025-04-19

## 2025-04-15 RX ORDER — MIDAZOLAM HYDROCHLORIDE 2 MG/2ML
2 INJECTION, SOLUTION INTRAMUSCULAR; INTRAVENOUS PRN
Status: DISCONTINUED | OUTPATIENT
Start: 2025-04-15 | End: 2025-04-15 | Stop reason: HOSPADM

## 2025-04-15 RX ORDER — INSULIN LISPRO 100 [IU]/ML
1-20 INJECTION, SOLUTION INTRAVENOUS; SUBCUTANEOUS
Status: ACTIVE | OUTPATIENT
Start: 2025-04-15 | End: 2025-04-17

## 2025-04-15 RX ORDER — ROPIVACAINE HYDROCHLORIDE 5 MG/ML
INJECTION, SOLUTION EPIDURAL; INFILTRATION; PERINEURAL PRN
Status: DISCONTINUED | OUTPATIENT
Start: 2025-04-15 | End: 2025-04-15 | Stop reason: HOSPADM

## 2025-04-15 RX ORDER — DEXMEDETOMIDINE HYDROCHLORIDE 4 UG/ML
.1-1.5 INJECTION, SOLUTION INTRAVENOUS CONTINUOUS
Status: DISCONTINUED | OUTPATIENT
Start: 2025-04-15 | End: 2025-04-17

## 2025-04-15 RX ORDER — ROCURONIUM BROMIDE 10 MG/ML
INJECTION, SOLUTION INTRAVENOUS
Status: DISCONTINUED | OUTPATIENT
Start: 2025-04-15 | End: 2025-04-15 | Stop reason: SDUPTHER

## 2025-04-15 RX ORDER — DESMOPRESSIN ACETATE 4 UG/ML
INJECTION, SOLUTION INTRAVENOUS; SUBCUTANEOUS
Status: DISCONTINUED | OUTPATIENT
Start: 2025-04-15 | End: 2025-04-15 | Stop reason: SDUPTHER

## 2025-04-15 RX ORDER — PROPOFOL 10 MG/ML
INJECTION, EMULSION INTRAVENOUS
Status: DISCONTINUED | OUTPATIENT
Start: 2025-04-15 | End: 2025-04-15 | Stop reason: SDUPTHER

## 2025-04-15 RX ORDER — ASPIRIN 81 MG/1
81 TABLET ORAL DAILY
Status: DISCONTINUED | OUTPATIENT
Start: 2025-04-16 | End: 2025-04-20

## 2025-04-15 RX ORDER — SODIUM CHLORIDE, SODIUM LACTATE, POTASSIUM CHLORIDE, CALCIUM CHLORIDE 600; 310; 30; 20 MG/100ML; MG/100ML; MG/100ML; MG/100ML
INJECTION, SOLUTION INTRAVENOUS CONTINUOUS
Status: DISCONTINUED | OUTPATIENT
Start: 2025-04-15 | End: 2025-04-15 | Stop reason: HOSPADM

## 2025-04-15 RX ORDER — SENNA AND DOCUSATE SODIUM 50; 8.6 MG/1; MG/1
1 TABLET, FILM COATED ORAL 2 TIMES DAILY
Status: DISCONTINUED | OUTPATIENT
Start: 2025-04-15 | End: 2025-05-01

## 2025-04-15 RX ORDER — INSULIN LISPRO 100 [IU]/ML
0-12 INJECTION, SOLUTION INTRAVENOUS; SUBCUTANEOUS
Status: DISCONTINUED | OUTPATIENT
Start: 2025-04-17 | End: 2025-04-20

## 2025-04-15 RX ORDER — ALBUMIN HUMAN 50 G/1000ML
SOLUTION INTRAVENOUS
Status: DISCONTINUED | OUTPATIENT
Start: 2025-04-15 | End: 2025-04-15 | Stop reason: SDUPTHER

## 2025-04-15 RX ORDER — MAGNESIUM SULFATE 1 G/100ML
1000 INJECTION INTRAVENOUS ONCE
Status: COMPLETED | OUTPATIENT
Start: 2025-04-15 | End: 2025-04-15

## 2025-04-15 RX ORDER — FENTANYL CITRATE 50 UG/ML
INJECTION, SOLUTION INTRAMUSCULAR; INTRAVENOUS
Status: DISCONTINUED | OUTPATIENT
Start: 2025-04-15 | End: 2025-04-15 | Stop reason: SDUPTHER

## 2025-04-15 RX ORDER — ACETAMINOPHEN 500 MG
1000 TABLET ORAL ONCE
Status: DISCONTINUED | OUTPATIENT
Start: 2025-04-15 | End: 2025-04-15 | Stop reason: HOSPADM

## 2025-04-15 RX ORDER — MUPIROCIN 20 MG/G
OINTMENT TOPICAL 2 TIMES DAILY
Status: DISPENSED | OUTPATIENT
Start: 2025-04-15 | End: 2025-04-20

## 2025-04-15 RX ORDER — SODIUM CHLORIDE, SODIUM LACTATE, POTASSIUM CHLORIDE, AND CALCIUM CHLORIDE .6; .31; .03; .02 G/100ML; G/100ML; G/100ML; G/100ML
500 INJECTION, SOLUTION INTRAVENOUS ONCE
Status: COMPLETED | OUTPATIENT
Start: 2025-04-15 | End: 2025-04-15

## 2025-04-15 RX ORDER — FAMOTIDINE 20 MG/1
20 TABLET, FILM COATED ORAL 2 TIMES DAILY
Status: DISCONTINUED | OUTPATIENT
Start: 2025-04-15 | End: 2025-04-15

## 2025-04-15 RX ORDER — HEPARIN SODIUM 1000 [USP'U]/ML
INJECTION, SOLUTION INTRAVENOUS; SUBCUTANEOUS
Status: DISCONTINUED | OUTPATIENT
Start: 2025-04-15 | End: 2025-04-15 | Stop reason: SDUPTHER

## 2025-04-15 RX ORDER — SUCCINYLCHOLINE/SOD CL,ISO/PF 200MG/10ML
SYRINGE (ML) INTRAVENOUS
Status: DISCONTINUED | OUTPATIENT
Start: 2025-04-15 | End: 2025-04-15 | Stop reason: SDUPTHER

## 2025-04-15 RX ORDER — AMIODARONE HYDROCHLORIDE 200 MG/1
400 TABLET ORAL 2 TIMES DAILY
Status: DISCONTINUED | OUTPATIENT
Start: 2025-04-16 | End: 2025-04-19

## 2025-04-15 RX ORDER — MIDAZOLAM HYDROCHLORIDE 2 MG/2ML
1 INJECTION, SOLUTION INTRAMUSCULAR; INTRAVENOUS
Status: DISCONTINUED | OUTPATIENT
Start: 2025-04-15 | End: 2025-04-17

## 2025-04-15 RX ORDER — PAPAVERINE HYDROCHLORIDE 30 MG/ML
INJECTION INTRAMUSCULAR; INTRAVENOUS PRN
Status: DISCONTINUED | OUTPATIENT
Start: 2025-04-15 | End: 2025-04-15 | Stop reason: HOSPADM

## 2025-04-15 RX ADMIN — IPRATROPIUM BROMIDE 0.5 MG: 0.5 SOLUTION RESPIRATORY (INHALATION) at 15:49

## 2025-04-15 RX ADMIN — PROPOFOL 50 MG: 10 INJECTION, EMULSION INTRAVENOUS at 09:36

## 2025-04-15 RX ADMIN — SODIUM CHLORIDE 2.06 UNITS/HR: 9 INJECTION, SOLUTION INTRAVENOUS at 09:14

## 2025-04-15 RX ADMIN — PROPOFOL 100 MG: 10 INJECTION, EMULSION INTRAVENOUS at 07:44

## 2025-04-15 RX ADMIN — GABAPENTIN 300 MG: 300 CAPSULE ORAL at 06:34

## 2025-04-15 RX ADMIN — CHLORHEXIDINE GLUCONATE 15 ML: 1.2 RINSE ORAL at 22:21

## 2025-04-15 RX ADMIN — Medication 100 MG: at 07:45

## 2025-04-15 RX ADMIN — ARFORMOTEROL TARTRATE 15 MCG: 15 SOLUTION RESPIRATORY (INHALATION) at 11:56

## 2025-04-15 RX ADMIN — SODIUM CHLORIDE: 9 INJECTION, SOLUTION INTRAVENOUS at 07:31

## 2025-04-15 RX ADMIN — CHLORHEXIDINE GLUCONATE 15 ML: 1.2 RINSE ORAL at 12:08

## 2025-04-15 RX ADMIN — FENTANYL CITRATE 50 MCG: 0.05 INJECTION, SOLUTION INTRAMUSCULAR; INTRAVENOUS at 10:09

## 2025-04-15 RX ADMIN — MUPIROCIN: 20 OINTMENT TOPICAL at 12:08

## 2025-04-15 RX ADMIN — HEPARIN SODIUM 27000 UNITS: 1000 INJECTION INTRAVENOUS; SUBCUTANEOUS at 08:35

## 2025-04-15 RX ADMIN — HEPARIN SODIUM 1000 UNITS: 1000 INJECTION INTRAVENOUS; SUBCUTANEOUS at 08:11

## 2025-04-15 RX ADMIN — LIDOCAINE HYDROCHLORIDE 50 MG: 20 INJECTION, SOLUTION EPIDURAL; INFILTRATION; INTRACAUDAL; PERINEURAL at 07:44

## 2025-04-15 RX ADMIN — PHENYLEPHRINE HYDROCHLORIDE 50 MCG/MIN: 10 INJECTION INTRAVENOUS at 07:50

## 2025-04-15 RX ADMIN — ALBUMIN (HUMAN) 12.5 G: 12.5 INJECTION, SOLUTION INTRAVENOUS at 11:14

## 2025-04-15 RX ADMIN — ACETAMINOPHEN 1000 MG: 500 TABLET ORAL at 06:34

## 2025-04-15 RX ADMIN — OXYCODONE 10 MG: 5 TABLET ORAL at 17:27

## 2025-04-15 RX ADMIN — MAGNESIUM SULFATE HEPTAHYDRATE 1000 MG: 10 INJECTION, SOLUTION INTRAVENOUS at 13:05

## 2025-04-15 RX ADMIN — PROTAMINE SULFATE 20 MG: 10 INJECTION, SOLUTION INTRAVENOUS at 09:31

## 2025-04-15 RX ADMIN — FENTANYL CITRATE 100 MCG: 0.05 INJECTION, SOLUTION INTRAMUSCULAR; INTRAVENOUS at 08:08

## 2025-04-15 RX ADMIN — ALBUMIN (HUMAN) 12.5 G: 12.5 INJECTION, SOLUTION INTRAVENOUS at 13:33

## 2025-04-15 RX ADMIN — WATER 2000 MG: 1 INJECTION INTRAMUSCULAR; INTRAVENOUS; SUBCUTANEOUS at 16:17

## 2025-04-15 RX ADMIN — SODIUM CHLORIDE, SODIUM LACTATE, POTASSIUM CHLORIDE, AND CALCIUM CHLORIDE: .6; .31; .03; .02 INJECTION, SOLUTION INTRAVENOUS at 06:45

## 2025-04-15 RX ADMIN — FENTANYL CITRATE 50 MCG: 50 INJECTION INTRAMUSCULAR; INTRAVENOUS at 07:06

## 2025-04-15 RX ADMIN — ACETAMINOPHEN 1000 MG: 500 TABLET ORAL at 17:27

## 2025-04-15 RX ADMIN — NOREPINEPHRINE BITARTRATE 5 MCG/MIN: 64 SOLUTION INTRAVENOUS at 23:16

## 2025-04-15 RX ADMIN — ONDANSETRON 4 MG: 2 INJECTION, SOLUTION INTRAMUSCULAR; INTRAVENOUS at 14:56

## 2025-04-15 RX ADMIN — MIDAZOLAM HYDROCHLORIDE 2 MG: 1 INJECTION, SOLUTION INTRAMUSCULAR; INTRAVENOUS at 07:06

## 2025-04-15 RX ADMIN — SENNOSIDES AND DOCUSATE SODIUM 1 TABLET: 50; 8.6 TABLET ORAL at 22:16

## 2025-04-15 RX ADMIN — DESMOPRESSIN ACETATE 18.9 MCG: 4 INJECTION, SOLUTION INTRAVENOUS; SUBCUTANEOUS at 09:40

## 2025-04-15 RX ADMIN — SUGAMMADEX 200 MG: 100 INJECTION, SOLUTION INTRAVENOUS at 10:50

## 2025-04-15 RX ADMIN — ALBUMIN (HUMAN) 12.5 G: 12.5 INJECTION, SOLUTION INTRAVENOUS at 21:23

## 2025-04-15 RX ADMIN — WATER 2000 MG: 1 INJECTION INTRAMUSCULAR; INTRAVENOUS; SUBCUTANEOUS at 08:05

## 2025-04-15 RX ADMIN — DEXMEDETOMIDINE 0.6 MCG/KG/HR: 100 INJECTION, SOLUTION, CONCENTRATE INTRAVENOUS at 09:08

## 2025-04-15 RX ADMIN — ROCURONIUM BROMIDE 5 MG: 10 INJECTION, SOLUTION INTRAVENOUS at 07:44

## 2025-04-15 RX ADMIN — ALBUMIN (HUMAN) 250 ML: 12.5 INJECTION, SOLUTION INTRAVENOUS at 10:21

## 2025-04-15 RX ADMIN — FAMOTIDINE 20 MG: 10 INJECTION, SOLUTION INTRAVENOUS at 12:09

## 2025-04-15 RX ADMIN — ALBUMIN (HUMAN) 12.5 G: 12.5 INJECTION, SOLUTION INTRAVENOUS at 12:12

## 2025-04-15 RX ADMIN — ATORVASTATIN CALCIUM 40 MG: 40 TABLET, FILM COATED ORAL at 22:16

## 2025-04-15 RX ADMIN — FENTANYL CITRATE 100 MCG: 0.05 INJECTION, SOLUTION INTRAMUSCULAR; INTRAVENOUS at 10:31

## 2025-04-15 RX ADMIN — FENTANYL CITRATE 200 MCG: 0.05 INJECTION, SOLUTION INTRAMUSCULAR; INTRAVENOUS at 07:44

## 2025-04-15 RX ADMIN — ROCURONIUM BROMIDE 20 MG: 10 INJECTION, SOLUTION INTRAVENOUS at 09:36

## 2025-04-15 RX ADMIN — AMINOCAPROIC ACID 10 G/HR: 250 INJECTION, SOLUTION INTRAVENOUS at 08:00

## 2025-04-15 RX ADMIN — SODIUM CHLORIDE, PRESERVATIVE FREE 10 ML: 5 INJECTION INTRAVENOUS at 12:08

## 2025-04-15 RX ADMIN — IPRATROPIUM BROMIDE 0.5 MG: 0.5 SOLUTION RESPIRATORY (INHALATION) at 20:24

## 2025-04-15 RX ADMIN — ROCURONIUM BROMIDE 45 MG: 10 INJECTION, SOLUTION INTRAVENOUS at 07:58

## 2025-04-15 RX ADMIN — ARFORMOTEROL TARTRATE 15 MCG: 15 SOLUTION RESPIRATORY (INHALATION) at 20:24

## 2025-04-15 RX ADMIN — Medication 80 MCG: at 07:44

## 2025-04-15 RX ADMIN — SODIUM CHLORIDE, PRESERVATIVE FREE 10 ML: 5 INJECTION INTRAVENOUS at 22:21

## 2025-04-15 RX ADMIN — SODIUM CHLORIDE, SODIUM LACTATE, POTASSIUM CHLORIDE, AND CALCIUM CHLORIDE 500 ML: .6; .31; .03; .02 INJECTION, SOLUTION INTRAVENOUS at 15:11

## 2025-04-15 RX ADMIN — MUPIROCIN: 20 OINTMENT TOPICAL at 22:21

## 2025-04-15 RX ADMIN — IPRATROPIUM BROMIDE 0.5 MG: 0.5 SOLUTION RESPIRATORY (INHALATION) at 11:57

## 2025-04-15 RX ADMIN — GABAPENTIN 200 MG: 100 CAPSULE ORAL at 22:16

## 2025-04-15 RX ADMIN — FENTANYL CITRATE 50 MCG: 0.05 INJECTION, SOLUTION INTRAMUSCULAR; INTRAVENOUS at 08:42

## 2025-04-15 ASSESSMENT — PAIN SCALES - GENERAL
PAINLEVEL_OUTOF10: 0
PAINLEVEL_OUTOF10: 0
PAINLEVEL_OUTOF10: 7

## 2025-04-15 ASSESSMENT — PULMONARY FUNCTION TESTS: PIF_VALUE: 22

## 2025-04-15 ASSESSMENT — PAIN - FUNCTIONAL ASSESSMENT: PAIN_FUNCTIONAL_ASSESSMENT: 0-10

## 2025-04-15 NOTE — PROGRESS NOTES
1055: Verbal and bedside report received from Isak RICHARDSON and CRNA on patient being received from [facility] for routine post op.    Report consisted of patient's Situation, Background, Assessment and Recommendations (SBAR).    Information from the following report(s) SBAR, Kardex, and MAR was reviewed with the receiving nurse.    Opportunity for questions and clarifications was provided.    Assessment completed upon patient's arrival to unit and care assumed.    Verbal orders received from Isak RICHARDSON to keep SBP <130, MAP >65 and wean to extubate as tolerated.     1106: ABG drawn: 7.3/48.0/374.1/23.6/-2.8/99.9. RT notified, vent adjusted to RR 16 and 50% FiO2.     1116: Xray at bedside. Isak RICHARDSON reviewed.     1245: Anthony GUAJARDO at bedside, updated on status of patient.     1256: Precedex gtt stopped.     1435: Patient awake, following commands, drowsy. RR on vent to 8.     1444: Dr. Cruz at bedside, patient placed on PSV 13/5. Tolerating well.     1509: Isak RICHARDSON at bedside, orders received for 500ml LR bolus.     1530: ABG drawn on 5/5: 7.26/51.9/69.5/23.3/-3.6/90.6. Dr. Cruz reviewed. Orders received to extubate to BiPAP. RT aware.    1545: RT at bedside, patient extubated to BiPAP 12/5, 40% FiO2.     1715: Patient placed on 4L NC, F2F completed per ERAS protocol. Tolerated well.     2000: Bedside and Verbal shift change report given to Kelin COOMBS (oncoming nurse) by Yareli COOMBS (offgoing nurse). Report included the following information Nurse Handoff Report, Index, Adult Overview, Surgery Report, Intake/Output, MAR, Recent Results, Cardiac Rhythm Atrial paced, and Alarm Parameters.

## 2025-04-15 NOTE — ANESTHESIA PROCEDURE NOTES
Arterial Line:    An arterial line was placed using surface landmarks, in the pre-op for the following indication(s): continuous blood pressure monitoring and blood sampling needed.    A 20 gauge (size) (length), Arrow (type) catheter was placed, Seldinger technique used, into the right radial artery, secured by Tegaderm.  Anesthesia type: Local  Local infiltration: Injection    Events:  patient tolerated procedure well with no complications.4/15/2025 7:11 AM4/15/2025 7:21 AM  Anesthesiologist: Tyrel Martinez MD  Performed: Anesthesiologist   Preanesthetic Checklist  Completed: patient identified, IV checked, site marked, risks and benefits discussed, surgical/procedural consents, equipment checked, pre-op evaluation, timeout performed, anesthesia consent given, oxygen available, monitors applied/VS acknowledged and fire risk safety assessment completed and verbalized

## 2025-04-15 NOTE — ANESTHESIA PROCEDURE NOTES
Procedure Performed: DENNIS       Start Time:        End Time:      Anesthesia Information  Performed Personally  Anesthesiologist:  Tyrel Martinez MD        Preanesthesia Checklist:  Patient identified, IV assessed, risks and benefits discussed, monitors and equipment assessed, procedure being performed at surgeon's request and anesthesia consent obtained.    General Procedure Information  Diagnostic Indications for Echo:  assessment of ascending aorta, assessment of surgical repair, hemodynamic monitoring and assessment of valve function  Location performed:  OR  Intubated  Bite block placed  Heart visualized  Probe Insertion:  Easy  Probe Type:  3D, mulitplane, epiaortic and biplane  Modalities:  2D, 3D, color flow mapping, continuous wave Doppler, pulse wave Doppler and M-mode    Echocardiographic and Doppler Measurements    Ventricles    Ventricle  Cavity Size  Cavity          Dimension  Hypertrophy  Thrombus  Global FXN  EF    RV  normal    No  No  normal      LV  normal    No  No  normal       Ventricular Findings Comments:  LVEF >55%    Ventricular Regional Function:  1- Basal Anteroseptal:  normal  2- Basal Anterior:  normal  3- Basal Anterolateral:  normal  4- Basal Inferolateral:  normal  5- Basal Inferior:  normal  6- Basal Inferoseptal:  normal  7- Mid Anteroseptal:  normal  8- Mid Anterior:  normal  9- Mid Anterolateral:  normal  10- Mid Inferolateral:  normal  11- Mid Inferior:  normal  12- Mid Inferoseptal:  normal  13- Apical Anterior:  normal  14- Apical Lateral:  normal  15- Apical Inferior:  normal  16- Apical Septal:  normal  17- Reynolds:  normal    Ventricular Wall Motion Comments:  No WMA    Valves     Valves  Annulus  Stenosis Measurements   Regurg  Leaflet   Morph  Leaflet   Motion Valve Comments    Aortic calcified Stenosis none.            none   calcified normal       Mitral calcified none             none calcified normal    Tricuspid normal   not present         none   normal   normal

## 2025-04-15 NOTE — H&P
Update History & Physical    The patient's History and Physical of April 9, 2025 was reviewed with the patient and I examined the patient. There was no change. The surgical site was confirmed by the patient and me.     Electronically signed by Isak Quinones PA-C on 4/15/2025 at 7:38 AM

## 2025-04-15 NOTE — CONSULTS
25  Yes Ena Henderson APRN - NP   amiodarone (CORDARONE) 200 MG tablet Take 2 tablets by mouth 2 times daily for 5 days 25  Deb Timmons APRN - NP   rivaroxaban (XARELTO) 15 MG TABS tablet Take 1 tablet by mouth daily (with breakfast)  Patient not taking: Reported on 4/15/2025 4/4/25   Mathieu Painting MD   metFORMIN (GLUCOPHAGE) 1000 MG tablet Take 1 tablet by mouth 2 times daily (with meals) 3/24/25   Joellen Griffin APRN - NP   clopidogrel (PLAVIX) 75 MG tablet Take 1 tablet by mouth daily 3/24/25   Ena Henderson APRN - NP   alendronate (FOSAMAX) 70 MG tablet Take 1 tablet by mouth once a week 1/15/25   Ena Henderson APRN - NP       Allergies/Social/Family History:     Allergies   Allergen Reactions    Eliquis [Apixaban] Itching    Penicillins Rash     Patient screened for any delayed non-IgE-mediated reaction to PCN.        Patient notes the following:    No delayed non-IgE-mediated reaction to PCN               Social History     Tobacco Use    Smoking status: Every Day     Current packs/day: 2.00     Average packs/day: 2.0 packs/day for 60.3 years (120.6 ttl pk-yrs)     Types: Cigarettes     Start date:      Passive exposure: Current    Smokeless tobacco: Never   Substance Use Topics    Alcohol use: Not Currently      Family History   Problem Relation Age of Onset    Diabetes Mother     Cancer Father         THROAT    Anesth Problems Neg Hx          OBJECTIVE:     Labs and Data: Reviewed 04/15/25  Medications: Reviewed 04/15/25  Imaging: Reviewed 04/15/25    BP (!) 166/72   Pulse 86   Temp 96.8 °F (36 °C)   Resp 14   Ht 1.676 m (5' 6\")   Wt 63 kg (139 lb)   SpO2 100%   BMI 22.44 kg/m²      Temp (24hrs), Av.4 °F (36.3 °C), Min:96.8 °F (36 °C), Max:97.9 °F (36.6 °C)           Physical Exam  Vitals and nursing note reviewed.   Constitutional:       General: He is not in acute distress.     Comments: Intubated, sedated   HENT:      Head: Normocephalic.

## 2025-04-15 NOTE — PROGRESS NOTES
Occupational Therapy  04/15/25    OT orders received and chart reviewed up to date. Pt POD 0 CABG. Will follow-up tomorrow for OT evaluation/ as appropriate.

## 2025-04-15 NOTE — ANESTHESIA PRE PROCEDURE
Department of Anesthesiology  Preprocedure Note       Name:  Scooter Dickerson   Age:  79 y.o.  :  1945                                          MRN:  [id number]         Date:  4/15/2025      Surgeon: Surgeon(s):  Andrés Brand MD    Procedure: Procedure(s):  ON PUMP CORONARY ARTERY BYPASS GRAFT WITH ENDOSCOPIC VEIN HARVEST WITH LIGATION OF LEFT ATRIAL APPENDAGE WITH CLIP (NO PAC) (E R A S)    Medications prior to admission:   Prior to Admission medications    Medication Sig Start Date End Date Taking? Authorizing Provider   aspirin 81 MG EC tablet Take 1 tablet by mouth daily 4/10/25  Yes Deb Timmons APRN - NP   chlorhexidine (PERIDEX) 0.12 % solution Swish and spit 15 mLs 2 times daily for 2 days 4/13/25 4/15/25 Yes Deb Timmons APRN - NP   mupirocin (BACTROBAN) 2 % ointment by Each Nostril route 2 times daily for 2 days 4/13/25 4/15/25 Yes Deb Timmons APRN - NP   albuterol sulfate HFA (PROVENTIL HFA) 108 (90 Base) MCG/ACT inhaler Inhale 2 puffs into the lungs every 6 hours as needed for Wheezing 3/31/25  Yes Ena Henderson APRN - NP   tiotropium-olodaterol (STIOLTO) 2.5-2.5 MCG/ACT AERS Inhale 2 puffs into the lungs daily 3/31/25  Yes Ena Henderson APRN - NP   metoprolol succinate (TOPROL XL) 25 MG extended release tablet Take 1 tablet by mouth daily 25  Yes Mathieu Painting MD   atorvastatin (LIPITOR) 40 MG tablet Take 1 tablet by mouth daily 25  Yes Ena Henderson APRN - NP   vitamin D3 (CHOLECALCIFEROL) 25 MCG (1000 UT) TABS tablet Take 2 tablets by mouth daily 25  Yes Ena Henderson APRN - NP   gabapentin (NEURONTIN) 300 MG capsule Take 2 capsules by mouth in the morning and at bedtime for 180 days. Max Daily Amount: 1,200 mg 25 Yes [place], Ena H, APRN - NP   finasteride (PROSCAR) 5 MG tablet Take 1 tablet by mouth daily 25  Yes Ena Henderson APRN - NP   amiodarone (CORDARONE) 200 MG tablet Take 2 tablets by mouth 2 times

## 2025-04-15 NOTE — PROGRESS NOTES
4 Eyes Skin Assessment     NAME:  Scooter Dickerson  YOB: 1945  MEDICAL RECORD NUMBER:  [id number]    The patient is being assessed for  Post-Op Surgical    I agree that at least one RN has performed a thorough Head to Toe Skin Assessment on the patient. ALL assessment sites listed below have been assessed.      Areas assessed by both nurses:    Head, Face, Ears, Shoulders, Back, Chest, Arms, Elbows, Hands, Sacrum. Buttock, Coccyx, Ischium, Legs. Feet and Heels, and Under Medical Devices         Does the Patient have a Wound? Yes wound(s) were present on assessment. LDA wound assessment was Initiated and completed by RN  Small wound on L 4th toe and R big toe.        Joiv Prevention initiated by RN: Yes  Wound Care Orders initiated by RN: No    Pressure Injury (Stage 3,4, Unstageable, DTI, NWPT, and Complex wounds) if present, place Wound referral order by RN under : No    New Ostomies, if present place, Ostomy referral order under : No     Nurse 1 eSignature: Electronically signed by Yareli Hunter RN on 4/15/25 at 6:35 PM EDT    **SHARE this note so that the co-signing nurse can place an eSignature**    Nurse 2 eSignature: Electronically signed by Martha Mora RN on 4/15/25 at 6:36 PM EDT

## 2025-04-15 NOTE — ANESTHESIA PROCEDURE NOTES
Central Venous Line:    A central venous line was placed using ultrasound guidance and surface landmarks, in the pre-op for the following indication(s): central venous access and CVP monitoring.4/15/2025 7:21 AM4/15/2025 7:30 AM    Sterility preparation included the following: provider used sterile gloves, gown, hat and mask and maximum sterile barriers used during central venous catheter insertion.    The patient was placed in Trendelenburg position.The right internal jugular vein was prepped.    The site was prepped with Chloraprep.  A 9 Fr (size), 12 (length), introducer double lumen was placed.    During the procedure, the following specific steps were taken: target vein identified, needle advanced into vein and blood aspirated and guidewire advanced into vein.    Intravenous verification was obtained by ultrasound, venous blood return and manometry.    Post insertion care included: all ports aspirated, all ports flushed easily, guidewire removed intact, Biopatch applied, line sutured in place and dressing applied.    During the procedure the patient experienced: patient tolerated procedure well with no complications.      Outcomes: uncomplicated  Anesthesia type: local..No  Staffing  Performed: Anesthesiologist   Anesthesiologist: Tyrel Martinez MD  Performed by: Tyrel Martinez MD  Authorized by: Tyrel Martinez MD    Preanesthetic Checklist  Completed: patient identified, IV checked, site marked, risks and benefits discussed, surgical/procedural consents, equipment checked, pre-op evaluation, timeout performed, anesthesia consent given, oxygen available, monitors applied/VS acknowledged and fire risk safety assessment completed and verbalized

## 2025-04-15 NOTE — BRIEF OP NOTE
BRIEF OP NOTE  Pre-Op Diagnosis: Disease of cardiovascular system [I25.10]    Post-Op Diagnosis: Disease of cardiovascular system [I25.10]      Procedure: Procedure(s):  CORONARY ARTERY BYPASS GRAFTING X 2 (LIMA-LAD, SVG-OM)WITH LIMA LESVGH, LIGATION OF LEFT ATRIAL APPENDAGE, DENNIS AND EPIAORTIC U/S BY DR HAGEN    Surgeon:  Dr. Sunday MD    Assistant(s): Isak Quinones PA-C    Anesthesia: General      Infusions:  Precedex, Insulin     Estimated Blood Loss: 200 ml     Cell Saver: 230 ml     Bypass Time: 31 min     Cross Clamp Time: 24 min    Specimens: * No specimens in log *    Drains and pacing wires: 2 Atrial Wires  1 Bipolar Ventricular Wire 3 Dinh Drains (2 Mediastinal, 1 L Pleural)     Wires completed by: Dr. Sunday MD    Sternal incision closed by: Isak Quinones PA-C    Leg incision closed by: Isak Quinones PA-C    Complications: None    Findings: See full operative note.    Implants:   Implant Name Type Inv. Item Serial No.  Lot No. LRB No. Used Action   CLIP MED SUTURE LESS 50 MM SYS 1 HND STRL ATRICLIP FLX V - SNA  CLIP MED SUTURE LESS 50 MM SYS 1 HND STRL ATRICLIP FLX V NA [employer]-WD [id number] N/A 1 Implanted

## 2025-04-15 NOTE — PERIOP NOTE
2G ANCEF AT 0805  BETA BLOCKER GIVEN 4/14 @ UNKNOWN TIME. NOT REDOSED PREOP D/T BRADYCARDIA    FAMILY UPDATED ON PROGRESS OF CASE AT 0945    TRANSFER - OUT REPORT:    Verbal report given to STEVEN on patient SARTHAK ARANA  being transferred to CVICU for routine progression of care s/p cardiac surgery    Report consisted of patient’s Situation, Background, Assessment and   Recommendations(SBAR).     Information from the following report(s) OR Summary, was reviewed with the receiving nurse.    Opportunity for questions and clarification was provided.     Pt transported to CVICU by CRNA, RN, perfusion, and PA. A-line, EKG and O2 monitored. Pt bagged at 100% FIO2. Elias and chest tubes patent and draining.

## 2025-04-15 NOTE — PROGRESS NOTES
Physical Therapy 4/15/2025    Orders received and chart reviewed up to date. Pt POD 0 CABG. Will follow-up tomorrow for PT evaluation/ as appropriate.     Thank you.  Gloria Orellana, PT, DPT

## 2025-04-15 NOTE — PROGRESS NOTES
Renal Dosing/Monitoring  Medication: Famotidine   Current regimen:  20 mg PO/IV every 12 hours  Recent Labs     04/15/25  0909 04/15/25  0943 04/15/25  1038   CREATININE 1.5* 1.8* 1.3     Estimated CrCl:  42 mL/min  Plan: Change to 20 mg PO/IV every 24 hours with respect to renal function (CrCl <50 mL/min) per P&T-approved renal dose adjustment protocol. Pharmacy will continue to monitor this patient daily for changes in clinical status and renal function.

## 2025-04-16 ENCOUNTER — APPOINTMENT (OUTPATIENT)
Facility: HOSPITAL | Age: 80
End: 2025-04-16
Attending: THORACIC SURGERY (CARDIOTHORACIC VASCULAR SURGERY)
Payer: MEDICARE

## 2025-04-16 PROBLEM — R73.9 TRANSIENT HYPERGLYCEMIA POST PROCEDURE: Status: ACTIVE | Noted: 2025-04-16

## 2025-04-16 LAB
ACUTE KIDNEY INJURY RISK NEPHROCHECK: 0.68 (ref 0–0.3)
ALBUMIN SERPL-MCNC: 3.7 G/DL (ref 3.5–5)
ALBUMIN/GLOB SERPL: 1.5 (ref 1.1–2.2)
ALP SERPL-CCNC: 57 U/L (ref 45–117)
ALT SERPL-CCNC: 12 U/L (ref 12–78)
ANION GAP SERPL CALC-SCNC: 5 MMOL/L (ref 2–12)
ANION GAP SERPL CALC-SCNC: 6 MMOL/L (ref 2–12)
ANION GAP SERPL CALC-SCNC: 7 MMOL/L (ref 2–12)
ANION GAP SERPL CALC-SCNC: 8 MMOL/L (ref 2–12)
AST SERPL-CCNC: 27 U/L (ref 15–37)
BASE DEFICIT BLD-SCNC: 5.9 MMOL/L
BASE DEFICIT BLD-SCNC: 6 MMOL/L
BASE DEFICIT BLD-SCNC: 6.5 MMOL/L
BASE DEFICIT BLD-SCNC: 6.8 MMOL/L
BASE DEFICIT BLD-SCNC: 7.3 MMOL/L
BASE DEFICIT BLD-SCNC: 8.2 MMOL/L
BASE DEFICIT BLD-SCNC: 8.9 MMOL/L
BILIRUB SERPL-MCNC: 0.4 MG/DL (ref 0.2–1)
BUN SERPL-MCNC: 38 MG/DL (ref 6–20)
BUN SERPL-MCNC: 42 MG/DL (ref 6–20)
BUN SERPL-MCNC: 43 MG/DL (ref 6–20)
BUN SERPL-MCNC: 48 MG/DL (ref 6–20)
BUN/CREAT SERPL: 18 (ref 12–20)
BUN/CREAT SERPL: 19 (ref 12–20)
BUN/CREAT SERPL: 19 (ref 12–20)
BUN/CREAT SERPL: 20 (ref 12–20)
CA-I BLD-SCNC: 1.17 MMOL/L (ref 1.12–1.32)
CA-I BLD-SCNC: 1.2 MMOL/L (ref 1.12–1.32)
CA-I BLD-SCNC: 1.2 MMOL/L (ref 1.12–1.32)
CA-I BLD-SCNC: 1.21 MMOL/L (ref 1.12–1.32)
CA-I BLD-SCNC: 1.25 MMOL/L (ref 1.12–1.32)
CA-I BLD-SCNC: 1.26 MMOL/L (ref 1.12–1.32)
CALCIUM SERPL-MCNC: 8.3 MG/DL (ref 8.5–10.1)
CALCIUM SERPL-MCNC: 8.3 MG/DL (ref 8.5–10.1)
CALCIUM SERPL-MCNC: 8.4 MG/DL (ref 8.5–10.1)
CALCIUM SERPL-MCNC: 8.6 MG/DL (ref 8.5–10.1)
CHLORIDE SERPL-SCNC: 107 MMOL/L (ref 97–108)
CHLORIDE SERPL-SCNC: 109 MMOL/L (ref 97–108)
CHLORIDE SERPL-SCNC: 110 MMOL/L (ref 97–108)
CHLORIDE SERPL-SCNC: 111 MMOL/L (ref 97–108)
CO2 SERPL-SCNC: 20 MMOL/L (ref 21–32)
CO2 SERPL-SCNC: 20 MMOL/L (ref 21–32)
CO2 SERPL-SCNC: 21 MMOL/L (ref 21–32)
CO2 SERPL-SCNC: 21 MMOL/L (ref 21–32)
CREAT SERPL-MCNC: 1.96 MG/DL (ref 0.7–1.3)
CREAT SERPL-MCNC: 2.31 MG/DL (ref 0.7–1.3)
CREAT SERPL-MCNC: 2.32 MG/DL (ref 0.7–1.3)
CREAT SERPL-MCNC: 2.45 MG/DL (ref 0.7–1.3)
EKG ATRIAL RATE: 62 BPM
EKG DIAGNOSIS: NORMAL
EKG P AXIS: 25 DEGREES
EKG P-R INTERVAL: 110 MS
EKG Q-T INTERVAL: 452 MS
EKG QRS DURATION: 148 MS
EKG QTC CALCULATION (BAZETT): 458 MS
EKG R AXIS: 58 DEGREES
EKG T AXIS: 36 DEGREES
EKG VENTRICULAR RATE: 62 BPM
ERYTHROCYTE [DISTWIDTH] IN BLOOD BY AUTOMATED COUNT: 19.5 % (ref 11.5–14.5)
ERYTHROCYTE [DISTWIDTH] IN BLOOD BY AUTOMATED COUNT: 19.9 % (ref 11.5–14.5)
GLOBULIN SER CALC-MCNC: 2.4 G/DL (ref 2–4)
GLUCOSE BLD STRIP.AUTO-MCNC: 101 MG/DL (ref 65–117)
GLUCOSE BLD STRIP.AUTO-MCNC: 104 MG/DL (ref 65–117)
GLUCOSE BLD STRIP.AUTO-MCNC: 105 MG/DL (ref 65–117)
GLUCOSE BLD STRIP.AUTO-MCNC: 107 MG/DL (ref 65–117)
GLUCOSE BLD STRIP.AUTO-MCNC: 109 MG/DL (ref 65–117)
GLUCOSE BLD STRIP.AUTO-MCNC: 110 MG/DL (ref 65–117)
GLUCOSE BLD STRIP.AUTO-MCNC: 110 MG/DL (ref 65–117)
GLUCOSE BLD STRIP.AUTO-MCNC: 114 MG/DL (ref 65–117)
GLUCOSE BLD STRIP.AUTO-MCNC: 115 MG/DL (ref 65–117)
GLUCOSE BLD STRIP.AUTO-MCNC: 115 MG/DL (ref 65–117)
GLUCOSE BLD STRIP.AUTO-MCNC: 117 MG/DL (ref 65–117)
GLUCOSE BLD STRIP.AUTO-MCNC: 120 MG/DL (ref 65–117)
GLUCOSE BLD STRIP.AUTO-MCNC: 124 MG/DL (ref 65–117)
GLUCOSE BLD STRIP.AUTO-MCNC: 125 MG/DL (ref 65–117)
GLUCOSE BLD STRIP.AUTO-MCNC: 130 MG/DL (ref 65–117)
GLUCOSE BLD STRIP.AUTO-MCNC: 133 MG/DL (ref 65–117)
GLUCOSE BLD STRIP.AUTO-MCNC: 143 MG/DL (ref 65–117)
GLUCOSE BLD STRIP.AUTO-MCNC: 146 MG/DL (ref 65–117)
GLUCOSE BLD STRIP.AUTO-MCNC: 95 MG/DL (ref 65–117)
GLUCOSE BLD STRIP.AUTO-MCNC: 97 MG/DL (ref 65–117)
GLUCOSE BLD STRIP.AUTO-MCNC: 99 MG/DL (ref 65–117)
GLUCOSE SERPL-MCNC: 100 MG/DL (ref 65–100)
GLUCOSE SERPL-MCNC: 101 MG/DL (ref 65–100)
GLUCOSE SERPL-MCNC: 117 MG/DL (ref 65–100)
GLUCOSE SERPL-MCNC: 89 MG/DL (ref 65–100)
HCO3 BLD-SCNC: 18.7 MMOL/L (ref 21–28)
HCO3 BLD-SCNC: 19.5 MMOL/L (ref 21–28)
HCO3 BLD-SCNC: 20.2 MMOL/L (ref 21–28)
HCO3 BLD-SCNC: 21 MMOL/L (ref 21–28)
HCO3 BLD-SCNC: 21.1 MMOL/L (ref 21–28)
HCO3 BLD-SCNC: 22.6 MMOL/L (ref 21–28)
HCO3 BLD-SCNC: 22.7 MMOL/L (ref 21–28)
HCT VFR BLD AUTO: 24.3 % (ref 36.6–50.3)
HCT VFR BLD AUTO: 24.4 % (ref 36.6–50.3)
HGB BLD-MCNC: 7.4 G/DL (ref 12.1–17)
HGB BLD-MCNC: 7.7 G/DL (ref 12.1–17)
HGB BLD-MCNC: 7.8 G/DL (ref 12.1–17)
HGB BLD-MCNC: 7.8 G/DL (ref 12.1–17)
HGB BLD-MCNC: 8.1 G/DL (ref 12.1–17)
HGB BLD-MCNC: 8.7 G/DL (ref 12.1–17)
HGB BLD-MCNC: 8.7 G/DL (ref 12.1–17)
IRON SATN MFR SERPL: 6 % (ref 20–50)
IRON SERPL-MCNC: 11 UG/DL (ref 35–150)
LACTATE SERPL-SCNC: 1.8 MMOL/L (ref 0.4–2)
MAGNESIUM SERPL-MCNC: 2.3 MG/DL (ref 1.6–2.4)
MCH RBC QN AUTO: 29.9 PG (ref 26–34)
MCH RBC QN AUTO: 30 PG (ref 26–34)
MCHC RBC AUTO-ENTMCNC: 31.7 G/DL (ref 30–36.5)
MCHC RBC AUTO-ENTMCNC: 32 G/DL (ref 30–36.5)
MCV RBC AUTO: 93.5 FL (ref 80–99)
MCV RBC AUTO: 94.6 FL (ref 80–99)
NRBC # BLD: 0 K/UL (ref 0–0.01)
NRBC # BLD: 0 K/UL (ref 0–0.01)
NRBC BLD-RTO: 0 PER 100 WBC
NRBC BLD-RTO: 0 PER 100 WBC
PCO2 BLD: 47.5 MMHG (ref 35–48)
PCO2 BLD: 50.2 MMHG (ref 35–48)
PCO2 BLD: 50.4 MMHG (ref 35–48)
PCO2 BLD: 52.1 MMHG (ref 35–48)
PCO2 BLD: 54.7 MMHG (ref 35–48)
PCO2 BLD: 62.1 MMHG (ref 35–48)
PCO2 BLD: 64.9 MMHG (ref 35–48)
PH BLD: 7.15 (ref 7.35–7.45)
PH BLD: 7.17 (ref 7.35–7.45)
PH BLD: 7.19 (ref 7.35–7.45)
PH BLD: 7.2 (ref 7.35–7.45)
PH BLD: 7.2 (ref 7.35–7.45)
PH BLD: 7.21 (ref 7.35–7.45)
PH BLD: 7.21 (ref 7.35–7.45)
PLATELET # BLD AUTO: 190 K/UL (ref 150–400)
PLATELET # BLD AUTO: 202 K/UL (ref 150–400)
PMV BLD AUTO: 10.2 FL (ref 8.9–12.9)
PMV BLD AUTO: 10.3 FL (ref 8.9–12.9)
PO2 BLD: 100 MMHG (ref 83–108)
PO2 BLD: 71 MMHG (ref 83–108)
PO2 BLD: 83 MMHG (ref 83–108)
PO2 BLD: 86 MMHG (ref 83–108)
PO2 BLD: 87 MMHG (ref 83–108)
PO2 BLD: 96 MMHG (ref 83–108)
PO2 BLD: 98 MMHG (ref 83–108)
POTASSIUM SERPL-SCNC: 5.2 MMOL/L (ref 3.5–5.1)
POTASSIUM SERPL-SCNC: 5.4 MMOL/L (ref 3.5–5.1)
POTASSIUM SERPL-SCNC: 5.4 MMOL/L (ref 3.5–5.1)
POTASSIUM SERPL-SCNC: 5.7 MMOL/L (ref 3.5–5.1)
PROT SERPL-MCNC: 6.1 G/DL (ref 6.4–8.2)
RBC # BLD AUTO: 2.57 M/UL (ref 4.1–5.7)
RBC # BLD AUTO: 2.61 M/UL (ref 4.1–5.7)
SAO2 % BLD: 87.9 % (ref 92–97)
SAO2 % BLD: 93.5 % (ref 92–97)
SAO2 % BLD: 93.8 % (ref 92–97)
SAO2 % BLD: 93.9 % (ref 92–97)
SAO2 % BLD: 95.2 % (ref 92–97)
SAO2 % BLD: 95.4 % (ref 92–97)
SAO2 % BLD: 96.1 % (ref 92–97)
SERVICE CMNT-IMP: ABNORMAL
SERVICE CMNT-IMP: NORMAL
SODIUM SERPL-SCNC: 135 MMOL/L (ref 136–145)
SODIUM SERPL-SCNC: 136 MMOL/L (ref 136–145)
SODIUM SERPL-SCNC: 137 MMOL/L (ref 136–145)
SODIUM SERPL-SCNC: 137 MMOL/L (ref 136–145)
SPECIMEN TYPE: ABNORMAL
TIBC SERPL-MCNC: 194 UG/DL (ref 250–450)
WBC # BLD AUTO: 11.8 K/UL (ref 4.1–11.1)
WBC # BLD AUTO: 14.5 K/UL (ref 4.1–11.1)

## 2025-04-16 PROCEDURE — 6370000000 HC RX 637 (ALT 250 FOR IP): Performed by: NURSE PRACTITIONER

## 2025-04-16 PROCEDURE — 80048 BASIC METABOLIC PNL TOTAL CA: CPT

## 2025-04-16 PROCEDURE — 2500000003 HC RX 250 WO HCPCS: Performed by: THORACIC SURGERY (CARDIOTHORACIC VASCULAR SURGERY)

## 2025-04-16 PROCEDURE — 83550 IRON BINDING TEST: CPT

## 2025-04-16 PROCEDURE — 6360000002 HC RX W HCPCS: Performed by: PHYSICIAN ASSISTANT

## 2025-04-16 PROCEDURE — 94640 AIRWAY INHALATION TREATMENT: CPT

## 2025-04-16 PROCEDURE — 6360000002 HC RX W HCPCS: Performed by: THORACIC SURGERY (CARDIOTHORACIC VASCULAR SURGERY)

## 2025-04-16 PROCEDURE — 82803 BLOOD GASES ANY COMBINATION: CPT

## 2025-04-16 PROCEDURE — 6370000000 HC RX 637 (ALT 250 FOR IP): Performed by: THORACIC SURGERY (CARDIOTHORACIC VASCULAR SURGERY)

## 2025-04-16 PROCEDURE — 2580000003 HC RX 258

## 2025-04-16 PROCEDURE — 6370000000 HC RX 637 (ALT 250 FOR IP): Performed by: PHYSICIAN ASSISTANT

## 2025-04-16 PROCEDURE — 6360000002 HC RX W HCPCS: Performed by: STUDENT IN AN ORGANIZED HEALTH CARE EDUCATION/TRAINING PROGRAM

## 2025-04-16 PROCEDURE — 76770 US EXAM ABDO BACK WALL COMP: CPT

## 2025-04-16 PROCEDURE — 6370000000 HC RX 637 (ALT 250 FOR IP)

## 2025-04-16 PROCEDURE — 2700000000 HC OXYGEN THERAPY PER DAY

## 2025-04-16 PROCEDURE — 83540 ASSAY OF IRON: CPT

## 2025-04-16 PROCEDURE — 82962 GLUCOSE BLOOD TEST: CPT

## 2025-04-16 PROCEDURE — 2500000003 HC RX 250 WO HCPCS

## 2025-04-16 PROCEDURE — 2000000000 HC ICU R&B

## 2025-04-16 PROCEDURE — 6360000002 HC RX W HCPCS

## 2025-04-16 PROCEDURE — 2580000003 HC RX 258: Performed by: STUDENT IN AN ORGANIZED HEALTH CARE EDUCATION/TRAINING PROGRAM

## 2025-04-16 PROCEDURE — 83605 ASSAY OF LACTIC ACID: CPT

## 2025-04-16 PROCEDURE — 83735 ASSAY OF MAGNESIUM: CPT

## 2025-04-16 PROCEDURE — P9045 ALBUMIN (HUMAN), 5%, 250 ML: HCPCS | Performed by: PHYSICIAN ASSISTANT

## 2025-04-16 PROCEDURE — 71045 X-RAY EXAM CHEST 1 VIEW: CPT

## 2025-04-16 PROCEDURE — 94760 N-INVAS EAR/PLS OXIMETRY 1: CPT

## 2025-04-16 PROCEDURE — 94660 CPAP INITIATION&MGMT: CPT

## 2025-04-16 PROCEDURE — 85027 COMPLETE CBC AUTOMATED: CPT

## 2025-04-16 PROCEDURE — 6360000002 HC RX W HCPCS: Performed by: INTERNAL MEDICINE

## 2025-04-16 PROCEDURE — 93010 ELECTROCARDIOGRAM REPORT: CPT | Performed by: INTERNAL MEDICINE

## 2025-04-16 PROCEDURE — 99221 1ST HOSP IP/OBS SF/LOW 40: CPT

## 2025-04-16 PROCEDURE — 80053 COMPREHEN METABOLIC PANEL: CPT

## 2025-04-16 PROCEDURE — 93005 ELECTROCARDIOGRAM TRACING: CPT

## 2025-04-16 PROCEDURE — 2500000003 HC RX 250 WO HCPCS: Performed by: ANESTHESIOLOGY

## 2025-04-16 RX ORDER — FUROSEMIDE 10 MG/ML
40 INJECTION INTRAMUSCULAR; INTRAVENOUS ONCE
Status: COMPLETED | OUTPATIENT
Start: 2025-04-16 | End: 2025-04-16

## 2025-04-16 RX ORDER — INDOMETHACIN 25 MG/1
50 CAPSULE ORAL ONCE
Status: COMPLETED | OUTPATIENT
Start: 2025-04-16 | End: 2025-04-16

## 2025-04-16 RX ORDER — SODIUM CHLORIDE, SODIUM LACTATE, POTASSIUM CHLORIDE, AND CALCIUM CHLORIDE .6; .31; .03; .02 G/100ML; G/100ML; G/100ML; G/100ML
250 INJECTION, SOLUTION INTRAVENOUS ONCE
Status: COMPLETED | OUTPATIENT
Start: 2025-04-16 | End: 2025-04-16

## 2025-04-16 RX ORDER — FUROSEMIDE 10 MG/ML
100 INJECTION INTRAMUSCULAR; INTRAVENOUS ONCE
Status: COMPLETED | OUTPATIENT
Start: 2025-04-16 | End: 2025-04-16

## 2025-04-16 RX ORDER — ALBUMIN HUMAN 50 G/1000ML
12.5 SOLUTION INTRAVENOUS ONCE
Status: COMPLETED | OUTPATIENT
Start: 2025-04-16 | End: 2025-04-16

## 2025-04-16 RX ORDER — FINASTERIDE 5 MG/1
5 TABLET, FILM COATED ORAL NIGHTLY
Status: DISCONTINUED | OUTPATIENT
Start: 2025-04-16 | End: 2025-05-01

## 2025-04-16 RX ORDER — NICOTINE 21 MG/24HR
1 PATCH, TRANSDERMAL 24 HOURS TRANSDERMAL DAILY PRN
Status: DISCONTINUED | OUTPATIENT
Start: 2025-04-16 | End: 2025-05-01

## 2025-04-16 RX ORDER — GABAPENTIN 100 MG/1
100 CAPSULE ORAL 3 TIMES DAILY
Status: DISCONTINUED | OUTPATIENT
Start: 2025-04-16 | End: 2025-04-23

## 2025-04-16 RX ADMIN — SODIUM CHLORIDE, SODIUM LACTATE, POTASSIUM CHLORIDE, AND CALCIUM CHLORIDE: .6; .31; .03; .02 INJECTION, SOLUTION INTRAVENOUS at 05:51

## 2025-04-16 RX ADMIN — FUROSEMIDE 40 MG: 10 INJECTION, SOLUTION INTRAMUSCULAR; INTRAVENOUS at 09:39

## 2025-04-16 RX ADMIN — CHLORHEXIDINE GLUCONATE 15 ML: 1.2 RINSE ORAL at 20:33

## 2025-04-16 RX ADMIN — OXYCODONE 10 MG: 5 TABLET ORAL at 05:01

## 2025-04-16 RX ADMIN — IPRATROPIUM BROMIDE AND ALBUTEROL SULFATE 1 DOSE: 2.5; .5 SOLUTION RESPIRATORY (INHALATION) at 00:51

## 2025-04-16 RX ADMIN — IPRATROPIUM BROMIDE 0.5 MG: 0.5 SOLUTION RESPIRATORY (INHALATION) at 11:24

## 2025-04-16 RX ADMIN — ACETAMINOPHEN 1000 MG: 500 TABLET ORAL at 07:14

## 2025-04-16 RX ADMIN — WATER 2000 MG: 1 INJECTION INTRAMUSCULAR; INTRAVENOUS; SUBCUTANEOUS at 08:13

## 2025-04-16 RX ADMIN — MUPIROCIN: 20 OINTMENT TOPICAL at 12:04

## 2025-04-16 RX ADMIN — ALBUMIN (HUMAN) 12.5 G: 12.5 INJECTION, SOLUTION INTRAVENOUS at 09:14

## 2025-04-16 RX ADMIN — SODIUM CHLORIDE, SODIUM LACTATE, POTASSIUM CHLORIDE, AND CALCIUM CHLORIDE 250 ML: .6; .31; .03; .02 INJECTION, SOLUTION INTRAVENOUS at 11:05

## 2025-04-16 RX ADMIN — IPRATROPIUM BROMIDE 0.5 MG: 0.5 SOLUTION RESPIRATORY (INHALATION) at 20:02

## 2025-04-16 RX ADMIN — FAMOTIDINE 20 MG: 20 TABLET, FILM COATED ORAL at 08:26

## 2025-04-16 RX ADMIN — SODIUM CHLORIDE, PRESERVATIVE FREE 10 ML: 5 INJECTION INTRAVENOUS at 20:34

## 2025-04-16 RX ADMIN — IRON SUCROSE 200 MG: 20 INJECTION, SOLUTION INTRAVENOUS at 15:37

## 2025-04-16 RX ADMIN — ACETAMINOPHEN 1000 MG: 500 TABLET ORAL at 23:45

## 2025-04-16 RX ADMIN — ARFORMOTEROL TARTRATE 15 MCG: 15 SOLUTION RESPIRATORY (INHALATION) at 07:35

## 2025-04-16 RX ADMIN — Medication 400 MG: at 08:26

## 2025-04-16 RX ADMIN — VASOPRESSIN 0.04 UNITS/MIN: 20 INJECTION, SOLUTION INTRAVENOUS at 17:55

## 2025-04-16 RX ADMIN — IPRATROPIUM BROMIDE 0.5 MG: 0.5 SOLUTION RESPIRATORY (INHALATION) at 07:35

## 2025-04-16 RX ADMIN — ASPIRIN 81 MG: 81 TABLET, COATED ORAL at 08:26

## 2025-04-16 RX ADMIN — CHLORHEXIDINE GLUCONATE 15 ML: 1.2 RINSE ORAL at 09:00

## 2025-04-16 RX ADMIN — SODIUM BICARBONATE 50 MEQ: 84 INJECTION, SOLUTION INTRAVENOUS at 22:57

## 2025-04-16 RX ADMIN — HYDROMORPHONE HYDROCHLORIDE 0.5 MG: 1 INJECTION, SOLUTION INTRAMUSCULAR; INTRAVENOUS; SUBCUTANEOUS at 08:26

## 2025-04-16 RX ADMIN — IPRATROPIUM BROMIDE 0.5 MG: 0.5 SOLUTION RESPIRATORY (INHALATION) at 15:36

## 2025-04-16 RX ADMIN — ARFORMOTEROL TARTRATE 15 MCG: 15 SOLUTION RESPIRATORY (INHALATION) at 20:02

## 2025-04-16 RX ADMIN — POLYETHYLENE GLYCOL 3350 17 G: 17 POWDER, FOR SOLUTION ORAL at 08:26

## 2025-04-16 RX ADMIN — ATORVASTATIN CALCIUM 40 MG: 40 TABLET, FILM COATED ORAL at 20:32

## 2025-04-16 RX ADMIN — VASOPRESSIN 0.04 UNITS/MIN: 20 INJECTION, SOLUTION INTRAVENOUS at 11:05

## 2025-04-16 RX ADMIN — GABAPENTIN 200 MG: 100 CAPSULE ORAL at 08:26

## 2025-04-16 RX ADMIN — EPOETIN ALFA 20000 UNITS: 20000 SOLUTION INTRAVENOUS; SUBCUTANEOUS at 17:56

## 2025-04-16 RX ADMIN — FINASTERIDE 5 MG: 5 TABLET, FILM COATED ORAL at 20:32

## 2025-04-16 RX ADMIN — OXYCODONE 10 MG: 5 TABLET ORAL at 01:05

## 2025-04-16 RX ADMIN — WATER 2000 MG: 1 INJECTION INTRAMUSCULAR; INTRAVENOUS; SUBCUTANEOUS at 20:21

## 2025-04-16 RX ADMIN — MUPIROCIN: 20 OINTMENT TOPICAL at 20:33

## 2025-04-16 RX ADMIN — WATER 2000 MG: 1 INJECTION INTRAMUSCULAR; INTRAVENOUS; SUBCUTANEOUS at 00:13

## 2025-04-16 RX ADMIN — SENNOSIDES AND DOCUSATE SODIUM 1 TABLET: 50; 8.6 TABLET ORAL at 20:32

## 2025-04-16 RX ADMIN — AMIODARONE HYDROCHLORIDE 400 MG: 200 TABLET ORAL at 20:32

## 2025-04-16 RX ADMIN — SENNOSIDES AND DOCUSATE SODIUM 1 TABLET: 50; 8.6 TABLET ORAL at 08:26

## 2025-04-16 RX ADMIN — FUROSEMIDE 100 MG: 10 INJECTION, SOLUTION INTRAMUSCULAR; INTRAVENOUS at 12:03

## 2025-04-16 RX ADMIN — Medication 400 MG: at 20:32

## 2025-04-16 RX ADMIN — ACETAMINOPHEN 1000 MG: 500 TABLET ORAL at 00:15

## 2025-04-16 ASSESSMENT — PAIN SCALES - GENERAL
PAINLEVEL_OUTOF10: 7
PAINLEVEL_OUTOF10: 8
PAINLEVEL_OUTOF10: 4

## 2025-04-16 ASSESSMENT — PAIN DESCRIPTION - LOCATION: LOCATION: CHEST

## 2025-04-16 NOTE — PROGRESS NOTES
I participated in Interdisciplinary Rounds on the CVICU unit where patient care was discussed.    The Interdisciplinary team is aware of  availability and were encouraged to request Spiritual Health support as needed.      The  on-call can be reached at [phone]).     Rev. Ramón Rock MDiv  Chaplain Resident

## 2025-04-16 NOTE — PROGRESS NOTES
Physical Therapy  04/16/2025    Received orders for PT evaluation.  Performed chart review and discussed patient with CTS provider on unit.  Patient placed back on BiPAP and medical team requesting to hold PT at this time until patient is more medically appropriate.  Will defer and follow-up tomorrow as patient is medically appropriate and available for PT.    Thank you,  Sylvia Gandhi, PT, DPT

## 2025-04-16 NOTE — PROGRESS NOTES
1945\: Bedside and Verbal shift change report given to MALVIN Neville (oncoming nurse) by MALVIN Downs (offgoing nurse). Report included the following information Nurse Handoff Report, Index, Surgery Report, Intake/Output, MAR, Recent Results, and Cardiac Rhythm A. Paced .     2100: Patient currently atrial paced via external pacemaker (rate: 70, 15 mA), however occasionally losing capture. MAP fluctuating between 64-65). Increased mA to 17 and rate to 74. Now capturing appropriately. MAP: 69-70    2115: MAP currently 65-66, even after external pacer adjustments. Will update Intensivist.     2120: Dr. Luna (Intensivist) at bedside. Discussed patient's BP and urine output status. Verbal orders to give 250 ml albumin    2258: Patient MAP continues to fluctuate between 60-65. No change after albumin. Updated with Dr. Luna, orders to switch maribeth gtt to levophed gtt.     0045: Patient lungs sound more coarse and wheezy compared to previous assessment. O2 sat: 96%. Asked RT to give prn duo-neb. Updated Intensivist.     0745: Bedside and Verbal shift change report given to MALVIN Haji (oncoming nurse) by MALVIN Neville (offgoing nurse). Report included the following information Nurse Handoff Report, Index, Surgery Report, Intake/Output, MAR, Recent Results, and Cardiac Rhythm A. Paced .

## 2025-04-16 NOTE — PROGRESS NOTES
Occupational Therapy: Hold  04/16/2025    Received orders for OT evaluation.  Performed chart review and discussed patient with CTS provider on unit.  Patient placed back on BiPAP and medical team requesting to hold OT at this time until patient is more medically appropriate.  Will defer and follow-up tomorrow as patient is medically appropriate and available for OT.    Thank you,  Connie SCHMITT, OTR/L

## 2025-04-16 NOTE — ANESTHESIA POSTPROCEDURE EVALUATION
Department of Anesthesiology  Postprocedure Note    Patient: Scooter Dickerson  MRN: [id number]  YOB: 1945  Date of evaluation: 4/16/2025    Procedure Summary       Date: 04/15/25 Room / Location: [facility] MAIN OR [address] [place] [address] MAIN OR    Anesthesia Start: 0731 Anesthesia Stop: 1058    Procedure: CORONARY ARTERY BYPASS GRAFTING X 2 WITH VAUGHAN, LESVGH, LIGATION OF LEFT ATRIAL APPENDAGE, DENNIS AND EPIAORTIC U/S BY DR HAGEN (Chest) Diagnosis:       Disease of cardiovascular system      (Disease of cardiovascular system [I25.10])    Providers: Andrés Brand MD Responsible Provider: Tyrel Hagen MD    Anesthesia Type: General ASA Status: 4            Anesthesia Type: General    Mackenzie Phase I:      Mackenzie Phase II:      Anesthesia Post Evaluation    Patient location during evaluation: PACU  Patient participation: complete - patient participated  Level of consciousness: responsive to verbal stimuli and sleepy but conscious  Pain score: 2  Airway patency: patent  Cardiovascular status: blood pressure returned to baseline  Respiratory status: acceptable  Hydration status: stable  Comments: +Post-Anesthesia Evaluation and Assessment    Patient: Scooter Dickerson MRN: [id number]  SSN: [id number]   YOB: 1945  Age: 79 y.o.  Sex: male          Cardiovascular Function/Vital Signs    BP (!) 106/57   Pulse 70   Temp 98.1 °F (36.7 °C)   Resp 29   Ht 1.676 m (5' 6\")   Wt 69.4 kg (153 lb)   SpO2 90%   BMI 24.69 kg/m²     Patient is status post Procedure(s):  CORONARY ARTERY BYPASS GRAFTING X 2 WITH VAUGHAN, LESVGH, LIGATION OF LEFT ATRIAL APPENDAGE, DENNIS AND EPIAORTIC U/S BY DR HAGEN.    Nausea/Vomiting: Controlled.    Postoperative hydration reviewed and adequate.    Pain:      Managed.    Neurological Status:       At baseline.    Mental Status and Level of Consciousness: Arousable.    Pulmonary Status:       Adequate oxygenation and airway patent.    Complications related to anesthesia: None    Post-anesthesia

## 2025-04-16 NOTE — CONSULTS
[facility] [place]  [facility]  DIABETES MANAGEMENT CONSULT    Consulted by Provider for advanced nursing evaluation and care for inpatient blood glucose management.    Evaluation and Action Plan   Scooter Dickerson is a 78 yo male with a history of CAD, HTN, HLD, sleep apnea, asthma, arthritis, and blood clots in right leg who presented for scheduled CABG x2 on 4/15. He was previously admitted for CABG eval and workup after having abnormal stress test results in March. The [facility] Diabetes [facility] has been consulted to assist in glycemic management and advanced diabetes management assessment this admission.    Admission BG 78. He underwent CABG x2 yesterday and insulin infusion was started post op for glycemic control. His 24 hour insulin needs were very low at 13.4 units with hourly rates of 0.1-2.6 units/hour. He will continue on the insulin infusion for another 24 hours. Anticipate he will be able to be transitioned off insulin infusion tomorrow afternoon. Will continue to follow along with you.     Blood glucose pattern      Significant diabetes-related events over the past 24-72 hours  A1C 5.4%  Insulin infusion post op CABG x2 glycemic control  Total daily insulin dose in the last 24 hours: 13.4 units      Management Rationale Action Plan   Medication   Basal needs Using  units/kg/D based on  Will continue insulin infusion for another 24 hours    Nutritional needs Covers carb load in meals 1-20 units carb coverage    Corrective insulin Using medium dose sensitivity  Q1hr while on insulin infusion    Additional orders  Carb consistent diet (60g CHO/meal)       Diabetes Discharge Plan   Medication:  A1c is 5.4% with no personal or family history of DM. Do not recommend any medications however he would benefit from some diet changes and increase in activity to help maintain current A1c     Additional orders  Follow up with PCP within 2 weeks of D/C          Initial Presentation   Scooter Dickerson is

## 2025-04-16 NOTE — CONSULTS
[address], [place] [address]  Phone: [phone]   [phone]     [email or web address]    NEPHROLOGY CONSULT NOTE     Patient: Scooter Dickerson MRN: [id number]  PCP: Ena Henderson APRN - NP   :     1945  Age:   79 y.o.  Sex:  male      Referring physician: Andrés Brand MD  Reason for consultation: 79 y.o. male with Disease of cardiovascular system [I25.10]  CAD in native artery [I25.10] complicated by CRISTINO  Admission Date: 4/15/2025  5:24 AM  LOS: 1 day      ASSESSMENT and PLAN :   CRISTINO on CKD  - ischemic ATN --> worse than expected   - likely has more advanced CKD than noted on pre op labs --> ? Small vessel disease from chronic heavy smoking   - Non oliguric w/ worsening renal indices   - CVP elevated w/ some Pulm vascular congestion   - agree w/ larger dose of diuretic challenge   - No emergent need for RRT but at risk   - ordered alice US  - dont remove sosa     CKD 3B   - baseline Cr 1.5 mg/dl pre op   - 2/2 HTN, DM, smoking     Met acidosis   - mild and stable     Acute Hypercarbic resp failure   Chronic heavy smoker   KARI - does not use CPAP  - per CCM     CAD s/p CABG x 2, AMADOU ligation 4/15   Hypotension   - on pressors     Chronic anemia   - worse post op   - start VILMA, check iron     BPH , hx of TURP 10/24  Osteoporosis   DM-II   A fib       Care Plan discussed with:  ICU team, cesilia        Thank you for consulting [facility] in the care of your patient.      Subjective:   HPI: Scooter Dickerson is a 79 y.o.  male who has been admitted to the hospital for elective CABG x 2 which was done yesterday. Post op his Cr increased to 2.4 mg/dl from baseline of 1.5 mg/dl for which we were asked to see him   Hx from talking to niece at bedside and review of charts   Was smoking until surgery. Reportedly cleared PFTS   Took last dose of Metformin 48 hrs before   Chronic anemia of unknown cause ?  breakfast)  Patient not taking: Reported on 4/15/2025 4/4/25   Mathieu Painting MD   metFORMIN (GLUCOPHAGE) 1000 MG tablet Take 1 tablet by mouth 2 times daily (with meals) 3/24/25   Joellen Griffin APRN - NP   clopidogrel (PLAVIX) 75 MG tablet Take 1 tablet by mouth daily 3/24/25   Ena Henderson APRN - NP   alendronate (FOSAMAX) 70 MG tablet Take 1 tablet by mouth once a week 1/15/25   Ena Henderson APRN - NP        Thank you for allowing us to participate in the care of this patient.   We will follow patient. Please don’t hesitate to call with any questions    Ferny Moctezuma MD  [facility]   [address] [place] [address], [place] [address]  Phone - [phone]   Fax - [phone]  [email or web address]

## 2025-04-16 NOTE — PROGRESS NOTES
0800- report received. All care assumed. Cardiac surgery rounds. Plan to hold amiodarone this am. Give 250 ml albumin and 40 mg IV lasix.     1048- Patient very drowsy. Eyes open to voice. ABG obtained. Patient assisted back to bed. Bipap placed. Critical result communicated to Dr. Gage and Nura Kerr. BMP trend also discussed. Nephrology consult placed. Dr. Cruz at bedside. Hemodynamics and urine output discussed. Order fro 250 ml LR and to start vasopressin. Current dose of levo 10. Recheck ABG in one hour. Map goal >70 SBP >120    1135- ABG reviewed with Dr. Cruz. Dr. Moctezuma at bedside. 100 mg of lasix ordered. Recheck at 1230    1230- ABG reviewed with Dr. Cruz. Recheck in a few hours    1440- ABG reviewed with Dr. Cruz. Recheck in 2 hours    1645- Dr. Cruz on unit to assess. ABG reviewed    1840- ABG reviewed with Dr. Cruz    2000- Bedside and Verbal shift change report given to Darshan RN and Giulia RN (oncoming nurse) by Raissa RN (offgoing nurse). Report included the following information Nurse Handoff Report, Intake/Output, MAR, Recent Results, Med Rec Status, and Cardiac Rhythm NSR .     2010- Potassium trend discussed with Dr. Carmona

## 2025-04-16 NOTE — PROGRESS NOTES
Cardiac Surgery ICU Progress Note    Admit Date: 4/15/2025  POD:  1 Day Post-Op    Procedure:  Procedure(s):  CORONARY ARTERY BYPASS GRAFTING X 2 WITH STEVEN VAUGHAN, LIGATION OF LEFT ATRIAL APPENDAGE, DENNIS AND EPIAORTIC U/S BY DR HAGEN       Hospital synopsis:  4/15 POD0: CABG x2/LAAL with Dr Brand. Arrived to ICU on precedex and insulin gtts.   POD1: On vaso 0.04, levo 4. A-paced 70's with intermittent capture. Low UOP overnight (200cc). Received albumin x1 and 250ml LR. IV lasix 40mg with minimal response. Consult nephrology: added additional lasix 100mg for diuretic challenge. CXR showing pulmonary edema. ABG showing rising PCO2 65 and pH 7.15. Placed on BiPAP with repeat ABG showing improvement.      Subjective:   Patient seen with Dr. Brand. Sitting in chair. Somnolent but arousable. States he didn't sleep well. Rates chest tenderness around 4/10. Denies nausea.  Afebrile. On 4L NC this am but escalated to BiPAP around 11am for worsening ABG: pH 7.15, PCO2 65, PO2 71. On Vaso 0.04 and Levo 8. A-paced but with intermittent capture. Underlying rhythm is NSR/borderline SB.      Objective:   Vitals:  Blood pressure (!) 106/57, pulse 65, temperature 98.2 °F (36.8 °C), temperature source Oral, resp. rate 14, height 1.676 m (5' 6\"), weight 69.4 kg (153 lb), SpO2 96%.  Temp (24hrs), Av.9 °F (36.6 °C), Min:95 °F (35 °C), Max:99.9 °F (37.7 °C)      Hemodynamics:    CO:    CI:    CVP:    SVR:   PAP Systolic:    PAP Diastolic:    PVR:    SV02:         Ventilator Settings:      Mode Rate TV Press PEEP FiO2 PIP Min. Vent   CPAP/PS 16 bpm  450 mL    5 40 %  22 cmH2O           Oxygen Flow Rate: O2 Flow Rate (L/min): 4 L/min    Oxygen Delivery Method: O2 Device: Nasal cannula      [place]  CVP (Mean): 16 mmHg       EKG/Rhythm:      Extubation Date / Time:  @ 1545: \"RT at bedside, patient extubated to BiPAP , 40% FiO2.\"    CT Output: 1423ml x 24 hours, (930ml overnight)    CXR:   Xray Result (most  ASA, statin, PO amio  - No BB while on pressors post op  - Chest tubes remain; AV wires pulled when appropriate    Acute Post Op Respiratory Insufficiency with Hypoxia and Hypercarbia, Pulmonary Edema: Pre-op ABG with PaO2 91.   - On 4L NC initially this am but ABG showed pH 7.15, PCO2 65, PO2 71. Discussed with Dr. Cruz. Placed to BiPAP. Hold off on bicarb. Repeat ABGs. Low threshold to reintubate if no improvement.  - Daily chest xray. CXR shows pulmonary edema throughout. Needs diuresis (see plan below).  - Aggressive pulmonary hygiene, IS, mobilize as tolerated    COPD, Tobacco Abuse: PTA albuterol PRN   - Scheduled duonebs  - Encouraged smoking cessation  - PRN nicoderm added    CRISTINO on CKD IIIb: baseline Cr 1.5 mg/dl  - Nephrology consulted. Appreciate recommendations.  - Cr 1.96 this am. Received lasix 40mg. Cr bump to 2.31 with minimal UOP response. Discussed with Dr. Cruz and nephrology. Will do a diuretic challenge with additional lasix 100mg. Repeat BMP.  - Albumin x 1 and 250ml LR  - Renal U/S ordered  - Daily BMP  - Keep sosa. Monitor UOP.  - Avoid nephrotoxic medications and prolonged hypotension    Acute Post Operative Hypotension:   - Intermittent capture on A-wires with MA's at 20 so discontinued. Underlying NSR 60's.   - On vaso at 0.04 and levo 8. Wean for SBP > 120 and MAPs > 70  - Albumin x 1 and 250ml LR    PAF: PTA amio, toprol, xarelto  - s/p LAAL  - Remains in NSR this admission  - Continue PO amio    Hyperkalemia:  - Potassium 5.6  - Receiving 140mg lasix today  - Repeat BMP     HLD:  - Lipitor 40mg daily     HTN: PTA Toprol XL 25mg  - Anti-hypertensives held while on pressors     PVD, PAD: PTA Plavix  - Pt reports he has had a procedure in the past. Likely fem-pop (from R to L). Follows with Dr. More. Has thrombus in R lesser saphenous vein.   - Continue ASA.   - Add plavix when CT output improves  - Monitor platelet count    Carotid Artery Stenosis: Moderate LICA and mild GOPAL

## 2025-04-16 NOTE — PROGRESS NOTES
CRITICAL CARE NOTE      Name: Scooter Dickerson   : 1945   MRN: [id number]   Date: 2025      Reason for ICU Admission: s/p CABG    ICU PROBLEM LIST   Multivessel CAD s/p CABG x2  PAF  COPD  Hx HTN  PAD  CKD 3  DM-2  BPH  ANA MARIA    HISTORY OF PRESENT ILLNESS:   Pt is a 78 yo M w/ PMH as noted above who presents to CVICU post planned CABG x2 (LIMA-LAD, SVG-OM), and AMADOU ligation. Pt received 2u pRBCs on pump, and DDAVP after the case. VVI paced due to sinus terence <40.      Pt arrives to CVICU in VVI pacing to 80s, intubated, sedated on precedex, insulin gtt    24 HOUR EVENTS:   Extubated to BiPAP POD 0, now on O2 per NC. Changed to levophed o/n. Remains AAI paced due to sinus terence underlying w/ drop in BP. UOP somewhat marginal, w/ noted bump in Cr.     Addendum: Pt w/ increased O2 requirements and hypercapnia requiring continuous BiPAP. Pt w/ noted respiratory acidosis, however mentation appropriate and responding to diuresis challenge. Will monitor closely and hopefully will be able to avoid re-intubation.      NEUROLOGICAL:    Monitor mentation  As needed pain regimen     PULMONOLOGY:   O2 per NC for spO2 90-98%  Home and as needed nebs  IS, PFV, out of bed to chair as tolerated      CARDIOVASCULAR:   Monitor underlying rhythm   Levophed, titrate as able  Goal MAP greater than 65, SBP less than 130, CI >2, CO >3.5  Pacer wires and Dinh drain management per CT surgery   Telemetry     GASTROINTESTINAL:   Pepcid  ADAT      RENAL/ELECTROLYTE/FLUIDS:   Strict ins and outs  Avoid nephrotoxins, renally dose medications   Daily renal panel  Monitor and replace electrolytes     ENDOCRINE:   Insulin drip per protocol  Glucose goal 120-180     HEMATOLOGY/ONCOLOGY:   SCDs  Chemical prophylaxis as cleared by surgery     ID/MICRO:      Perioperative Ancef      ICU DAILY CHECKLIST      Code Status: Full  DVT Prophylaxis: SCDs  T/L/D: CVC, A-line, Dinh x3, Elias   SUP: Pepcid  Diet: ADA T  Activity Level: Ad

## 2025-04-17 ENCOUNTER — APPOINTMENT (OUTPATIENT)
Facility: HOSPITAL | Age: 80
End: 2025-04-17
Attending: THORACIC SURGERY (CARDIOTHORACIC VASCULAR SURGERY)
Payer: MEDICARE

## 2025-04-17 LAB
ANION GAP SERPL CALC-SCNC: 10 MMOL/L (ref 2–12)
ANION GAP SERPL CALC-SCNC: 5 MMOL/L (ref 2–12)
ANION GAP SERPL CALC-SCNC: 9 MMOL/L (ref 2–12)
BASE DEFICIT BLD-SCNC: 1.4 MMOL/L
BASE DEFICIT BLD-SCNC: 1.9 MMOL/L
BASE DEFICIT BLD-SCNC: 10.6 MMOL/L
BASE DEFICIT BLD-SCNC: 2 MMOL/L
BASE EXCESS BLD CALC-SCNC: 0.5 MMOL/L
BUN SERPL-MCNC: 45 MG/DL (ref 6–20)
BUN SERPL-MCNC: 46 MG/DL (ref 6–20)
BUN SERPL-MCNC: 48 MG/DL (ref 6–20)
BUN/CREAT SERPL: 18 (ref 12–20)
BUN/CREAT SERPL: 19 (ref 12–20)
BUN/CREAT SERPL: 20 (ref 12–20)
CA-I BLD-SCNC: 1.09 MMOL/L (ref 1.12–1.32)
CA-I BLD-SCNC: 1.12 MMOL/L (ref 1.12–1.32)
CA-I BLD-SCNC: 1.13 MMOL/L (ref 1.12–1.32)
CA-I BLD-SCNC: 1.17 MMOL/L (ref 1.12–1.32)
CALCIUM SERPL-MCNC: 8.1 MG/DL (ref 8.5–10.1)
CALCIUM SERPL-MCNC: 8.2 MG/DL (ref 8.5–10.1)
CALCIUM SERPL-MCNC: 8.3 MG/DL (ref 8.5–10.1)
CHLORIDE SERPL-SCNC: 104 MMOL/L (ref 97–108)
CHLORIDE SERPL-SCNC: 105 MMOL/L (ref 97–108)
CHLORIDE SERPL-SCNC: 106 MMOL/L (ref 97–108)
CO2 SERPL-SCNC: 21 MMOL/L (ref 21–32)
CO2 SERPL-SCNC: 24 MMOL/L (ref 21–32)
CO2 SERPL-SCNC: 26 MMOL/L (ref 21–32)
CREAT SERPL-MCNC: 2.42 MG/DL (ref 0.7–1.3)
CREAT SERPL-MCNC: 2.44 MG/DL (ref 0.7–1.3)
CREAT SERPL-MCNC: 2.46 MG/DL (ref 0.7–1.3)
ERYTHROCYTE [DISTWIDTH] IN BLOOD BY AUTOMATED COUNT: 19.8 % (ref 11.5–14.5)
GLUCOSE BLD STRIP.AUTO-MCNC: 103 MG/DL (ref 65–117)
GLUCOSE BLD STRIP.AUTO-MCNC: 106 MG/DL (ref 65–117)
GLUCOSE BLD STRIP.AUTO-MCNC: 106 MG/DL (ref 65–117)
GLUCOSE BLD STRIP.AUTO-MCNC: 109 MG/DL (ref 65–117)
GLUCOSE BLD STRIP.AUTO-MCNC: 110 MG/DL (ref 65–117)
GLUCOSE BLD STRIP.AUTO-MCNC: 112 MG/DL (ref 65–117)
GLUCOSE BLD STRIP.AUTO-MCNC: 114 MG/DL (ref 65–117)
GLUCOSE BLD STRIP.AUTO-MCNC: 117 MG/DL (ref 65–117)
GLUCOSE BLD STRIP.AUTO-MCNC: 119 MG/DL (ref 65–117)
GLUCOSE BLD STRIP.AUTO-MCNC: 127 MG/DL (ref 65–117)
GLUCOSE BLD STRIP.AUTO-MCNC: 136 MG/DL (ref 65–117)
GLUCOSE BLD STRIP.AUTO-MCNC: 147 MG/DL (ref 65–117)
GLUCOSE BLD STRIP.AUTO-MCNC: 93 MG/DL (ref 65–117)
GLUCOSE BLD STRIP.AUTO-MCNC: 94 MG/DL (ref 65–117)
GLUCOSE BLD STRIP.AUTO-MCNC: 96 MG/DL (ref 65–117)
GLUCOSE BLD STRIP.AUTO-MCNC: 99 MG/DL (ref 65–117)
GLUCOSE BLD STRIP.AUTO-MCNC: NORMAL MG/DL (ref 65–117)
GLUCOSE SERPL-MCNC: 122 MG/DL (ref 65–100)
GLUCOSE SERPL-MCNC: 87 MG/DL (ref 65–100)
GLUCOSE SERPL-MCNC: 95 MG/DL (ref 65–100)
HCO3 BLD-SCNC: 16.9 MMOL/L (ref 21–28)
HCO3 BLD-SCNC: 23.8 MMOL/L (ref 21–28)
HCO3 BLD-SCNC: 24.1 MMOL/L (ref 21–28)
HCO3 BLD-SCNC: 24.2 MMOL/L (ref 21–28)
HCO3 BLD-SCNC: 26.7 MMOL/L (ref 21–28)
HCT VFR BLD AUTO: 23.3 % (ref 36.6–50.3)
HCT VFR BLD AUTO: 25.1 % (ref 36.6–50.3)
HGB BLD-MCNC: 7.5 G/DL (ref 12.1–17)
HGB BLD-MCNC: 8.3 G/DL (ref 12.1–17)
HISTORY CHECK: NORMAL
MAGNESIUM SERPL-MCNC: 2.1 MG/DL (ref 1.6–2.4)
MAGNESIUM SERPL-MCNC: 2.1 MG/DL (ref 1.6–2.4)
MCH RBC QN AUTO: 29.5 PG (ref 26–34)
MCHC RBC AUTO-ENTMCNC: 32.2 G/DL (ref 30–36.5)
MCV RBC AUTO: 91.7 FL (ref 80–99)
NRBC # BLD: 0 K/UL (ref 0–0.01)
NRBC BLD-RTO: 0 PER 100 WBC
PCO2 BLD: 40.8 MMHG (ref 35–48)
PCO2 BLD: 44.9 MMHG (ref 35–48)
PCO2 BLD: 47.2 MMHG (ref 35–48)
PCO2 BLD: 47.5 MMHG (ref 35–48)
PCO2 BLD: 51 MMHG (ref 35–48)
PH BLD: 7.18 (ref 7.35–7.45)
PH BLD: 7.31 (ref 7.35–7.45)
PH BLD: 7.32 (ref 7.35–7.45)
PH BLD: 7.33 (ref 7.35–7.45)
PH BLD: 7.37 (ref 7.35–7.45)
PLATELET # BLD AUTO: 179 K/UL (ref 150–400)
PMV BLD AUTO: 10.5 FL (ref 8.9–12.9)
PO2 BLD: 116 MMHG (ref 83–108)
PO2 BLD: 73 MMHG (ref 83–108)
PO2 BLD: 74 MMHG (ref 83–108)
PO2 BLD: 79 MMHG (ref 83–108)
PO2 BLD: 84 MMHG (ref 83–108)
POTASSIUM SERPL-SCNC: 4.2 MMOL/L (ref 3.5–5.1)
POTASSIUM SERPL-SCNC: 4.6 MMOL/L (ref 3.5–5.1)
POTASSIUM SERPL-SCNC: 5.1 MMOL/L (ref 3.5–5.1)
RBC # BLD AUTO: 2.54 M/UL (ref 4.1–5.7)
SAO2 % BLD: 93 % (ref 92–97)
SAO2 % BLD: 93.3 % (ref 92–97)
SAO2 % BLD: 94.2 % (ref 92–97)
SAO2 % BLD: 96 % (ref 92–97)
SAO2 % BLD: 97.3 % (ref 92–97)
SERVICE CMNT-IMP: ABNORMAL
SERVICE CMNT-IMP: NORMAL
SODIUM SERPL-SCNC: 135 MMOL/L (ref 136–145)
SODIUM SERPL-SCNC: 137 MMOL/L (ref 136–145)
SODIUM SERPL-SCNC: 138 MMOL/L (ref 136–145)
SPECIMEN TYPE: ABNORMAL
WBC # BLD AUTO: 11.5 K/UL (ref 4.1–11.1)

## 2025-04-17 PROCEDURE — 83735 ASSAY OF MAGNESIUM: CPT

## 2025-04-17 PROCEDURE — 6360000002 HC RX W HCPCS: Performed by: STUDENT IN AN ORGANIZED HEALTH CARE EDUCATION/TRAINING PROGRAM

## 2025-04-17 PROCEDURE — 6360000002 HC RX W HCPCS

## 2025-04-17 PROCEDURE — 85027 COMPLETE CBC AUTOMATED: CPT

## 2025-04-17 PROCEDURE — 2580000003 HC RX 258: Performed by: NURSE PRACTITIONER

## 2025-04-17 PROCEDURE — 2500000003 HC RX 250 WO HCPCS: Performed by: ANESTHESIOLOGY

## 2025-04-17 PROCEDURE — 2580000003 HC RX 258: Performed by: STUDENT IN AN ORGANIZED HEALTH CARE EDUCATION/TRAINING PROGRAM

## 2025-04-17 PROCEDURE — 6370000000 HC RX 637 (ALT 250 FOR IP): Performed by: THORACIC SURGERY (CARDIOTHORACIC VASCULAR SURGERY)

## 2025-04-17 PROCEDURE — 94640 AIRWAY INHALATION TREATMENT: CPT

## 2025-04-17 PROCEDURE — 2500000003 HC RX 250 WO HCPCS: Performed by: STUDENT IN AN ORGANIZED HEALTH CARE EDUCATION/TRAINING PROGRAM

## 2025-04-17 PROCEDURE — 6370000000 HC RX 637 (ALT 250 FOR IP)

## 2025-04-17 PROCEDURE — 30233N1 TRANSFUSION OF NONAUTOLOGOUS RED BLOOD CELLS INTO PERIPHERAL VEIN, PERCUTANEOUS APPROACH: ICD-10-PCS | Performed by: NURSE PRACTITIONER

## 2025-04-17 PROCEDURE — 2500000003 HC RX 250 WO HCPCS

## 2025-04-17 PROCEDURE — 2700000000 HC OXYGEN THERAPY PER DAY

## 2025-04-17 PROCEDURE — 82962 GLUCOSE BLOOD TEST: CPT

## 2025-04-17 PROCEDURE — 71045 X-RAY EXAM CHEST 1 VIEW: CPT

## 2025-04-17 PROCEDURE — 99231 SBSQ HOSP IP/OBS SF/LOW 25: CPT

## 2025-04-17 PROCEDURE — 85014 HEMATOCRIT: CPT

## 2025-04-17 PROCEDURE — P9016 RBC LEUKOCYTES REDUCED: HCPCS

## 2025-04-17 PROCEDURE — 6360000002 HC RX W HCPCS: Performed by: ANESTHESIOLOGY

## 2025-04-17 PROCEDURE — 97530 THERAPEUTIC ACTIVITIES: CPT

## 2025-04-17 PROCEDURE — 97163 PT EVAL HIGH COMPLEX 45 MIN: CPT

## 2025-04-17 PROCEDURE — 97165 OT EVAL LOW COMPLEX 30 MIN: CPT

## 2025-04-17 PROCEDURE — 2580000003 HC RX 258: Performed by: ANESTHESIOLOGY

## 2025-04-17 PROCEDURE — 94660 CPAP INITIATION&MGMT: CPT

## 2025-04-17 PROCEDURE — 2000000000 HC ICU R&B

## 2025-04-17 PROCEDURE — 6360000002 HC RX W HCPCS: Performed by: NURSE PRACTITIONER

## 2025-04-17 PROCEDURE — 94760 N-INVAS EAR/PLS OXIMETRY 1: CPT

## 2025-04-17 PROCEDURE — 80048 BASIC METABOLIC PNL TOTAL CA: CPT

## 2025-04-17 PROCEDURE — 6370000000 HC RX 637 (ALT 250 FOR IP): Performed by: NURSE PRACTITIONER

## 2025-04-17 PROCEDURE — 85018 HEMOGLOBIN: CPT

## 2025-04-17 PROCEDURE — 2580000003 HC RX 258: Performed by: INTERNAL MEDICINE

## 2025-04-17 PROCEDURE — 36430 TRANSFUSION BLD/BLD COMPNT: CPT

## 2025-04-17 PROCEDURE — 82803 BLOOD GASES ANY COMBINATION: CPT

## 2025-04-17 PROCEDURE — 6370000000 HC RX 637 (ALT 250 FOR IP): Performed by: PHYSICIAN ASSISTANT

## 2025-04-17 PROCEDURE — 6360000002 HC RX W HCPCS: Performed by: INTERNAL MEDICINE

## 2025-04-17 RX ORDER — INDOMETHACIN 25 MG/1
50 CAPSULE ORAL ONCE
Status: COMPLETED | OUTPATIENT
Start: 2025-04-17 | End: 2025-04-17

## 2025-04-17 RX ORDER — BUMETANIDE 0.25 MG/ML
2 INJECTION, SOLUTION INTRAMUSCULAR; INTRAVENOUS 2 TIMES DAILY
Status: DISCONTINUED | OUTPATIENT
Start: 2025-04-17 | End: 2025-04-21

## 2025-04-17 RX ORDER — HEPARIN SODIUM 5000 [USP'U]/ML
5000 INJECTION, SOLUTION INTRAVENOUS; SUBCUTANEOUS EVERY 8 HOURS SCHEDULED
Status: DISCONTINUED | OUTPATIENT
Start: 2025-04-17 | End: 2025-05-01

## 2025-04-17 RX ORDER — MAGNESIUM SULFATE IN WATER 40 MG/ML
2000 INJECTION, SOLUTION INTRAVENOUS ONCE
Status: COMPLETED | OUTPATIENT
Start: 2025-04-17 | End: 2025-04-17

## 2025-04-17 RX ORDER — DEXMEDETOMIDINE HYDROCHLORIDE 4 UG/ML
.1-1.5 INJECTION, SOLUTION INTRAVENOUS CONTINUOUS
Status: DISCONTINUED | OUTPATIENT
Start: 2025-04-17 | End: 2025-04-23

## 2025-04-17 RX ORDER — SODIUM CHLORIDE 9 MG/ML
INJECTION, SOLUTION INTRAVENOUS PRN
Status: DISCONTINUED | OUTPATIENT
Start: 2025-04-17 | End: 2025-04-20

## 2025-04-17 RX ADMIN — HEPARIN SODIUM 5000 UNITS: 5000 INJECTION INTRAVENOUS; SUBCUTANEOUS at 09:05

## 2025-04-17 RX ADMIN — ATORVASTATIN CALCIUM 40 MG: 40 TABLET, FILM COATED ORAL at 21:09

## 2025-04-17 RX ADMIN — SODIUM BICARBONATE: 84 INJECTION, SOLUTION INTRAVENOUS at 01:14

## 2025-04-17 RX ADMIN — IPRATROPIUM BROMIDE 0.5 MG: 0.5 SOLUTION RESPIRATORY (INHALATION) at 11:36

## 2025-04-17 RX ADMIN — SENNOSIDES AND DOCUSATE SODIUM 1 TABLET: 50; 8.6 TABLET ORAL at 21:09

## 2025-04-17 RX ADMIN — Medication 400 MG: at 09:02

## 2025-04-17 RX ADMIN — Medication 400 MG: at 21:09

## 2025-04-17 RX ADMIN — IPRATROPIUM BROMIDE 0.5 MG: 0.5 SOLUTION RESPIRATORY (INHALATION) at 16:02

## 2025-04-17 RX ADMIN — INSULIN LISPRO 2 UNITS: 100 INJECTION, SOLUTION INTRAVENOUS; SUBCUTANEOUS at 13:46

## 2025-04-17 RX ADMIN — HEPARIN SODIUM 5000 UNITS: 5000 INJECTION INTRAVENOUS; SUBCUTANEOUS at 13:52

## 2025-04-17 RX ADMIN — ASPIRIN 81 MG: 81 TABLET, COATED ORAL at 09:02

## 2025-04-17 RX ADMIN — ACETAMINOPHEN 1000 MG: 500 TABLET ORAL at 18:30

## 2025-04-17 RX ADMIN — HYDROMORPHONE HYDROCHLORIDE 0.5 MG: 1 INJECTION, SOLUTION INTRAMUSCULAR; INTRAVENOUS; SUBCUTANEOUS at 01:49

## 2025-04-17 RX ADMIN — VASOPRESSIN 0.04 UNITS/MIN: 20 INJECTION, SOLUTION INTRAVENOUS at 10:45

## 2025-04-17 RX ADMIN — CHLORHEXIDINE GLUCONATE 15 ML: 1.2 RINSE ORAL at 10:19

## 2025-04-17 RX ADMIN — ACETAMINOPHEN 1000 MG: 500 TABLET ORAL at 13:04

## 2025-04-17 RX ADMIN — MAGNESIUM HYDROXIDE 30 ML: 400 SUSPENSION ORAL at 09:01

## 2025-04-17 RX ADMIN — SODIUM CHLORIDE, PRESERVATIVE FREE 10 ML: 5 INJECTION INTRAVENOUS at 21:25

## 2025-04-17 RX ADMIN — DEXMEDETOMIDINE HYDROCHLORIDE 0.2 MCG/KG/HR: 4 INJECTION, SOLUTION INTRAVENOUS at 23:13

## 2025-04-17 RX ADMIN — MAGNESIUM SULFATE HEPTAHYDRATE 2000 MG: 40 INJECTION, SOLUTION INTRAVENOUS at 16:37

## 2025-04-17 RX ADMIN — MUPIROCIN: 20 OINTMENT TOPICAL at 10:19

## 2025-04-17 RX ADMIN — ARFORMOTEROL TARTRATE 15 MCG: 15 SOLUTION RESPIRATORY (INHALATION) at 20:11

## 2025-04-17 RX ADMIN — IRON SUCROSE 400 MG: 20 INJECTION, SOLUTION INTRAVENOUS at 07:40

## 2025-04-17 RX ADMIN — AMIODARONE HYDROCHLORIDE 150 MG: 50 INJECTION, SOLUTION INTRAVENOUS at 10:44

## 2025-04-17 RX ADMIN — ACETAMINOPHEN 1000 MG: 500 TABLET ORAL at 07:15

## 2025-04-17 RX ADMIN — BUMETANIDE 2 MG: 0.25 INJECTION INTRAMUSCULAR; INTRAVENOUS at 09:01

## 2025-04-17 RX ADMIN — VASOPRESSIN 0.04 UNITS/MIN: 20 INJECTION, SOLUTION INTRAVENOUS at 01:55

## 2025-04-17 RX ADMIN — SODIUM BICARBONATE 50 MEQ: 84 INJECTION, SOLUTION INTRAVENOUS at 00:38

## 2025-04-17 RX ADMIN — AMIODARONE HYDROCHLORIDE 1 MG/MIN: 50 INJECTION, SOLUTION INTRAVENOUS at 13:03

## 2025-04-17 RX ADMIN — IPRATROPIUM BROMIDE 0.5 MG: 0.5 SOLUTION RESPIRATORY (INHALATION) at 20:11

## 2025-04-17 RX ADMIN — AMIODARONE HYDROCHLORIDE 400 MG: 200 TABLET ORAL at 09:02

## 2025-04-17 RX ADMIN — SENNOSIDES AND DOCUSATE SODIUM 1 TABLET: 50; 8.6 TABLET ORAL at 09:02

## 2025-04-17 RX ADMIN — ARFORMOTEROL TARTRATE 15 MCG: 15 SOLUTION RESPIRATORY (INHALATION) at 07:18

## 2025-04-17 RX ADMIN — POLYETHYLENE GLYCOL 3350 17 G: 17 POWDER, FOR SOLUTION ORAL at 09:02

## 2025-04-17 RX ADMIN — MUPIROCIN: 20 OINTMENT TOPICAL at 21:17

## 2025-04-17 RX ADMIN — IPRATROPIUM BROMIDE 0.5 MG: 0.5 SOLUTION RESPIRATORY (INHALATION) at 07:17

## 2025-04-17 RX ADMIN — FINASTERIDE 5 MG: 5 TABLET, FILM COATED ORAL at 21:09

## 2025-04-17 RX ADMIN — SODIUM CHLORIDE, PRESERVATIVE FREE 10 ML: 5 INJECTION INTRAVENOUS at 10:19

## 2025-04-17 RX ADMIN — AMIODARONE HYDROCHLORIDE 150 MG: 50 INJECTION, SOLUTION INTRAVENOUS at 02:02

## 2025-04-17 RX ADMIN — FAMOTIDINE 20 MG: 20 TABLET, FILM COATED ORAL at 09:02

## 2025-04-17 RX ADMIN — INSULIN GLARGINE 5 UNITS: 100 INJECTION, SOLUTION SUBCUTANEOUS at 13:52

## 2025-04-17 RX ADMIN — BUMETANIDE 2 MG: 0.25 INJECTION INTRAMUSCULAR; INTRAVENOUS at 18:30

## 2025-04-17 RX ADMIN — CHLORHEXIDINE GLUCONATE 15 ML: 1.2 RINSE ORAL at 21:20

## 2025-04-17 RX ADMIN — AMIODARONE HYDROCHLORIDE 1 MG/MIN: 50 INJECTION, SOLUTION INTRAVENOUS at 05:25

## 2025-04-17 RX ADMIN — HEPARIN SODIUM 5000 UNITS: 5000 INJECTION INTRAVENOUS; SUBCUTANEOUS at 21:30

## 2025-04-17 RX ADMIN — AMIODARONE HYDROCHLORIDE 400 MG: 200 TABLET ORAL at 21:08

## 2025-04-17 RX ADMIN — AMIODARONE HYDROCHLORIDE 1 MG/MIN: 50 INJECTION, SOLUTION INTRAVENOUS at 20:31

## 2025-04-17 ASSESSMENT — PAIN DESCRIPTION - ORIENTATION
ORIENTATION: ANTERIOR;LEFT
ORIENTATION: ANTERIOR

## 2025-04-17 ASSESSMENT — PAIN SCALES - GENERAL
PAINLEVEL_OUTOF10: 8
PAINLEVEL_OUTOF10: 2

## 2025-04-17 ASSESSMENT — PAIN DESCRIPTION - LOCATION
LOCATION: CHEST
LOCATION: CHEST;BACK

## 2025-04-17 ASSESSMENT — PAIN DESCRIPTION - PAIN TYPE: TYPE: SURGICAL PAIN

## 2025-04-17 ASSESSMENT — PAIN DESCRIPTION - ONSET: ONSET: ON-GOING

## 2025-04-17 NOTE — PROGRESS NOTES
Critical care problems:  - metabolic acidosis  - atrial fibrilllation      Pt was monitored frequently overnight and his uop and abg were assessed and reassessed. Pt continues to have good uop overnight but had a metabolic acidosis. He was ultimately started on bicarb drip.    Early this morning he went into afib with HR in 80's and 90's. He was inittially given an amio bolus but remained in afib and the HR started to increase with stable BP so amio drip was added.    Critical care time 30 min

## 2025-04-17 NOTE — PLAN OF CARE
Problem: Occupational Therapy - Adult  Goal: By Discharge: Performs self-care activities at highest level of function for planned discharge setting.  See evaluation for individualized goals.  Description: FUNCTIONAL STATUS PRIOR TO ADMISSION:  Patient was ambulatory using rollator  Receives Help From: Family, Prior Level of Assist for ADLs: Independent,  ,  ,  ,  ,  , Prior Level of Assist for Homemaking: Needs assistance, Ambulation Assistance: Needs assistance, Prior Level of Assist for Transfers: Independent,       HOME SUPPORT: Patient lived alone and reports being independent with ADLS/IADLS .    Occupational Therapy Goals:  Initiated 4/17/2025    1.  Patient will perform ADLs standing 5 mins without fatigue or LOB with Maximal Assist and Assist x1 within 7 days.  2.  Patient will perform upper body ADLs with Minimal Assist within 7 days.  3.  Patient will perform lower body ADLs with Moderate Assist within 7 days.    4.  Patient will perform gathering ADL items high and low 2/2 with Maximal Assist within 7 days.  5.  Patient will perform toilet transfers with Maximal Assist and Assist x1 within 7 days.  6.  Patient will perform all aspects of toileting with Moderate Assist within 7 days.  7.  Patient will participate in cardiac/sternal upper extremity therapeutic exercise/activities to increase independence with ADLs with supervision/set-up for 5 minutes within 7 days.   8.  Patient will adhere to Move in the Tube precautions during functional tasks with Min cues within 7 days.     Outcome: Progressing   OCCUPATIONAL THERAPY EVALUATION    Patient: Scooter Dickerson (79 y.o. male)  Date: 4/17/2025  Primary Diagnosis: Disease of cardiovascular system [I25.10]  CAD in native artery [I25.10]  Procedure(s) (LRB):  CORONARY ARTERY BYPASS GRAFTING X 2 WITH STEVEN VAUGHAN, LIGATION OF LEFT ATRIAL APPENDAGE, DENNIS AND EPIAORTIC U/S BY DR HAGEN (N/A) 2 Days Post-Op     Precautions: Fall Risk           Sternal Precautions:

## 2025-04-17 NOTE — CONSULTS
[facility]  PROGRAM FOR DIABETES HEALTH  DIABETES MANAGEMENT CONSULT    Consulted by Provider for advanced nursing evaluation and care for inpatient blood glucose management.    Evaluation and Action Plan   Scooter Dickerson is a 80 yo male with a history of CAD, HTN, HLD, sleep apnea, asthma, arthritis, and blood clots in right leg who presented for scheduled CABG x2 on 4/15. He was previously admitted for CABG eval and workup after having abnormal stress test results in March. The [facility] Diabetes [facility] has been consulted to assist in glycemic management and advanced diabetes management assessment this admission.    Mr. Dickerson has no personal or family history of diabetes with current A1c of 5.4%. He currently does not take any medications for diabetes or has ever in the past. He endorses he eats small portions.    Admission BG 78. He underwent CABG x2 yesterday and insulin infusion was started post op for glycemic control. His 24 hour insulin needs were very low at 13.4 units with hourly rates of 0.1-2.6 units/hour. He will continue on the insulin infusion for another 24 hours. Anticipate he will be able to be transitioned off insulin infusion tomorrow afternoon. Will continue to follow along with you.     4/17- 24 hour insulin needs 17.2 units with hourly rates of 0-0.8 overnight. He is currently still on vasopressin and Levophed at the lowest dosing. He is progressing well and eating his first clear liquid breakfast tray with anticipation of regular diet by dinner. Will transition him off the insulin infusion this afternoon with the nurse driven protocol. Will continue to follow along with you and adjust subcutaneous insulin dosing.     Blood glucose pattern      Significant diabetes-related events over the past 24-72 hours  A1C 5.4%  Insulin infusion post op CABG x2 glycemic control  Total daily insulin dose in the last 24 hours: 13.4 units -> 17.2 units      Management Rationale Action Plan   Medication

## 2025-04-17 NOTE — PROGRESS NOTES
[facility]   [address], [place] [address]  Phone: [phone]   [phone]    [email or web address]     Nephrology Progress Note    Patient Name : Scooter Dickerson      : 1945     MRN : [id number]  Date of Admission : 4/15/2025  Date of Servive : 25    CC:  Follow up for CRISTINO       Assessment and Plan   CRISTINO on CKD  - ischemic ATN, non oliguric w/ diuretics   - renal US : suggests CKD, No obstruction   - expect Cr to plateau in next 24-48 hrs '  - aim net negative 1L today   - continue Bumex 2 mg BID, can skip PM dose if adequate UOP   - could remove sosa tomorrow   - labs Q12h and replete lytes, correct acidosis      CKD 3B   - baseline Cr 1.5 mg/dl pre op   - / HTN, DM, smoking      Met + Resp acidosis   - stopped Bicarb gtt   - PRN Bicarb push      Acute Hypercarbic resp failure   Chronic heavy smoker   KARI - does not use CPAP  - per CCM      CAD s/p CABG x 2, AMADOU ligation 4/15   Hypotension   - on pressors      Chronic anemia   Iron def : ordered venofer x 3 doses   - continue high dose VILMA  - transfuse PRN      BPH , hx of TURP 10/24  Osteoporosis   DM-II   A fib : On Amio        Care Plan discussed with:  ICU team     Interval History:  Overnight , worsening acidosis and needed Bicarb gtt, did well w/ BIPAP, PCO2 improved.  Good UOP, cr stable. A fib -- needing amio gtt     Review of Systems: Pertinent items are noted in HPI.    Current Medications:   Current Facility-Administered Medications   Medication Dose Route Frequency    sodium bicarbonate 150 mEq in dextrose 5 % 1,000 mL infusion   IntraVENous Continuous    amiodarone (CORDARONE) 450 mg in dextrose 5 % 250 mL infusion ([id number])  1 mg/min IntraVENous Continuous    VASOpressin (VASOSTRICT) 20 units in sodium chloride 0.9% 100 mL infusion  0.04 Units/min IntraVENous Continuous    [Held by provider] gabapentin (NEURONTIN) capsule 100 mg  100 mg Oral TID    EPINEPHrine 5 mg in

## 2025-04-17 NOTE — PROGRESS NOTES
CRITICAL CARE NOTE      Name: Scooter Dickerson   : 1945   MRN: [id number]   Date: 2025      Reason for ICU Admission: s/p CABG    ICU PROBLEM LIST   Multivessel CAD s/p CABG x2  PAF  COPD  Hx HTN  PAD  CKD 3  DM-2  BPH  ANA MARIA    HISTORY OF PRESENT ILLNESS:   Pt is a 78 yo M w/ PMH as noted above who presents to CVICU post planned CABG x2 (LIMA-LAD, SVG-OM), and AAMDOU ligation. Pt received 2u pRBCs on pump, and DDAVP after the case. VVI paced due to sinus terence <40.      Pt arrives to CVICU in VVI pacing to 80s, intubated, sedated on precedex, insulin gtt. Pt extubated POD 0.     24 HOUR EVENTS:   BiPAP o/n, now back on NC. Afib o/n, now on amiodarone gtt. Improved UOP w/ diuresis w/ improvement in respiratory and metabolic acidosis, briefly on bicarb gtt o/n. Sitting in bedside chair this AM.      NEUROLOGICAL:    Monitor mentation  As needed pain regimen     PULMONOLOGY:   O2 per NC for spO2 90-98%  Home and as needed nebs  IS, PFV, out of bed to chair as tolerated      CARDIOVASCULAR:   Amio gtt  Levophed, vaso titrate as able  Goal MAP greater than 65, SBP less than 130, CI >2, CO >3.5  Pacer wires and Dinh drain management per CT surgery   Telemetry     GASTROINTESTINAL:   Pepcid  ADAT      RENAL/ELECTROLYTE/FLUIDS:   Strict ins and outs  Nephrology following  Continue diuresis  Avoid nephrotoxins, renally dose medications   Daily renal panel  Monitor and replace electrolytes     ENDOCRINE:   Insulin drip per protocol  Glucose goal 120-180     HEMATOLOGY/ONCOLOGY:   SCDs  Chemical prophylaxis as cleared by surgery     ID/MICRO:      Perioperative Ancef      ICU DAILY CHECKLIST      Code Status: Full  DVT Prophylaxis: SCDs  T/L/D: CVC, A-line, Dinh x3, Elias   SUP: Pepcid  Diet: ADA T  Activity Level: Ad aisha.  ABCDEF Bundle/Checklist Completed:Yes  Disposition: Per primary  Multidisciplinary Rounds Completed:  Yes  Patient/Family Updated: Yes       Review of Systems:     Negative except as noted

## 2025-04-17 NOTE — PROGRESS NOTES
2000 Bedside and Verbal shift change report given to Eliana RN (oncoming nurse) by Raissa RN (offgoing nurse). Report included the following information Nurse Handoff Report, Adult Overview, Surgery Report, Intake/Output, Recent Results, and Cardiac Rhythm NSR .     2200 Levo off.    2232 ABGs pH 7.202 / pCO2 47.5 / pO2  85.7  /  HCO3 18.7.  Intensivist notified.  Orders  for bicarb and BMP.    0019  ABGs pH 7.183 / pCO2 44.9 / pO2 116  /  HCO3 16.9.  Intensivist notified.  Additional orders for second bicarb injection.  Also added bicarb drip.    0112 NSR to A fib.  Intensivist notified.  Order for amio bolus.    0230 ABGs pH 7.314  / pCO2 47.5 / pO2  78.7 / HCO3 24.1.  Intensivist ok to keep on 4L NC.    0400 Pt stil in A. Fib.  Order for amio drip.    0630 D/c bicarb drip. Initiate bumex. Per nephrology.    0800 Bedside and Verbal shift change report given to Raissa RN (oncoming nurse) by Eliana RN (offgoing nurse). Report included the following information Nurse Handoff Report, Intake/Output, MAR, Recent Results, and Cardiac Rhythm A fib .

## 2025-04-17 NOTE — CARDIO/PULMONARY
Chart reviewed: Patient is 79 y.o. male admitted with Disease of cardiovascular system [I25.10]  CAD in native artery [I25.10]    Attempted to visit Scooter Dickerson to discuss cardiac rehab and possible options. Patient was unavailable. It appears he is over an hour away from any cardiac rehab.

## 2025-04-17 NOTE — PROGRESS NOTES
0800- report received. All care assumed    0930- ABG reviewed. Plan for amio bolus    1130- converted to SB    2000- Bedside and Verbal shift change report given to Paxton RN (oncoming nurse) by Raissa RN (offgoing nurse). Report included the following information Nurse Handoff Report, Intake/Output, MAR, Recent Results, Med Rec Status, and Cardiac Rhythm SB .

## 2025-04-17 NOTE — PLAN OF CARE
Problem: Physical Therapy - Adult  Goal: By Discharge: Performs mobility at highest level of function for planned discharge setting.  See evaluation for individualized goals.  4/17/2025 1210 by Sylvia Gandhi PT  Note: FUNCTIONAL STATUS PRIOR TO ADMISSION: Unclear PLOF as patient was an inconsistent historian, however appears to have had a functional decline leading up to hospital admission.  Reports difficulty with household distance ambulation, utilized rollator for all mobility, but was independent with ADLs    HOME SUPPORT PRIOR TO ADMISSION:  Pt lives alone and niece lives nearby; unclear level of s/a available    Physical Therapy Goals  Initiated 4/17/2025  1.  Patient will move from supine to sit and sit to supine in bed with minimal assistance within 5 day(s).    2.  Patient will perform sit to stand with minimal assistance within 5 day(s).  3.  Patient will transfer from bed to chair and chair to bed with minimal assistance using the least restrictive device within 5 day(s).  4.  Patient will ambulate with minimal assistance for 50 feet with the least restrictive device within 5 day(s).   5.  Patient will perform cardiac exercises per protocol with minimal assistance within 5 days.  6.  Patient will verbally recall and functionally demonstrate mindful-based movements (\"move in the tube\") principles without cues within 5 days.    PHYSICAL THERAPY EVALUATION    Patient: Scooter Dickerson (79 y.o. male)  Date: 4/17/2025  Primary Diagnosis: Disease of cardiovascular system [I25.10]  CAD in native artery [I25.10]  Procedure(s) (LRB):  CORONARY ARTERY BYPASS GRAFTING X 2 WITH STEVEN VAUGHAN, LIGATION OF LEFT ATRIAL APPENDAGE, DENNIS AND EPIAORTIC U/S BY DR HAGEN (N/A) 2 Days Post-Op   Precautions: Restrictions/Precautions  Restrictions/Precautions: Fall Risk     Position Activity Restriction  Sternal Precautions: Move in the Tube      ASSESSMENT :   Patient is a 80 y/o male presenting s/p CABG x2 4/15 secondary to

## 2025-04-17 NOTE — PROGRESS NOTES
Cardiac Surgery ICU Progress Note    Admit Date: 4/15/2025  POD:  2 Days Post-Op    Procedure:    4/15/25 with Dr. Brand:   CORONARY ARTERY BYPASS GRAFTING X 2 WITH LIMA (LIMA-LAD, SVG-OM , LESVGH, LIGATION OF LEFT ATRIAL APPENDAGE, DENNIS AND EPIAORTIC U/S BY DR HAGEN       Hospital synopsis:  4/15 POD0: CABG x2/LAAL with Dr Brand. DENNIS with normal biventricular function, EF 55%, no WMA. Transferred to ICU on precedex and insulin gtt with A&V wires and 3 alecia drains (2 med, L pleural). Extubated at 1545 to BiPAP. Overnight maribeth transitioned to norepi.    POD1: On vaso 0.04, levo 4. A-paced 70's with intermittent capture. Low UOP overnight (200cc). Received albumin x1 and 250ml LR. IV lasix 40mg with minimal response. Consult nephrology: added additional lasix 100mg for diuretic challenge. CXR showing pulmonary edema. ABG showing rising PCO2 65 and pH 7.15. Placed on BiPAP with repeat ABG showing improvement.    POD 2: Afib around 0200, amio bolus and infusion started. Remains on norepi and vaso. 4L NC this AM, transition to HFNC for ongoing mild hypercapnia. Good response to lasix challenge, Cr up to 2.44. Bumex 2mg IV BID today. PRBC x 1 for Hgb 7.5. Xray with gastric bubble and dilation noted.      Subjective:   Patient seen with Dr. Mccloud, up in chair, afebrile, 4L NC, afib low 100s. He is mildly somnolent but woke up and knew month/year. He thought he was in the hospital for cancer.      Objective:   Vitals:  Blood pressure (!) 116/56, pulse (!) 113, temperature 97.3 °F (36.3 °C), temperature source Axillary, resp. rate 24, height 1.676 m (5' 6\"), weight 69.9 kg (154 lb 1.6 oz), SpO2 99%.  Temp (24hrs), Av.7 °F (36.5 °C), Min:97.3 °F (36.3 °C), Max:98.2 °F (36.8 °C)    CVP: 6-13    Continuous Infusions:  Norepi 2 mcg/min  Vaso .04 unit/min  Insulin    Oxygen Flow Rate: O2 Flow Rate (L/min): 4 L/min    Oxygen Delivery Method: O2 Device: Nasal cannula    EKG/Rhythm: Afib low 100s      Extubation Date /

## 2025-04-17 NOTE — CARE COORDINATION
Care Management Initial Assessment       RUR: 23%  Readmission? No  1st IM letter given? Yes - 4/16  1st  letter given: No-NA  Admission: Disease of cardiovascula* Disease of cardiovascular system, CAD in native artery. CABG x 2    CM met w patient at bedside in CVI to introduce self/role as CM and to confirm the Facesheet.  Patient lives alone at [facility] in Independent living.  He uses a rollator for mobility and has it in the room and reports a hx of HH w [facility].  Therapy is recommending IPR and patient is agreeable and requests [facility].  I offered to call his niece to discuss w her but he requested I not call and \"bother\" her bc she works. [facility] and [facility] and agreeable to CM sending clinicals as secondary insurance.  All questions have been answered and CM will continue to follow for ADRIÁN needs.    Anticipated ADRIÁN needs:  Pending [facility]  Back up plan is [facility] and referral not sent yet.  Will need help w transport at d/c  On [facility]      04/17/25 1232   Service Assessment   Patient Orientation Alert and Oriented;Person;Place;Situation   Cognition Alert   History Provided By Patient   Primary Caregiver Self   Support Systems Family Members  (Niece but he says he doesn't want to bother her bc she works)   Patient's Healthcare Decision Maker is: Named in Scanned ACP Document  (Jaya)   PCP Verified by CM Yes  (Candance Keyser)   Last Visit to PCP Within last 3 months   Prior Functional Level Assistance with the following:;Mobility;Bathing   Can patient return to prior living arrangement Unknown at present   Ability to make needs known: Good   Family able to assist with home care needs: No   Would you like for me to discuss the discharge plan with any other family members/significant others, and if so, who? Yes  (Jaya West [phone])   Financial Resources Medicare   Social/Functional History   Lives With Alone   Type of Home

## 2025-04-18 ENCOUNTER — APPOINTMENT (OUTPATIENT)
Facility: HOSPITAL | Age: 80
End: 2025-04-18
Attending: THORACIC SURGERY (CARDIOTHORACIC VASCULAR SURGERY)
Payer: MEDICARE

## 2025-04-18 LAB
ABO + RH BLD: NORMAL
ANION GAP SERPL CALC-SCNC: 9 MMOL/L (ref 2–12)
BASE EXCESS BLDV CALC-SCNC: 0.2 MMOL/L
BLD PROD TYP BPU: NORMAL
BLOOD BANK BLOOD PRODUCT EXPIRATION DATE: NORMAL
BLOOD BANK DISPENSE STATUS: NORMAL
BLOOD BANK ISBT PRODUCT BLOOD TYPE: 7300
BLOOD BANK PRODUCT CODE: NORMAL
BLOOD BANK UNIT TYPE AND RH: NORMAL
BLOOD GROUP ANTIBODIES SERPL: NORMAL
BPU ID: NORMAL
BUN SERPL-MCNC: 50 MG/DL (ref 6–20)
BUN/CREAT SERPL: 20 (ref 12–20)
CALCIUM SERPL-MCNC: 8.3 MG/DL (ref 8.5–10.1)
CHLORIDE SERPL-SCNC: 101 MMOL/L (ref 97–108)
CO2 SERPL-SCNC: 25 MMOL/L (ref 21–32)
CREAT SERPL-MCNC: 2.53 MG/DL (ref 0.7–1.3)
CROSSMATCH RESULT: NORMAL
ERYTHROCYTE [DISTWIDTH] IN BLOOD BY AUTOMATED COUNT: 19.1 % (ref 11.5–14.5)
GLUCOSE BLD STRIP.AUTO-MCNC: 126 MG/DL (ref 65–117)
GLUCOSE BLD STRIP.AUTO-MCNC: 126 MG/DL (ref 65–117)
GLUCOSE BLD STRIP.AUTO-MCNC: 129 MG/DL (ref 65–117)
GLUCOSE SERPL-MCNC: 91 MG/DL (ref 65–100)
HCO3 BLDV-SCNC: 25.8 MMOL/L (ref 23–28)
HCT VFR BLD AUTO: 23.8 % (ref 36.6–50.3)
HGB BLD-MCNC: 7.9 G/DL (ref 12.1–17)
MAGNESIUM SERPL-MCNC: 3.1 MG/DL (ref 1.6–2.4)
MCH RBC QN AUTO: 30.4 PG (ref 26–34)
MCHC RBC AUTO-ENTMCNC: 33.2 G/DL (ref 30–36.5)
MCV RBC AUTO: 91.5 FL (ref 80–99)
NRBC # BLD: 0.02 K/UL (ref 0–0.01)
NRBC BLD-RTO: 0.2 PER 100 WBC
PCO2 BLDV: 45.6 MMHG (ref 41–51)
PH BLDV: 7.36 (ref 7.32–7.42)
PHOSPHATE SERPL-MCNC: 4.7 MG/DL (ref 2.6–4.7)
PLATELET # BLD AUTO: 164 K/UL (ref 150–400)
PMV BLD AUTO: 10.5 FL (ref 8.9–12.9)
PO2 BLDV: 33 MMHG (ref 25–40)
POTASSIUM SERPL-SCNC: 4.5 MMOL/L (ref 3.5–5.1)
RBC # BLD AUTO: 2.6 M/UL (ref 4.1–5.7)
SAO2 % BLDV: 60.6 % (ref 65–88)
SERVICE CMNT-IMP: ABNORMAL
SODIUM SERPL-SCNC: 135 MMOL/L (ref 136–145)
SPECIMEN EXP DATE BLD: NORMAL
SPECIMEN TYPE: ABNORMAL
UNIT DIVISION: 0
UNIT ISSUE DATE/TIME: NORMAL
WBC # BLD AUTO: 9.2 K/UL (ref 4.1–11.1)

## 2025-04-18 PROCEDURE — 2500000003 HC RX 250 WO HCPCS

## 2025-04-18 PROCEDURE — 97530 THERAPEUTIC ACTIVITIES: CPT

## 2025-04-18 PROCEDURE — 2580000003 HC RX 258: Performed by: ANESTHESIOLOGY

## 2025-04-18 PROCEDURE — 71045 X-RAY EXAM CHEST 1 VIEW: CPT

## 2025-04-18 PROCEDURE — 6370000000 HC RX 637 (ALT 250 FOR IP): Performed by: THORACIC SURGERY (CARDIOTHORACIC VASCULAR SURGERY)

## 2025-04-18 PROCEDURE — 2580000003 HC RX 258: Performed by: STUDENT IN AN ORGANIZED HEALTH CARE EDUCATION/TRAINING PROGRAM

## 2025-04-18 PROCEDURE — 2580000003 HC RX 258: Performed by: INTERNAL MEDICINE

## 2025-04-18 PROCEDURE — 6370000000 HC RX 637 (ALT 250 FOR IP): Performed by: NURSE PRACTITIONER

## 2025-04-18 PROCEDURE — 6360000002 HC RX W HCPCS: Performed by: INTERNAL MEDICINE

## 2025-04-18 PROCEDURE — 6360000002 HC RX W HCPCS: Performed by: NURSE PRACTITIONER

## 2025-04-18 PROCEDURE — 6370000000 HC RX 637 (ALT 250 FOR IP)

## 2025-04-18 PROCEDURE — 6370000000 HC RX 637 (ALT 250 FOR IP): Performed by: PHYSICIAN ASSISTANT

## 2025-04-18 PROCEDURE — 84100 ASSAY OF PHOSPHORUS: CPT

## 2025-04-18 PROCEDURE — 6370000000 HC RX 637 (ALT 250 FOR IP): Performed by: STUDENT IN AN ORGANIZED HEALTH CARE EDUCATION/TRAINING PROGRAM

## 2025-04-18 PROCEDURE — 83735 ASSAY OF MAGNESIUM: CPT

## 2025-04-18 PROCEDURE — 6360000002 HC RX W HCPCS: Performed by: ANESTHESIOLOGY

## 2025-04-18 PROCEDURE — 6360000002 HC RX W HCPCS: Performed by: STUDENT IN AN ORGANIZED HEALTH CARE EDUCATION/TRAINING PROGRAM

## 2025-04-18 PROCEDURE — 82962 GLUCOSE BLOOD TEST: CPT

## 2025-04-18 PROCEDURE — 94640 AIRWAY INHALATION TREATMENT: CPT

## 2025-04-18 PROCEDURE — 2700000000 HC OXYGEN THERAPY PER DAY

## 2025-04-18 PROCEDURE — 85027 COMPLETE CBC AUTOMATED: CPT

## 2025-04-18 PROCEDURE — 97116 GAIT TRAINING THERAPY: CPT

## 2025-04-18 PROCEDURE — 80048 BASIC METABOLIC PNL TOTAL CA: CPT

## 2025-04-18 PROCEDURE — P9047 ALBUMIN (HUMAN), 25%, 50ML: HCPCS | Performed by: INTERNAL MEDICINE

## 2025-04-18 PROCEDURE — 82803 BLOOD GASES ANY COMBINATION: CPT

## 2025-04-18 PROCEDURE — 97535 SELF CARE MNGMENT TRAINING: CPT

## 2025-04-18 PROCEDURE — 94760 N-INVAS EAR/PLS OXIMETRY 1: CPT

## 2025-04-18 PROCEDURE — 2000000000 HC ICU R&B

## 2025-04-18 PROCEDURE — 99231 SBSQ HOSP IP/OBS SF/LOW 25: CPT

## 2025-04-18 RX ORDER — GINSENG 100 MG
CAPSULE ORAL ONCE
Status: COMPLETED | OUTPATIENT
Start: 2025-04-18 | End: 2025-04-18

## 2025-04-18 RX ORDER — HALOPERIDOL 5 MG/ML
5 INJECTION INTRAMUSCULAR EVERY 6 HOURS PRN
Status: DISCONTINUED | OUTPATIENT
Start: 2025-04-18 | End: 2025-04-18

## 2025-04-18 RX ORDER — QUETIAPINE FUMARATE 25 MG/1
50 TABLET, FILM COATED ORAL NIGHTLY
Status: DISCONTINUED | OUTPATIENT
Start: 2025-04-18 | End: 2025-04-20

## 2025-04-18 RX ORDER — METOPROLOL TARTRATE 25 MG/1
25 TABLET, FILM COATED ORAL 2 TIMES DAILY
Status: DISCONTINUED | OUTPATIENT
Start: 2025-04-18 | End: 2025-04-23

## 2025-04-18 RX ORDER — HALOPERIDOL 5 MG/ML
5 INJECTION INTRAMUSCULAR EVERY 6 HOURS PRN
Status: DISCONTINUED | OUTPATIENT
Start: 2025-04-18 | End: 2025-05-01

## 2025-04-18 RX ORDER — ALBUMIN (HUMAN) 12.5 G/50ML
25 SOLUTION INTRAVENOUS EVERY 6 HOURS
Status: DISCONTINUED | OUTPATIENT
Start: 2025-04-18 | End: 2025-04-19

## 2025-04-18 RX ADMIN — ASPIRIN 81 MG: 81 TABLET, COATED ORAL at 08:34

## 2025-04-18 RX ADMIN — HEPARIN SODIUM 5000 UNITS: 5000 INJECTION INTRAVENOUS; SUBCUTANEOUS at 06:12

## 2025-04-18 RX ADMIN — IPRATROPIUM BROMIDE 0.5 MG: 0.5 SOLUTION RESPIRATORY (INHALATION) at 16:53

## 2025-04-18 RX ADMIN — POLYETHYLENE GLYCOL 3350 17 G: 17 POWDER, FOR SOLUTION ORAL at 08:32

## 2025-04-18 RX ADMIN — BUMETANIDE 2 MG: 0.25 INJECTION INTRAMUSCULAR; INTRAVENOUS at 17:30

## 2025-04-18 RX ADMIN — ACETAMINOPHEN 1000 MG: 500 TABLET ORAL at 17:39

## 2025-04-18 RX ADMIN — AMIODARONE HYDROCHLORIDE 400 MG: 200 TABLET ORAL at 08:32

## 2025-04-18 RX ADMIN — IRON SUCROSE 400 MG: 20 INJECTION, SOLUTION INTRAVENOUS at 06:55

## 2025-04-18 RX ADMIN — HEPARIN SODIUM 5000 UNITS: 5000 INJECTION INTRAVENOUS; SUBCUTANEOUS at 15:21

## 2025-04-18 RX ADMIN — AMIODARONE HYDROCHLORIDE 1 MG/MIN: 50 INJECTION, SOLUTION INTRAVENOUS at 04:09

## 2025-04-18 RX ADMIN — HALOPERIDOL LACTATE 5 MG: 5 INJECTION, SOLUTION INTRAMUSCULAR at 23:35

## 2025-04-18 RX ADMIN — Medication 400 MG: at 08:34

## 2025-04-18 RX ADMIN — VASOPRESSIN 0.01 UNITS/MIN: 20 INJECTION, SOLUTION INTRAVENOUS at 04:19

## 2025-04-18 RX ADMIN — CHLORHEXIDINE GLUCONATE 15 ML: 1.2 RINSE ORAL at 08:39

## 2025-04-18 RX ADMIN — FAMOTIDINE 20 MG: 20 TABLET, FILM COATED ORAL at 08:34

## 2025-04-18 RX ADMIN — BUMETANIDE 2 MG: 0.25 INJECTION INTRAMUSCULAR; INTRAVENOUS at 09:14

## 2025-04-18 RX ADMIN — MUPIROCIN: 20 OINTMENT TOPICAL at 08:39

## 2025-04-18 RX ADMIN — ALBUMIN (HUMAN) 25 G: 0.25 INJECTION, SOLUTION INTRAVENOUS at 15:21

## 2025-04-18 RX ADMIN — ALBUMIN (HUMAN) 25 G: 0.25 INJECTION, SOLUTION INTRAVENOUS at 08:38

## 2025-04-18 RX ADMIN — EPOETIN ALFA 20000 UNITS: 20000 SOLUTION INTRAVENOUS; SUBCUTANEOUS at 17:30

## 2025-04-18 RX ADMIN — METOPROLOL TARTRATE 25 MG: 25 TABLET, FILM COATED ORAL at 15:21

## 2025-04-18 RX ADMIN — IPRATROPIUM BROMIDE 0.5 MG: 0.5 SOLUTION RESPIRATORY (INHALATION) at 08:37

## 2025-04-18 RX ADMIN — BACITRACIN 1 EACH: 500 OINTMENT TOPICAL at 09:18

## 2025-04-18 RX ADMIN — SODIUM CHLORIDE, PRESERVATIVE FREE 10 ML: 5 INJECTION INTRAVENOUS at 08:39

## 2025-04-18 RX ADMIN — SENNOSIDES AND DOCUSATE SODIUM 1 TABLET: 50; 8.6 TABLET ORAL at 08:34

## 2025-04-18 RX ADMIN — ARFORMOTEROL TARTRATE 15 MCG: 15 SOLUTION RESPIRATORY (INHALATION) at 08:37

## 2025-04-18 RX ADMIN — HALOPERIDOL LACTATE 5 MG: 5 INJECTION, SOLUTION INTRAMUSCULAR at 22:24

## 2025-04-18 ASSESSMENT — PAIN SCALES - GENERAL
PAINLEVEL_OUTOF10: 2

## 2025-04-18 ASSESSMENT — PAIN DESCRIPTION - LOCATION: LOCATION: CHEST

## 2025-04-18 NOTE — CONSULTS
[facility]  [facility]  DIABETES MANAGEMENT CONSULT    Consulted by Provider for advanced nursing evaluation and care for inpatient blood glucose management.    Evaluation and Action Plan   Scooter Dickerson is a 78 yo male with a history of CAD, HTN, HLD, sleep apnea, asthma, arthritis, and blood clots in right leg who presented for scheduled CABG x2 on 4/15. He was previously admitted for CABG eval and workup after having abnormal stress test results in March. The [facility] has been consulted to assist in glycemic management and advanced diabetes management assessment this admission.    Mr. Dickerson has no personal or family history of diabetes with current A1c of 5.4%. He currently does not take any medications for diabetes or has ever in the past. He endorses he eats small portions.    Admission BG 78. He underwent CABG x2 yesterday and insulin infusion was started post op for glycemic control. His 24 hour insulin needs were very low at 13.4 units with hourly rates of 0.1-2.6 units/hour. He will continue on the insulin infusion for another 24 hours. Anticipate he will be able to be transitioned off insulin infusion tomorrow afternoon. Will continue to follow along with you.     4/18- , within target range. Will hold basal dosing and monitor BG pattern for 24 hours to determine if he will require consistent scheduled insulin therapy. Will place recs for over the weekend incase his BG pattern experiences persistent hyperglycemia.     ** Over weekend recs for BG management:  If FBG is greater than 180 restart transitioning off dosing with 20% increase to Lantus 6 units.   If FBG is within target range of 100-180 continue to monitor BG pattern and hold basal dosing.   If his BG pattern is experiencing persistent pre-meal hyperglycemia START Humalog 4 units TID with meals     Blood glucose pattern      Significant diabetes-related events over the past 24-72 hours  A1C

## 2025-04-18 NOTE — CARDIO/PULMONARY
Chart reviewed: Patient is 79 y.o. male admitted with Disease of cardiovascular system [I25.10]  CAD in native artery [I25.10]    Education: ERAS education  at bedside.  Met with Scooter Dickerson to BEGIN cardiac surgery post discharge instructions and to discuss participation in the Cardiac Rehab Program.     Educated using teach back method. Reviewed the use of bear for sternal support, daily weight and temperature monitoring,  and use of incentive spirometer.   Scooter Dickerson was able to demonstrate proper use of incentive spirometer, achieving 500 ml.     Discussed Cardiac Rehab Program format, benefits, and encouraged participation. Scooter Dickerson [place] of [place] miles[place] for OP program.He was advised to speak to his cardiologist about exercising once he is discharged.   Scooter Dickerson followed direction well but unsure how much information he will retain.      Dalila Schultz RN

## 2025-04-18 NOTE — PROGRESS NOTES
Cardiac Surgery ICU Progress Note    Admit Date: 4/15/2025  POD:  3 Days Post-Op    Procedure:    4/15/25 with Dr. Brand:   CORONARY ARTERY BYPASS GRAFTING X 2 WITH LIMA (LIMA-LAD, SVG-OM , LESVGH, LIGATION OF LEFT ATRIAL APPENDAGE, DENNIS AND EPIAORTIC U/S BY DR HAGEN       Hospital synopsis:  4/15 POD0: CABG x2/LAAL with Dr Brand. DENNIS with normal biventricular function, EF 55%, no WMA. Transferred to ICU on precedex and insulin gtt with A&V wires and 3 alecia drains (2 med, L pleural). Extubated at 1545 to BiPAP. Overnight maribeth transitioned to norepi.    POD1: On vaso 0.04, levo 4. A-paced 70's with intermittent capture. Low UOP overnight (200cc). Received albumin x1 and 250ml LR. IV lasix 40mg with minimal response. Consult nephrology: added additional lasix 100mg for diuretic challenge. CXR showing pulmonary edema. ABG showing rising PCO2 65 and pH 7.15. Placed on BiPAP with repeat ABG showing improvement.    POD 2: Afib around 0200, amio bolus and infusion started. Remains on norepi and vaso. 4L NC this AM, transition to HFNC for ongoing mild hypercapnia. Good response to lasix challenge, Cr up to 2.44. Bumex 2mg IV BID today. PRBC x 1 for Hgb 7.5. Xray with gastric bubble and dilation noted.   4/15 POD 3: Agitation overnight, precedex infusion started. VBG stable, CO2 normal. SB in the 50s this AM, DC amio infusion. Remains on HFNC, pressors off. Continue diuresis, deline. AV wires pulled, plan to pull chest tubes today. Continue ICU level of care.      Subjective:   Patient seen with Dr. Brand, in bed, restraints in place. Afebrile, SB in the 50s, HFNC 40L 30%. He is more confused this morning and affect is flat this AM.      Objective:   Vitals:  Blood pressure (!) 112/55, pulse 53, temperature 99.1 °F (37.3 °C), temperature source Oral, resp. rate 22, height 1.676 m (5' 6\"), weight 70.6 kg (155 lb 10.3 oz), SpO2 94%.  Temp (24hrs), Av.9 °F (37.2 °C), Min:98.6 °F (37 °C), Max:99.1 °F (37.3 °C)    CVP:  uptrend, 2.53 this AM  - Bumex IV BID today per nephrology  - Continue sosa catheter with mentation changes today and continued rise in Cr  - Monitor BMP  - Avoid nephrotoxic medications and prolonged hypotension    Hyperkalemia: Improved  - Potassium 4.5  - Repeat BMP     BPH: PTA finasteride 5mg  - Continue finasteride 5mg     DM2: PTA metformin 1G BID, pre op A1C 5.4  - Diabetes Management consulted  - Transitioned off insulin infusion on 4/17, PRN SSI  - Carb consistent diet    Delirium: Noted overnight 4/17 to 4/18  - Precedex infusion overnight, PRN haldol ordered by Intensivist  - Encourage day/night schedules  - Monitor      Acute on Chronic Anemia, ANA MARIA: Transfused PRBC x 1 on 4/17  - Hgb 7.9 today, off pressors, no acute indication for transfusion   - Daily CBC and monitor CT output  - Venofer 200mg x 3 doses (4/16-4/18)     Acute Post Op Pain:  - Scheduled tylenol, lidocaine patches  - PRN oxycodone, dilaudid  - Gabapentin held d/t CRISTINO on CKD    Osteoporosis: On PTA fosamax and vit D  - Eval closer to DC     Neuropathy: 2/2 DM and PAD. PTA gabapentin 600mg BID  - Hold gabapentin in setting of CRISTINO on CKD    Baseline Mobility Issues:  - Per notes, he uses a rollator most of the time at baseline, noted decline in his function the last 2 years  - PT/OT consulted, recommending IPR     Fluid and electrolytes: Maintain K+ >4 and Mg level >2.  Nutrition: advance diet as tolerated, cardiac, added supplement   Activity: OOB all meals and ambulate in halls, encourage I/S   Bowel Regimen: Senokot , Miralax, and PRN dulcolax   GI ppx: Pepcid  DVT ppx: SCDs, SQ heparin (start 4/17)     Dispo: Continue ICU level of care due to O2 requirements and new delirium.     Signed By: JENNIFER DECKER - NP

## 2025-04-18 NOTE — PROGRESS NOTES
[facility]   [address], [place] [address]  Phone: [phone]   [phone]    [email or web address]     Nephrology Progress Note    Patient Name : Scooter Dickerson      : 1945     MRN : [id number]  Date of Admission : 4/15/2025  Date of Servive : 25    CC:  Follow up for CRISTINO       Assessment and Plan   CRISTINO on CKD  - ischemic ATN, non oliguric w/ diuretics   - renal US : suggests CKD, No obstruction   - Cr plateauing  - aim net negative or atleast net even --> continue IV bumex   - probably better to keep sosa for today -- too weak.   - scrotal elevation to help w/ penile swelling   - labs daily   - I will be seeing him daily over the weekend      CKD 3B   - baseline Cr 1.5 mg/dl pre op   - / HTN, DM, smoking      Met + Resp acidosis   - improved      Acute Hypercarbic resp failure   Chronic heavy smoker   KARI - does not use CPAP  - per CCM      CAD s/p CABG x 2, AMADOU ligation 4/15   Hypotension   - off pressors      Chronic anemia   Iron def : complete venofer x 3 doses   - continue high dose VILMA  - transfuse PRN      BPH , hx of TURP 10/24, penile swelling   Osteoporosis   DM-II   A fib : On oral Amio        Care Plan discussed with:  ICU team     Interval History:  Tolerated iron infusion, Hb down some,. Suboptimal UOP w/ Bumex, agitated and more confused overnight. Off pressors, No A line, has ongoing penile swelling. Poor po intake,. Reports abdominal distension     Review of Systems: Review of systems not obtained due to patient factors.    Current Medications:   Current Facility-Administered Medications   Medication Dose Route Frequency    amiodarone (CORDARONE) 450 mg in dextrose 5 % 250 mL infusion ([id number])  1 mg/min IntraVENous Continuous    bumetanide (BUMEX) injection 2 mg  2 mg IntraVENous BID    iron sucrose (VENOFER) 400 mg in sodium chloride 0.9 % 250 mL IVPB  400 mg IntraVENous Q24H    heparin (porcine) injection 5,000 Units  8.6-50 MG tablet 1 tablet  1 tablet Oral BID    bisacodyl (DULCOLAX) suppository 10 mg  10 mg Rectal Daily PRN    magnesium hydroxide (MILK OF MAGNESIA) 400 MG/5ML suspension 30 mL  30 mL Oral Daily PRN    atorvastatin (LIPITOR) tablet 40 mg  40 mg Oral Nightly    potassium chloride 20 mEq/50 mL IVPB (Central Line)  20 mEq IntraVENous PRN    magnesium sulfate 2000 mg in 50 mL IVPB premix  2,000 mg IntraVENous PRN    ipratropium 0.5 mg-albuterol 2.5 mg (DUONEB) nebulizer solution 1 Dose  1 Dose Inhalation Q4H PRN    niCARdipine (CARDENE) 25 mg in sodium chloride 0.9 % 250 mL infusion ([id number])  2.5-15 mg/hr IntraVENous Continuous    insulin lispro (HUMALOG,ADMELOG) injection vial 0-12 Units  0-12 Units SubCUTAneous TID WC    insulin lispro (HUMALOG,ADMELOG) injection vial 0-6 Units  0-6 Units SubCUTAneous Nightly    glucose chewable tablet 16 g  4 tablet Oral PRN    dextrose bolus 10% 125 mL  125 mL IntraVENous PRN    Or    dextrose bolus 10% 250 mL  250 mL IntraVENous PRN    glucagon injection 1 mg  1 mg SubCUTAneous PRN    dextrose 10 % infusion   IntraVENous Continuous PRN    arformoterol tartrate (BROVANA) nebulizer solution 15 mcg  15 mcg Nebulization BID RT    And    ipratropium (ATROVENT) 0.02 % nebulizer solution 0.5 mg  0.5 mg Nebulization 4x Daily RT    famotidine (PEPCID) tablet 20 mg  20 mg Oral Daily    Or    famotidine (PEPCID) 20 MG/2ML 20 mg in sodium chloride (PF) 0.9 % 10 mL injection  20 mg IntraVENous Daily    lactated ringers infusion   IntraVENous Continuous    norepinephrine (LEVOPHED) 16 mg in sodium chloride 0.9 % 250 mL infusion (premix)  1-100 mcg/min IntraVENous Continuous      Allergies   Allergen Reactions    Eliquis [Apixaban] Itching    Penicillins Rash     Patient screened for any delayed non-IgE-mediated reaction to PCN.        Patient notes the following:    No delayed non-IgE-mediated reaction to PCN                Objective:  Vitals:    Vitals:    04/18/25 0300 04/18/25 0400

## 2025-04-18 NOTE — PLAN OF CARE
Problem: Physical Therapy - Adult  Goal: By Discharge: Performs mobility at highest level of function for planned discharge setting.  See evaluation for individualized goals.  Description: Problem: Physical Therapy - Adult  Goal: By Discharge: Performs mobility at highest level of function for planned discharge setting.  See evaluation for individualized goals.  4/17/2025 1210 by Sylvia Gandhi PT  Note: FUNCTIONAL STATUS PRIOR TO ADMISSION: Unclear PLOF as patient was an inconsistent historian, however appears to have had a functional decline leading up to hospital admission.  Reports difficulty with household distance ambulation, utilized rollator for all mobility, but was independent with ADLs     HOME SUPPORT PRIOR TO ADMISSION:  Pt lives alone and niece lives nearby; unclear level of s/a available     Physical Therapy Goals  Initiated 4/17/2025  1.  Patient will move from supine to sit and sit to supine in bed with minimal assistance within 5 day(s).    2.  Patient will perform sit to stand with minimal assistance within 5 day(s).  3.  Patient will transfer from bed to chair and chair to bed with minimal assistance using the least restrictive device within 5 day(s).  4.  Patient will ambulate with minimal assistance for 50 feet with the least restrictive device within 5 day(s).   5.  Patient will perform cardiac exercises per protocol with minimal assistance within 5 days.  6.  Patient will verbally recall and functionally demonstrate mindful-based movements (\"move in the tube\") principles without cues within 5 days.   4/18/2025 1501 by Mariajose Arriola, PT  Outcome: Progressing     PHYSICAL THERAPY TREATMENT    Patient: Scooter Dickerson (79 y.o. male)  Date: 4/18/2025  Diagnosis: Disease of cardiovascular system [I25.10]  CAD in native artery [I25.10] Disease of cardiovascular system  Procedure(s) (LRB):  CORONARY ARTERY BYPASS GRAFTING X 2 WITH STEVEN VAUGHAN, LIGATION OF LEFT ATRIAL APPENDAGE, DENNIS AND EPIAORTIC    Shoulder abduction    []                             []                             []                             []                                Scapular elevation  1  5  [x]                             []                              []                             []                                Scapular retraction    []                             []                             []                             []                                Trunk rotation    []                             []                             []                             []                                Trunk sidebending    []                             []                              []                             []                                                                                                                                                                                                                                                                       Pain Rating:  Did not interfere    Activity Tolerance:   Fair     After treatment:   Patient left in no apparent distress sitting up in chair, Call bell within reach, and Bed/ chair alarm activated    COMMUNICATION/EDUCATION:   The patient's plan of care was discussed with: occupational therapist and registered nurse    Patient Education  Education Given To: Patient  Education Provided: Role of Therapy;Plan of Care;Mobility Training;Precautions;Orientation  Education Provided Comments: move in tube precautions; activity progression and pacing  Education Method: Verbal;Demonstration  Education Outcome: Verbalized understanding;Continued education needed      Mariajose Arriola PT  Minutes: 31

## 2025-04-18 NOTE — PROGRESS NOTES
2000- Received report on patient from MALVIN Haji.     2235- Patient refuse to wear BIPAP mask. Provided education on BIPAP. Still refusing with RT and nurse. Patient left on HFNC 40L 32% FIO2, sating well.     2300- Patient increasingly agitated/paranoid. Order placed for dex.    2320- Patient removed HFNC and attempting to pull at R IJ CVC. HFNC placed back into nose, dex increased. Order for soft bilateral wrist restraints if needed. No ABG at this time per intensivist.     0600- Soft bilateral wrist restraints applied for pulling at lines. POC aware.     0800- Bedside shift change report given to MALVIN Haji (oncoming nurse) by MALVIN Matta (offgoing nurse). Report included the following information Nurse Handoff Report, Adult Overview, Surgery Report, Intake/Output, MAR, Recent Results, and Cardiac Rhythm SB .

## 2025-04-18 NOTE — PROGRESS NOTES
CRITICAL CARE NOTE      Name: Scooter Dickerson   : 1945   MRN: [id number]   Date: 2025      Reason for ICU Admission: s/p CABG    ICU PROBLEM LIST   ICU delirium   Multivessel CAD s/p CABG x2  PAF  COPD  Hx HTN  PAD  CRISTINO on CKD 3  DM-2  BPH  ANA MARIA    HISTORY OF PRESENT ILLNESS:   Pt is a 80 yo M w/ PMH as noted above who presents to CVICU post planned CABG x2 (LIMA-LAD, SVG-OM), and AMADOU ligation. Pt received 2u pRBCs on pump, and DDAVP after the case. VVI paced due to sinus terence <40.      Pt arrives to CVICU in VVI pacing to 80s, intubated, sedated on precedex, insulin gtt. Pt extubated POD 0. Concern for renal function in milvia op period, nephrology following.     24 HOUR EVENTS:   ICU delirium o/n, requiring precedex. Off vasopressors. On HFNC this AM. UOP remains marginal, however grossly net even w/ diuresis.     NEUROLOGICAL:    Monitor mentation  Delirium precautions  Precedex gtt  PRN anxiolytics  Avoid delirio genic medications  As needed pain regimen     PULMONOLOGY:   O2 per HFNC for spO2 90-98%  Home and as needed nebs  Diuresis as tolerated  IS, PFV, out of bed to chair as tolerated      CARDIOVASCULAR:   Amio gtt, transition to PO  Goal MAP greater than 65, SBP less than 130, CI >2, CO >3.5  Pacer wires and Dinh drain management per CT surgery   Telemetry     GASTROINTESTINAL:   Pepcid  ADAT      RENAL/ELECTROLYTE/FLUIDS:   Strict ins and outs  Nephrology following  Continue diuresis  Avoid nephrotoxins, renally dose medications   Daily renal panel  Monitor and replace electrolytes     ENDOCRINE:   Insulin drip per protocol  Glucose goal 120-180     HEMATOLOGY/ONCOLOGY:   SCDs  Chemical prophylaxis as cleared by surgery     ID/MICRO:      Perioperative Ancef      ICU DAILY CHECKLIST      Code Status: Full  DVT Prophylaxis: SCDs  T/L/D: CVC, A-line, Dinh x3, Elias   SUP: Pepcid  Diet: ADA T  Activity Level: Ad aisha.  ABCDEF Bundle/Checklist Completed:Yes  Disposition: Per

## 2025-04-18 NOTE — PLAN OF CARE
Problem: Occupational Therapy - Adult  Goal: By Discharge: Performs self-care activities at highest level of function for planned discharge setting.  See evaluation for individualized goals.  Description: FUNCTIONAL STATUS PRIOR TO ADMISSION:  Patient was ambulatory using rollator  Receives Help From: Family, Prior Level of Assist for ADLs: Independent,  ,  ,  ,  ,  , Prior Level of Assist for Homemaking: Needs assistance, Ambulation Assistance: Needs assistance, Prior Level of Assist for Transfers: Independent,       HOME SUPPORT: Patient lived alone and reports being independent with ADLS/IADLS .    Occupational Therapy Goals:  Initiated 4/17/2025    1.  Patient will perform ADLs standing 5 mins without fatigue or LOB with Maximal Assist and Assist x1 within 7 days.  2.  Patient will perform upper body ADLs with Minimal Assist within 7 days.  3.  Patient will perform lower body ADLs with Moderate Assist within 7 days.    4.  Patient will perform gathering ADL items high and low 2/2 with Maximal Assist within 7 days.  5.  Patient will perform toilet transfers with Maximal Assist and Assist x1 within 7 days.  6.  Patient will perform all aspects of toileting with Moderate Assist within 7 days.  7.  Patient will participate in cardiac/sternal upper extremity therapeutic exercise/activities to increase independence with ADLs with supervision/set-up for 5 minutes within 7 days.   8.  Patient will adhere to Move in the Tube precautions during functional tasks with Min cues within 7 days.     Outcome: Progressing   OCCUPATIONAL THERAPY TREATMENT  Patient: Scooter Dickerson (79 y.o. male)  Date: 4/18/2025  Primary Diagnosis: Disease of cardiovascular system [I25.10]  CAD in native artery [I25.10]  Procedure(s) (LRB):  CORONARY ARTERY BYPASS GRAFTING X 2 WITH STEVEN VAUGHAN, LIGATION OF LEFT ATRIAL APPENDAGE, DENNIS AND EPIAORTIC U/S BY DR HAGEN (N/A) 3 Days Post-Op   Precautions: Fall Risk           Sternal Precautions: Move  in the Tube    Chart, occupational therapy assessment, plan of care, and goals were reviewed.    ASSESSMENT  Patient continues to benefit from skilled OT services and is progressing towards goals. Pt received semi-reclined in bed, agreeable to therapy, cleared by RN. Pt was was oriented to self only thinking we were in his living room, required cues to reorient to situation. Once sitting EOB, pt was more alert and following commands, able to remember that he was at [facility]. Pt was able to complete 3/6 cardiac exercises sitting EOB with good sitting balance. Mod x2 for sit<>stand and functional mobility with rollator. VSS. Pt was left sitting in chair, vss, all needs met, call bell in reach. Will continue to follow in acute setting.             PLAN :  Patient continues to benefit from skilled intervention to address the above impairments.  Continue treatment per established plan of care to address goals.    Recommend with staff: oob to chair and up to bsc x2    Recommend next OT session: dressing, precautions.    Recommendation for discharge: (in order for the patient to meet his/her long term goals):   High intensity/comprehensive skilled occupational therapy in a multidisciplinary setting as patient is working towards tolerating up to 3 hours of therapy/day 5-7x/week    Other factors to consider for discharge: patient's current support system is unable to meet their requirements for physical assistance, poor safety awareness, impaired cognition, high risk for falls, not safe to be alone, and concern for safely navigating or managing the home environment    IF patient discharges home will need the following DME: continuing to assess with progress       SUBJECTIVE:   Patient stated “You know you are full of crap.” pt after saying he was doing a good job    OBJECTIVE DATA SUMMARY:   Cognitive/Behavioral Status:  Orientation  Overall Orientation Status: Impaired  Orientation Level: Oriented to person;Disoriented to

## 2025-04-18 NOTE — PROCEDURES
PROCEDURE NOTE  Date: 4/18/2025   Name: Scooter Dickerson  YOB: 1945    Procedures    Instructed patient how to take a deep breathe and hum while chest tubes were being pulled. Patient demonstrated with ease. CT x3 removed without difficulty. Vaseline gauze and 4x4s applied. Chest xray in the morning unless change in respiratory status.     Jojo Jason, NP

## 2025-04-18 NOTE — PROCEDURES
PROCEDURE NOTE  Date: 4/18/2025   Name: Scooter Dickerson  YOB: 1945    Procedures    Patient has not used pacing wires past 48 hours. He has become delirious and is pulling at lines. Decision made to remove wires despite no betablocker yet.  Skin cleaned with chlorohexidine swab, sutures removed. A wires removed first, tension applied to wires and they slid through skin easily. V wires removed next, tension applied and wires pulled through skin. came out easily. Patient tolerated well, hemodynamically stable at end of procedure. Nursing aware will need to monitor over next 4 hours due to wire removal. Vital signs every 15 minutes for 1 hour and bedrest for 1 hour post removal.     DAILY Ramos-BC

## 2025-04-18 NOTE — PROGRESS NOTES
0800- report received. All care assumed    0830- NP at bedside to remove pacing wires    0930- RIJ CVC removed    1000- NP at bedside to remove chest tubes    1015- nasal cannula placed    1020- L>R edema noted. NP Eren notified. Duplex ordered    1245- Bedside and Verbal shift change report given to Koki RN (oncoming nurse) by Raissa RN (offgoing nurse). Report included the following information Nurse Handoff Report, Intake/Output, MAR, Recent Results, Med Rec Status, and Cardiac Rhythm NSR .

## 2025-04-19 ENCOUNTER — APPOINTMENT (OUTPATIENT)
Facility: HOSPITAL | Age: 80
End: 2025-04-19
Attending: THORACIC SURGERY (CARDIOTHORACIC VASCULAR SURGERY)
Payer: MEDICARE

## 2025-04-19 LAB
AMMONIA PLAS-SCNC: 26 UMOL/L
ANION GAP SERPL CALC-SCNC: 8 MMOL/L (ref 2–12)
BUN SERPL-MCNC: 62 MG/DL (ref 6–20)
BUN/CREAT SERPL: 22 (ref 12–20)
CALCIUM SERPL-MCNC: 8 MG/DL (ref 8.5–10.1)
CHLORIDE SERPL-SCNC: 101 MMOL/L (ref 97–108)
CO2 SERPL-SCNC: 23 MMOL/L (ref 21–32)
CREAT SERPL-MCNC: 2.77 MG/DL (ref 0.7–1.3)
ERYTHROCYTE [DISTWIDTH] IN BLOOD BY AUTOMATED COUNT: 19.4 % (ref 11.5–14.5)
GLUCOSE BLD STRIP.AUTO-MCNC: 115 MG/DL (ref 65–117)
GLUCOSE BLD STRIP.AUTO-MCNC: 137 MG/DL (ref 65–117)
GLUCOSE BLD STRIP.AUTO-MCNC: 159 MG/DL (ref 65–117)
GLUCOSE BLD STRIP.AUTO-MCNC: 218 MG/DL (ref 65–117)
GLUCOSE SERPL-MCNC: 207 MG/DL (ref 65–100)
HCT VFR BLD AUTO: 25.5 % (ref 36.6–50.3)
HGB BLD-MCNC: 8.1 G/DL (ref 12.1–17)
MAGNESIUM SERPL-MCNC: 3 MG/DL (ref 1.6–2.4)
MCH RBC QN AUTO: 29.8 PG (ref 26–34)
MCHC RBC AUTO-ENTMCNC: 31.8 G/DL (ref 30–36.5)
MCV RBC AUTO: 93.8 FL (ref 80–99)
NRBC # BLD: 0.06 K/UL (ref 0–0.01)
NRBC BLD-RTO: 1.3 PER 100 WBC
PLATELET # BLD AUTO: 181 K/UL (ref 150–400)
PMV BLD AUTO: 10.1 FL (ref 8.9–12.9)
POTASSIUM SERPL-SCNC: 4.1 MMOL/L (ref 3.5–5.1)
RBC # BLD AUTO: 2.72 M/UL (ref 4.1–5.7)
SERVICE CMNT-IMP: ABNORMAL
SERVICE CMNT-IMP: NORMAL
SODIUM SERPL-SCNC: 132 MMOL/L (ref 136–145)
WBC # BLD AUTO: 4.8 K/UL (ref 4.1–11.1)

## 2025-04-19 PROCEDURE — 83735 ASSAY OF MAGNESIUM: CPT

## 2025-04-19 PROCEDURE — 94640 AIRWAY INHALATION TREATMENT: CPT

## 2025-04-19 PROCEDURE — 6360000002 HC RX W HCPCS: Performed by: STUDENT IN AN ORGANIZED HEALTH CARE EDUCATION/TRAINING PROGRAM

## 2025-04-19 PROCEDURE — 82962 GLUCOSE BLOOD TEST: CPT

## 2025-04-19 PROCEDURE — 2500000003 HC RX 250 WO HCPCS: Performed by: STUDENT IN AN ORGANIZED HEALTH CARE EDUCATION/TRAINING PROGRAM

## 2025-04-19 PROCEDURE — 2580000003 HC RX 258: Performed by: NURSE PRACTITIONER

## 2025-04-19 PROCEDURE — 2580000003 HC RX 258: Performed by: INTERNAL MEDICINE

## 2025-04-19 PROCEDURE — 2580000003 HC RX 258: Performed by: STUDENT IN AN ORGANIZED HEALTH CARE EDUCATION/TRAINING PROGRAM

## 2025-04-19 PROCEDURE — 80048 BASIC METABOLIC PNL TOTAL CA: CPT

## 2025-04-19 PROCEDURE — 6360000002 HC RX W HCPCS: Performed by: NURSE PRACTITIONER

## 2025-04-19 PROCEDURE — 2500000003 HC RX 250 WO HCPCS

## 2025-04-19 PROCEDURE — 2580000003 HC RX 258

## 2025-04-19 PROCEDURE — 6360000002 HC RX W HCPCS: Performed by: INTERNAL MEDICINE

## 2025-04-19 PROCEDURE — 6370000000 HC RX 637 (ALT 250 FOR IP)

## 2025-04-19 PROCEDURE — 71045 X-RAY EXAM CHEST 1 VIEW: CPT

## 2025-04-19 PROCEDURE — 82140 ASSAY OF AMMONIA: CPT

## 2025-04-19 PROCEDURE — 2580000003 HC RX 258: Performed by: THORACIC SURGERY (CARDIOTHORACIC VASCULAR SURGERY)

## 2025-04-19 PROCEDURE — 6360000002 HC RX W HCPCS: Performed by: THORACIC SURGERY (CARDIOTHORACIC VASCULAR SURGERY)

## 2025-04-19 PROCEDURE — 2000000000 HC ICU R&B

## 2025-04-19 PROCEDURE — 85027 COMPLETE CBC AUTOMATED: CPT

## 2025-04-19 PROCEDURE — P9047 ALBUMIN (HUMAN), 25%, 50ML: HCPCS | Performed by: INTERNAL MEDICINE

## 2025-04-19 RX ORDER — AMIODARONE HYDROCHLORIDE 200 MG/1
200 TABLET ORAL 2 TIMES DAILY
Status: DISCONTINUED | OUTPATIENT
Start: 2025-04-19 | End: 2025-04-25

## 2025-04-19 RX ORDER — SODIUM CHLORIDE, SODIUM LACTATE, POTASSIUM CHLORIDE, CALCIUM CHLORIDE 600; 310; 30; 20 MG/100ML; MG/100ML; MG/100ML; MG/100ML
INJECTION, SOLUTION INTRAVENOUS CONTINUOUS
Status: DISCONTINUED | OUTPATIENT
Start: 2025-04-19 | End: 2025-04-20

## 2025-04-19 RX ORDER — SODIUM CHLORIDE 9 MG/ML
INJECTION, SOLUTION INTRAVENOUS PRN
Status: DISCONTINUED | OUTPATIENT
Start: 2025-04-19 | End: 2025-05-03 | Stop reason: HOSPADM

## 2025-04-19 RX ORDER — ALBUMIN (HUMAN) 12.5 G/50ML
12.5 SOLUTION INTRAVENOUS EVERY 8 HOURS
Status: COMPLETED | OUTPATIENT
Start: 2025-04-19 | End: 2025-04-19

## 2025-04-19 RX ADMIN — DEXMEDETOMIDINE HYDROCHLORIDE 1 MCG/KG/HR: 4 INJECTION, SOLUTION INTRAVENOUS at 14:52

## 2025-04-19 RX ADMIN — SODIUM CHLORIDE: 0.9 INJECTION, SOLUTION INTRAVENOUS at 08:36

## 2025-04-19 RX ADMIN — FAMOTIDINE 20 MG: 10 INJECTION, SOLUTION INTRAVENOUS at 08:19

## 2025-04-19 RX ADMIN — BUMETANIDE 2 MG: 0.25 INJECTION INTRAMUSCULAR; INTRAVENOUS at 08:19

## 2025-04-19 RX ADMIN — MUPIROCIN: 20 OINTMENT TOPICAL at 21:11

## 2025-04-19 RX ADMIN — INSULIN LISPRO 4 UNITS: 100 INJECTION, SOLUTION INTRAVENOUS; SUBCUTANEOUS at 08:13

## 2025-04-19 RX ADMIN — ALBUMIN (HUMAN) 12.5 G: 0.25 INJECTION, SOLUTION INTRAVENOUS at 09:26

## 2025-04-19 RX ADMIN — IRON SUCROSE 400 MG: 20 INJECTION, SOLUTION INTRAVENOUS at 08:38

## 2025-04-19 RX ADMIN — HEPARIN SODIUM 5000 UNITS: 5000 INJECTION INTRAVENOUS; SUBCUTANEOUS at 21:39

## 2025-04-19 RX ADMIN — BUMETANIDE 2 MG: 0.25 INJECTION INTRAMUSCULAR; INTRAVENOUS at 16:59

## 2025-04-19 RX ADMIN — HEPARIN SODIUM 5000 UNITS: 5000 INJECTION INTRAVENOUS; SUBCUTANEOUS at 13:33

## 2025-04-19 RX ADMIN — DEXMEDETOMIDINE HYDROCHLORIDE 1 MCG/KG/HR: 4 INJECTION, SOLUTION INTRAVENOUS at 20:32

## 2025-04-19 RX ADMIN — DEXMEDETOMIDINE HYDROCHLORIDE 1 MCG/KG/HR: 4 INJECTION, SOLUTION INTRAVENOUS at 08:38

## 2025-04-19 RX ADMIN — INSULIN LISPRO 2 UNITS: 100 INJECTION, SOLUTION INTRAVENOUS; SUBCUTANEOUS at 17:04

## 2025-04-19 RX ADMIN — IPRATROPIUM BROMIDE 0.5 MG: 0.5 SOLUTION RESPIRATORY (INHALATION) at 15:20

## 2025-04-19 RX ADMIN — HEPARIN SODIUM 5000 UNITS: 5000 INJECTION INTRAVENOUS; SUBCUTANEOUS at 08:13

## 2025-04-19 RX ADMIN — SODIUM CHLORIDE, PRESERVATIVE FREE 10 ML: 5 INJECTION INTRAVENOUS at 21:09

## 2025-04-19 RX ADMIN — SODIUM CHLORIDE, SODIUM LACTATE, POTASSIUM CHLORIDE, AND CALCIUM CHLORIDE: .6; .31; .03; .02 INJECTION, SOLUTION INTRAVENOUS at 14:03

## 2025-04-19 RX ADMIN — ALBUMIN (HUMAN) 12.5 G: 0.25 INJECTION, SOLUTION INTRAVENOUS at 13:34

## 2025-04-19 RX ADMIN — VALPROATE SODIUM 250 MG: 100 INJECTION, SOLUTION INTRAVENOUS at 08:12

## 2025-04-19 RX ADMIN — VALPROATE SODIUM 250 MG: 100 INJECTION, SOLUTION INTRAVENOUS at 16:12

## 2025-04-19 RX ADMIN — CHLORHEXIDINE GLUCONATE 15 ML: 1.2 RINSE ORAL at 21:08

## 2025-04-19 RX ADMIN — IPRATROPIUM BROMIDE 0.5 MG: 0.5 SOLUTION RESPIRATORY (INHALATION) at 19:26

## 2025-04-19 RX ADMIN — ARFORMOTEROL TARTRATE 15 MCG: 15 SOLUTION RESPIRATORY (INHALATION) at 19:26

## 2025-04-19 ASSESSMENT — PAIN SCALES - GENERAL
PAINLEVEL_OUTOF10: 0
PAINLEVEL_OUTOF10: 2
PAINLEVEL_OUTOF10: 0

## 2025-04-19 NOTE — PROGRESS NOTES
[facility]   [address], [place] [address]  Phone: [phone]   [phone]    [email or web address]     Nephrology Progress Note    Patient Name : Scooter Dickerson      : 1945     MRN : [id number]  Date of Admission : 4/15/2025  Date of Servive : 25    CC:  Follow up for CRISTINO       Assessment and Plan   CRISTINO on CKD  - ischemic ATN, non oliguric w/ diuretics   - renal US : suggests CKD, No obstruction   - Get labs and see if metabolic issues (including NH4) driving his post op delirium   - continue IV Bumex, UOP less than desired  - inclined towards keeping his sosa x 24 hrs due to MS issues   - to remain in ICU     Post op Delirium   -likely toxic metabolic, less likely central   - per [facility]      CKD 3B   - baseline Cr 1.5 mg/dl pre op   - 2/2 HTN, DM, smoking      Met + Resp acidosis   - improved      Acute Hypercarbic resp failure   Chronic heavy smoker   KARI - does not use CPAP  - per [facility]      CAD s/p CABG x 2, AMADOU ligation 4/15   Hypotension   - off pressors      Chronic anemia   Iron def : complete venofer x 3 doses   - continue high dose VILMA  - transfuse PRN      BPH , hx of TURP 10/24, penile swelling   Osteoporosis   DM-II   A fib : On oral Amio        Care Plan discussed with:  ICU team     Interval History:  Seen and examined. Another night of delirium, agitation , refusing labs and O2. UOP less than desired.       Review of Systems: Review of systems not obtained due to patient factors.    Current Medications:   Current Facility-Administered Medications   Medication Dose Route Frequency    QUEtiapine (SEROQUEL) tablet 50 mg  50 mg Oral Nightly    metoprolol tartrate (LOPRESSOR) tablet 25 mg  25 mg Oral BID    haloperidol lactate (HALDOL) injection 5 mg  5 mg IntraMUSCular Q6H PRN    bumetanide (BUMEX) injection 2 mg  2 mg IntraVENous BID    iron sucrose (VENOFER) 400 mg in sodium chloride 0.9 % 250 mL IVPB  400 mg IntraVENous Q24H     heparin (porcine) injection 5,000 Units  5,000 Units SubCUTAneous 3 times per day    0.9 % sodium chloride infusion   IntraVENous PRN    dexmedeTOMIDine (PRECEDEX) 400 mcg in sodium chloride 0.9 % 100 mL infusion  0.1-1.5 mcg/kg/hr IntraVENous Continuous    [Held by provider] gabapentin (NEURONTIN) capsule 100 mg  100 mg Oral TID    epoetin christi (EPOGEN;PROCRIT) injection 20,000 Units  20,000 Units SubCUTAneous Once per day on Monday Wednesday Friday    finasteride (PROSCAR) tablet 5 mg  5 mg Oral Nightly    nicotine (NICODERM CQ) 21 MG/24HR 1 patch  1 patch TransDERmal Daily PRN    melatonin tablet 3 mg  3 mg Oral Nightly PRN    0.9 % sodium chloride infusion   IntraVENous Continuous    0.45 % sodium chloride infusion   IntraVENous Continuous    sodium chloride flush 0.9 % injection 5-40 mL  5-40 mL IntraVENous 2 times per day    sodium chloride flush 0.9 % injection 5-40 mL  5-40 mL IntraVENous PRN    ondansetron (ZOFRAN) injection 4 mg  4 mg IntraVENous Q4H PRN    aspirin EC tablet 81 mg  81 mg Oral Daily    acetaminophen (TYLENOL) tablet 1,000 mg  1,000 mg Oral Q6H    lidocaine 4 % external patch 2 patch  2 patch Topical Daily    oxyCODONE (ROXICODONE) immediate release tablet 5 mg  5 mg Oral Q4H PRN    Or    oxyCODONE (ROXICODONE) immediate release tablet 10 mg  10 mg Oral Q4H PRN    HYDROmorphone (DILAUDID) injection 0.5 mg  0.5 mg IntraVENous Q4H PRN    amiodarone (CORDARONE) tablet 400 mg  400 mg Oral BID    chlorhexidine (PERIDEX) 0.12 % solution 15 mL  15 mL Mouth/Throat BID    hydrALAZINE (APRESOLINE) injection 10 mg  10 mg IntraVENous Q6H PRN    mupirocin (BACTROBAN) 2 % ointment   Each Nostril BID    diphenhydrAMINE (BENADRYL) capsule 25 mg  25 mg Oral Nightly PRN    polyethylene glycol (GLYCOLAX) packet 17 g  17 g Oral Daily    sennosides-docusate sodium (SENOKOT-S) 8.6-50 MG tablet 1 tablet  1 tablet Oral BID    bisacodyl (DULCOLAX) suppository 10 mg  10 mg Rectal Daily PRN    magnesium hydroxide

## 2025-04-19 NOTE — PROGRESS NOTES
baseline. He is disoriented.      Cranial Nerves: No cranial nerve deficit.   Psychiatric:      Comments: Intermittent agitation            Intake/Output:     Intake/Output Summary (Last 24 hours) at 4/19/2025 0900  Last data filed at 4/19/2025 0400  Gross per 24 hour   Intake 629.69 ml   Output 1240 ml   Net -610.31 ml       Imaging  Pertinent imaging reviewed and interpreted as noted above      I personally spent 40 minutes of critical care time.  This is time spent at this critically ill patient's bedside actively involved in patient care as well as the coordination of care.  This does not include any procedural time which has been billed separately.    Curt Cruz MD  Internal Medicine/Critical Care/Neurocritical Care Medicine  [facility]   Staff Intensivist- [facility]

## 2025-04-19 NOTE — PROGRESS NOTES
2000- Received report on patient from MALVIN Telles.     2015- Attempt to give nighttime medications. Patient refusing at this time, will attempt again later.     2100- Patient Niece at bedside. Attempt to give medications, patient still refusing.     2110- Intensivist at bedside. Patient still refusing. Plan for now is attempt to let patient rest.     2224- Patient increasingly agitated, R PIV pulled out, removing oxygen and leads. Intensivist at bedside. Verbal order for 5 mg IM Haldol. Given.    2335- Patient still agitated. Intensivist at bedside, verbal order for 5mg IM Haldol. Given. Dex started.     0630- Patient starting to become increasingly agitated, Remove O2 and attempting to take out IV. Ok per intensivist to put dex up to 1, 1.5 if needed.     0800- Bedside shift change report given to MALVIN Billingsley (oncoming nurse) by MALVIN Matta (offgoing nurse). Report included the following information Nurse Handoff Report, Surgery Report, Intake/Output, MAR, Med Rec Status, and Cardiac Rhythm SR/SB .

## 2025-04-19 NOTE — PROGRESS NOTES
2000: Bedside shift change report given to Paxton RN (oncoming nurse) by Koki RN (offgoing nurse). Report included the following information Nurse Handoff Report, Adult Overview, Surgery Report, Intake/Output, MAR, and Recent Results.

## 2025-04-19 NOTE — PROGRESS NOTES
Cardiac Surgery ICU Progress Note    Admit Date: 4/15/2025  POD:  4 Days Post-Op    Procedure:    4/15/25 with Dr. Brand:   CORONARY ARTERY BYPASS GRAFTING X 2 WITH LIMA (LIMA-LAD, SVG-OM , LESVGH, LIGATION OF LEFT ATRIAL APPENDAGE, DENNIS AND EPIAORTIC U/S BY DR HAGEN       Hospital synopsis:  4/15 POD0: CABG x2/LAAL with Dr Brand. DENNIS with normal biventricular function, EF 55%, no WMA. Transferred to ICU on precedex and insulin gtt with A&V wires and 3 alecia drains (2 med, L pleural). Extubated at 1545 to BiPAP. Overnight maribeth transitioned to norepi.   4/16 POD1: On vaso 0.04, levo 4. A-paced 70's with intermittent capture. Low UOP overnight (200cc). Received albumin x1 and 250ml LR. IV lasix 40mg with minimal response. Consult nephrology: added additional lasix 100mg for diuretic challenge. CXR showing pulmonary edema. ABG showing rising PCO2 65 and pH 7.15. Placed on BiPAP with repeat ABG showing improvement.   4/14 POD 2: Afib around 0200, amio bolus and infusion started. Remains on norepi and vaso. 4L NC this AM, transition to HFNC for ongoing mild hypercapnia. Good response to lasix challenge, Cr up to 2.44. Bumex 2mg IV BID today. PRBC x 1 for Hgb 7.5. Xray with gastric bubble and dilation noted.   4/15 POD 3: Agitation overnight, precedex infusion started. VBG stable, CO2 normal. SB in the 50s this AM, DC amio infusion. Remains on HFNC, pressors off. Continue diuresis, deline. AV wires pulled, plan to pull chest tubes today. Continue ICU level of care. HTN in PM, start Metoprolol   4/16 POD 4: Worsening delirium haldol x 2 overnight and increased precedex. Valproic acid added by ICU. Refusing PO medications, food/drink today. Cr slightly increased with less robust response to diuretics. Start LR 50mL/hr for low UO and no PO intake.      Subjective:   Patient seen with Dr. Cedillo, in bed, in restraints. He is afebrile, SB in the 50s, 4L NC and drowsy/confused this AM.      Objective:   Vitals:  Blood pressure

## 2025-04-19 NOTE — PROGRESS NOTES
Physical Therapy      Reviewed chart and discussed with RN. Pt has been agitated and not appropriate for therapy at this time will defer and continue to follow.

## 2025-04-19 NOTE — PROGRESS NOTES
2000- Received report on patient from MALVIN Billingsley.    2100- Patient refuse oral medications. Remains on dex.     0800- Bedside shift change report given to MALVIN Billingsley (oncoming nurse) by MALVIN Matta (offgoing nurse). Report included the following information Nurse Handoff Report, Intake/Output, MAR, and Cardiac Rhythm SB .

## 2025-04-20 ENCOUNTER — APPOINTMENT (OUTPATIENT)
Facility: HOSPITAL | Age: 80
End: 2025-04-20
Attending: THORACIC SURGERY (CARDIOTHORACIC VASCULAR SURGERY)
Payer: MEDICARE

## 2025-04-20 LAB
ANION GAP SERPL CALC-SCNC: 7 MMOL/L (ref 2–12)
ARTERIAL PATENCY WRIST A: POSITIVE
BASE DEFICIT BLD-SCNC: 2.6 MMOL/L
BASE DEFICIT BLDV-SCNC: 7.4 MMOL/L
BDY SITE: ABNORMAL
BUN SERPL-MCNC: 65 MG/DL (ref 6–20)
BUN/CREAT SERPL: 23 (ref 12–20)
CALCIUM SERPL-MCNC: 7.6 MG/DL (ref 8.5–10.1)
CHLORIDE SERPL-SCNC: 103 MMOL/L (ref 97–108)
CO2 SERPL-SCNC: 23 MMOL/L (ref 21–32)
CREAT SERPL-MCNC: 2.77 MG/DL (ref 0.7–1.3)
EKG ATRIAL RATE: 54 BPM
EKG DIAGNOSIS: NORMAL
EKG P AXIS: 24 DEGREES
EKG P-R INTERVAL: 102 MS
EKG Q-T INTERVAL: 540 MS
EKG QRS DURATION: 158 MS
EKG QTC CALCULATION (BAZETT): 512 MS
EKG R AXIS: 58 DEGREES
EKG T AXIS: 19 DEGREES
EKG VENTRICULAR RATE: 54 BPM
ERYTHROCYTE [DISTWIDTH] IN BLOOD BY AUTOMATED COUNT: 19.3 % (ref 11.5–14.5)
GAS FLOW.O2 O2 DELIVERY SYS: ABNORMAL
GAS FLOW.O2 O2 DELIVERY SYS: ABNORMAL
GAS FLOW.O2 SETTING OXYMISER: 16 BPM
GLUCOSE BLD STRIP.AUTO-MCNC: 156 MG/DL (ref 65–117)
GLUCOSE BLD STRIP.AUTO-MCNC: 158 MG/DL (ref 65–117)
GLUCOSE BLD STRIP.AUTO-MCNC: 181 MG/DL (ref 65–117)
GLUCOSE BLD STRIP.AUTO-MCNC: 185 MG/DL (ref 65–117)
GLUCOSE SERPL-MCNC: 136 MG/DL (ref 65–100)
HCO3 BLD-SCNC: 23 MMOL/L (ref 21–28)
HCO3 BLDV-SCNC: 19.8 MMOL/L (ref 23–28)
HCT VFR BLD AUTO: 25.8 % (ref 36.6–50.3)
HGB BLD-MCNC: 8.3 G/DL (ref 12.1–17)
MAGNESIUM SERPL-MCNC: 2.8 MG/DL (ref 1.6–2.4)
MCH RBC QN AUTO: 29.4 PG (ref 26–34)
MCHC RBC AUTO-ENTMCNC: 32.2 G/DL (ref 30–36.5)
MCV RBC AUTO: 91.5 FL (ref 80–99)
NRBC # BLD: 0.2 K/UL (ref 0–0.01)
NRBC BLD-RTO: 3.2 PER 100 WBC
O2/TOTAL GAS SETTING VFR VENT: 30 %
O2/TOTAL GAS SETTING VFR VENT: 60 %
PCO2 BLD: 42.7 MMHG (ref 35–48)
PCO2 BLDV: 46.5 MMHG (ref 41–51)
PEEP RESPIRATORY: 5 CMH2O
PH BLD: 7.34 (ref 7.35–7.45)
PH BLDV: 7.24 (ref 7.32–7.42)
PHOSPHATE SERPL-MCNC: 4.3 MG/DL (ref 2.6–4.7)
PLATELET # BLD AUTO: 178 K/UL (ref 150–400)
PMV BLD AUTO: 10.8 FL (ref 8.9–12.9)
PO2 BLD: 148 MMHG (ref 83–108)
PO2 BLDV: <27 MMHG (ref 25–40)
POTASSIUM SERPL-SCNC: 4.4 MMOL/L (ref 3.5–5.1)
RBC # BLD AUTO: 2.82 M/UL (ref 4.1–5.7)
SAO2 % BLD: 99.1 % (ref 92–97)
SERVICE CMNT-IMP: ABNORMAL
SODIUM SERPL-SCNC: 133 MMOL/L (ref 136–145)
SPECIMEN TYPE: ABNORMAL
SPECIMEN TYPE: ABNORMAL
VENTILATION MODE VENT: ABNORMAL
VT SETTING VENT: 400 ML
WBC # BLD AUTO: 6.3 K/UL (ref 4.1–11.1)

## 2025-04-20 PROCEDURE — 2500000003 HC RX 250 WO HCPCS: Performed by: STUDENT IN AN ORGANIZED HEALTH CARE EDUCATION/TRAINING PROGRAM

## 2025-04-20 PROCEDURE — 6360000002 HC RX W HCPCS: Performed by: INTERNAL MEDICINE

## 2025-04-20 PROCEDURE — 2500000003 HC RX 250 WO HCPCS

## 2025-04-20 PROCEDURE — 6360000002 HC RX W HCPCS: Performed by: STUDENT IN AN ORGANIZED HEALTH CARE EDUCATION/TRAINING PROGRAM

## 2025-04-20 PROCEDURE — 2000000000 HC ICU R&B

## 2025-04-20 PROCEDURE — 74018 RADEX ABDOMEN 1 VIEW: CPT

## 2025-04-20 PROCEDURE — 31500 INSERT EMERGENCY AIRWAY: CPT

## 2025-04-20 PROCEDURE — 0BH17EZ INSERTION OF ENDOTRACHEAL AIRWAY INTO TRACHEA, VIA NATURAL OR ARTIFICIAL OPENING: ICD-10-PCS | Performed by: STUDENT IN AN ORGANIZED HEALTH CARE EDUCATION/TRAINING PROGRAM

## 2025-04-20 PROCEDURE — 87077 CULTURE AEROBIC IDENTIFY: CPT

## 2025-04-20 PROCEDURE — 5A1945Z RESPIRATORY VENTILATION, 24-96 CONSECUTIVE HOURS: ICD-10-PCS | Performed by: STUDENT IN AN ORGANIZED HEALTH CARE EDUCATION/TRAINING PROGRAM

## 2025-04-20 PROCEDURE — 36600 WITHDRAWAL OF ARTERIAL BLOOD: CPT

## 2025-04-20 PROCEDURE — 6360000002 HC RX W HCPCS: Performed by: NURSE PRACTITIONER

## 2025-04-20 PROCEDURE — 2700000000 HC OXYGEN THERAPY PER DAY

## 2025-04-20 PROCEDURE — 83735 ASSAY OF MAGNESIUM: CPT

## 2025-04-20 PROCEDURE — 71045 X-RAY EXAM CHEST 1 VIEW: CPT

## 2025-04-20 PROCEDURE — 6370000000 HC RX 637 (ALT 250 FOR IP)

## 2025-04-20 PROCEDURE — 87205 SMEAR GRAM STAIN: CPT

## 2025-04-20 PROCEDURE — 93005 ELECTROCARDIOGRAM TRACING: CPT | Performed by: THORACIC SURGERY (CARDIOTHORACIC VASCULAR SURGERY)

## 2025-04-20 PROCEDURE — 80048 BASIC METABOLIC PNL TOTAL CA: CPT

## 2025-04-20 PROCEDURE — 84100 ASSAY OF PHOSPHORUS: CPT

## 2025-04-20 PROCEDURE — 82803 BLOOD GASES ANY COMBINATION: CPT

## 2025-04-20 PROCEDURE — 87186 SC STD MICRODIL/AGAR DIL: CPT

## 2025-04-20 PROCEDURE — 94640 AIRWAY INHALATION TREATMENT: CPT

## 2025-04-20 PROCEDURE — 6370000000 HC RX 637 (ALT 250 FOR IP): Performed by: STUDENT IN AN ORGANIZED HEALTH CARE EDUCATION/TRAINING PROGRAM

## 2025-04-20 PROCEDURE — 92610 EVALUATE SWALLOWING FUNCTION: CPT

## 2025-04-20 PROCEDURE — 87070 CULTURE OTHR SPECIMN AEROBIC: CPT

## 2025-04-20 PROCEDURE — 2580000003 HC RX 258: Performed by: STUDENT IN AN ORGANIZED HEALTH CARE EDUCATION/TRAINING PROGRAM

## 2025-04-20 PROCEDURE — 93971 EXTREMITY STUDY: CPT

## 2025-04-20 PROCEDURE — 5A0935A ASSISTANCE WITH RESPIRATORY VENTILATION, LESS THAN 24 CONSECUTIVE HOURS, HIGH NASAL FLOW/VELOCITY: ICD-10-PCS | Performed by: STUDENT IN AN ORGANIZED HEALTH CARE EDUCATION/TRAINING PROGRAM

## 2025-04-20 PROCEDURE — 82962 GLUCOSE BLOOD TEST: CPT

## 2025-04-20 PROCEDURE — 2500000003 HC RX 250 WO HCPCS: Performed by: NURSE PRACTITIONER

## 2025-04-20 PROCEDURE — 85027 COMPLETE CBC AUTOMATED: CPT

## 2025-04-20 PROCEDURE — 6370000000 HC RX 637 (ALT 250 FOR IP): Performed by: NURSE PRACTITIONER

## 2025-04-20 PROCEDURE — 94002 VENT MGMT INPAT INIT DAY: CPT

## 2025-04-20 PROCEDURE — P9047 ALBUMIN (HUMAN), 25%, 50ML: HCPCS | Performed by: NURSE PRACTITIONER

## 2025-04-20 PROCEDURE — 93010 ELECTROCARDIOGRAM REPORT: CPT | Performed by: INTERNAL MEDICINE

## 2025-04-20 PROCEDURE — 89220 SPUTUM SPECIMEN COLLECTION: CPT

## 2025-04-20 RX ORDER — METOPROLOL TARTRATE 1 MG/ML
5 INJECTION, SOLUTION INTRAVENOUS EVERY 6 HOURS
Status: DISCONTINUED | OUTPATIENT
Start: 2025-04-20 | End: 2025-04-20

## 2025-04-20 RX ORDER — ALBUMIN (HUMAN) 12.5 G/50ML
25 SOLUTION INTRAVENOUS ONCE
Status: COMPLETED | OUTPATIENT
Start: 2025-04-20 | End: 2025-04-20

## 2025-04-20 RX ORDER — ROCURONIUM BROMIDE 10 MG/ML
50 INJECTION, SOLUTION INTRAVENOUS ONCE
Status: COMPLETED | OUTPATIENT
Start: 2025-04-20 | End: 2025-04-20

## 2025-04-20 RX ORDER — INSULIN LISPRO 100 [IU]/ML
0-12 INJECTION, SOLUTION INTRAVENOUS; SUBCUTANEOUS EVERY 6 HOURS
Status: DISCONTINUED | OUTPATIENT
Start: 2025-04-20 | End: 2025-04-20

## 2025-04-20 RX ORDER — INSULIN LISPRO 100 [IU]/ML
0-12 INJECTION, SOLUTION INTRAVENOUS; SUBCUTANEOUS EVERY 6 HOURS
Status: DISCONTINUED | OUTPATIENT
Start: 2025-04-20 | End: 2025-05-01

## 2025-04-20 RX ORDER — ROCURONIUM BROMIDE 10 MG/ML
50 INJECTION, SOLUTION INTRAVENOUS ONCE
Status: DISCONTINUED | OUTPATIENT
Start: 2025-04-20 | End: 2025-04-20 | Stop reason: DRUGHIGH

## 2025-04-20 RX ORDER — ASPIRIN 81 MG/1
81 TABLET, CHEWABLE ORAL DAILY
Status: DISCONTINUED | OUTPATIENT
Start: 2025-04-20 | End: 2025-05-01

## 2025-04-20 RX ORDER — ETOMIDATE 2 MG/ML
20 INJECTION INTRAVENOUS ONCE
Status: COMPLETED | OUTPATIENT
Start: 2025-04-20 | End: 2025-04-20

## 2025-04-20 RX ORDER — MIDAZOLAM HYDROCHLORIDE 2 MG/2ML
4 INJECTION, SOLUTION INTRAMUSCULAR; INTRAVENOUS ONCE
Status: COMPLETED | OUTPATIENT
Start: 2025-04-20 | End: 2025-04-20

## 2025-04-20 RX ADMIN — ATORVASTATIN CALCIUM 40 MG: 40 TABLET, FILM COATED ORAL at 21:09

## 2025-04-20 RX ADMIN — IPRATROPIUM BROMIDE 0.5 MG: 0.5 SOLUTION RESPIRATORY (INHALATION) at 07:45

## 2025-04-20 RX ADMIN — IPRATROPIUM BROMIDE 0.5 MG: 0.5 SOLUTION RESPIRATORY (INHALATION) at 11:05

## 2025-04-20 RX ADMIN — IPRATROPIUM BROMIDE AND ALBUTEROL SULFATE 1 DOSE: 2.5; .5 SOLUTION RESPIRATORY (INHALATION) at 03:29

## 2025-04-20 RX ADMIN — ROCURONIUM BROMIDE 50 MG: 10 INJECTION, SOLUTION INTRAVENOUS at 13:10

## 2025-04-20 RX ADMIN — PIPERACILLIN AND TAZOBACTAM 3375 MG: 3; .375 INJECTION, POWDER, LYOPHILIZED, FOR SOLUTION INTRAVENOUS at 21:42

## 2025-04-20 RX ADMIN — SODIUM CHLORIDE, PRESERVATIVE FREE 10 ML: 5 INJECTION INTRAVENOUS at 08:11

## 2025-04-20 RX ADMIN — SENNOSIDES AND DOCUSATE SODIUM 1 TABLET: 50; 8.6 TABLET ORAL at 13:59

## 2025-04-20 RX ADMIN — DEXMEDETOMIDINE HYDROCHLORIDE 1.5 MCG/KG/HR: 4 INJECTION, SOLUTION INTRAVENOUS at 17:19

## 2025-04-20 RX ADMIN — Medication 2 UNITS: at 17:34

## 2025-04-20 RX ADMIN — CHLORHEXIDINE GLUCONATE 15 ML: 1.2 RINSE ORAL at 08:10

## 2025-04-20 RX ADMIN — BUMETANIDE 2 MG: 0.25 INJECTION INTRAMUSCULAR; INTRAVENOUS at 08:10

## 2025-04-20 RX ADMIN — HEPARIN SODIUM 5000 UNITS: 5000 INJECTION INTRAVENOUS; SUBCUTANEOUS at 13:30

## 2025-04-20 RX ADMIN — ALBUMIN (HUMAN) 25 G: 0.25 INJECTION, SOLUTION INTRAVENOUS at 09:53

## 2025-04-20 RX ADMIN — BISACODYL 10 MG: 10 SUPPOSITORY RECTAL at 10:52

## 2025-04-20 RX ADMIN — DEXMEDETOMIDINE HYDROCHLORIDE 1.5 MCG/KG/HR: 4 INJECTION, SOLUTION INTRAVENOUS at 21:14

## 2025-04-20 RX ADMIN — BUMETANIDE 2 MG: 0.25 INJECTION INTRAMUSCULAR; INTRAVENOUS at 17:18

## 2025-04-20 RX ADMIN — VALPROATE SODIUM 250 MG: 100 INJECTION, SOLUTION INTRAVENOUS at 08:12

## 2025-04-20 RX ADMIN — CHLORHEXIDINE GLUCONATE 15 ML: 1.2 RINSE ORAL at 21:11

## 2025-04-20 RX ADMIN — IPRATROPIUM BROMIDE 0.5 MG: 0.5 SOLUTION RESPIRATORY (INHALATION) at 15:09

## 2025-04-20 RX ADMIN — INSULIN LISPRO 2 UNITS: 100 INJECTION, SOLUTION INTRAVENOUS; SUBCUTANEOUS at 12:09

## 2025-04-20 RX ADMIN — ACETAMINOPHEN 1000 MG: 500 TABLET ORAL at 23:26

## 2025-04-20 RX ADMIN — SENNOSIDES AND DOCUSATE SODIUM 1 TABLET: 50; 8.6 TABLET ORAL at 21:10

## 2025-04-20 RX ADMIN — VALPROATE SODIUM 250 MG: 100 INJECTION, SOLUTION INTRAVENOUS at 23:45

## 2025-04-20 RX ADMIN — ETOMIDATE INJECTION 20 MG: 2 SOLUTION INTRAVENOUS at 13:10

## 2025-04-20 RX ADMIN — SODIUM CHLORIDE, PRESERVATIVE FREE 10 ML: 5 INJECTION INTRAVENOUS at 21:11

## 2025-04-20 RX ADMIN — ARFORMOTEROL TARTRATE 15 MCG: 15 SOLUTION RESPIRATORY (INHALATION) at 19:23

## 2025-04-20 RX ADMIN — PIPERACILLIN AND TAZOBACTAM 4500 MG: 4; .5 INJECTION, POWDER, LYOPHILIZED, FOR SOLUTION INTRAVENOUS at 16:31

## 2025-04-20 RX ADMIN — INSULIN LISPRO 2 UNITS: 100 INJECTION, SOLUTION INTRAVENOUS; SUBCUTANEOUS at 08:20

## 2025-04-20 RX ADMIN — DEXMEDETOMIDINE HYDROCHLORIDE 1.4 MCG/KG/HR: 4 INJECTION, SOLUTION INTRAVENOUS at 13:23

## 2025-04-20 RX ADMIN — POLYETHYLENE GLYCOL 3350 17 G: 17 POWDER, FOR SOLUTION ORAL at 13:59

## 2025-04-20 RX ADMIN — MIDAZOLAM HYDROCHLORIDE 4 MG: 2 INJECTION, SOLUTION INTRAMUSCULAR; INTRAVENOUS at 13:34

## 2025-04-20 RX ADMIN — DEXMEDETOMIDINE HYDROCHLORIDE 1 MCG/KG/HR: 4 INJECTION, SOLUTION INTRAVENOUS at 08:30

## 2025-04-20 RX ADMIN — ACETAMINOPHEN 1000 MG: 500 TABLET ORAL at 17:18

## 2025-04-20 RX ADMIN — MAGNESIUM HYDROXIDE 30 ML: 400 SUSPENSION ORAL at 17:18

## 2025-04-20 RX ADMIN — Medication 2 UNITS: at 23:33

## 2025-04-20 RX ADMIN — DEXMEDETOMIDINE HYDROCHLORIDE 1 MCG/KG/HR: 4 INJECTION, SOLUTION INTRAVENOUS at 02:43

## 2025-04-20 RX ADMIN — VALPROATE SODIUM 250 MG: 100 INJECTION, SOLUTION INTRAVENOUS at 15:31

## 2025-04-20 RX ADMIN — VALPROATE SODIUM 250 MG: 100 INJECTION, SOLUTION INTRAVENOUS at 00:01

## 2025-04-20 RX ADMIN — IPRATROPIUM BROMIDE 0.5 MG: 0.5 SOLUTION RESPIRATORY (INHALATION) at 19:23

## 2025-04-20 RX ADMIN — METOPROLOL TARTRATE 5 MG: 1 INJECTION, SOLUTION INTRAVENOUS at 11:02

## 2025-04-20 RX ADMIN — HEPARIN SODIUM 5000 UNITS: 5000 INJECTION INTRAVENOUS; SUBCUTANEOUS at 06:17

## 2025-04-20 RX ADMIN — ACETAMINOPHEN 1000 MG: 500 TABLET ORAL at 13:59

## 2025-04-20 RX ADMIN — ARFORMOTEROL TARTRATE 15 MCG: 15 SOLUTION RESPIRATORY (INHALATION) at 07:45

## 2025-04-20 RX ADMIN — ASPIRIN 81 MG: 81 TABLET, CHEWABLE ORAL at 13:59

## 2025-04-20 RX ADMIN — HEPARIN SODIUM 5000 UNITS: 5000 INJECTION INTRAVENOUS; SUBCUTANEOUS at 21:39

## 2025-04-20 ASSESSMENT — PAIN SCALES - GENERAL
PAINLEVEL_OUTOF10: 0

## 2025-04-20 ASSESSMENT — PULMONARY FUNCTION TESTS
PIF_VALUE: 22
PIF_VALUE: 23
PIF_VALUE: 25

## 2025-04-20 NOTE — PROGRESS NOTES
Cardiac Surgery ICU Progress Note    Admit Date: 4/15/2025  POD:  5 Days Post-Op    Procedure:    4/15/25 with Dr. Brand:   CORONARY ARTERY BYPASS GRAFTING X 2 WITH LIMA (LIMA-LAD, SVG-OM , LESVGH, LIGATION OF LEFT ATRIAL APPENDAGE, DENNIS AND EPIAORTIC U/S BY DR HAGEN       [facility] synopsis:  4/15 POD0: CABG x2/LAAL with Dr Brand. DENNIS with normal biventricular function, EF 55%, no WMA. Transferred to ICU on precedex and insulin gtt with A&V wires and 3 alecia drains (2 med, L pleural). Extubated at 1545 to BiPAP. Overnight maribeth transitioned to norepi.   4/16 POD1: On vaso 0.04, levo 4. A-paced 70's with intermittent capture. Low UOP overnight (200cc). Received albumin x1 and 250ml LR. IV lasix 40mg with minimal response. Consult nephrology: added additional lasix 100mg for diuretic challenge. CXR showing pulmonary edema. ABG showing rising PCO2 65 and pH 7.15. Placed on BiPAP with repeat ABG showing improvement.   4/17 POD 2: Afib around 0200, amio bolus and infusion started. Remains on norepi and vaso. 4L NC this AM, transition to HFNC for ongoing mild hypercapnia. Good response to lasix challenge, Cr up to 2.44. Bumex 2mg IV BID today. PRBC x 1 for Hgb 7.5. Xray with gastric bubble and dilation noted.   4/18 POD 3: Agitation overnight, precedex infusion started. VBG stable, CO2 normal. SB in the 50s this AM, DC amio infusion. Remains on HFNC, pressors off. Continue diuresis, deline. AV wires pulled, plan to pull chest tubes today. Continue ICU level of care. HTN in PM, start Metoprolol   4/19 POD 4: Worsening delirium haldol x 2 overnight and increased precedex. Valproic acid added by ICU. Refusing PO medications, food/drink today. Cr slightly increased with less robust response to diuretics. Start LR 50mL/hr for low UO and no PO intake.   4/20 POD 5: Mentation improved slightly but his respiratory status has worsened, very coarse bilaterally, worsening hypoxia, back on HFNC. Speech consulted, but NPO. Likely will  Post Op Respiratory Insufficiency with Hypoxia and Hypercarbia, Pulmonary Edema: Pre-op ABG with PaO2 91.   - Hypercapnia is improved, transitioned to NC on 4/18  - Worsening respiratory status on 4/20, concern for aspiration and management of secretions with delirium   - Consult Speech, NPO  - Aggressive pulmonary hygiene, IS, mobilize as tolerated  - Bumex 2mg IV BID today  - Xray daily, noted R effusion, mildly improving     COPD, Tobacco Abuse: PTA albuterol PRN   - Scheduled duonebs  - Encouraged smoking cessation  - PRN nicoderm     CRISTINO on CKD IIIb: baseline Cr 1.5 mg/dl  - Nephrology consulted. Appreciate recommendations  - Cr continues to slowly uptrend, 2.77 this AM  - Bumex IV BID today per nephrology  - Continue sosa catheter with mentation changes and continued rise in Cr  - Monitor BMP  - Avoid nephrotoxic medications and prolonged hypotension  - LR stopped by Nephrology, favor dobhoff with FWF    Hyponatremia: Mild  - 133, no acute intervention     Hyperkalemia: Resolved  - Potassium 4.1  - Repeat BMP     BPH: PTA finasteride 5mg  - Continue finasteride 5mg -- held while NPO     DM2: PTA metformin 1G BID, pre op A1C 5.4  - Diabetes Management consulted  - Transitioned off insulin infusion on 4/17, PRN SSI  - Carb consistent diet    Delirium: Noted overnight 4/17 to 4/18  - Precedex infusion overnight, PRN haldol ordered by Intensivist  - Valproic acid started 4/19  - Encourage day/night schedules  - Plan to place dobhoff as discussed for TF and medications  - DC Seroquel, QTc prolonged at 512 on 4/20     Acute on Chronic Anemia, ANA MARIA: Transfused PRBC x 1 on 4/17  - Hgb 8.3 today, off pressors, no acute indication for transfusion   - Daily CBC   - Venofer 200mg x 3 doses (4/16-4/18)     Acute Post Op Pain:  - Scheduled tylenol, lidocaine patches  - PRN oxycodone, dilaudid  - Gabapentin held d/t CRISTINO on CKD    Osteoporosis: On PTA fosamax and vit D  - Eval closer to DC     Neuropathy: 2/2 DM and PAD. PTA

## 2025-04-20 NOTE — PROCEDURES
PROCEDURE NOTE  Date: 4/20/2025   Name: Scooter Dickerson  YOB: 1945    Intubation    Date/Time: 4/20/2025 1:34 PM    Performed by: Curt Cruz MD  Authorized by: Curt Cruz MD  Consent: Verbal consent obtained.  Consent given by: power of   Patient identity confirmed: hospital-assigned identification number  Time out: Immediately prior to procedure a \"time out\" was called to verify the correct patient, procedure, equipment, support staff and site/side marked as required.  Indications: hypoxemia, respiratory distress and airway protection  Intubation method: video-assisted  Patient status: paralyzed (RSI)  Preoxygenation: High flow nasal cannula.  Sedatives: etomidate  Paralytic: rocuronium  Laryngoscope size: Mac 3  Tube size: 7.5 mm  Tube type: cuffed  Number of attempts: 1  Cricoid pressure: no  Cords visualized: yes  Post-procedure assessment: chest rise and CO2 detector  Breath sounds: equal  Cuff inflated: yes  ETT to teeth: 23 cm  Tube secured with: ETT prajapati  Chest x-ray interpreted by me.  Chest x-ray findings: endotracheal tube in appropriate position  Patient tolerance: patient tolerated the procedure well with no immediate complications

## 2025-04-20 NOTE — PROGRESS NOTES
Pt noted to be declining, RN states hold for today, noted that patient will likely be intubated in the next few minutes. Will follow and reevaluate as needed.     Mariajose Garsia, DPT, PT

## 2025-04-20 NOTE — PLAN OF CARE
Speech LAnguage Pathology EVALUATION    Patient: Scooter Dickerson (79 y.o. male)  Date: 4/20/2025  Primary Diagnosis: Disease of cardiovascular system [I25.10]  CAD in native artery [I25.10]  Procedure(s) (LRB):  CORONARY ARTERY BYPASS GRAFTING X 2 WITH STEVEN VAUGHAN, LIGATION OF LEFT ATRIAL APPENDAGE, DENNIS AND EPIAORTIC U/S BY DR HAGEN (N/A) 5 Days Post-Op   Precautions:  Fall Risk           Sternal Precautions: Move in the Tube      ASSESSMENT :  Patient seen for bedside swallow evaluation. Patient swallow function limited at this time due to reduced respiratory status as well as altered mentation. Patient with significant but very weak cough with small amount of water. Oxygen fluctuating between 87 and 91 during session and thus no further PO trials indicated, particularly given reduced secretion management noted. At this juncture, recommend ice chips sparingly and consideration of short-term non-oral means of nutrition. Will continue to follow.     Patient will benefit from skilled intervention to address the above impairments.     PLAN :  Recommendations and Planned Interventions:  Diet: NPO  Ice chips sparingly  Good oral care           Acute SLP Services: SLP Plan of Care: 5 times/week. Patient's rehabilitation potential is considered to be Guarded.  Discharge Recommendations: Continue to assess pending progress     SUBJECTIVE:   Patient stated, “No more.”    OBJECTIVE:     Past Medical History:   Diagnosis Date    Arthritis     Asthma     Atrial fibrillation (HCC)     CAD (coronary artery disease)     Diabetes mellitus (HCC)     History of blood transfusion 1989    Hx of blood clots     RIGHT LEG    Hyperlipidemia     Hypertension     Sleep apnea     NOT USING CPAP NOW     Past Surgical History:   Procedure Laterality Date    CARDIAC PROCEDURE N/A 04/02/2025    Left heart cath / coronary angiography performed by Jonah Huddleston MD at [facility] CARDIAC CATH LAB    COLONOSCOPY      CORONARY ARTERY BYPASS GRAFT N/A

## 2025-04-20 NOTE — PROGRESS NOTES
Recent Results, Med Rec Status, Cardiac Rhythm Sinus terence, Alarm Parameters, and Neuro Assessment.     Drips: Precedex @ 1.5

## 2025-04-20 NOTE — PROGRESS NOTES
[facility]   [address], [place] [address]  Phone: [phone]   [phone]    [email or web address]"[email or web address]     Nephrology Progress Note    Patient Name : Scooter Dickerson      : 1945     MRN : [id number]  Date of Admission : 4/15/2025  Date of Servive : 25    CC:  Follow up for CRISTINO       Assessment and Plan   CRISTINO on CKD  - ischemic ATN  - Non oliguric w/ diuretics, declining UOP   - CXR worse  - avoid IVF, consider DHT placement and start TF/ FW flushes   - PRN diuretics   - Likely will need dialysis tomorrow   - Keep sosa     Post op Delirium   - per [facility], critical care      CKD 3B   - baseline Cr 1.5 mg/dl pre op   - 2/2 HTN, DM, smoking      Met + Resp acidosis   - improved      Acute Hypercarbic resp failure   Chronic heavy smoker   KARI - does not use CPAP  - per [facility]      CAD s/p CABG x 2, AMADOU ligation 4/15   Hypotension   - off pressors      Chronic anemia   Iron def : complete venofer x 3 doses   - continue  VILMA  - transfuse PRN      BPH , hx of TURP 10/24, penile swelling   Osteoporosis   DM-II   A fib : On oral Amio        Care Plan discussed with:  ICU team     Interval History:  Seen and examined. Remains confused, in restraints, Cr plateau`d with IVF but CXR wosening and UOP declining.       Review of Systems: Review of systems not obtained due to patient factors.    Current Medications:   Current Facility-Administered Medications   Medication Dose Route Frequency    valproate (DEPACON) 250 mg in sodium chloride 0.9 % 100 mL IVPB  250 mg IntraVENous Q8H    amiodarone (CORDARONE) tablet 200 mg  200 mg Oral BID    0.9 % sodium chloride infusion   IntraVENous PRN    QUEtiapine (SEROQUEL) tablet 50 mg  50 mg Oral Nightly    metoprolol tartrate (LOPRESSOR) tablet 25 mg  25 mg Oral BID    haloperidol lactate (HALDOL) injection 5 mg  5 mg IntraMUSCular Q6H PRN    bumetanide (BUMEX) injection 2 mg  2 mg IntraVENous BID    heparin (porcine)

## 2025-04-21 ENCOUNTER — APPOINTMENT (OUTPATIENT)
Facility: HOSPITAL | Age: 80
End: 2025-04-21
Attending: THORACIC SURGERY (CARDIOTHORACIC VASCULAR SURGERY)
Payer: MEDICARE

## 2025-04-21 PROBLEM — J96.01 ACUTE HYPOXEMIC RESPIRATORY FAILURE (HCC): Status: ACTIVE | Noted: 2025-04-21

## 2025-04-21 LAB
ANION GAP SERPL CALC-SCNC: 11 MMOL/L (ref 2–12)
ANION GAP SERPL CALC-SCNC: 6 MMOL/L (ref 2–12)
BUN SERPL-MCNC: 74 MG/DL (ref 6–20)
BUN SERPL-MCNC: 74 MG/DL (ref 6–20)
BUN/CREAT SERPL: 23 (ref 12–20)
BUN/CREAT SERPL: 23 (ref 12–20)
CALCIUM SERPL-MCNC: 7.6 MG/DL (ref 8.5–10.1)
CALCIUM SERPL-MCNC: 7.9 MG/DL (ref 8.5–10.1)
CHLORIDE SERPL-SCNC: 102 MMOL/L (ref 97–108)
CHLORIDE SERPL-SCNC: 103 MMOL/L (ref 97–108)
CO2 SERPL-SCNC: 22 MMOL/L (ref 21–32)
CO2 SERPL-SCNC: 26 MMOL/L (ref 21–32)
CREAT SERPL-MCNC: 3.22 MG/DL (ref 0.7–1.3)
CREAT SERPL-MCNC: 3.26 MG/DL (ref 0.7–1.3)
ECHO BSA: 1.71 M2
ECHO BSA: 1.71 M2
ECHO EST RA PRESSURE: 3 MMHG
ECHO LV E' LATERAL VELOCITY: 5.71 CM/S
ECHO LV E' SEPTAL VELOCITY: 3.51 CM/S
ECHO LV EF PHYSICIAN: 55 %
ECHO LV EJECTION FRACTION A2C: 49 %
ECHO LV EJECTION FRACTION A4C: 44 %
ECHO LV FRACTIONAL SHORTENING: 37 % (ref 28–44)
ECHO LV INTERNAL DIMENSION DIASTOLE INDEX: 2.53 CM/M2
ECHO LV INTERNAL DIMENSION DIASTOLIC: 4.6 CM (ref 4.2–5.9)
ECHO LV INTERNAL DIMENSION SYSTOLIC INDEX: 1.59 CM/M2
ECHO LV INTERNAL DIMENSION SYSTOLIC: 2.9 CM
ECHO LV IVSD: 0.6 CM (ref 0.6–1)
ECHO LV MASS 2D: 90.5 G (ref 88–224)
ECHO LV MASS INDEX 2D: 49.7 G/M2 (ref 49–115)
ECHO LV POSTERIOR WALL DIASTOLIC: 0.7 CM (ref 0.6–1)
ECHO LV RELATIVE WALL THICKNESS RATIO: 0.3
ECHO LVOT AREA: 3.1 CM2
ECHO LVOT DIAM: 2 CM
ECHO MV A VELOCITY: 0.37 M/S
ECHO MV E VELOCITY: 0.68 M/S
ECHO MV E/A RATIO: 1.84
ECHO MV E/E' LATERAL: 11.91
ECHO MV E/E' RATIO (AVERAGED): 15.64
ECHO MV E/E' SEPTAL: 19.37
ECHO RIGHT VENTRICULAR SYSTOLIC PRESSURE (RVSP): 32 MMHG
ECHO TV REGURGITANT MAX VELOCITY: 2.7 M/S
ECHO TV REGURGITANT PEAK GRADIENT: 29 MMHG
ERYTHROCYTE [DISTWIDTH] IN BLOOD BY AUTOMATED COUNT: 19 % (ref 11.5–14.5)
GLUCOSE BLD STRIP.AUTO-MCNC: 139 MG/DL (ref 65–117)
GLUCOSE BLD STRIP.AUTO-MCNC: 148 MG/DL (ref 65–117)
GLUCOSE BLD STRIP.AUTO-MCNC: 160 MG/DL (ref 65–117)
GLUCOSE SERPL-MCNC: 119 MG/DL (ref 65–100)
GLUCOSE SERPL-MCNC: 146 MG/DL (ref 65–100)
HCT VFR BLD AUTO: 27.3 % (ref 36.6–50.3)
HGB BLD-MCNC: 8.8 G/DL (ref 12.1–17)
MAGNESIUM SERPL-MCNC: 2.8 MG/DL (ref 1.6–2.4)
MCH RBC QN AUTO: 29.3 PG (ref 26–34)
MCHC RBC AUTO-ENTMCNC: 32.2 G/DL (ref 30–36.5)
MCV RBC AUTO: 91 FL (ref 80–99)
NRBC # BLD: 0.71 K/UL (ref 0–0.01)
NRBC BLD-RTO: 8.9 PER 100 WBC
PLATELET # BLD AUTO: 183 K/UL (ref 150–400)
PMV BLD AUTO: 10.3 FL (ref 8.9–12.9)
POTASSIUM SERPL-SCNC: 3.4 MMOL/L (ref 3.5–5.1)
POTASSIUM SERPL-SCNC: 3.6 MMOL/L (ref 3.5–5.1)
RBC # BLD AUTO: 3 M/UL (ref 4.1–5.7)
SERVICE CMNT-IMP: ABNORMAL
SODIUM SERPL-SCNC: 134 MMOL/L (ref 136–145)
SODIUM SERPL-SCNC: 136 MMOL/L (ref 136–145)
WBC # BLD AUTO: 8 K/UL (ref 4.1–11.1)

## 2025-04-21 PROCEDURE — 93356 MYOCRD STRAIN IMG SPCKL TRCK: CPT | Performed by: SPECIALIST

## 2025-04-21 PROCEDURE — 80048 BASIC METABOLIC PNL TOTAL CA: CPT

## 2025-04-21 PROCEDURE — P9047 ALBUMIN (HUMAN), 25%, 50ML: HCPCS | Performed by: NURSE PRACTITIONER

## 2025-04-21 PROCEDURE — 2580000003 HC RX 258: Performed by: STUDENT IN AN ORGANIZED HEALTH CARE EDUCATION/TRAINING PROGRAM

## 2025-04-21 PROCEDURE — 6360000002 HC RX W HCPCS: Performed by: INTERNAL MEDICINE

## 2025-04-21 PROCEDURE — 94003 VENT MGMT INPAT SUBQ DAY: CPT

## 2025-04-21 PROCEDURE — 93306 TTE W/DOPPLER COMPLETE: CPT | Performed by: SPECIALIST

## 2025-04-21 PROCEDURE — 6370000000 HC RX 637 (ALT 250 FOR IP): Performed by: STUDENT IN AN ORGANIZED HEALTH CARE EDUCATION/TRAINING PROGRAM

## 2025-04-21 PROCEDURE — 6370000000 HC RX 637 (ALT 250 FOR IP)

## 2025-04-21 PROCEDURE — 6360000002 HC RX W HCPCS: Performed by: NURSE PRACTITIONER

## 2025-04-21 PROCEDURE — 99231 SBSQ HOSP IP/OBS SF/LOW 25: CPT

## 2025-04-21 PROCEDURE — 82962 GLUCOSE BLOOD TEST: CPT

## 2025-04-21 PROCEDURE — 6370000000 HC RX 637 (ALT 250 FOR IP): Performed by: NURSE PRACTITIONER

## 2025-04-21 PROCEDURE — 2500000003 HC RX 250 WO HCPCS: Performed by: STUDENT IN AN ORGANIZED HEALTH CARE EDUCATION/TRAINING PROGRAM

## 2025-04-21 PROCEDURE — 94640 AIRWAY INHALATION TREATMENT: CPT

## 2025-04-21 PROCEDURE — 99292 CRITICAL CARE ADDL 30 MIN: CPT | Performed by: THORACIC SURGERY (CARDIOTHORACIC VASCULAR SURGERY)

## 2025-04-21 PROCEDURE — 83735 ASSAY OF MAGNESIUM: CPT

## 2025-04-21 PROCEDURE — 71045 X-RAY EXAM CHEST 1 VIEW: CPT

## 2025-04-21 PROCEDURE — 93308 TTE F-UP OR LMTD: CPT

## 2025-04-21 PROCEDURE — 6360000002 HC RX W HCPCS: Performed by: STUDENT IN AN ORGANIZED HEALTH CARE EDUCATION/TRAINING PROGRAM

## 2025-04-21 PROCEDURE — 93356 MYOCRD STRAIN IMG SPCKL TRCK: CPT

## 2025-04-21 PROCEDURE — 6370000000 HC RX 637 (ALT 250 FOR IP): Performed by: PHYSICIAN ASSISTANT

## 2025-04-21 PROCEDURE — 85027 COMPLETE CBC AUTOMATED: CPT

## 2025-04-21 PROCEDURE — 2500000003 HC RX 250 WO HCPCS

## 2025-04-21 PROCEDURE — 2700000000 HC OXYGEN THERAPY PER DAY

## 2025-04-21 PROCEDURE — 6360000002 HC RX W HCPCS

## 2025-04-21 PROCEDURE — 99291 CRITICAL CARE FIRST HOUR: CPT | Performed by: THORACIC SURGERY (CARDIOTHORACIC VASCULAR SURGERY)

## 2025-04-21 PROCEDURE — 6370000000 HC RX 637 (ALT 250 FOR IP): Performed by: ANESTHESIOLOGY

## 2025-04-21 PROCEDURE — 2000000000 HC ICU R&B

## 2025-04-21 PROCEDURE — 3E0G76Z INTRODUCTION OF NUTRITIONAL SUBSTANCE INTO UPPER GI, VIA NATURAL OR ARTIFICIAL OPENING: ICD-10-PCS | Performed by: THORACIC SURGERY (CARDIOTHORACIC VASCULAR SURGERY)

## 2025-04-21 RX ORDER — BUMETANIDE 0.25 MG/ML
4 INJECTION, SOLUTION INTRAMUSCULAR; INTRAVENOUS 2 TIMES DAILY
Status: DISCONTINUED | OUTPATIENT
Start: 2025-04-21 | End: 2025-04-23

## 2025-04-21 RX ORDER — ALBUMIN (HUMAN) 12.5 G/50ML
25 SOLUTION INTRAVENOUS EVERY 6 HOURS
Status: COMPLETED | OUTPATIENT
Start: 2025-04-21 | End: 2025-04-22

## 2025-04-21 RX ORDER — LACTULOSE 10 G/15ML
20 SOLUTION ORAL 3 TIMES DAILY
Status: DISCONTINUED | OUTPATIENT
Start: 2025-04-21 | End: 2025-04-23

## 2025-04-21 RX ADMIN — HEPARIN SODIUM 5000 UNITS: 5000 INJECTION INTRAVENOUS; SUBCUTANEOUS at 20:25

## 2025-04-21 RX ADMIN — PIPERACILLIN AND TAZOBACTAM 3375 MG: 3; .375 INJECTION, POWDER, LYOPHILIZED, FOR SOLUTION INTRAVENOUS at 05:33

## 2025-04-21 RX ADMIN — SODIUM CHLORIDE, PRESERVATIVE FREE 10 ML: 5 INJECTION INTRAVENOUS at 08:46

## 2025-04-21 RX ADMIN — DEXMEDETOMIDINE HYDROCHLORIDE 1.5 MCG/KG/HR: 4 INJECTION, SOLUTION INTRAVENOUS at 13:20

## 2025-04-21 RX ADMIN — ARFORMOTEROL TARTRATE 15 MCG: 15 SOLUTION RESPIRATORY (INHALATION) at 21:06

## 2025-04-21 RX ADMIN — POTASSIUM BICARBONATE 40 MEQ: 782 TABLET, EFFERVESCENT ORAL at 08:28

## 2025-04-21 RX ADMIN — HEPARIN SODIUM 5000 UNITS: 5000 INJECTION INTRAVENOUS; SUBCUTANEOUS at 15:16

## 2025-04-21 RX ADMIN — LACTULOSE 20 G: 20 SOLUTION ORAL at 20:26

## 2025-04-21 RX ADMIN — IPRATROPIUM BROMIDE 0.5 MG: 0.5 SOLUTION RESPIRATORY (INHALATION) at 17:33

## 2025-04-21 RX ADMIN — CHLORHEXIDINE GLUCONATE 15 ML: 1.2 RINSE ORAL at 20:35

## 2025-04-21 RX ADMIN — FINASTERIDE 5 MG: 5 TABLET, FILM COATED ORAL at 20:25

## 2025-04-21 RX ADMIN — ATORVASTATIN CALCIUM 40 MG: 40 TABLET, FILM COATED ORAL at 20:25

## 2025-04-21 RX ADMIN — LACTULOSE 20 G: 20 SOLUTION ORAL at 08:28

## 2025-04-21 RX ADMIN — METOPROLOL TARTRATE 25 MG: 25 TABLET, FILM COATED ORAL at 20:25

## 2025-04-21 RX ADMIN — AMIODARONE HYDROCHLORIDE 200 MG: 200 TABLET ORAL at 08:28

## 2025-04-21 RX ADMIN — DEXMEDETOMIDINE HYDROCHLORIDE 1.5 MCG/KG/HR: 4 INJECTION, SOLUTION INTRAVENOUS at 09:17

## 2025-04-21 RX ADMIN — ASPIRIN 81 MG: 81 TABLET, CHEWABLE ORAL at 08:28

## 2025-04-21 RX ADMIN — BUMETANIDE 4 MG: 0.25 INJECTION INTRAMUSCULAR; INTRAVENOUS at 18:11

## 2025-04-21 RX ADMIN — ALBUMIN (HUMAN) 25 G: 0.25 INJECTION, SOLUTION INTRAVENOUS at 20:33

## 2025-04-21 RX ADMIN — LACTULOSE 20 G: 20 SOLUTION ORAL at 15:15

## 2025-04-21 RX ADMIN — Medication 2 UNITS: at 06:20

## 2025-04-21 RX ADMIN — HYDRALAZINE HYDROCHLORIDE 10 MG: 20 INJECTION INTRAMUSCULAR; INTRAVENOUS at 19:03

## 2025-04-21 RX ADMIN — Medication 2 UNITS: at 12:26

## 2025-04-21 RX ADMIN — SENNOSIDES AND DOCUSATE SODIUM 1 TABLET: 50; 8.6 TABLET ORAL at 20:25

## 2025-04-21 RX ADMIN — VALPROATE SODIUM 250 MG: 100 INJECTION, SOLUTION INTRAVENOUS at 09:38

## 2025-04-21 RX ADMIN — HEPARIN SODIUM 5000 UNITS: 5000 INJECTION INTRAVENOUS; SUBCUTANEOUS at 05:32

## 2025-04-21 RX ADMIN — PIPERACILLIN AND TAZOBACTAM 3375 MG: 3; .375 INJECTION, POWDER, LYOPHILIZED, FOR SOLUTION INTRAVENOUS at 17:27

## 2025-04-21 RX ADMIN — DEXMEDETOMIDINE HYDROCHLORIDE 1.5 MCG/KG/HR: 4 INJECTION, SOLUTION INTRAVENOUS at 16:55

## 2025-04-21 RX ADMIN — VALPROATE SODIUM 250 MG: 100 INJECTION, SOLUTION INTRAVENOUS at 17:04

## 2025-04-21 RX ADMIN — DEXMEDETOMIDINE HYDROCHLORIDE 1.5 MCG/KG/HR: 4 INJECTION, SOLUTION INTRAVENOUS at 05:24

## 2025-04-21 RX ADMIN — SODIUM CHLORIDE: 0.9 INJECTION, SOLUTION INTRAVENOUS at 08:41

## 2025-04-21 RX ADMIN — BUMETANIDE 2 MG: 0.25 INJECTION INTRAMUSCULAR; INTRAVENOUS at 08:28

## 2025-04-21 RX ADMIN — METOPROLOL TARTRATE 25 MG: 25 TABLET, FILM COATED ORAL at 08:28

## 2025-04-21 RX ADMIN — POTASSIUM BICARBONATE 40 MEQ: 782 TABLET, EFFERVESCENT ORAL at 16:58

## 2025-04-21 RX ADMIN — ARFORMOTEROL TARTRATE 15 MCG: 15 SOLUTION RESPIRATORY (INHALATION) at 09:35

## 2025-04-21 RX ADMIN — SODIUM CHLORIDE, PRESERVATIVE FREE 10 ML: 5 INJECTION INTRAVENOUS at 20:26

## 2025-04-21 RX ADMIN — IPRATROPIUM BROMIDE 0.5 MG: 0.5 SOLUTION RESPIRATORY (INHALATION) at 13:04

## 2025-04-21 RX ADMIN — AMIODARONE HYDROCHLORIDE 200 MG: 200 TABLET ORAL at 20:25

## 2025-04-21 RX ADMIN — CHLORHEXIDINE GLUCONATE 15 ML: 1.2 RINSE ORAL at 08:46

## 2025-04-21 RX ADMIN — EPOETIN ALFA 20000 UNITS: 20000 SOLUTION INTRAVENOUS; SUBCUTANEOUS at 17:07

## 2025-04-21 RX ADMIN — ACETAMINOPHEN 1000 MG: 500 TABLET ORAL at 05:24

## 2025-04-21 RX ADMIN — DEXMEDETOMIDINE HYDROCHLORIDE 1.5 MCG/KG/HR: 4 INJECTION, SOLUTION INTRAVENOUS at 01:15

## 2025-04-21 RX ADMIN — IPRATROPIUM BROMIDE 0.5 MG: 0.5 SOLUTION RESPIRATORY (INHALATION) at 09:35

## 2025-04-21 RX ADMIN — IPRATROPIUM BROMIDE 0.5 MG: 0.5 SOLUTION RESPIRATORY (INHALATION) at 21:06

## 2025-04-21 RX ADMIN — ALBUMIN (HUMAN) 25 G: 0.25 INJECTION, SOLUTION INTRAVENOUS at 15:16

## 2025-04-21 RX ADMIN — POLYETHYLENE GLYCOL 3350 17 G: 17 POWDER, FOR SOLUTION ORAL at 09:19

## 2025-04-21 RX ADMIN — ALBUMIN (HUMAN) 25 G: 0.25 INJECTION, SOLUTION INTRAVENOUS at 08:27

## 2025-04-21 RX ADMIN — DEXMEDETOMIDINE HYDROCHLORIDE 1.5 MCG/KG/HR: 4 INJECTION, SOLUTION INTRAVENOUS at 20:35

## 2025-04-21 ASSESSMENT — PAIN SCALES - GENERAL
PAINLEVEL_OUTOF10: 0
PAINLEVEL_OUTOF10: 0

## 2025-04-21 ASSESSMENT — PULMONARY FUNCTION TESTS
PIF_VALUE: 17
PIF_VALUE: 20
PIF_VALUE: 22
PIF_VALUE: 21
PIF_VALUE: 20
PIF_VALUE: 21

## 2025-04-21 NOTE — CONSULTS
Medication   Basal needs Using  units/kg/D based on  Not indicated at this time    Nutritional needs Covers carb load in meals Will start NPH once TF recs are placed       Corrective insulin Using medium dose sensitivity  Q6hr    Additional orders  NPO will start TF later today        Diabetes Discharge Plan   Medication:  A1c is 5.4% with no personal or family history of DM. Do not recommend any medications however he would benefit from some diet changes and increase in activity to help maintain current A1c     Additional orders  Follow up with PCP within 2 weeks of D/C          Initial Presentation   Scooter Dickerson is a 79 y.o. male who presented on 4/15 for scheduled CABG x2     HX:   Past Medical History:   Diagnosis Date    Arthritis     Asthma     Atrial fibrillation (HCC)     CAD (coronary artery disease)     Diabetes mellitus (HCC)     History of blood transfusion 1989    Hx of blood clots     RIGHT LEG    Hyperlipidemia     Hypertension     Sleep apnea     NOT USING CPAP NOW        INITIAL DX: Disease of cardiovascular system [I25.10]  CAD in native artery [I25.10]     Current Treatment     TX: insulin, statin, ASA, levophed, nebulizer, gabapentin,      Hospital Course   Clinical progress has been uncomplicated    Lab Results   Component Value Date    LABA1C 5.4 04/09/2025     Diabetes-related Medical History  Acute complications  Stress hyperglycemia   Neurological complications  Hypoglycemia unawareness  Microvascular disease  none  Macrovascular disease  CAD, HTN, HLD  Other associated conditions     none      Subjective   Patient intubated      Objective   Physical exam  General Normal weight  male in no acute distress/ill-appearing.   Neuro  Sedated and intubated   Vital Signs   Vitals:    04/21/25 0900   BP: (!) 120/47   Pulse: 53   Resp: 30   Temp: 98.8 °F (37.1 °C)   SpO2: 92%     Skin  Warm and dry.       Laboratory  Recent Labs     04/19/25  0832 04/20/25  0202 04/21/25  0519   WBC 4.8 6.3 8.0    HGB 8.1* 8.3* 8.8*   HCT 25.5* 25.8* 27.3*   MCV 93.8 91.5 91.0    178 183     Recent Labs     04/19/25  0832 04/20/25  0202 04/21/25  0519   * 133* 136   K 4.1 4.4 3.6    103 103   CO2 23 23 22   PHOS  --  4.3  --    BUN 62* 65* 74*   CREATININE 2.77* 2.77* 3.22*     Lab Results   Component Value Date    ALT 12 04/16/2025    AST 27 04/16/2025    ALKPHOS 57 04/16/2025    BILITOT 0.4 04/16/2025     Lab Results   Component Value Date    TSH 4.55 (H) 04/09/2025         Factors impacting BG management  Factor Dose Comments   Nutrition:  Standard meals   60 grams/meal    Drugs:  Vasopressor load  Steroids  Epogen  Blood transfusion(s)  Atypical antipsychotics   Precedex    Affects insulin delivery  Impairs insulin action  A1cs inaccurate  A1cs inaccurate  Weight gain increases insulin resistance   Pain PRN     Kidney function Normal     Liver function Normal           Assessment and Nursing Intervention   Nursing Diagnosis Risk for unstable blood glucose pattern   Nursing Intervention Domain 5250 Decision-making Support   Nursing Interventions Examined current inpatient diabetes/blood glucose control   Explored factors facilitating and impeding inpatient management  Explored corrective strategies with patient and responsible inpatient provider   Informed patient of rational for insulin strategy while hospitalized     Nursing Diagnosis 40762 Ineffective Health Management   Nursing Intervention Domain 5250 Decision-making Support   Nursing Interventions Identified diabetes self-management practices impeding diabetes control  Discussed diabetes survival skills related to  Healthy Plate eating plan; given handouts  Role of physical activity in improving insulin sensitivity and action  Procedure for blood glucose monitoring & options for low-cost products  Medications plan at discharge     Billing Code(s)   [] 58987 IP initial hospital care - 40 minutes   [] 71008 IP initial hospital care -55

## 2025-04-21 NOTE — PROGRESS NOTES
Physical Therapy 4/21/25    Chart reviewed, patient received sedated and on vent. Per ABCDEF protocol, will work with patient when PEEP is 10.0 or less, FIO2 60% or less, and patient is following basic commands. Will follow patient peripherally.     Recommend nursing to complete with patient, as able and per protocol, in order to promote cardiopulmonary systems, maintain strength, endurance and independence:   -bed in chair position or maximize full reverse Trendelenburg position to facilitate upright activity with foot board and non-skid footwear on 3x/day ~30-60 mins each   -ROM during bathing B UEs and LEs  -positioning to prevent contractures and edema.  RASS -1/0/+1 (during SAT) Active ROM  Sitting (bed in chair position)  Standing (reverse Trendelenburg)  ADLs   RASS -3/2 Passive ROM  Sit (bed in chair position)   RASS -5/-4 Passive ROM       Thank you for your consideration,    Stacy Rodarte, PT, DPT

## 2025-04-21 NOTE — PROGRESS NOTES
SLP Contact Note    Noted pt intubated yesterday. Will sign off for now. Please reconsult when pt able to participate with SLP.      Christie Thomas, Ph.D., CCC-SLP (pronouns: she/her/hers)  Speech-Language Pathologist

## 2025-04-21 NOTE — PROGRESS NOTES
(N/A, 4/15/2025).      Home Medications:     Prior to Admission medications    Medication Sig Start Date End Date Taking? Authorizing Provider   aspirin 81 MG EC tablet Take 1 tablet by mouth daily 4/10/25  Yes Deb Timmons APRN - NP   albuterol sulfate HFA (PROVENTIL HFA) 108 (90 Base) MCG/ACT inhaler Inhale 2 puffs into the lungs every 6 hours as needed for Wheezing 3/31/25  Yes Ena Henderson APRN - NP   tiotropium-olodaterol (STIOLTO) 2.5-2.5 MCG/ACT AERS Inhale 2 puffs into the lungs daily 3/31/25  Yes Ena Henderson APRN - NP   metoprolol succinate (TOPROL XL) 25 MG extended release tablet Take 1 tablet by mouth daily 2/24/25  Yes Mathieu Painting MD   atorvastatin (LIPITOR) 40 MG tablet Take 1 tablet by mouth daily 2/21/25  Yes Ena Henderson APRN - NP   vitamin D3 (CHOLECALCIFEROL) 25 MCG (1000 UT) TABS tablet Take 2 tablets by mouth daily 2/21/25  Yes Ena Henderson APRN - NP   gabapentin (NEURONTIN) 300 MG capsule Take 2 capsules by mouth in the morning and at bedtime for 180 days. Max Daily Amount: 1,200 mg 2/12/25 8/11/25 Yes Ena Henderson APRN - NP   finasteride (PROSCAR) 5 MG tablet Take 1 tablet by mouth daily 1/24/25  Yes Ena Henderson APRN - NP   amiodarone (CORDARONE) 200 MG tablet Take 2 tablets by mouth 2 times daily for 5 days 4/9/25 4/14/25  Deb Timmons APRN - NP   rivaroxaban (XARELTO) 15 MG TABS tablet Take 1 tablet by mouth daily (with breakfast)  Patient not taking: Reported on 4/15/2025 4/4/25   Mathieu Painting MD   metFORMIN (GLUCOPHAGE) 1000 MG tablet Take 1 tablet by mouth 2 times daily (with meals) 3/24/25   Joellen Griffin APRN - NP   clopidogrel (PLAVIX) 75 MG tablet Take 1 tablet by mouth daily 3/24/25   Ena Henderson APRN - NP   alendronate (FOSAMAX) 70 MG tablet Take 1 tablet by mouth once a week 1/15/25   Ena Henderson APRN - NP         Allergies/Social/Family History:     Allergies   Allergen Reactions    Eliquis [Apixaban]  Itching    Penicillins Rash     Patient screened for any delayed non-IgE-mediated reaction to PCN.        Patient notes the following:    No delayed non-IgE-mediated reaction to PCN               Social History     Tobacco Use    Smoking status: Every Day     Current packs/day: 2.00     Average packs/day: 2.0 packs/day for 60.3 years (120.6 ttl pk-yrs)     Types: Cigarettes     Start date: 1965     Passive exposure: Current    Smokeless tobacco: Never   Substance Use Topics    Alcohol use: Not Currently      Family History   Problem Relation Age of Onset    Diabetes Mother     Cancer Father         THROAT    Anesth Problems Neg Hx          LABS AND  DATA:   Reviewed      Peak airway pressure:      Minute ventilation:        CRITICAL CARE CONSULTANT NOTE  I had a face to face encounter with the patient, reviewed and interpreted patient data including clinical events, labs, images, vital signs, I/O's, and examined patient.  I have discussed the case and the plan and management of the patient's care with the consulting services, the bedside nurses and the respiratory therapist.      NOTE OF PERSONAL INVOLVEMENT IN CARE   This patient has a high probability of imminent, clinically significant deterioration, which requires the highest level of preparedness to intervene urgently. I participated in the decision-making and personally managed or directed the management of the following life and organ supporting interventions that required my frequent assessment to treat or prevent imminent deterioration.        Jonah Carmona MD   Critical Care Medicine  [facility]  [phone]  4/21/2025

## 2025-04-21 NOTE — PROGRESS NOTES
[address], [place] [address]  Phone: [phone]   [phone]    [email or web address]     Nephrology Progress Note    Patient Name : Scooter Dickerson      : 1945     MRN : [id number]  Date of Admission : 4/15/2025  Date of Servive : 25    CC:  Follow up for CRISTINO       Assessment and Plan   CRISTINO on CKD  - ischemic ATN  - worsening renal function, oliguric  - increase bumex to 4mg BID  - will likely need RRT tomorrow  - will get line in tomorrow and initiate dialysis if he fails to respond to diuresis    Post op Delirium   - per CSS, critical care      CKD 3B   - baseline Cr 1.5 mg/dl pre op   - 2/2 HTN, DM, smoking      Met + Resp acidosis   - improved      Acute Hypercarbic resp failure   Chronic heavy smoker   KARI - does not use CPAP  - per [facility]      CAD s/p CABG x 2, AMADOU ligation 4/15   Hypotension   - off pressors      Chronic anemia   Iron def : complete venofer x 3 doses   - continue  VILMA  - transfuse PRN      BPH , hx of TURP 10/24, penile swelling   Osteoporosis   DM-II   A fib : On oral Amio        Interval History:  Seen and examined.  Intubated, sedated.  Not on pressors.  Oliguric w/ Rising Cr.        Review of Systems: Review of systems not obtained due to patient factors.    Current Medications:   Current Facility-Administered Medications   Medication Dose Route Frequency    piperacillin-tazobactam (ZOSYN) 3,375 mg in sodium chloride 0.9 % 50 mL IVPB (addEASE)  3,375 mg IntraVENous Q12H    albumin human 25% IV solution 25 g  25 g IntraVENous Q6H    lactulose (CHRONULAC) 10 GM/15ML solution 20 g  20 g Oral TID    aspirin chewable tablet 81 mg  81 mg Oral Daily    insulin lispro (HUMALOG,ADMELOG) injection vial 0-12 Units  0-12 Units SubCUTAneous Q6H    valproate (DEPACON) 250 mg in sodium chloride 0.9 % 100 mL IVPB  250 mg IntraVENous Q8H    amiodarone (CORDARONE) tablet 200 mg  200 mg Oral BID    0.9 % sodium chloride

## 2025-04-21 NOTE — PROGRESS NOTES
2000- Report received, drip verified, patient vitals stable; care assumed.    Uneventful night, minimal secretions, no BM    0800- Bedside and Verbal shift change report given to Koki COOMBS (oncoming nurse) by Yamile (offgoing nurse). Report included the following information Nurse Handoff Report, Intake/Output, MAR, Recent Results, Cardiac Rhythm SB, and Alarm Parameters.

## 2025-04-21 NOTE — CARE COORDINATION
Transition of Care Plan:    RUR: 26%  Prior Level of Functioning: Lives alone at [facility]. Rollator for mobility and hx w [facility].   Disposition: (Intubated) DOMINGUEZ following along for medical and functional progress.   If SNF or IPR: Date FOC offered: 4/17  Date FOC received: 4/17  Accepting facility: Pending   Date authorization started with reference number: NA  Follow up appointments: Specialist  DME needed: Defer to rehab  Transportation at discharge: TBD  IM/IMM Medicare/ letter given: 4/16  Is patient a  and connected with [facility]? Yes   If yes, was  transfer form completed and VA notified? Yes on 4/17 (ph: [phone], fax: [phone]  Caregiver Contact:   Discharge Caregiver contacted prior to discharge?   Care Conference needed?   Barriers to discharge:  Medical-CABG 4/15, intubated, Cr    Patient discussed in Cardiac Surgery IDRs and his delirium worsened over the weekend and he was declining food and liquids.  He was intubated and has poor urine output and Cr.  CM will continue to follow.    TIFFANY Quinteros CCM  Care Management   Available on [employer] or [phone]

## 2025-04-21 NOTE — PROGRESS NOTES
Day #2 of Zosyn  Indication:  HAP  Current regimen:  3.375G IV q8h  Abx regimen: Zosyn  Recent Labs     25  0832 25  0202 25  0519   WBC 4.8 6.3 8.0   CREATININE 2.77* 2.77* 3.22*   BUN 62* 65* 74*     Est CrCl: 17 ml/min  Temp (24hrs), Av.6 °F (37 °C), Min:97 °F (36.1 °C), Max:99.3 °F (37.4 °C)      Plan: Change to 3.375G IV q12h for CrCl < 20 ml/min. Patient may need HD today.  Pharmacy will continue to monitor patient daily and will make dosage adjustments based upon changing renal function.

## 2025-04-21 NOTE — PROGRESS NOTES
Occupational Therapy  04/21/25    Chart reviewed, patient received sedated and on vent. Per ABCDEF protocol, will work with patient when PEEP is 10.0 or less, FIO2 60% or less, and patient is following basic commands. Will follow patient peripherally.      Recommend nursing to complete with patient, as able and per protocol, in order to promote cardiopulmonary systems, maintain strength, endurance and independence:   -bed in chair position or maximize full reverse Trendelenburg position to facilitate upright activity with foot board and non-skid footwear on 3x/day ~30-60 mins each   -ROM during bathing B UEs and LEs  -positioning to prevent contractures and edema.  RASS -1/0/+1 (during SAT) Active ROM  Sitting (bed in chair position)  Standing (reverse Trendelenburg)  ADLs   RASS -3/2 Passive ROM  Sit (bed in chair position)   RASS -5/-4 Passive ROM      Thank you  Arlene Olson, OTR/L

## 2025-04-21 NOTE — PROGRESS NOTES
Cardiac Surgery ICU Progress Note    Admit Date: 4/15/2025  POD:  6 Days Post-Op    Procedure:    4/15/25 with Dr. Brand:   CORONARY ARTERY BYPASS GRAFTING X 2 WITH LIMA (LIMA-LAD, SVG-OM , LESVGH, LIGATION OF LEFT ATRIAL APPENDAGE, DENNIS AND EPIAORTIC U/S BY DR HAGEN       [facility] synopsis:  4/15 POD0: CABG x2/LAAL with Dr Brand. DENNIS with normal biventricular function, EF 55%, no WMA. Transferred to ICU on precedex and insulin gtt with A&V wires and 3 alecia drains (2 med, L pleural). Extubated at 1545 to BiPAP. Overnight maribeth transitioned to norepi.   4/16 POD1: On vaso 0.04, levo 4. A-paced 70's with intermittent capture. Low UOP overnight (200cc). Received albumin x1 and 250ml LR. IV lasix 40mg with minimal response. Consult nephrology: added additional lasix 100mg for diuretic challenge. CXR showing pulmonary edema. ABG showing rising PCO2 65 and pH 7.15. Placed on BiPAP with repeat ABG showing improvement.   4/17 POD 2: Afib around 0200, amio bolus and infusion started. Remains on norepi and vaso. 4L NC this AM, transition to HFNC for ongoing mild hypercapnia. Good response to lasix challenge, Cr up to 2.44. Bumex 2mg IV BID today. PRBC x 1 for Hgb 7.5. Xray with gastric bubble and dilation noted.   4/18 POD 3: Agitation overnight, precedex infusion started. VBG stable, CO2 normal. SB in the 50s this AM, DC amio infusion. Remains on HFNC, pressors off. Continue diuresis, deline. AV wires pulled, plan to pull chest tubes today. Continue ICU level of care. HTN in PM, start Metoprolol   4/19 POD 4: Worsening delirium haldol x 2 overnight and increased precedex. Valproic acid added by ICU. Refusing PO medications, food/drink today. Cr slightly increased with less robust response to diuretics. Start LR 50mL/hr for low UO and no PO intake.   4/20 POD 5: Mentation improved slightly but his respiratory status has worsened, very coarse bilaterally, worsening hypoxia, back on HFNC. Speech consulted, but NPO. Likely will  precedex infusion for sedation      Acute on Chronic Anemia, ANA MARIA: Transfused PRBC x 1 on 4/17  - Hgb 8.8 today, off pressors, no acute indication for transfusion   - Daily CBC   - Venofer 200mg x 3 doses (4/16-4/18)     Acute Post Op Pain:  - Scheduled tylenol, lidocaine patches  - PRN oxycodone  - Gabapentin held d/t CRISTINO on CKD    Osteoporosis: On PTA fosamax and vit D  - Eval closer to DC     Neuropathy: 2/2 DM and PAD. PTA gabapentin 600mg BID  - Hold gabapentin in setting of CRISTINO on CKD    Baseline Mobility Issues:  - Per notes, he uses a rollator most of the time at baseline, noted decline in his function the last 2 years  - PT/OT consulted, recommending IPR     Fluid and electrolytes: Maintain K+ >4 and Mg level >2.  Nutrition: advance diet as tolerated, cardiac, added supplement   Activity: OOB all meals and ambulate in halls, encourage I/S   Bowel Regimen: Senokot , Miralax, and PRN dulcolax   GI ppx: Pepcid  DVT ppx: SCDs, SQ heparin (start 4/17)     Dispo: Continue ICU level of care. Would give today to recover, get TF started, give a day of ABX. Eval for weaning of vent tomorrow.     Signed By: JENNIFER DECKER - NP

## 2025-04-21 NOTE — CONSULTS
Comprehensive Nutrition Assessment    Type and Reason for Visit: Initial, Consult, Nutrition support    Nutrition Recommendations/Plan:     220 ml Nepro QID with 215 ml FWF after each bolus  Provides: 880 ml, 1584 kcal, 71 gm pro, 640 ml + 860 ml FWF = 1500 ml total H2O  Provide bolus feeds at half rate for first day, advance to full rate tomorrow pending tolerance  Provide bolus feed 2 hours before/after Valproate administration   Adjust FWF pending RRT    Monitor electrolytes, replete prn       Malnutrition Assessment:  Malnutrition Status:  Insufficient data (04/21/25 1223)    Context:  Acute Illness     Findings of the 6 clinical characteristics of malnutrition:  Energy Intake:  75% or less of estimated energy requirements for 7 or more days  Weight Loss:  Unable to assess     Body Fat Loss:  Unable to assess     Muscle Mass Loss:  Unable to assess    Fluid Accumulation:  No fluid accumulation     Strength:  Not Performed     Nutrition Assessment:    80 yo male admitted for CABG x 2 (LIMA-LAD, SVG-OM) and AMADOU ligation, presented to CVICU following procedure. Pt s/p 2 U PRBCs and DDAVP. PMH of PAF, COPD, HTN, PAD, CKD3, T2DM, BPH, ANA MARIA.     4/21: RD consult for TF. S/p 1 U PRBC 4/17. Extubated to BiPAP on 4/16 but respiratory status worsened pt re-intubated 4/20. Nephrology following, CRISTINO on CKD, will likely require RRT tomorrow. Ongoing agitation and delirium, precedex infusion started. Off pressors.    Spoke with niece, pt normally eats well, at least 2 meals/d. She reports he had just started to eat more knowing he was getting the CABG done. She is unsure of a UBW but does not report recent weight loss. # and scaled weight on admission 153#. Previous weights throughout 2025 have been ~130-140#, suspect this is more accurate as weights this admission are bed scale and pt with extremity and generalized edema. Will use 139# from 4/9/2025 for EEN.     Valproate recommended to be  from enteral

## 2025-04-22 ENCOUNTER — APPOINTMENT (OUTPATIENT)
Facility: HOSPITAL | Age: 80
End: 2025-04-22
Attending: THORACIC SURGERY (CARDIOTHORACIC VASCULAR SURGERY)
Payer: MEDICARE

## 2025-04-22 PROBLEM — Z78.9 ON ENTERAL NUTRITION: Status: ACTIVE | Noted: 2025-04-22

## 2025-04-22 LAB
ANION GAP SERPL CALC-SCNC: 10 MMOL/L (ref 2–12)
ANION GAP SERPL CALC-SCNC: 9 MMOL/L (ref 2–12)
BUN SERPL-MCNC: 75 MG/DL (ref 6–20)
BUN SERPL-MCNC: 80 MG/DL (ref 6–20)
BUN/CREAT SERPL: 23 (ref 12–20)
BUN/CREAT SERPL: 24 (ref 12–20)
CALCIUM SERPL-MCNC: 7.9 MG/DL (ref 8.5–10.1)
CALCIUM SERPL-MCNC: 8.2 MG/DL (ref 8.5–10.1)
CHLORIDE SERPL-SCNC: 104 MMOL/L (ref 97–108)
CHLORIDE SERPL-SCNC: 106 MMOL/L (ref 97–108)
CO2 SERPL-SCNC: 22 MMOL/L (ref 21–32)
CO2 SERPL-SCNC: 22 MMOL/L (ref 21–32)
CREAT SERPL-MCNC: 3.2 MG/DL (ref 0.7–1.3)
CREAT SERPL-MCNC: 3.4 MG/DL (ref 0.7–1.3)
ERYTHROCYTE [DISTWIDTH] IN BLOOD BY AUTOMATED COUNT: 19.1 % (ref 11.5–14.5)
GLUCOSE BLD STRIP.AUTO-MCNC: 193 MG/DL (ref 65–117)
GLUCOSE BLD STRIP.AUTO-MCNC: 225 MG/DL (ref 65–117)
GLUCOSE BLD STRIP.AUTO-MCNC: 229 MG/DL (ref 65–117)
GLUCOSE BLD STRIP.AUTO-MCNC: 264 MG/DL (ref 65–117)
GLUCOSE SERPL-MCNC: 174 MG/DL (ref 65–100)
GLUCOSE SERPL-MCNC: 221 MG/DL (ref 65–100)
HCT VFR BLD AUTO: 26.2 % (ref 36.6–50.3)
HGB BLD-MCNC: 8.3 G/DL (ref 12.1–17)
MAGNESIUM SERPL-MCNC: 2.8 MG/DL (ref 1.6–2.4)
MCH RBC QN AUTO: 29.4 PG (ref 26–34)
MCHC RBC AUTO-ENTMCNC: 31.7 G/DL (ref 30–36.5)
MCV RBC AUTO: 92.9 FL (ref 80–99)
NRBC # BLD: 0.41 K/UL (ref 0–0.01)
NRBC BLD-RTO: 7.1 PER 100 WBC
PHOSPHATE SERPL-MCNC: 3.4 MG/DL (ref 2.6–4.7)
PLATELET # BLD AUTO: 169 K/UL (ref 150–400)
PMV BLD AUTO: 9.8 FL (ref 8.9–12.9)
POTASSIUM SERPL-SCNC: 3.7 MMOL/L (ref 3.5–5.1)
POTASSIUM SERPL-SCNC: 3.8 MMOL/L (ref 3.5–5.1)
RBC # BLD AUTO: 2.82 M/UL (ref 4.1–5.7)
SERVICE CMNT-IMP: ABNORMAL
SODIUM SERPL-SCNC: 135 MMOL/L (ref 136–145)
SODIUM SERPL-SCNC: 138 MMOL/L (ref 136–145)
WBC # BLD AUTO: 5.8 K/UL (ref 4.1–11.1)

## 2025-04-22 PROCEDURE — 70450 CT HEAD/BRAIN W/O DYE: CPT

## 2025-04-22 PROCEDURE — 85027 COMPLETE CBC AUTOMATED: CPT

## 2025-04-22 PROCEDURE — 6370000000 HC RX 637 (ALT 250 FOR IP): Performed by: STUDENT IN AN ORGANIZED HEALTH CARE EDUCATION/TRAINING PROGRAM

## 2025-04-22 PROCEDURE — 2500000003 HC RX 250 WO HCPCS

## 2025-04-22 PROCEDURE — 6370000000 HC RX 637 (ALT 250 FOR IP)

## 2025-04-22 PROCEDURE — 83735 ASSAY OF MAGNESIUM: CPT

## 2025-04-22 PROCEDURE — 6360000002 HC RX W HCPCS: Performed by: STUDENT IN AN ORGANIZED HEALTH CARE EDUCATION/TRAINING PROGRAM

## 2025-04-22 PROCEDURE — 6360000002 HC RX W HCPCS: Performed by: INTERNAL MEDICINE

## 2025-04-22 PROCEDURE — 2500000003 HC RX 250 WO HCPCS: Performed by: STUDENT IN AN ORGANIZED HEALTH CARE EDUCATION/TRAINING PROGRAM

## 2025-04-22 PROCEDURE — 70551 MRI BRAIN STEM W/O DYE: CPT

## 2025-04-22 PROCEDURE — P9047 ALBUMIN (HUMAN), 25%, 50ML: HCPCS | Performed by: NURSE PRACTITIONER

## 2025-04-22 PROCEDURE — 82962 GLUCOSE BLOOD TEST: CPT

## 2025-04-22 PROCEDURE — 94003 VENT MGMT INPAT SUBQ DAY: CPT

## 2025-04-22 PROCEDURE — 74018 RADEX ABDOMEN 1 VIEW: CPT

## 2025-04-22 PROCEDURE — 2580000003 HC RX 258: Performed by: STUDENT IN AN ORGANIZED HEALTH CARE EDUCATION/TRAINING PROGRAM

## 2025-04-22 PROCEDURE — 6370000000 HC RX 637 (ALT 250 FOR IP): Performed by: NURSE PRACTITIONER

## 2025-04-22 PROCEDURE — 80048 BASIC METABOLIC PNL TOTAL CA: CPT

## 2025-04-22 PROCEDURE — 99231 SBSQ HOSP IP/OBS SF/LOW 25: CPT

## 2025-04-22 PROCEDURE — 6360000002 HC RX W HCPCS: Performed by: ANESTHESIOLOGY

## 2025-04-22 PROCEDURE — P9047 ALBUMIN (HUMAN), 25%, 50ML: HCPCS | Performed by: ANESTHESIOLOGY

## 2025-04-22 PROCEDURE — 2580000003 HC RX 258: Performed by: NURSE PRACTITIONER

## 2025-04-22 PROCEDURE — 6360000002 HC RX W HCPCS: Performed by: NURSE PRACTITIONER

## 2025-04-22 PROCEDURE — 2700000000 HC OXYGEN THERAPY PER DAY

## 2025-04-22 PROCEDURE — 84100 ASSAY OF PHOSPHORUS: CPT

## 2025-04-22 PROCEDURE — 94640 AIRWAY INHALATION TREATMENT: CPT

## 2025-04-22 PROCEDURE — 6370000000 HC RX 637 (ALT 250 FOR IP): Performed by: PHYSICIAN ASSISTANT

## 2025-04-22 PROCEDURE — 71045 X-RAY EXAM CHEST 1 VIEW: CPT

## 2025-04-22 PROCEDURE — 2000000000 HC ICU R&B

## 2025-04-22 PROCEDURE — 70544 MR ANGIOGRAPHY HEAD W/O DYE: CPT

## 2025-04-22 RX ORDER — EPINEPHRINE 0.1 MG/ML
INJECTION INTRAVENOUS
Status: DISPENSED
Start: 2025-04-22 | End: 2025-04-23

## 2025-04-22 RX ORDER — INDOMETHACIN 25 MG/1
CAPSULE ORAL
Status: DISCONTINUED
Start: 2025-04-22 | End: 2025-04-22 | Stop reason: WASHOUT

## 2025-04-22 RX ORDER — AMIODARONE HYDROCHLORIDE 150 MG/3ML
INJECTION, SOLUTION INTRAVENOUS
Status: DISCONTINUED
Start: 2025-04-22 | End: 2025-04-22 | Stop reason: WASHOUT

## 2025-04-22 RX ORDER — PHENYLEPHRINE HCL IN 0.9% NACL 0.4MG/10ML
SYRINGE (ML) INTRAVENOUS
Status: DISPENSED
Start: 2025-04-22 | End: 2025-04-23

## 2025-04-22 RX ORDER — ATROPINE SULFATE 0.1 MG/ML
INJECTION INTRAVENOUS
Status: DISCONTINUED
Start: 2025-04-22 | End: 2025-04-22 | Stop reason: WASHOUT

## 2025-04-22 RX ORDER — METOCLOPRAMIDE HYDROCHLORIDE 5 MG/ML
5 INJECTION INTRAMUSCULAR; INTRAVENOUS EVERY 8 HOURS
Status: DISPENSED | OUTPATIENT
Start: 2025-04-22 | End: 2025-04-23

## 2025-04-22 RX ORDER — FUROSEMIDE 10 MG/ML
40 INJECTION INTRAMUSCULAR; INTRAVENOUS ONCE
Status: COMPLETED | OUTPATIENT
Start: 2025-04-22 | End: 2025-04-22

## 2025-04-22 RX ORDER — INDOMETHACIN 25 MG/1
CAPSULE ORAL
Status: DISPENSED
Start: 2025-04-22 | End: 2025-04-23

## 2025-04-22 RX ORDER — AMIODARONE HYDROCHLORIDE 150 MG/3ML
INJECTION, SOLUTION INTRAVENOUS
Status: DISPENSED
Start: 2025-04-22 | End: 2025-04-23

## 2025-04-22 RX ORDER — ATROPINE SULFATE 0.1 MG/ML
INJECTION INTRAVENOUS
Status: DISPENSED
Start: 2025-04-22 | End: 2025-04-23

## 2025-04-22 RX ORDER — ALBUMIN (HUMAN) 12.5 G/50ML
12.5 SOLUTION INTRAVENOUS ONCE
Status: COMPLETED | OUTPATIENT
Start: 2025-04-22 | End: 2025-04-22

## 2025-04-22 RX ORDER — METOCLOPRAMIDE HYDROCHLORIDE 5 MG/ML
10 INJECTION INTRAMUSCULAR; INTRAVENOUS EVERY 8 HOURS
Status: DISCONTINUED | OUTPATIENT
Start: 2025-04-22 | End: 2025-04-22

## 2025-04-22 RX ORDER — EPINEPHRINE 0.1 MG/ML
INJECTION INTRAVENOUS
Status: DISCONTINUED
Start: 2025-04-22 | End: 2025-04-22 | Stop reason: WASHOUT

## 2025-04-22 RX ADMIN — ACETAMINOPHEN 1000 MG: 500 TABLET ORAL at 00:10

## 2025-04-22 RX ADMIN — LACTULOSE 20 G: 20 SOLUTION ORAL at 14:12

## 2025-04-22 RX ADMIN — SODIUM CHLORIDE, PRESERVATIVE FREE 10 ML: 5 INJECTION INTRAVENOUS at 08:42

## 2025-04-22 RX ADMIN — POLYETHYLENE GLYCOL 3350 17 G: 17 POWDER, FOR SOLUTION ORAL at 08:23

## 2025-04-22 RX ADMIN — BUMETANIDE 4 MG: 0.25 INJECTION INTRAMUSCULAR; INTRAVENOUS at 08:22

## 2025-04-22 RX ADMIN — METOPROLOL TARTRATE 25 MG: 25 TABLET, FILM COATED ORAL at 21:05

## 2025-04-22 RX ADMIN — BUMETANIDE 4 MG: 0.25 INJECTION INTRAMUSCULAR; INTRAVENOUS at 17:01

## 2025-04-22 RX ADMIN — Medication 4 UNITS: at 17:22

## 2025-04-22 RX ADMIN — CHLORHEXIDINE GLUCONATE 15 ML: 1.2 RINSE ORAL at 21:20

## 2025-04-22 RX ADMIN — ACETAMINOPHEN 1000 MG: 500 TABLET ORAL at 12:20

## 2025-04-22 RX ADMIN — SODIUM CHLORIDE, PRESERVATIVE FREE 10 ML: 5 INJECTION INTRAVENOUS at 21:19

## 2025-04-22 RX ADMIN — Medication 4 UNITS: at 12:01

## 2025-04-22 RX ADMIN — IPRATROPIUM BROMIDE 0.5 MG: 0.5 SOLUTION RESPIRATORY (INHALATION) at 13:07

## 2025-04-22 RX ADMIN — HEPARIN SODIUM 5000 UNITS: 5000 INJECTION INTRAVENOUS; SUBCUTANEOUS at 06:14

## 2025-04-22 RX ADMIN — METOPROLOL TARTRATE 25 MG: 25 TABLET, FILM COATED ORAL at 08:22

## 2025-04-22 RX ADMIN — IPRATROPIUM BROMIDE 0.5 MG: 0.5 SOLUTION RESPIRATORY (INHALATION) at 20:43

## 2025-04-22 RX ADMIN — HEPARIN SODIUM 5000 UNITS: 5000 INJECTION INTRAVENOUS; SUBCUTANEOUS at 21:07

## 2025-04-22 RX ADMIN — ACETAMINOPHEN 1000 MG: 500 TABLET ORAL at 17:01

## 2025-04-22 RX ADMIN — METOCLOPRAMIDE HYDROCHLORIDE 10 MG: 5 INJECTION INTRAMUSCULAR; INTRAVENOUS at 12:25

## 2025-04-22 RX ADMIN — ACETAMINOPHEN 1000 MG: 500 TABLET ORAL at 04:49

## 2025-04-22 RX ADMIN — SODIUM CHLORIDE 40 MG: 9 INJECTION INTRAMUSCULAR; INTRAVENOUS; SUBCUTANEOUS at 11:50

## 2025-04-22 RX ADMIN — DEXMEDETOMIDINE HYDROCHLORIDE 1.5 MCG/KG/HR: 4 INJECTION, SOLUTION INTRAVENOUS at 04:47

## 2025-04-22 RX ADMIN — ASPIRIN 81 MG: 81 TABLET, CHEWABLE ORAL at 08:22

## 2025-04-22 RX ADMIN — IPRATROPIUM BROMIDE 0.5 MG: 0.5 SOLUTION RESPIRATORY (INHALATION) at 16:22

## 2025-04-22 RX ADMIN — AMIODARONE HYDROCHLORIDE 200 MG: 200 TABLET ORAL at 08:22

## 2025-04-22 RX ADMIN — IPRATROPIUM BROMIDE 0.5 MG: 0.5 SOLUTION RESPIRATORY (INHALATION) at 09:37

## 2025-04-22 RX ADMIN — POTASSIUM BICARBONATE 40 MEQ: 782 TABLET, EFFERVESCENT ORAL at 12:21

## 2025-04-22 RX ADMIN — VALPROATE SODIUM 250 MG: 100 INJECTION, SOLUTION INTRAVENOUS at 16:24

## 2025-04-22 RX ADMIN — Medication 2 UNITS: at 04:53

## 2025-04-22 RX ADMIN — PIPERACILLIN AND TAZOBACTAM 3375 MG: 3; .375 INJECTION, POWDER, LYOPHILIZED, FOR SOLUTION INTRAVENOUS at 04:49

## 2025-04-22 RX ADMIN — ATORVASTATIN CALCIUM 40 MG: 40 TABLET, FILM COATED ORAL at 21:05

## 2025-04-22 RX ADMIN — HEPARIN SODIUM 5000 UNITS: 5000 INJECTION INTRAVENOUS; SUBCUTANEOUS at 14:12

## 2025-04-22 RX ADMIN — DEXMEDETOMIDINE HYDROCHLORIDE 1.5 MCG/KG/HR: 4 INJECTION, SOLUTION INTRAVENOUS at 07:04

## 2025-04-22 RX ADMIN — LACTULOSE 20 G: 20 SOLUTION ORAL at 08:22

## 2025-04-22 RX ADMIN — AMIODARONE HYDROCHLORIDE 200 MG: 200 TABLET ORAL at 21:05

## 2025-04-22 RX ADMIN — PIPERACILLIN AND TAZOBACTAM 3375 MG: 3; .375 INJECTION, POWDER, LYOPHILIZED, FOR SOLUTION INTRAVENOUS at 17:00

## 2025-04-22 RX ADMIN — FINASTERIDE 5 MG: 5 TABLET, FILM COATED ORAL at 21:05

## 2025-04-22 RX ADMIN — ARFORMOTEROL TARTRATE 15 MCG: 15 SOLUTION RESPIRATORY (INHALATION) at 09:37

## 2025-04-22 RX ADMIN — DEXMEDETOMIDINE HYDROCHLORIDE 1.5 MCG/KG/HR: 4 INJECTION, SOLUTION INTRAVENOUS at 00:10

## 2025-04-22 RX ADMIN — ALBUMIN (HUMAN) 25 G: 0.25 INJECTION, SOLUTION INTRAVENOUS at 03:23

## 2025-04-22 RX ADMIN — BISACODYL 10 MG: 10 SUPPOSITORY RECTAL at 06:33

## 2025-04-22 RX ADMIN — ALBUMIN (HUMAN) 12.5 G: 0.25 INJECTION, SOLUTION INTRAVENOUS at 12:28

## 2025-04-22 RX ADMIN — VALPROATE SODIUM 250 MG: 100 INJECTION, SOLUTION INTRAVENOUS at 00:09

## 2025-04-22 RX ADMIN — FUROSEMIDE 40 MG: 10 INJECTION, SOLUTION INTRAMUSCULAR; INTRAVENOUS at 12:25

## 2025-04-22 RX ADMIN — CHLORHEXIDINE GLUCONATE 15 ML: 1.2 RINSE ORAL at 08:18

## 2025-04-22 RX ADMIN — VALPROATE SODIUM 250 MG: 100 INJECTION, SOLUTION INTRAVENOUS at 08:33

## 2025-04-22 RX ADMIN — SENNOSIDES AND DOCUSATE SODIUM 1 TABLET: 50; 8.6 TABLET ORAL at 08:23

## 2025-04-22 RX ADMIN — ARFORMOTEROL TARTRATE 15 MCG: 15 SOLUTION RESPIRATORY (INHALATION) at 20:43

## 2025-04-22 ASSESSMENT — PULMONARY FUNCTION TESTS
PIF_VALUE: 19
PIF_VALUE: 18

## 2025-04-22 ASSESSMENT — PAIN SCALES - GENERAL
PAINLEVEL_OUTOF10: 0

## 2025-04-22 NOTE — PROGRESS NOTES
Comprehensive Nutrition Assessment    Type and Reason for Visit: Reassess    Nutrition Recommendations/Plan:     Adjust TF via DHT to continuous: Nepro @ 40 mL/hr with 100 mL H2O flushes q 4 hours       Malnutrition Assessment:  Malnutrition Status:  Insufficient data (04/21/25 1223)    Context:  Acute Illness     Findings of the 6 clinical characteristics of malnutrition:  Energy Intake:  75% or less of estimated energy requirements for 7 or more days  Weight Loss:  Unable to assess     Body Fat Loss:  Unable to assess     Muscle Mass Loss:  Unable to assess    Fluid Accumulation:  No fluid accumulation     Strength:  Not Performed     Nutrition Assessment:    78 yo male admitted for CABG x 2 (LIMA-LAD, SVG-OM) and AMADOU ligation, presented to CVICU following procedure. Pt s/p 2 U PRBCs and DDAVP. PMH of PAF, COPD, HTN, PAD, CKD3, T2DM, BPH, ANA MARIA.    Extubated to BiPAP on 4/16 but respiratory status worsened pt re-intubated 4/20.      4/22: tube feeds initiated yesterday @ 1/2 rate.  Since Depacon is IV, enteral feeding does not impair absorption, but ?if will be adjusted to enteral form.  Tube feeds off at present, OG placed to intermittent suction to help clear bowel/ relieve pressure which may have been contributing to respiratory status.  OG replaced with DHT this morning.  KUB with persistent diffuse small bowel distention.  SBT today and working towards extubation.  In this case, bolus feeds would be reasonable.  However, from GI standpoint, may do better with continuous TF until starts moving bowels.  Last BM PTA.  If continuous TF needed, current regimen to continuous would be Nepro @ 40 mL/hr + 215 mL H2O flush q 6 hours to provide the same.    Cr stable per nephrology, remains on Bumex IV BID.  No indication for RRT yet.    Balancing protein needs with CKD and post-op CABG + vent.  Spoke with RN this afternoon.  Pt has been extubated, however DHT was inserted d/t concern for ability to pass swallow eval.

## 2025-04-22 NOTE — PROGRESS NOTES
Renal Dosing/Monitoring  Medication: metoclopramide   Current regimen:  10 every 8 hr x 3 dose  Recent Labs     04/21/25  0519 04/21/25  1540 04/22/25  0328   CREATININE 3.22* 3.26* 3.20*   BUN 74* 74* 75*     Estimated CrCl:  17 ml/min  Plan: Change to 5 mg  q8 hr per [facility] P&T Committee Protocol with respect to renal function.  Pharmacy will continue to monitor patient daily and will make dosage adjustments based upon changing renal function.

## 2025-04-22 NOTE — PROGRESS NOTES
0800: Bedside shift change report given to Nicky RN and America RN (oncoming nurse) by Angela RN (offgoing nurse). Report included the following information Nurse Handoff Report, Intake/Output, MAR, Recent Results, and Cardiac Rhythm Sinus Robert .     1030: OG removed. Dobhoff placed    1045: Patient moving all extremities and following all commands. Placed on SBT    1100: KUB obtained. Stylet removed.     1125: Intensivist BYRON Carmona MD at bedside. Orders to pause sedation to prepare for extubation.    1130- Bedside shift change report given to CAROL RN (oncoming nurse) by IGNACIO RN/ TR RN (offgoing nurse). Report included the following information Nurse Handoff Report, Intake/Output, MAR, Recent Results, and Cardiac Rhythm Sinus Robert.

## 2025-04-22 NOTE — PROGRESS NOTES
Cardiac Surgery ICU Progress Note    Admit Date: 4/15/2025  POD:  7 Days Post-Op    Procedure:    4/15/25 with Dr. Brand:   CORONARY ARTERY BYPASS GRAFTING X 2 WITH LIMA (LIMA-LAD, SVG-OM , LESVGH, LIGATION OF LEFT ATRIAL APPENDAGE, DENNIS AND EPIAORTIC U/S BY DR HAGEN       [facility] synopsis:  4/15 POD0: CABG x2/LAAL with Dr Brand. DENNIS with normal biventricular function, EF 55%, no WMA. Transferred to ICU on precedex and insulin gtt with A&V wires and 3 alecia drains (2 med, L pleural). Extubated at 1545 to BiPAP. Overnight maribeth transitioned to norepi.   4/16 POD1: On vaso 0.04, levo 4. A-paced 70's with intermittent capture. Low UOP overnight (200cc). Received albumin x1 and 250ml LR. IV lasix 40mg with minimal response. Consult nephrology: added additional lasix 100mg for diuretic challenge. CXR showing pulmonary edema. ABG showing rising PCO2 65 and pH 7.15. Placed on BiPAP with repeat ABG showing improvement.   4/17 POD 2: Afib around 0200, amio bolus and infusion started. Remains on norepi and vaso. 4L NC this AM, transition to HFNC for ongoing mild hypercapnia. Good response to lasix challenge, Cr up to 2.44. Bumex 2mg IV BID today. PRBC x 1 for Hgb 7.5. Xray with gastric bubble and dilation noted.   4/18 POD 3: Agitation overnight, precedex infusion started. VBG stable, CO2 normal. SB in the 50s this AM, DC amio infusion. Remains on HFNC, pressors off. Continue diuresis, deline. AV wires pulled, plan to pull chest tubes today. Continue ICU level of care. HTN in PM, start Metoprolol   4/19 POD 4: Worsening delirium haldol x 2 overnight and increased precedex. Valproic acid added by ICU. Refusing PO medications, food/drink today. Cr slightly increased with less robust response to diuretics. Start LR 50mL/hr for low UO and no PO intake.   4/20 POD 5: Mentation improved slightly but his respiratory status has worsened, very coarse bilaterally, worsening hypoxia, back on HFNC. Speech consulted, but NPO. Likely will

## 2025-04-22 NOTE — PROGRESS NOTES
Physical Therapy 4/22/25    Chart reviewed and discussed patient with RN, patient remains intubated at this time however plans for SBT's today with potential extubation. Will defer and follow up later today for re-eval as medically appropriate.    PM update: patient extubated, however code stroke called d/t change in neuro status after extubation. Will hold and continue to follow up as able.       Thank you for your consideration,    Stacy Rodarte, PT, DPT

## 2025-04-22 NOTE — DIABETES MGMT
[facility]  [facility]  DIABETES MANAGEMENT CONSULT    Consulted by Provider for advanced nursing evaluation and care for inpatient blood glucose management.    Evaluation and Action Plan   Scooter Dickerson is a 78 yo male with a history of CAD, HTN, HLD, sleep apnea, asthma, arthritis, and blood clots in right leg who presented for scheduled CABG x2 on 4/15. He was previously admitted for CABG eval and workup after having abnormal stress test results in March. The [facility] has been consulted to assist in glycemic management and advanced diabetes management assessment this admission.    Mr. Dickerson has no personal or family history of diabetes with current A1c of 5.4%. He currently does not take any medications for diabetes or has ever in the past. He endorses he eats small portions.    Admission BG 78. He underwent CABG x2 yesterday and insulin infusion was started post op for glycemic control. His 24 hour insulin needs were very low at 13.4 units with hourly rates of 0.1-2.6 units/hour. He will continue on the insulin infusion for another 24 hours. Anticipate he will be able to be transitioned off insulin infusion tomorrow afternoon. Will continue to follow along with you.     - , within target range. His BG pattern is stable with minimal hyperglycemic spikes, tolerating bolus TF well. If BG pattern starts to experience hyperglycemia will start conservative carb coverage dosing. Will continue to monitor BG pattern closely and follow along with you.     Blood glucose pattern      Significant diabetes-related events over the past 24-72 hours  A1C 5.4%  Fastin  BG pattern: 119-148  Correction: 2 units       Management Rationale Action Plan   Medication   Basal needs Using  units/kg/D based on  Not indicated at this time    Nutritional needs Covers carb load in meals Will continue to monitor      Corrective insulin Using medium dose sensitivity  Q6hr    Additional

## 2025-04-22 NOTE — PROGRESS NOTES
[address], [facility] [place] [address]  Phone: [phone]   [phone]    [email or web address]     Nephrology Progress Note    Patient Name : Scooter Dickerson      : 1945     MRN : [id number]  Date of Admission : 4/15/2025  Date of Servive : 25    CC:  Follow up for CRISTINO       Assessment and Plan   CRISTINO on CKD  - ischemic ATN  - cr peaked, good UOP  - cont bumex 4mg IV BID  - daily labs and I/Os  - no need for RRT at this time    Post op Delirium   - per CSS, critical care      CKD 3B   - baseline Cr 1.5 mg/dl pre op   - 2/2 HTN, DM, smoking     Acute Hypercarbic resp failure   Chronic heavy smoker   KARI - does not use CPAP  - per [facility]      CAD s/p CABG x 2, AMADOU ligation 4/15   Hypotension   - off pressors      Chronic anemia   Iron def : complete venofer x 3 doses   - continue  VILMA  - transfuse PRN      BPH , hx of TURP 10/24, penile swelling   Osteoporosis   DM-II   A fib : On oral Amio        Interval History:  Seen and examined.  Intubated, sedated.  Not on pressors.  UOP picking up.  Cr stable.  Oxygenation and BP stable.       Review of Systems: Review of systems not obtained due to patient factors.    Current Medications:   Current Facility-Administered Medications   Medication Dose Route Frequency    piperacillin-tazobactam (ZOSYN) 3,375 mg in sodium chloride 0.9 % 50 mL IVPB (addEASE)  3,375 mg IntraVENous Q12H    lactulose (CHRONULAC) 10 GM/15ML solution 20 g  20 g Oral TID    bumetanide (BUMEX) injection 4 mg  4 mg IntraVENous BID    aspirin chewable tablet 81 mg  81 mg Oral Daily    insulin lispro (HUMALOG,ADMELOG) injection vial 0-12 Units  0-12 Units SubCUTAneous Q6H    valproate (DEPACON) 250 mg in sodium chloride 0.9 % 100 mL IVPB  250 mg IntraVENous Q8H    amiodarone (CORDARONE) tablet 200 mg  200 mg Oral BID    0.9 % sodium chloride infusion   IntraVENous PRN    metoprolol tartrate (LOPRESSOR) tablet 25 mg  25 mg Oral BID

## 2025-04-22 NOTE — PROGRESS NOTES
2000: Bedside shift change report given to Angela RN (oncoming nurse) by Koki RN (offgoing nurse). Report included the following information Nurse Handoff Report, Adult Overview, Surgery Report, Intake/Output, MAR, and Recent Results.

## 2025-04-22 NOTE — PROGRESS NOTES
Occupational Therapy    Chart reviewed, patient remains intubated at this time with plans for SBT and potential extubation. Will hold and follow up in PM.     PM update: patient extubated, however code stroke called d/t change in neuro status after extubation. Will hold and continue to follow up as able.     Eri Rousseau MS, OTR/L

## 2025-04-22 NOTE — PROGRESS NOTES
1200: Bedside and Verbal shift change report given to MALVIN Jones (oncoming nurse) by MALVIN Saunders and MALVIN Cross (offgoing nurse). Report included the following information Nurse Handoff Report, Index, Intake/Output, MAR, and Recent Results.    1221: MD Kristine updated on radiology read of KUB recommending advancing dobhoff 10cm. RN not to advance and dobhoff okay to use.   MD also at bedside assessing patient. RN okay to extubate.     1245: Patient extubated by RT to 4lt NC     1420: MD Kristine updated about low urine output after lasix.     1442: Code stroke called due to R eyelid droop and RUE and RLE weaker than L     1451: Code stroke canceled by Neuro NP and MD Kristine who are at bedside.     1523: Patient taken down for head CT with neuro NP     1930: Bedside and Verbal shift change report given to MALVIN Pineda (oncoming nurse) by MALVIN Jones (offgoing nurse). Report included the following information Nurse Handoff Report, Index, Intake/Output, MAR, and Recent Results.

## 2025-04-22 NOTE — PLAN OF CARE
Problem: Chronic Conditions and Co-morbidities  Goal: Patient's chronic conditions and co-morbidity symptoms are monitored and maintained or improved  Outcome: Progressing  Flowsheets (Taken 4/21/2025 2000)  Care Plan - Patient's Chronic Conditions and Co-Morbidity Symptoms are Monitored and Maintained or Improved:   Monitor and assess patient's chronic conditions and comorbid symptoms for stability, deterioration, or improvement   Collaborate with multidisciplinary team to address chronic and comorbid conditions and prevent exacerbation or deterioration     Problem: Pain  Goal: Verbalizes/displays adequate comfort level or baseline comfort level  Outcome: Progressing  Flowsheets (Taken 4/21/2025 2000)  Verbalizes/displays adequate comfort level or baseline comfort level:   Encourage patient to monitor pain and request assistance   Assess pain using appropriate pain scale   Administer analgesics based on type and severity of pain and evaluate response     Problem: Safety - Adult  Goal: Free from fall injury  Outcome: Progressing     Problem: Discharge Planning  Goal: Discharge to home or other facility with appropriate resources  Outcome: Progressing  Flowsheets (Taken 4/21/2025 2000)  Discharge to home or other facility with appropriate resources:   Identify barriers to discharge with patient and caregiver   Arrange for needed discharge resources and transportation as appropriate   Identify discharge learning needs (meds, wound care, etc)     Problem: Safety - Medical Restraint  Goal: Remains free of injury from restraints (Restraint for Interference with Medical Device)  Description: INTERVENTIONS:1. Determine that other, less restrictive measures have been tried or would not be effective before applying the restraint2. Evaluate the patient's condition at the time of restraint application3. Inform patient/family regarding the reason for restraint4. Q2H: Monitor safety, psychosocial status, comfort, nutrition and  hydration  Outcome: Progressing     Problem: Skin/Tissue Integrity  Goal: Skin integrity remains intact  Description: 1.  Monitor for areas of redness and/or skin breakdown2.  Assess vascular access sites hourly3.  Every 4-6 hours minimum:  Change oxygen saturation probe site4.  Every 4-6 hours:  If on nasal continuous positive airway pressure, respiratory therapy assess nares and determine need for appliance change or resting period  Outcome: Progressing  Flowsheets (Taken 4/21/2025 2000)  Skin Integrity Remains Intact:   Monitor for areas of redness and/or skin breakdown   Assess vascular access sites hourly   Every 4-6 hours minimum:  Change oxygen saturation probe site     Problem: Confusion  Goal: Confusion, delirium, dementia, or psychosis is improved or at baseline  Description: INTERVENTIONS:1. Assess for possible contributors to thought disturbance, including medications, impaired vision or hearing, underlying metabolic abnormalities, dehydration, psychiatric diagnoses, and notify attending LIP2. [place] high risk fall precautions, as indicated3. Provide frequent short contacts to provide reality reorientation, refocusing and direction4. Decrease environmental stimuli, including noise as appropriate5. Monitor and intervene to maintain adequate nutrition, hydration, elimination, sleep and activity6. If unable to ensure safety without constant attention obtain sitter and review sitter guidelines with assigned personnel7. Initiate Psychosocial CNS and Spiritual Care consult, as indicated  INTERVENTIONS:1. Assess for possible contributors to thought disturbance, including medications, impaired vision or hearing, underlying metabolic abnormalities, dehydration, psychiatric diagnoses, and notify attending LIP2. [place] high risk fall precautions, as indicated3. Provide frequent short contacts to provide reality reorientation, refocusing and direction4. Decrease environmental stimuli, including noise as

## 2025-04-22 NOTE — PROGRESS NOTES
Neurovascular Brief Progress Note:    Arrived to code stroke. Code stroke cancelled by primary team.  Discussed patient with Sarai Jason NP. Recommend MRI brain and neurology consult.     PIOTR Lynn  Neurovascular Nurse Practitioner

## 2025-04-22 NOTE — PROGRESS NOTES
Name: Scooter Dickerson   : 1945   MRN: [id number]   Date: 2025        No chief complaint on file.        HPI:     80 yo M w/ PMH as noted above who presents to CVICU post planned CABG x2 (LIMA-LAD, SVG-OM), and AMADOU ligation. Pt received 2u pRBCs on pump, and DDAVP after the case. VVI paced due to sinus terence <40.       Pt arrives to CVICU in VVI pacing to 80s, intubated, sedated on precedex, insulin gtt. Pt extubated POD 0. Concern for renal function in milvia op period, nephrology following. Developed ICU delirium     Active Problems Being Managed:     ICU delirium   Multivessel CAD s/p CABG x2  PAF  COPD  Hx HTN  PAD  CRISTINO on CKD 3  DM-2  BPH  ANA MARIA      Assessment/Plan:     No events overnight. However, when pt was extubated neuro exam suggested some right side weakness. Last known well difficult to determine due to sedation and delerium over past week.  CT head was negative for acute pathology. Plan is for MRI and Neurology consult      NEUROLOGICAL:      - CT head negative for acute pathology. Continuing to monitor neuro exam and work up for perioperative stroke.  - Delirium precautions  - Precedex gtt  - on Seroquel, depakote  - PRN anxiolytics       PULMONOLOGY:   - reviewed vent settings; since he was re-intubated . Changed vent to SIMV and later  to  SBT trial.  - Pt successfully extubated.     CARDIOVASCULAR:   - Amio PO  - Goal MAP greater than 65, SBP less than 130, CI >2, CO >3.5  - Pacer wire management per CT surgery      GASTROINTESTINAL:   - on Pepcid  - continue bowel regimen; Noted air in stomach which has improved since pt on intermittend  suction  and stool in intenstine.      RENAL/ELECTROLYTE/FLUIDS:   - Strict ins and outs  - Avoid nephrotoxins, renally dose medications   - Daily renal panel; Cr peaked at  3.26 and is 3.20 this morning;   - Monitor and replace electrolytes  - Nephrology is following     ENDOCRINE:   - Insulin per protocol  - Glucose goal 100-180

## 2025-04-22 NOTE — PROGRESS NOTES
Cardiac Surgery Care Coordinator- Met with Scooter Dickerson's cesilia at the bedside. Reviewed plan of care and encouraged her to verbalize. Will continue to follow for educational and emotional needs.
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
## 2025-04-23 ENCOUNTER — APPOINTMENT (OUTPATIENT)
Facility: HOSPITAL | Age: 80
End: 2025-04-23
Attending: THORACIC SURGERY (CARDIOTHORACIC VASCULAR SURGERY)
Payer: MEDICARE

## 2025-04-23 PROBLEM — R53.1 RIGHT SIDED WEAKNESS: Status: ACTIVE | Noted: 2025-04-23

## 2025-04-23 LAB
ALBUMIN SERPL-MCNC: 3.6 G/DL (ref 3.5–5)
ALBUMIN/GLOB SERPL: 1.4 (ref 1.1–2.2)
ALP SERPL-CCNC: 92 U/L (ref 45–117)
ALT SERPL-CCNC: 9 U/L (ref 12–78)
ANION GAP SERPL CALC-SCNC: 10 MMOL/L (ref 2–12)
ANION GAP SERPL CALC-SCNC: 4 MMOL/L (ref 2–12)
ANION GAP SERPL CALC-SCNC: 5 MMOL/L (ref 2–12)
AST SERPL-CCNC: 6 U/L (ref 15–37)
BACTERIA SPEC CULT: ABNORMAL
BACTERIA SPEC CULT: ABNORMAL
BILIRUB SERPL-MCNC: 0.5 MG/DL (ref 0.2–1)
BUN SERPL-MCNC: 79 MG/DL (ref 6–20)
BUN SERPL-MCNC: 79 MG/DL (ref 6–20)
BUN SERPL-MCNC: 80 MG/DL (ref 6–20)
BUN/CREAT SERPL: 22 (ref 12–20)
BUN/CREAT SERPL: 24 (ref 12–20)
BUN/CREAT SERPL: 25 (ref 12–20)
CALCIUM SERPL-MCNC: 8.3 MG/DL (ref 8.5–10.1)
CALCIUM SERPL-MCNC: 8.3 MG/DL (ref 8.5–10.1)
CALCIUM SERPL-MCNC: 8.4 MG/DL (ref 8.5–10.1)
CHLORIDE SERPL-SCNC: 104 MMOL/L (ref 97–108)
CHLORIDE SERPL-SCNC: 105 MMOL/L (ref 97–108)
CHLORIDE SERPL-SCNC: 105 MMOL/L (ref 97–108)
CHOLEST SERPL-MCNC: 57 MG/DL
CO2 SERPL-SCNC: 24 MMOL/L (ref 21–32)
CO2 SERPL-SCNC: 27 MMOL/L (ref 21–32)
CO2 SERPL-SCNC: 28 MMOL/L (ref 21–32)
CREAT SERPL-MCNC: 3.24 MG/DL (ref 0.7–1.3)
CREAT SERPL-MCNC: 3.35 MG/DL (ref 0.7–1.3)
CREAT SERPL-MCNC: 3.53 MG/DL (ref 0.7–1.3)
ERYTHROCYTE [DISTWIDTH] IN BLOOD BY AUTOMATED COUNT: 20.2 % (ref 11.5–14.5)
GLOBULIN SER CALC-MCNC: 2.5 G/DL (ref 2–4)
GLUCOSE BLD STRIP.AUTO-MCNC: 199 MG/DL (ref 65–117)
GLUCOSE BLD STRIP.AUTO-MCNC: 213 MG/DL (ref 65–117)
GLUCOSE BLD STRIP.AUTO-MCNC: 251 MG/DL (ref 65–117)
GLUCOSE BLD STRIP.AUTO-MCNC: 263 MG/DL (ref 65–117)
GLUCOSE SERPL-MCNC: 226 MG/DL (ref 65–100)
GLUCOSE SERPL-MCNC: 229 MG/DL (ref 65–100)
GLUCOSE SERPL-MCNC: 243 MG/DL (ref 65–100)
GRAM STN SPEC: ABNORMAL
GRAM STN SPEC: ABNORMAL
HCT VFR BLD AUTO: 30.9 % (ref 36.6–50.3)
HDLC SERPL-MCNC: 39 MG/DL
HDLC SERPL: 1.5 (ref 0–5)
HGB BLD-MCNC: 10 G/DL (ref 12.1–17)
LDLC SERPL CALC-MCNC: 6.8 MG/DL (ref 0–100)
MAGNESIUM SERPL-MCNC: 3 MG/DL (ref 1.6–2.4)
MCH RBC QN AUTO: 30.5 PG (ref 26–34)
MCHC RBC AUTO-ENTMCNC: 32.4 G/DL (ref 30–36.5)
MCV RBC AUTO: 94.2 FL (ref 80–99)
NRBC # BLD: 0.9 K/UL (ref 0–0.01)
NRBC BLD-RTO: 9 PER 100 WBC
PLATELET # BLD AUTO: 230 K/UL (ref 150–400)
PMV BLD AUTO: 10.7 FL (ref 8.9–12.9)
POTASSIUM SERPL-SCNC: 3.3 MMOL/L (ref 3.5–5.1)
POTASSIUM SERPL-SCNC: 3.6 MMOL/L (ref 3.5–5.1)
POTASSIUM SERPL-SCNC: 4.1 MMOL/L (ref 3.5–5.1)
PROT SERPL-MCNC: 6.1 G/DL (ref 6.4–8.2)
RBC # BLD AUTO: 3.28 M/UL (ref 4.1–5.7)
SERVICE CMNT-IMP: ABNORMAL
SODIUM SERPL-SCNC: 136 MMOL/L (ref 136–145)
SODIUM SERPL-SCNC: 137 MMOL/L (ref 136–145)
SODIUM SERPL-SCNC: 139 MMOL/L (ref 136–145)
TRIGL SERPL-MCNC: 56 MG/DL
VLDLC SERPL CALC-MCNC: 11.2 MG/DL
WBC # BLD AUTO: 10 K/UL (ref 4.1–11.1)

## 2025-04-23 PROCEDURE — 92610 EVALUATE SWALLOWING FUNCTION: CPT

## 2025-04-23 PROCEDURE — 2580000003 HC RX 258

## 2025-04-23 PROCEDURE — APPNB45 APP NON BILLABLE 31-45 MINUTES

## 2025-04-23 PROCEDURE — 6370000000 HC RX 637 (ALT 250 FOR IP)

## 2025-04-23 PROCEDURE — 94760 N-INVAS EAR/PLS OXIMETRY 1: CPT

## 2025-04-23 PROCEDURE — 85027 COMPLETE CBC AUTOMATED: CPT

## 2025-04-23 PROCEDURE — 97168 OT RE-EVAL EST PLAN CARE: CPT

## 2025-04-23 PROCEDURE — 80053 COMPREHEN METABOLIC PANEL: CPT

## 2025-04-23 PROCEDURE — 80061 LIPID PANEL: CPT

## 2025-04-23 PROCEDURE — 80048 BASIC METABOLIC PNL TOTAL CA: CPT

## 2025-04-23 PROCEDURE — 99231 SBSQ HOSP IP/OBS SF/LOW 25: CPT

## 2025-04-23 PROCEDURE — 82962 GLUCOSE BLOOD TEST: CPT

## 2025-04-23 PROCEDURE — 6370000000 HC RX 637 (ALT 250 FOR IP): Performed by: STUDENT IN AN ORGANIZED HEALTH CARE EDUCATION/TRAINING PROGRAM

## 2025-04-23 PROCEDURE — 36415 COLL VENOUS BLD VENIPUNCTURE: CPT

## 2025-04-23 PROCEDURE — 6360000002 HC RX W HCPCS: Performed by: STUDENT IN AN ORGANIZED HEALTH CARE EDUCATION/TRAINING PROGRAM

## 2025-04-23 PROCEDURE — 2700000000 HC OXYGEN THERAPY PER DAY

## 2025-04-23 PROCEDURE — 97530 THERAPEUTIC ACTIVITIES: CPT

## 2025-04-23 PROCEDURE — 2580000003 HC RX 258: Performed by: STUDENT IN AN ORGANIZED HEALTH CARE EDUCATION/TRAINING PROGRAM

## 2025-04-23 PROCEDURE — 6370000000 HC RX 637 (ALT 250 FOR IP): Performed by: PHYSICIAN ASSISTANT

## 2025-04-23 PROCEDURE — 6360000002 HC RX W HCPCS

## 2025-04-23 PROCEDURE — 2000000000 HC ICU R&B

## 2025-04-23 PROCEDURE — 2500000003 HC RX 250 WO HCPCS: Performed by: STUDENT IN AN ORGANIZED HEALTH CARE EDUCATION/TRAINING PROGRAM

## 2025-04-23 PROCEDURE — 97112 NEUROMUSCULAR REEDUCATION: CPT

## 2025-04-23 PROCEDURE — 2500000003 HC RX 250 WO HCPCS

## 2025-04-23 PROCEDURE — 99223 1ST HOSP IP/OBS HIGH 75: CPT | Performed by: STUDENT IN AN ORGANIZED HEALTH CARE EDUCATION/TRAINING PROGRAM

## 2025-04-23 PROCEDURE — 97535 SELF CARE MNGMENT TRAINING: CPT

## 2025-04-23 PROCEDURE — 6360000002 HC RX W HCPCS: Performed by: ANESTHESIOLOGY

## 2025-04-23 PROCEDURE — 94640 AIRWAY INHALATION TREATMENT: CPT

## 2025-04-23 PROCEDURE — 83735 ASSAY OF MAGNESIUM: CPT

## 2025-04-23 PROCEDURE — 6370000000 HC RX 637 (ALT 250 FOR IP): Performed by: NURSE PRACTITIONER

## 2025-04-23 PROCEDURE — 2580000003 HC RX 258: Performed by: NURSE PRACTITIONER

## 2025-04-23 PROCEDURE — 6370000000 HC RX 637 (ALT 250 FOR IP): Performed by: ANESTHESIOLOGY

## 2025-04-23 PROCEDURE — 71045 X-RAY EXAM CHEST 1 VIEW: CPT

## 2025-04-23 PROCEDURE — 6360000002 HC RX W HCPCS: Performed by: INTERNAL MEDICINE

## 2025-04-23 PROCEDURE — 6360000002 HC RX W HCPCS: Performed by: NURSE PRACTITIONER

## 2025-04-23 PROCEDURE — 97164 PT RE-EVAL EST PLAN CARE: CPT

## 2025-04-23 RX ORDER — OXYCODONE HYDROCHLORIDE 5 MG/1
2.5 TABLET ORAL EVERY 8 HOURS PRN
Refills: 0 | Status: DISCONTINUED | OUTPATIENT
Start: 2025-04-23 | End: 2025-05-01

## 2025-04-23 RX ORDER — GUAIFENESIN 200 MG/10ML
200 LIQUID ORAL EVERY 6 HOURS
Status: DISCONTINUED | OUTPATIENT
Start: 2025-04-23 | End: 2025-04-27

## 2025-04-23 RX ORDER — METOPROLOL TARTRATE 50 MG
50 TABLET ORAL 2 TIMES DAILY
Status: DISCONTINUED | OUTPATIENT
Start: 2025-04-23 | End: 2025-04-27

## 2025-04-23 RX ADMIN — AMIODARONE HYDROCHLORIDE 1 MG/MIN: 50 INJECTION, SOLUTION INTRAVENOUS at 09:14

## 2025-04-23 RX ADMIN — CHLORHEXIDINE GLUCONATE 15 ML: 1.2 RINSE ORAL at 21:29

## 2025-04-23 RX ADMIN — VALPROATE SODIUM 250 MG: 100 INJECTION, SOLUTION INTRAVENOUS at 08:32

## 2025-04-23 RX ADMIN — ASPIRIN 81 MG: 81 TABLET, CHEWABLE ORAL at 08:30

## 2025-04-23 RX ADMIN — SODIUM CHLORIDE 40 MG: 9 INJECTION INTRAMUSCULAR; INTRAVENOUS; SUBCUTANEOUS at 08:30

## 2025-04-23 RX ADMIN — FINASTERIDE 5 MG: 5 TABLET, FILM COATED ORAL at 21:28

## 2025-04-23 RX ADMIN — Medication 4 UNITS: at 00:49

## 2025-04-23 RX ADMIN — HEPARIN SODIUM 5000 UNITS: 5000 INJECTION INTRAVENOUS; SUBCUTANEOUS at 21:31

## 2025-04-23 RX ADMIN — ACETAMINOPHEN 1000 MG: 500 TABLET ORAL at 06:02

## 2025-04-23 RX ADMIN — ARFORMOTEROL TARTRATE 15 MCG: 15 SOLUTION RESPIRATORY (INHALATION) at 08:01

## 2025-04-23 RX ADMIN — BUMETANIDE 2 MG/HR: 0.25 INJECTION INTRAMUSCULAR; INTRAVENOUS at 15:09

## 2025-04-23 RX ADMIN — INSULIN HUMAN 5 UNITS: 100 INJECTION, SUSPENSION SUBCUTANEOUS at 08:44

## 2025-04-23 RX ADMIN — METOPROLOL TARTRATE 50 MG: 50 TABLET, FILM COATED ORAL at 21:28

## 2025-04-23 RX ADMIN — HEPARIN SODIUM 5000 UNITS: 5000 INJECTION INTRAVENOUS; SUBCUTANEOUS at 14:10

## 2025-04-23 RX ADMIN — Medication 2 UNITS: at 16:42

## 2025-04-23 RX ADMIN — POTASSIUM BICARBONATE 40 MEQ: 782 TABLET, EFFERVESCENT ORAL at 21:28

## 2025-04-23 RX ADMIN — VALPROATE SODIUM 250 MG: 100 INJECTION, SOLUTION INTRAVENOUS at 16:00

## 2025-04-23 RX ADMIN — SODIUM CHLORIDE, PRESERVATIVE FREE 10 ML: 5 INJECTION INTRAVENOUS at 21:29

## 2025-04-23 RX ADMIN — Medication 6 UNITS: at 11:24

## 2025-04-23 RX ADMIN — AMIODARONE HYDROCHLORIDE 150 MG: 50 INJECTION, SOLUTION INTRAVENOUS at 03:58

## 2025-04-23 RX ADMIN — BUMETANIDE 2 MG/HR: 0.25 INJECTION INTRAMUSCULAR; INTRAVENOUS at 22:37

## 2025-04-23 RX ADMIN — AMIODARONE HYDROCHLORIDE 200 MG: 200 TABLET ORAL at 08:31

## 2025-04-23 RX ADMIN — SODIUM CHLORIDE, PRESERVATIVE FREE 10 ML: 5 INJECTION INTRAVENOUS at 08:32

## 2025-04-23 RX ADMIN — METOPROLOL TARTRATE 50 MG: 50 TABLET, FILM COATED ORAL at 08:30

## 2025-04-23 RX ADMIN — ACETAMINOPHEN 1000 MG: 500 TABLET ORAL at 00:43

## 2025-04-23 RX ADMIN — HYDRALAZINE HYDROCHLORIDE 10 MG: 20 INJECTION INTRAMUSCULAR; INTRAVENOUS at 18:15

## 2025-04-23 RX ADMIN — GUAIFENESIN 200 MG: 200 SOLUTION ORAL at 12:10

## 2025-04-23 RX ADMIN — CHLORHEXIDINE GLUCONATE 15 ML: 1.2 RINSE ORAL at 08:33

## 2025-04-23 RX ADMIN — PIPERACILLIN AND TAZOBACTAM 3375 MG: 3; .375 INJECTION, POWDER, LYOPHILIZED, FOR SOLUTION INTRAVENOUS at 05:40

## 2025-04-23 RX ADMIN — IPRATROPIUM BROMIDE 0.5 MG: 0.5 SOLUTION RESPIRATORY (INHALATION) at 12:59

## 2025-04-23 RX ADMIN — AMIODARONE HYDROCHLORIDE 0.5 MG/MIN: 50 INJECTION, SOLUTION INTRAVENOUS at 14:09

## 2025-04-23 RX ADMIN — ACETAMINOPHEN 1000 MG: 500 TABLET ORAL at 17:05

## 2025-04-23 RX ADMIN — ACETAMINOPHEN 1000 MG: 500 TABLET ORAL at 12:34

## 2025-04-23 RX ADMIN — INSULIN HUMAN 10 UNITS: 100 INJECTION, SUSPENSION SUBCUTANEOUS at 10:09

## 2025-04-23 RX ADMIN — POTASSIUM BICARBONATE 20 MEQ: 782 TABLET, EFFERVESCENT ORAL at 15:59

## 2025-04-23 RX ADMIN — AMIODARONE HYDROCHLORIDE 150 MG: 50 INJECTION, SOLUTION INTRAVENOUS at 08:52

## 2025-04-23 RX ADMIN — IPRATROPIUM BROMIDE 0.5 MG: 0.5 SOLUTION RESPIRATORY (INHALATION) at 16:25

## 2025-04-23 RX ADMIN — IPRATROPIUM BROMIDE 0.5 MG: 0.5 SOLUTION RESPIRATORY (INHALATION) at 20:31

## 2025-04-23 RX ADMIN — BUMETANIDE 1 MG/HR: 0.25 INJECTION INTRAMUSCULAR; INTRAVENOUS at 09:15

## 2025-04-23 RX ADMIN — POTASSIUM BICARBONATE 40 MEQ: 782 TABLET, EFFERVESCENT ORAL at 08:45

## 2025-04-23 RX ADMIN — HEPARIN SODIUM 5000 UNITS: 5000 INJECTION INTRAVENOUS; SUBCUTANEOUS at 06:02

## 2025-04-23 RX ADMIN — ATORVASTATIN CALCIUM 40 MG: 40 TABLET, FILM COATED ORAL at 21:28

## 2025-04-23 RX ADMIN — Medication 6 UNITS: at 06:14

## 2025-04-23 RX ADMIN — AMIODARONE HYDROCHLORIDE 200 MG: 200 TABLET ORAL at 21:28

## 2025-04-23 RX ADMIN — EPOETIN ALFA 20000 UNITS: 20000 SOLUTION INTRAVENOUS; SUBCUTANEOUS at 16:01

## 2025-04-23 RX ADMIN — IPRATROPIUM BROMIDE 0.5 MG: 0.5 SOLUTION RESPIRATORY (INHALATION) at 08:01

## 2025-04-23 RX ADMIN — ARFORMOTEROL TARTRATE 15 MCG: 15 SOLUTION RESPIRATORY (INHALATION) at 20:31

## 2025-04-23 RX ADMIN — METOCLOPRAMIDE HYDROCHLORIDE 5 MG: 5 INJECTION INTRAMUSCULAR; INTRAVENOUS at 03:58

## 2025-04-23 RX ADMIN — VALPROATE SODIUM 250 MG: 100 INJECTION, SOLUTION INTRAVENOUS at 00:45

## 2025-04-23 RX ADMIN — GUAIFENESIN 200 MG: 200 SOLUTION ORAL at 16:28

## 2025-04-23 RX ADMIN — PIPERACILLIN AND TAZOBACTAM 3375 MG: 3; .375 INJECTION, POWDER, LYOPHILIZED, FOR SOLUTION INTRAVENOUS at 16:31

## 2025-04-23 RX ADMIN — INSULIN HUMAN 11 UNITS: 100 INJECTION, SUSPENSION SUBCUTANEOUS at 21:28

## 2025-04-23 ASSESSMENT — PAIN SCALES - GENERAL
PAINLEVEL_OUTOF10: 0

## 2025-04-23 NOTE — PLAN OF CARE
Speech LAnguage Pathology RE-EVALUATION    Patient: Scooter Dickerson (79 y.o. male)  Date: 4/23/2025  Primary Diagnosis: Disease of cardiovascular system [I25.10]  CAD in native artery [I25.10]  Procedure(s) (LRB):  CORONARY ARTERY BYPASS GRAFTING X 2 WITH STEVEN VAUGHAN, LIGATION OF LEFT ATRIAL APPENDAGE, DENNIS AND EPIAORTIC U/S BY DR HAGEN (N/A) 8 Days Post-Op   Precautions:  Fall Risk           Sternal Precautions: Move in the Tube      ASSESSMENT :  Patient now seen s/p two day intubation. Patient presents with improved level of alertness compared to initial assessment on Sunday. However, patient does continue to present with seemingly reduced secretion management, with wet, gurgling voice in both the presence and absence of PO trials. Patient also notably with multiple swallows and a weak cough with ice chips, but significantly improved compared to Sunday. For today, recommend pt initiate ice chips after vigilant oral care to begin to utilize swallow muscles once again, and then SLP will plan to complete a Flexible Endoscopic Evaluation of Swallow (FEES) at bedside tomorrow to objectively assess safety of swallow physiology, as well as the degree of secretions.     Patient will benefit from skilled intervention to address the above impairments.     PLAN :  Recommendations and Planned Interventions:  Diet: NPO and ice chips  DHT as primary source of nutrition/hydration/medication  Would not progress beyond ice chips without objective imaging of the swallow         Re-Evaluation: Progress toward goals: fair. SLP Plan of Care: 5 times/week   Discharge Recommendations: Continue to assess pending progress     SUBJECTIVE:   Patient stated, “Uh-huh.”    OBJECTIVE:     Past Medical History:   Diagnosis Date    Arthritis     Asthma     Atrial fibrillation (HCC)     CAD (coronary artery disease)     Diabetes mellitus (HCC)     History of blood transfusion 1989    Hx of blood clots     RIGHT LEG    Hyperlipidemia

## 2025-04-23 NOTE — PROGRESS NOTES
Name: Scooter Dickerson   : 1945   MRN: [id number]   Date: 2025        No chief complaint on file.        HPI:     80 yo M w/ PMH as noted above who presents to CVICU post planned CABG x2 (LIMA-LAD, SVG-OM), and AMADOU ligation. Pt received 2u pRBCs on pump, and DDAVP after the case. VVI paced due to sinus terence <40.       Pt arrives to CVICU in VVI pacing to 80s, intubated, sedated on precedex, insulin gtt. Pt extubated POD 0. Concern for renal function in milvia op period, nephrology following. Developed ICU delirium     Active Problems Being Managed:     ICU delirium   Multivessel CAD s/p CABG x2  PAF  COPD  Hx HTN  PAD  CRISTINO on CKD 3  DM-2  BPH  ANA MARIA      Assessment/Plan:     No events overnight.       NEUROLOGICAL:      - CT head negative for acute pathology. MRI was also negative. Reviewed Neurology note and appreciate input  - Delirium precautions  - on Seroquel, depakote  - PRN anxiolytics       PULMONOLOGY:   - reviewed vent settings; since he was re-intubated . Changed vent to SIMV and later  to  SBT trial.  - Pt successfully extubated.     CARDIOVASCULAR:   - Amio PO  - Goal MAP greater than 65, SBP less than 130, CI >2, CO >3.5  - Pacer wire management per CT surgery      GASTROINTESTINAL:   - on Pepcid  - continue bowel regimen; Noted air in stomach which has improved since pt on intermittend  suction  and stool in intenstine.      RENAL/ELECTROLYTE/FLUIDS:   - Strict ins and outs  - Avoid nephrotoxins, renally dose medications   - Daily renal panel; Cr 3.35   - Replete K of 3.3  - Nephrology is following     ENDOCRINE:   - BS in mid 200's  - Glucose goal 100-180     HEMATOLOGY/ONCOLOGY:   - monitor H/H     ID/MICRO:   - afebrile and wbc 10     ICU DAILY CHECKLIST      Code Status: Full  DVT Prophylaxis: heparin  T/L/D: PIV, Elias   SUP: Pepcid  Diet: ADA T  Activity Level: bedrest  ABCDEF Bundle/Checklist Completed:Yes  Disposition: Per primary  Multidisciplinary Rounds

## 2025-04-23 NOTE — PLAN OF CARE
Problem: Physical Therapy - Adult  Goal: By Discharge: Performs mobility at highest level of function for planned discharge setting.  See evaluation for individualized goals.  Description: Physical Therapy Goals  Updated for re-eval 4/23/2025  1.  Patient will move from supine to sit and sit to supine in bed with moderate assistance within 5 day(s).    2.  Patient will perform sit to stand with moderate assistance within 5 day(s).  3.  Patient will transfer from bed to chair and chair to bed with maximal assistance using the least restrictive device within 5 day(s).  4.  Patient will ambulate with moderate assist for 10 feet with the least restrictive device within 5 day(s).   5.  Patient will perform cardiac exercises per protocol with minimal assistance within 5 days.  6.  Patient will verbally recall and functionally demonstrate mindful-based movements (\"move in the tube\") principles without cues within 5 days.     Note: FUNCTIONAL STATUS PRIOR TO ADMISSION: Unclear PLOF as patient was an inconsistent historian, however appears to have had a functional decline leading up to hospital admission.  Reports difficulty with household distance ambulation, utilized rollator for all mobility, but was independent with ADLs     HOME SUPPORT PRIOR TO ADMISSION:  Pt lives alone and niece lives nearby; unclear level of s/a available     Physical Therapy Goals  Initiated 4/17/2025  1.  Patient will move from supine to sit and sit to supine in bed with minimal assistance within 5 day(s).    2.  Patient will perform sit to stand with minimal assistance within 5 day(s).  3.  Patient will transfer from bed to chair and chair to bed with minimal assistance using the least restrictive device within 5 day(s).  4.  Patient will ambulate with minimal assistance for 50 feet with the least restrictive device within 5 day(s).   5.  Patient will perform cardiac exercises per protocol with minimal assistance within 5 days.  6.  Patient  close to rib cage when performing any load-bearing activity such as getting in/out of bed, pushing up from a chair, opening a door, or lifting a box.  Patient was given a handout with diagrams of each correct/incorrect method of performing each of the above tasks.    Therapeutic Exercises:   Patient instructed on the benefits and did not demonstrate cardiac exercises due to patient fatigue/confusion. Will attempt next session.                                                                                                                                                                                                                               [facility] AM-PAC®      Basic Mobility Inpatient Short Form (6-Clicks) Version 2    How much help is needed turning from your back to your side while in a flat bed without using bedrails?: Total  How much help is needed moving from lying on your back to sitting on the side of a flat bed without using bedrails?: Total  How much help is needed moving to and from a bed to a chair?: Total  How much help is needed standing up from a chair using your arms?: Total  How much help is needed walking in hospital room?: Total  How much help is needed climbing 3-5 steps with a railing?: Total    -PAC Inpatient Mobility Raw Score : 6  -PAC Inpatient T-Scale Score : 23.55     Cutoff score <=171,2,3 had higher odds of discharging home with home health or need of SNF/IPR.    1. Florence Burch, Frances Sutton, Nik Trevizo, Marlena Kumar, Monico Freeman, Rishi Burch.  Validity of the -PAC “6-Clicks” Inpatient Daily Activity and Basic Mobility Short Forms. Physical Therapy Mar 2014, 94 (3) 379-391; DOI: 10.2522/ptj.90751141  2. Leland GOLD, Asher BURROWS, Radha BURROWS, George BURROWS. Association of AM-PAC \"6-Clicks\" Basic Mobility and Daily Activity Scores With Discharge Destination. Phys Ther. 2021 Apr 4;101(4):pyrs542. doi: 10.1093/ptj/tren166. PMID: 67880025.  3. Nichole BURROWS,

## 2025-04-23 NOTE — PROGRESS NOTES
[address], [place] [address]  Phone: [phone]   [phone]    [email or web address] CampusHubei Kento ElectronicCloud County [facility] CenterrighTune     Nephrology Progress Note    Patient Name : Scooter Dickerson      : 1945     MRN : [id number]  Date of Admission : 4/15/2025  Date of Servive : 25    CC:  Follow up for CRISTINO       Assessment and Plan   CRISTINO on CKD  - ischemic ATN  - cr peaked, good UOP  - ok to change to bumex drip for more aggressive diuresis  - daily labs and I/Os  - no need for RRT at this time    Post op Delirium   - per CSS, critical care      CKD 3B   - baseline Cr 1.5 mg/dl pre op   - 2/2 HTN, DM, smoking     Acute Hypercarbic resp failure   Chronic heavy smoker   KARI - does not use CPAP  - per [facility]      CAD s/p CABG x 2, AMADOU ligation 4/15   Hypotension   - off pressors      Chronic anemia   Iron def : complete venofer x 3 doses   - continue  VILMA  - transfuse PRN      BPH , hx of TURP 10/24, penile swelling   Osteoporosis   DM-II   A fib : On oral Amio        Interval History:  Seen and examined.  Extubated, confused.  MRI yesterday neg for acute CVA.  Cr stable, stable UOP.  Remains volume overloaded.  Unable to obtain ROS.      Review of Systems: Review of systems not obtained due to patient factors.    Current Medications:   Current Facility-Administered Medications   Medication Dose Route Frequency    metoprolol tartrate (LOPRESSOR) tablet 50 mg  50 mg Oral BID    bumetanide (BUMEX) 12.5 mg in 50 mL infusion  1 mg/hr IntraVENous Continuous    amiodarone (CORDARONE) 150 mg in dextrose 5 % 100 mL bolus ([id number])  150 mg IntraVENous Once    Followed by    amiodarone (CORDARONE) 450 mg in dextrose 5 % 250 mL infusion ([id number])  1 mg/min IntraVENous Continuous    Followed by    amiodarone (CORDARONE) 450 mg in dextrose 5 % 250 mL infusion ([id number])  0.5 mg/min IntraVENous Continuous    potassium bicarb-citric acid (EFFER-K) effervescent tablet 40 mEq  40 mEq Oral BID

## 2025-04-23 NOTE — DIABETES MGMT
[facility]  [facility]  DIABETES MANAGEMENT CONSULT    Consulted by Provider for advanced nursing evaluation and care for inpatient blood glucose management.    Evaluation and Action Plan   Scooter Dickerson is a 78 yo male with a history of CAD, HTN, HLD, sleep apnea, asthma, arthritis, and blood clots in right leg who presented for scheduled CABG x2 on 4/15. He was previously admitted for CABG eval and workup after having abnormal stress test results in March. The [facility] has been consulted to assist in glycemic management and advanced diabetes management assessment this admission.    Mr. Dickerson has no personal or family history of diabetes with current A1c of 5.4%. He currently does not take any medications for diabetes or has ever in the past. He endorses he eats small portions.    Admission BG 78. He underwent CABG x2 yesterday and insulin infusion was started post op for glycemic control. His 24 hour insulin needs were very low at 13.4 units with hourly rates of 0.1-2.6 units/hour. He will continue on the insulin infusion for another 24 hours. Anticipate he will be able to be transitioned off insulin infusion tomorrow afternoon. Will continue to follow along with you.     - He was extubated yesterday afternoon and experienced s/s of Right sided weakness indicated possible stroke. Code stroke was called MRI and CT are negative. TF were increased from bolus 4 times daily to continuous. Will start NPH to cover carb load in TF. Cardiac surgery team started continuous amio which has dextrose containing fluids. Will determine need for additional insulin for anticipated hyperglycemia.  not within target range likely from amio and TF. Will continue Q6hr POC. Will continue to follow along with you.     Blood glucose pattern      Significant diabetes-related events over the past 24-72 hours  A1C 5.4%  Fastin  BG pattern: 193-264  Correction: 14 units   Nepro @ 40

## 2025-04-23 NOTE — PROGRESS NOTES
0730: Bedside and Verbal shift change report given to MALVIN Jones (oncoming nurse) by MALVIN Pineda (offgoing nurse). Report included the following information Nurse Handoff Report, Index, Intake/Output, MAR, and Recent Results.     1050: Patient worked with PT/OT. Patient unable to sit upright/leaning forward, unable to manage secretions, and RR rate increased to 40. PT/OT expressing concerns on patients ability to safely sit in recliner. Patient placed in chair position in bed.     1306: CRISTOBAL Wolfe updated about low urine output. NP to increase bumex drip dose.     1815: PRN hydralazine given for SBP in 170s    1930: Bedside and Verbal shift change report given to MALVIN Navarro (oncoming nurse) by MALVIN Jones (offgoing nurse). Report included the following information Nurse Handoff Report, Index, Intake/Output, MAR, and Recent Results.
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
## 2025-04-23 NOTE — PROGRESS NOTES
Cardiac Surgery Care Coordinator-  Met with Scooter Dickerson. Reviewed plan of care and discussed goals for the day. He is conversing, but not clearly communicating. Will continue to follow for educational and emotional support.

## 2025-04-23 NOTE — PROGRESS NOTES
Cardiac Surgery ICU Progress Note    Admit Date: 4/15/2025  POD:  8 Days Post-Op    Procedure:    4/15/25 with Dr. Brand:   CORONARY ARTERY BYPASS GRAFTING X 2 WITH LIMA (LIMA-LAD, SVG-OM , LESVGH, LIGATION OF LEFT ATRIAL APPENDAGE, DENNIS AND EPIAORTIC U/S BY DR HAGEN       [facility] synopsis:  4/15 POD0: CABG x2/LAAL with Dr Brand. DENNIS with normal biventricular function, EF 55%, no WMA. Transferred to ICU on precedex and insulin gtt with A&V wires and 3 alecia drains (2 med, L pleural). Extubated at 1545 to BiPAP. Overnight maribeth transitioned to norepi.   4/16 POD1: On vaso 0.04, levo 4. A-paced 70's with intermittent capture. Low UOP overnight (200cc). Received albumin x1 and 250ml LR. IV lasix 40mg with minimal response. Consult nephrology: added additional lasix 100mg for diuretic challenge. CXR showing pulmonary edema. ABG showing rising PCO2 65 and pH 7.15. Placed on BiPAP with repeat ABG showing improvement.   4/17 POD 2: Afib around 0200, amio bolus and infusion started. Remains on norepi and vaso. 4L NC this AM, transition to HFNC for ongoing mild hypercapnia. Good response to lasix challenge, Cr up to 2.44. Bumex 2mg IV BID today. PRBC x 1 for Hgb 7.5. Xray with gastric bubble and dilation noted.   4/18 POD 3: Agitation overnight, precedex infusion started. VBG stable, CO2 normal. SB in the 50s this AM, DC amio infusion. Remains on HFNC, pressors off. Continue diuresis, deline. AV wires pulled, plan to pull chest tubes today. Continue ICU level of care. HTN in PM, start Metoprolol   4/19 POD 4: Worsening delirium haldol x 2 overnight and increased precedex. Valproic acid added by ICU. Refusing PO medications, food/drink today. Cr slightly increased with less robust response to diuretics. Start LR 50mL/hr for low UO and no PO intake.   4/20 POD 5: Mentation improved slightly but his respiratory status has worsened, very coarse bilaterally, worsening hypoxia, back on HFNC. Speech consulted, but NPO. Likely will

## 2025-04-23 NOTE — PROGRESS NOTES
2000: Bedside and Verbal shift change report given to jamie (oncoming nurse) by eduin (offgoing nurse). Report included the following information Nurse Handoff Report.     2015: neuro NP OBEY Kang @ bedside- NP is aware of MRI scheduled tonight, lipid panel ordered with  am labs       2030: this RN called to confirm time to bring pt to MRI- pt scheduled to come approx @ 2300.      2115: pt in afib in low 100s, pt's SBP/MAPS stable, has been given scheduled metop and amio 10 mins ago-see MAR-intensivist made aware -no new orders at this time-instructed to monitor at this time-if rate increases to 120s or BP does not tolerate MD will possibly order amio bolus, plan of care ongoing        2330: Pt transported to MRI by this RN with transport meds/emergency equipment , tech, and transport staff       0340: pt remains in afib 90s-low 100s, BP stable, intensivist ordered 1x dose amio bolus         0500: amio bolus ineffective-pt remains in afib 90-low 100s, BP stable -intensivist aware-no new orders at this time          0755: Bedside and Verbal shift change report given to eduin (oncoming nurse) by jamie (offgoing nurse). Report included the following information Nurse Handoff Report.

## 2025-04-24 ENCOUNTER — APPOINTMENT (OUTPATIENT)
Facility: HOSPITAL | Age: 80
End: 2025-04-24
Attending: THORACIC SURGERY (CARDIOTHORACIC VASCULAR SURGERY)
Payer: MEDICARE

## 2025-04-24 PROBLEM — R73.9 STRESS HYPERGLYCEMIA: Status: ACTIVE | Noted: 2025-04-24

## 2025-04-24 LAB
ANION GAP SERPL CALC-SCNC: 6 MMOL/L (ref 2–12)
BUN SERPL-MCNC: 88 MG/DL (ref 6–20)
BUN/CREAT SERPL: 27 (ref 12–20)
CALCIUM SERPL-MCNC: 8.4 MG/DL (ref 8.5–10.1)
CHLORIDE SERPL-SCNC: 106 MMOL/L (ref 97–108)
CO2 SERPL-SCNC: 28 MMOL/L (ref 21–32)
CREAT SERPL-MCNC: 3.24 MG/DL (ref 0.7–1.3)
ERYTHROCYTE [DISTWIDTH] IN BLOOD BY AUTOMATED COUNT: 21 % (ref 11.5–14.5)
GLUCOSE BLD STRIP.AUTO-MCNC: 191 MG/DL (ref 65–117)
GLUCOSE BLD STRIP.AUTO-MCNC: 222 MG/DL (ref 65–117)
GLUCOSE BLD STRIP.AUTO-MCNC: 229 MG/DL (ref 65–117)
GLUCOSE BLD STRIP.AUTO-MCNC: 236 MG/DL (ref 65–117)
GLUCOSE SERPL-MCNC: 169 MG/DL (ref 65–100)
HCT VFR BLD AUTO: 29.6 % (ref 36.6–50.3)
HGB BLD-MCNC: 9.5 G/DL (ref 12.1–17)
MAGNESIUM SERPL-MCNC: 2.6 MG/DL (ref 1.6–2.4)
MCH RBC QN AUTO: 30.1 PG (ref 26–34)
MCHC RBC AUTO-ENTMCNC: 32.1 G/DL (ref 30–36.5)
MCV RBC AUTO: 93.7 FL (ref 80–99)
NRBC # BLD: 1.09 K/UL (ref 0–0.01)
NRBC BLD-RTO: 10.2 PER 100 WBC
PLATELET # BLD AUTO: 181 K/UL (ref 150–400)
PMV BLD AUTO: 10.8 FL (ref 8.9–12.9)
POTASSIUM SERPL-SCNC: 3.7 MMOL/L (ref 3.5–5.1)
RBC # BLD AUTO: 3.16 M/UL (ref 4.1–5.7)
SERVICE CMNT-IMP: ABNORMAL
SODIUM SERPL-SCNC: 140 MMOL/L (ref 136–145)
WBC # BLD AUTO: 10.7 K/UL (ref 4.1–11.1)

## 2025-04-24 PROCEDURE — 2500000003 HC RX 250 WO HCPCS: Performed by: STUDENT IN AN ORGANIZED HEALTH CARE EDUCATION/TRAINING PROGRAM

## 2025-04-24 PROCEDURE — 2580000003 HC RX 258: Performed by: STUDENT IN AN ORGANIZED HEALTH CARE EDUCATION/TRAINING PROGRAM

## 2025-04-24 PROCEDURE — 80048 BASIC METABOLIC PNL TOTAL CA: CPT

## 2025-04-24 PROCEDURE — 6370000000 HC RX 637 (ALT 250 FOR IP): Performed by: NURSE PRACTITIONER

## 2025-04-24 PROCEDURE — 82962 GLUCOSE BLOOD TEST: CPT

## 2025-04-24 PROCEDURE — 6370000000 HC RX 637 (ALT 250 FOR IP)

## 2025-04-24 PROCEDURE — 97530 THERAPEUTIC ACTIVITIES: CPT

## 2025-04-24 PROCEDURE — 6360000002 HC RX W HCPCS: Performed by: STUDENT IN AN ORGANIZED HEALTH CARE EDUCATION/TRAINING PROGRAM

## 2025-04-24 PROCEDURE — 85027 COMPLETE CBC AUTOMATED: CPT

## 2025-04-24 PROCEDURE — 99231 SBSQ HOSP IP/OBS SF/LOW 25: CPT

## 2025-04-24 PROCEDURE — 2500000003 HC RX 250 WO HCPCS: Performed by: INTERNAL MEDICINE

## 2025-04-24 PROCEDURE — 6370000000 HC RX 637 (ALT 250 FOR IP): Performed by: STUDENT IN AN ORGANIZED HEALTH CARE EDUCATION/TRAINING PROGRAM

## 2025-04-24 PROCEDURE — 94667 MNPJ CHEST WALL 1ST: CPT

## 2025-04-24 PROCEDURE — 6360000002 HC RX W HCPCS

## 2025-04-24 PROCEDURE — 2580000003 HC RX 258: Performed by: NURSE PRACTITIONER

## 2025-04-24 PROCEDURE — 6360000002 HC RX W HCPCS: Performed by: NURSE PRACTITIONER

## 2025-04-24 PROCEDURE — 71045 X-RAY EXAM CHEST 1 VIEW: CPT

## 2025-04-24 PROCEDURE — 2000000000 HC ICU R&B

## 2025-04-24 PROCEDURE — 94640 AIRWAY INHALATION TREATMENT: CPT

## 2025-04-24 PROCEDURE — 92612 ENDOSCOPY SWALLOW (FEES) VID: CPT

## 2025-04-24 PROCEDURE — 6360000002 HC RX W HCPCS: Performed by: INTERNAL MEDICINE

## 2025-04-24 PROCEDURE — 31720 CLEARANCE OF AIRWAYS: CPT

## 2025-04-24 PROCEDURE — 83735 ASSAY OF MAGNESIUM: CPT

## 2025-04-24 PROCEDURE — 2500000003 HC RX 250 WO HCPCS

## 2025-04-24 PROCEDURE — 6370000000 HC RX 637 (ALT 250 FOR IP): Performed by: PHYSICIAN ASSISTANT

## 2025-04-24 PROCEDURE — 92526 ORAL FUNCTION THERAPY: CPT

## 2025-04-24 RX ADMIN — POTASSIUM BICARBONATE 40 MEQ: 782 TABLET, EFFERVESCENT ORAL at 21:40

## 2025-04-24 RX ADMIN — HEPARIN SODIUM 5000 UNITS: 5000 INJECTION INTRAVENOUS; SUBCUTANEOUS at 21:40

## 2025-04-24 RX ADMIN — BUMETANIDE 2 MG/HR: 0.25 INJECTION INTRAMUSCULAR; INTRAVENOUS at 09:27

## 2025-04-24 RX ADMIN — ACETAMINOPHEN 1000 MG: 500 TABLET ORAL at 05:19

## 2025-04-24 RX ADMIN — GUAIFENESIN 200 MG: 200 SOLUTION ORAL at 17:21

## 2025-04-24 RX ADMIN — Medication 4 UNITS: at 11:23

## 2025-04-24 RX ADMIN — ACETAMINOPHEN 1000 MG: 500 TABLET ORAL at 17:21

## 2025-04-24 RX ADMIN — BUMETANIDE 2 MG/HR: 0.25 INJECTION INTRAMUSCULAR; INTRAVENOUS at 15:10

## 2025-04-24 RX ADMIN — SENNOSIDES AND DOCUSATE SODIUM 1 TABLET: 50; 8.6 TABLET ORAL at 21:40

## 2025-04-24 RX ADMIN — VALPROATE SODIUM 250 MG: 100 INJECTION, SOLUTION INTRAVENOUS at 00:22

## 2025-04-24 RX ADMIN — Medication 2 UNITS: at 17:21

## 2025-04-24 RX ADMIN — CHLOROTHIAZIDE SODIUM 500 MG: 500 INJECTION, POWDER, LYOPHILIZED, FOR SOLUTION INTRAVENOUS at 11:24

## 2025-04-24 RX ADMIN — POTASSIUM BICARBONATE 40 MEQ: 782 TABLET, EFFERVESCENT ORAL at 09:16

## 2025-04-24 RX ADMIN — AMIODARONE HYDROCHLORIDE 200 MG: 200 TABLET ORAL at 09:16

## 2025-04-24 RX ADMIN — IPRATROPIUM BROMIDE 0.5 MG: 0.5 SOLUTION RESPIRATORY (INHALATION) at 07:59

## 2025-04-24 RX ADMIN — IPRATROPIUM BROMIDE 0.5 MG: 0.5 SOLUTION RESPIRATORY (INHALATION) at 15:25

## 2025-04-24 RX ADMIN — VALPROATE SODIUM 250 MG: 100 INJECTION, SOLUTION INTRAVENOUS at 09:19

## 2025-04-24 RX ADMIN — PIPERACILLIN AND TAZOBACTAM 3375 MG: 3; .375 INJECTION, POWDER, LYOPHILIZED, FOR SOLUTION INTRAVENOUS at 04:44

## 2025-04-24 RX ADMIN — Medication 4 UNITS: at 00:26

## 2025-04-24 RX ADMIN — SODIUM CHLORIDE, PRESERVATIVE FREE 10 ML: 5 INJECTION INTRAVENOUS at 21:41

## 2025-04-24 RX ADMIN — IPRATROPIUM BROMIDE 0.5 MG: 0.5 SOLUTION RESPIRATORY (INHALATION) at 12:12

## 2025-04-24 RX ADMIN — IPRATROPIUM BROMIDE 0.5 MG: 0.5 SOLUTION RESPIRATORY (INHALATION) at 19:43

## 2025-04-24 RX ADMIN — SODIUM CHLORIDE, PRESERVATIVE FREE 10 ML: 5 INJECTION INTRAVENOUS at 09:21

## 2025-04-24 RX ADMIN — SODIUM CHLORIDE 40 MG: 9 INJECTION INTRAMUSCULAR; INTRAVENOUS; SUBCUTANEOUS at 09:17

## 2025-04-24 RX ADMIN — GUAIFENESIN 200 MG: 200 SOLUTION ORAL at 05:19

## 2025-04-24 RX ADMIN — ATORVASTATIN CALCIUM 40 MG: 40 TABLET, FILM COATED ORAL at 21:40

## 2025-04-24 RX ADMIN — GUAIFENESIN 200 MG: 200 SOLUTION ORAL at 11:23

## 2025-04-24 RX ADMIN — INSULIN HUMAN 20 UNITS: 100 INJECTION, SUSPENSION SUBCUTANEOUS at 21:40

## 2025-04-24 RX ADMIN — BUMETANIDE 2 MG/HR: 0.25 INJECTION INTRAMUSCULAR; INTRAVENOUS at 22:50

## 2025-04-24 RX ADMIN — METOPROLOL TARTRATE 50 MG: 50 TABLET, FILM COATED ORAL at 21:40

## 2025-04-24 RX ADMIN — AMIODARONE HYDROCHLORIDE 200 MG: 200 TABLET ORAL at 21:40

## 2025-04-24 RX ADMIN — HEPARIN SODIUM 5000 UNITS: 5000 INJECTION INTRAVENOUS; SUBCUTANEOUS at 13:16

## 2025-04-24 RX ADMIN — ACETAMINOPHEN 1000 MG: 500 TABLET ORAL at 11:23

## 2025-04-24 RX ADMIN — BUMETANIDE 2 MG/HR: 0.25 INJECTION INTRAMUSCULAR; INTRAVENOUS at 04:42

## 2025-04-24 RX ADMIN — ARFORMOTEROL TARTRATE 15 MCG: 15 SOLUTION RESPIRATORY (INHALATION) at 07:59

## 2025-04-24 RX ADMIN — INSULIN HUMAN 20 UNITS: 100 INJECTION, SUSPENSION SUBCUTANEOUS at 09:17

## 2025-04-24 RX ADMIN — GUAIFENESIN 200 MG: 200 SOLUTION ORAL at 00:21

## 2025-04-24 RX ADMIN — METOPROLOL TARTRATE 50 MG: 50 TABLET, FILM COATED ORAL at 09:16

## 2025-04-24 RX ADMIN — PIPERACILLIN AND TAZOBACTAM 3375 MG: 3; .375 INJECTION, POWDER, LYOPHILIZED, FOR SOLUTION INTRAVENOUS at 16:05

## 2025-04-24 RX ADMIN — Medication 4 UNITS: at 05:19

## 2025-04-24 RX ADMIN — ASPIRIN 81 MG: 81 TABLET, CHEWABLE ORAL at 09:17

## 2025-04-24 RX ADMIN — CHLORHEXIDINE GLUCONATE 15 ML: 1.2 RINSE ORAL at 09:23

## 2025-04-24 RX ADMIN — HEPARIN SODIUM 5000 UNITS: 5000 INJECTION INTRAVENOUS; SUBCUTANEOUS at 06:03

## 2025-04-24 RX ADMIN — FINASTERIDE 5 MG: 5 TABLET, FILM COATED ORAL at 21:40

## 2025-04-24 RX ADMIN — ARFORMOTEROL TARTRATE 15 MCG: 15 SOLUTION RESPIRATORY (INHALATION) at 19:43

## 2025-04-24 RX ADMIN — CHLORHEXIDINE GLUCONATE 15 ML: 1.2 RINSE ORAL at 21:41

## 2025-04-24 NOTE — PROGRESS NOTES
Weight: Last Weight   Weight - Scale: 63 kg (139 lb) Weight - Scale: 74 kg (163 lb 2.3 oz)     Intake / Output / Drain:  Current Shift: No intake/output data recorded.  Last 24 hrs.:   Intake/Output Summary (Last 24 hours) at 4/24/2025 0743  Last data filed at 4/24/2025 0700  Gross per 24 hour   Intake 2614.87 ml   Output 1200 ml   Net 1414.87 ml       EXAM:  General:  Ill appearing man, debilitated, weak.  Unable to support his own weight.           Lungs:   Rhonchorous throughout all posterior and anterior lung fields. Unlabored at rest on nasal cannula.    Incision:  No erythema, drainage or swelling. Prineo intact.    Heart:  Regular rate and rhythm, S1, S2 normal, no murmur, click, rub or gallop.  Noisy chest.    Abdomen:   Soft, non-tender.  No masses,  No organomegaly.  Hypoactive BS.    Extremities:  2-3+ pitting BLE edema. PPP.  Pale. Hairless legs.    Neurologic:   Alert. O to name. Slow to respond to questions but with  appropriate answers.  Moves all extremities weakly.  Cannot sit up in bed without assistance.      Labs:   Recent Labs     04/24/25  0431   WBC 10.7   HGB 9.5*   HCT 29.6*         K 3.7   BUN 88*        Assessment:     Principal Problem:    Disease of cardiovascular system  Active Problems:    CAD in native artery    S/P CABG x 2    Transient hyperglycemia post procedure    Acute hypoxemic respiratory failure (HCC)    On enteral nutrition    Right sided weakness  Resolved Problems:    * No resolved hospital problems. *       Plan/Recommendations/Medical Decision Making:   Multiv CAD s/p CABG x 2: AV wires pulled  - asa, statin, metoprolol.      PAF s/p LAAL: PTA amio, Toprol, Xarelto;   - amio bolus and infusion yesterday for afib with RVR. . Converted to SR  - Continue enteral amiodarone, decrease to 200mg BID (4/19, HR in the 50s)   - Emailed Dr. Martinez to ask for update on DENNIS note so I can comment on LAAL and any residual     Acute Post Operative Hypotension: Resolved.      HLD: PTA Atorvastatin 40mg  - Lipitor 40mg daily     HTN: PTA Toprol XL 25mg  - Metoprolol 50 mg BID      PVD, PAD: PTA Plavix and ASA  - Pt reports he has had a procedure in the past. Likely fem-pop (from R to L). Follows with Dr. More. Has thrombus in R lesser saphenous vein.   - Continue ASA.   - Resume Plavix once off heparin SQ     Carotid Artery Stenosis: Moderate LICA and mild GOPAL stenosis  - Plan to follow with Dr More.  - Plavix when appropriate     Acute Post Op Respiratory Insufficiency with Hypoxia and Hypercarbia, Pulmonary Edema: Pre-op ABG with PaO2 91. R/t atelectasis, fluid overload with resulting pulmonary edema, and inability to manage secretions with delirium and generalized weakness.  - Hypercapnia improved, transitioned to NC on 4/18  - Worsening respiratory status on 4/20, concern for aspiration and management of secretions with delirium              - Re-intubated 4/20  - Diuretics per Nephrology: Bumex 2mg/hr infusion and Diuril.   - ongoing pulmonary edema   - added chest PT to help mobilize secretions.  - PEP therapy     Concern for Aspiration: with delirium, worsening respiratory status 4/20  - During intubation, thick secretions noted, Respiratory Culture positive for  heavy pseudomonas  - Zosyn started 4/20      COPD, Tobacco Abuse: PTA albuterol PRN   - Scheduled Duonebs  - Encouraged smoking cessation  - PRN nicoderm      CRISTINO on CKD IIIb: baseline Cr 1.5 mg/dl  - Nephrology following daily   - Cr 3.24  today, adequate response to Bumex infusion. Diuril added by Dr. Browne  - Continue Elias catheter   - Avoid nephrotoxic medications; allowing higher blood pressures for perfusion  - No acute indication for dialysis      BPH: PTA finasteride 5mg  - Continue finasteride 5mg      DM2: PTA metformin 1G BID, pre op A1C 5.4  - Diabetes Management consulted  - NPH 11 units Q12 hrs     Delirium: Noted overnight 4/17 to 4/18  - Valproic acid started 4/19  - DC Seroquel, QTc prolonged at 512

## 2025-04-24 NOTE — PLAN OF CARE
Problem: Occupational Therapy - Adult  Goal: By Discharge: Performs self-care activities at highest level of function for planned discharge setting.  See evaluation for individualized goals.  Description: FUNCTIONAL STATUS PRIOR TO ADMISSION:  Patient was ambulatory using rollator  Receives Help From: Family, Prior Level of Assist for ADLs: Independent,  ,  ,  ,  ,  , Prior Level of Assist for Homemaking: Needs assistance, Ambulation Assistance: Needs assistance, Prior Level of Assist for Transfers: Independent,       HOME SUPPORT: Patient lived alone and reports being independent with ADLS/IADLS .    Occupational Therapy Goals:  Re-Evaluation s/p re-intubation and code stroke 4/23/2025  1.  Patient will perform ADLs seated 5 mins without fatigue or LOB with Maximal Assist and Assist x1 within 7 days.   2.  Patient will perform upper body ADLs with Maximal Assist within 7 days.  3.  Patient will perform lower body ADLs with Maximal Assist using AE PRN within 7 days.    4.  Patient will perform gathering ADL items high and low 2/2 with Maximal Assist within 7 days.  5.  Patient will perform BSC transfers with Maximal Assist and Assist x1 within 7 days.  6.  Patient will perform all aspects of toileting with Maximal Assist within 7 days.  7.  Patient will participate in cardiac/sternal upper extremity therapeutic exercise/activities to increase independence with ADLs with supervision/set-up for 5 minutes within 7 days.   8.  Patient will adhere to Move in the Tube precautions during functional tasks with Min cues within 7 days.    Initiated 4/17/2025  1.  Patient will perform ADLs standing 5 mins without fatigue or LOB with Maximal Assist and Assist x1 within 7 days.  2.  Patient will perform upper body ADLs with Minimal Assist within 7 days.  3.  Patient will perform lower body ADLs with Moderate Assist within 7 days.    4.  Patient will perform gathering ADL items high and low 2/2 with Maximal Assist within 7

## 2025-04-24 NOTE — PROGRESS NOTES
Name: Scooter Dickerson   : 1945   MRN: [id number]   Date: 2025        No chief complaint on file.        HPI:     78 yo M w/ PMH as noted above who presents to CVICU post planned CABG x2 (LIMA-LAD, SVG-OM), and AMADOU ligation. Pt received 2u pRBCs on pump, and DDAVP after the case. VVI paced due to sinus terence <40.       Pt arrives to CVICU in VVI pacing to 80s, intubated, sedated on precedex, insulin gtt. Pt extubated POD 0. Concern for renal function in milvia op period, nephrology following. Developed ICU delirium     Active Problems Being Managed:     ICU delirium   Multivessel CAD s/p CABG x2  PAF  COPD  Hx HTN  PAD  CRISTINO on CKD 3  DM-2  BPH  ANA MARIA      Assessment/Plan:     No events overnight.       NEUROLOGICAL:      - CT head negative for acute pathology. MRI was also negative. Reviewed Neurology note and appreciate input  - Delirium precautions  - on Seroquel,   - d/c depakote  - PRN anxiolytics       PULMONOLOGY:     -Saturating well  - continue IS and OOB to chair     CARDIOVASCULAR:   - Amio PO  - Goal MAP greater than 65, SBP less than 130, CI >2, CO >3.5  - Pacer wire management per CT surgery      GASTROINTESTINAL:   - on Pepcid  - continue bowel regimen     RENAL/ELECTROLYTE/FLUIDS:   - Strict ins and outs  - Avoid nephrotoxins, renally dose medications   - Daily renal panel; Cr 3.24   - Replete K of 3.7  - Nephrology is following     ENDOCRINE:   - BS in mid 200's  - Glucose goal 100-180  - started on NPH yesterday, follow trend today     HEMATOLOGY/ONCOLOGY:   - monitor H/H     ID/MICRO:   - afebrile and wbc 10.7     ICU DAILY CHECKLIST      Code Status: Full  DVT Prophylaxis: heparin  T/L/D: PIV, Elias   SUP: Pepcid  Diet: ADA T  Activity Level: bedrest  ABCDEF Bundle/Checklist Completed:Yes  Disposition: Per primary  Multidisciplinary Rounds Completed:  Yes  Patient/Family Updated: Yes      I personally spent 50 minutes of critical care time.  This is time spent at this critically  108 (90 Base) MCG/ACT inhaler Inhale 2 puffs into the lungs every 6 hours as needed for Wheezing 3/31/25  Yes Ena Henderson APRN - NP   tiotropium-olodaterol (STIOLTO) 2.5-2.5 MCG/ACT AERS Inhale 2 puffs into the lungs daily 3/31/25  Yes Ena Henderson APRN - NP   metoprolol succinate (TOPROL XL) 25 MG extended release tablet Take 1 tablet by mouth daily 2/24/25  Yes Mathieu Painting MD   atorvastatin (LIPITOR) 40 MG tablet Take 1 tablet by mouth daily 2/21/25  Yes Ena Henderson APRN - NP   vitamin D3 (CHOLECALCIFEROL) 25 MCG (1000 UT) TABS tablet Take 2 tablets by mouth daily 2/21/25  Yes Ena Henderson APRN - NP   gabapentin (NEURONTIN) 300 MG capsule Take 2 capsules by mouth in the morning and at bedtime for 180 days. Max Daily Amount: 1,200 mg 2/12/25 8/11/25 Yes Ena Henderson APRN - NP   finasteride (PROSCAR) 5 MG tablet Take 1 tablet by mouth daily 1/24/25  Yes Ena Henderson APRN - NP   amiodarone (CORDARONE) 200 MG tablet Take 2 tablets by mouth 2 times daily for 5 days 4/9/25 4/14/25  Deb Timmons APRN - NP   rivaroxaban (XARELTO) 15 MG TABS tablet Take 1 tablet by mouth daily (with breakfast)  Patient not taking: Reported on 4/15/2025 4/4/25   Mathieu Painting MD   metFORMIN (GLUCOPHAGE) 1000 MG tablet Take 1 tablet by mouth 2 times daily (with meals) 3/24/25   Joellen Griffin APRN - NP   clopidogrel (PLAVIX) 75 MG tablet Take 1 tablet by mouth daily 3/24/25   Ena Henderson APRN - NP   alendronate (FOSAMAX) 70 MG tablet Take 1 tablet by mouth once a week 1/15/25   Ena Henderson APRN - NP         Allergies/Social/Family History:     Allergies   Allergen Reactions    Eliquis [Apixaban] Itching    Penicillins Rash     Patient screened for any delayed non-IgE-mediated reaction to PCN.        Patient notes the following:    No delayed non-IgE-mediated reaction to PCN               Social History     Tobacco Use    Smoking status: Every Day     Current packs/day:

## 2025-04-24 NOTE — PROGRESS NOTES
SLP Contact Note    Flexible Endoscopic Evaluation of Swallowing (FEES) completed on this patient. Patient observed with aspiration of thin liquids (large bolus), penetration of mildly thick liquids to the level of the vocal folds, and no penetration nor aspiration with moderately-thick liquids or puree solids. Recommend patient initiate Puree diet with moderately thick liquids. Ice chips should be given one at a time between meals, after oral hygiene has been complete. Full procedure note to follow.     Thank you,  Pauly Lind M.Ed, CCC-SLP (pronouns: she/her/hers)  Speech-Language Pathologist

## 2025-04-24 NOTE — DIABETES MGMT
Rationale Action Plan   Medication   Basal needs Using  units/kg/D based on  Not indicated at this time    Nutritional needs Using 1:7 carb coverage for TF Nepro total CHO 150g  Give NPH 10 units once   START NPH 11 units Q12 hrs starting tonight      Corrective insulin Using medium dose sensitivity  Q6hr    Additional orders  NPO will start TF later today        Diabetes Discharge Plan   Medication:  A1c is 5.4% with no personal or family history of DM. Do not recommend any medications however he would benefit from some diet changes and increase in activity to help maintain current A1c     Additional orders  Follow up with PCP within 2 weeks of D/C          Initial Presentation   Scooter Dickerson is a 79 y.o. male who presented on 4/15 for scheduled CABG x2     HX:   Past Medical History:   Diagnosis Date    Arthritis     Asthma     Atrial fibrillation (HCC)     CAD (coronary artery disease)     Diabetes mellitus (HCC)     History of blood transfusion 1989    Hx of blood clots     RIGHT LEG    Hyperlipidemia     Hypertension     Sleep apnea     NOT USING CPAP NOW        INITIAL DX: Disease of cardiovascular system [I25.10]  CAD in native artery [I25.10]     Current Treatment     TX: insulin, statin, ASA, levophed, nebulizer, gabapentin,      Hospital Course   Clinical progress has been uncomplicated    Lab Results   Component Value Date    LABA1C 5.4 04/09/2025     Diabetes-related Medical History  Acute complications  Stress hyperglycemia   Neurological complications  Hypoglycemia unawareness  Microvascular disease  none  Macrovascular disease  CAD, HTN, HLD  Other associated conditions     none      Subjective   \" I am okay\"     Objective   Physical exam  General Normal weight  male in no acute distress/ill-appearing.   Neuro  Oriented  Vital Signs   Vitals:    04/24/25 0759   BP:    Pulse: 70   Resp: 26   Temp:    SpO2: 96%     Skin  Warm and dry.       Laboratory  Recent Labs     04/22/25  0328 04/23/25  0536

## 2025-04-24 NOTE — PLAN OF CARE
disposable scope  Feeding Assistance: Vale Klein, SLP   Feeding Tube Present in Nare: Yes, dobhoff tube  Adverse Reaction: No  Respiratory status:   Room air                           Part I:  Anatomic-Physiologic Assessment       Nasal passage WFL     Velopharyngeal port   Complete   Base of tongue   WFL   Epiglottis   Incomplete   Arytenoids   Symmetric   Vocal folds   Adduction: Complete  Abduction: Complete   Posterior pharyngeal wall   Symmetric  Superior cornu of the thyroid cartilage protruding from L lateral pharyngeal wall    Trachea   Appearance of tortuous trachea, with a right lateral turn visual from the pharynx       Part II:  Secretions:    [place] Secretion Rating (0-7): 6     Location:  2 - Secretions in laryngeal vestibule (beyond healthy lubrication of mucosa) Amount:   1 - Secretions in pyriform fossae, not yet full (20-80%) Response*:  3 - No response to secretions in laryngeal vestibule   Comments (e.g., quality/texture/color): Clear, frothy secretions in laryngeal vestibule and pyriform sinuses, as well as resting on the first tracheal ring     *Normal airway responses in the pharynx or laryngeal vestibule may include spontaneous coughing, throat clearing, and/or swallowing        Part III:  Swallow Trials:    Subjective comments regarding oral phase of swallow: Impaired containment--posterior spillage, Prolonged mastication, Minimal mastication, and Oral holding    Comments regarding esophageal phase of swallow:  Patient belched x2 without evidence of proximal escape    Pharyngeal phase of swallow:     Consistency  Number of Trials Swallow Initiation PAS Vallecular Residue* Pyriform Sinus Residue*   Ice chips 1 Other: unable to visualize due to limited dye effect N/A  Not assessed Not assessed   Thin  7 Valleculae and Over epiglottic rim 8 4 - moderate; 25-50%, epiglottic ligament covered 5 - severe; >50%, filled to aryepiglottic fold   Mildly thick  4 Valleculae and Over epiglottic  and recommended diet changes were discussed with: Registered nurse    Patient is unable to participate in goal setting and plan of care    Thank you,  Pauly Lind, SLP  SLP Individual Minutes  Time In: 1008  Time Out: 1048  Minutes: 40      Problem: SLP Adult - Impaired Swallowing  Goal: By Discharge: Advance to least restrictive diet without signs or symptoms of aspiration for planned discharge setting.  See evaluation for individualized goals.  Description: Speech Therapy Goals  Initiated 4/20/2025    1. Patient will tolerate least restrictive diet without adverse effects within 7 days.  2. Patient will participate in objective imaging of swallow. MET 4/24/25.  Outcome: Progressing

## 2025-04-24 NOTE — PLAN OF CARE
Problem: Physical Therapy - Adult  Goal: By Discharge: Performs mobility at highest level of function for planned discharge setting.  See evaluation for individualized goals.  Description: Physical Therapy Goals  Updated for re-eval 4/23/2025  1.  Patient will move from supine to sit and sit to supine in bed with moderate assistance within 5 day(s).    2.  Patient will perform sit to stand with moderate assistance within 5 day(s).  3.  Patient will transfer from bed to chair and chair to bed with maximal assistance using the least restrictive device within 5 day(s).  4.  Patient will ambulate with moderate assist for 10 feet with the least restrictive device within 5 day(s).   5.  Patient will perform cardiac exercises per protocol with minimal assistance within 5 days.  6.  Patient will verbally recall and functionally demonstrate mindful-based movements (\"move in the tube\") principles without cues within 5 days.     Note: FUNCTIONAL STATUS PRIOR TO ADMISSION: Unclear PLOF as patient was an inconsistent historian, however appears to have had a functional decline leading up to hospital admission.  Reports difficulty with household distance ambulation, utilized rollator for all mobility, but was independent with ADLs     HOME SUPPORT PRIOR TO ADMISSION:  Pt lives alone and niece lives nearby; unclear level of s/a available     Physical Therapy Goals  Initiated 4/17/2025  1.  Patient will move from supine to sit and sit to supine in bed with minimal assistance within 5 day(s).    2.  Patient will perform sit to stand with minimal assistance within 5 day(s).  3.  Patient will transfer from bed to chair and chair to bed with minimal assistance using the least restrictive device within 5 day(s).  4.  Patient will ambulate with minimal assistance for 50 feet with the least restrictive device within 5 day(s).   5.  Patient will perform cardiac exercises per protocol with minimal assistance within 5 days.  6.  Patient

## 2025-04-24 NOTE — PROGRESS NOTES
2000- Bedside shift change report given to MALVIN Navarro (oncoming nurse) by MALVIN Jones (offgoing nurse). Report included the following information Nurse Handoff Report, Index, Adult Overview, Surgery Report, Intake/Output, MAR, Recent Results, Med Rec Status, and Cardiac Rhythm NSR .

## 2025-04-24 NOTE — PROGRESS NOTES
[address], [place] [address]  Phone: [phone]   [phone]    [email or web address]     Nephrology Progress Note    Patient Name : Scooter Dickerson      : 1945     MRN : [id number]  Date of Admission : 4/15/2025  Date of Servive : 25    CC:  Follow up for CRISTINO       Assessment and Plan   CRISTINO on CKD  - ischemic ATN  - cr peaked, UOP picking up  - diuril x 1  - cont bumex drip  - daily labs and I/Os  - no need for RRT at this time    Post op Delirium   - per CSS, critical care      CKD 3B   - baseline Cr 1.5 mg/dl pre op   - 2/2 HTN, DM, smoking     Acute Hypercarbic resp failure   Chronic heavy smoker   KARI - does not use CPAP  - per [facility]      CAD s/p CABG x 2, AMADOU ligation 4/15   Hypotension   - off pressors      Chronic anemia   Iron def : complete venofer x 3 doses   - continue  VILMA  - transfuse PRN      BPH , hx of TURP 10/24, penile swelling   Osteoporosis   DM-II   A fib : On oral Amio        Interval History:  Seen and examined.  Extubated, confused.   On bumex drip.  UOP starting to  this AM.  Unable to obtain ROS      Review of Systems: Review of systems not obtained due to patient factors.    Current Medications:   Current Facility-Administered Medications   Medication Dose Route Frequency    insulin NPH (HumuLIN N;NovoLIN N) injection vial 20 Units  20 Units SubCUTAneous Q12H    metoprolol tartrate (LOPRESSOR) tablet 50 mg  50 mg Oral BID    bumetanide (BUMEX) 12.5 mg in 50 mL infusion  2 mg/hr IntraVENous Continuous    amiodarone (CORDARONE) 450 mg in dextrose 5 % 250 mL infusion ([id number])  0.5 mg/min IntraVENous Continuous    potassium bicarb-citric acid (EFFER-K) effervescent tablet 40 mEq  40 mEq Oral BID    guaiFENesin (ROBITUSSIN) 100 MG/5ML liquid 200 mg  200 mg Oral Q6H    oxyCODONE (ROXICODONE) immediate release tablet 2.5 mg  2.5 mg Oral Q8H PRN    pantoprazole (PROTONIX) 40 mg in sodium chloride (PF)  Line)  20 mEq IntraVENous PRN    magnesium sulfate 2000 mg in 50 mL IVPB premix  2,000 mg IntraVENous PRN    ipratropium 0.5 mg-albuterol 2.5 mg (DUONEB) nebulizer solution 1 Dose  1 Dose Inhalation Q4H PRN    glucose chewable tablet 16 g  4 tablet Oral PRN    dextrose bolus 10% 125 mL  125 mL IntraVENous PRN    Or    dextrose bolus 10% 250 mL  250 mL IntraVENous PRN    glucagon injection 1 mg  1 mg SubCUTAneous PRN    dextrose 10 % infusion   IntraVENous Continuous PRN    arformoterol tartrate (BROVANA) nebulizer solution 15 mcg  15 mcg Nebulization BID RT    And    ipratropium (ATROVENT) 0.02 % nebulizer solution 0.5 mg  0.5 mg Nebulization 4x Daily RT      Allergies   Allergen Reactions    Eliquis [Apixaban] Itching    Penicillins Rash     Patient screened for any delayed non-IgE-mediated reaction to PCN.        Patient notes the following:    No delayed non-IgE-mediated reaction to PCN                Objective:  Vitals:    Vitals:    04/24/25 0700 04/24/25 0759 04/24/25 0800 04/24/25 0900   BP: (!) 161/58  (!) 168/57 (!) 157/57   Pulse: 69 70 71 74   Resp: 29 26 27 27   Temp:       TempSrc:       SpO2: 96% 96%     Weight: 73.2 kg (161 lb 6 oz)      Height:           Intake and Output:  04/24 0701 - 04/24 1900  In: 230   Out: 250 [Urine:250]  04/22 1901 - 04/24 0700  In: 3644.5 [I.V.:605.7]  Out: 2050 [Urine:2050]    Physical Examination:  General: Awake, confused, on NRB  HEENT:           EOMI  Neck:  Supple, no mass  Resp:  B/L rales   CV:  iregular,  no murmur or rub, no LE edema  GI:  Soft, NT, + BS, slight distension   Neurologic:  sedated  Psych:             unable to assess  :  Elias + , penile and scrotal swelling     [x]    High complexity decision making was performed  [x]    Patient is at high-risk of decompensation with multiple organ involvement    Lab Data Personally Reviewed: I have reviewed all the pertinent labs, microbiology data and radiology studies during assessment.    Labs:  Recent Labs

## 2025-04-25 ENCOUNTER — APPOINTMENT (OUTPATIENT)
Facility: HOSPITAL | Age: 80
End: 2025-04-25
Attending: THORACIC SURGERY (CARDIOTHORACIC VASCULAR SURGERY)
Payer: MEDICARE

## 2025-04-25 LAB
ANION GAP SERPL CALC-SCNC: 3 MMOL/L (ref 2–12)
ARTERIAL PATENCY WRIST A: POSITIVE
ARTERIAL PATENCY WRIST A: POSITIVE
BASE EXCESS BLD CALC-SCNC: 12.1 MMOL/L
BASE EXCESS BLD CALC-SCNC: 14.3 MMOL/L
BDY SITE: ABNORMAL
BDY SITE: ABNORMAL
BUN SERPL-MCNC: 91 MG/DL (ref 6–20)
BUN/CREAT SERPL: 28 (ref 12–20)
CALCIUM SERPL-MCNC: 8.4 MG/DL (ref 8.5–10.1)
CHLORIDE SERPL-SCNC: 102 MMOL/L (ref 97–108)
CO2 SERPL-SCNC: 34 MMOL/L (ref 21–32)
CREAT SERPL-MCNC: 3.28 MG/DL (ref 0.7–1.3)
ERYTHROCYTE [DISTWIDTH] IN BLOOD BY AUTOMATED COUNT: 21.9 % (ref 11.5–14.5)
GAS FLOW.O2 O2 DELIVERY SYS: ABNORMAL
GAS FLOW.O2 O2 DELIVERY SYS: ABNORMAL
GAS FLOW.O2 SETTING OXYMISER: 18 BPM
GAS FLOW.O2 SETTING OXYMISER: 22 BPM
GLUCOSE BLD STRIP.AUTO-MCNC: 101 MG/DL (ref 65–117)
GLUCOSE BLD STRIP.AUTO-MCNC: 109 MG/DL (ref 65–117)
GLUCOSE BLD STRIP.AUTO-MCNC: 168 MG/DL (ref 65–117)
GLUCOSE BLD STRIP.AUTO-MCNC: 61 MG/DL (ref 65–117)
GLUCOSE BLD STRIP.AUTO-MCNC: 69 MG/DL (ref 65–117)
GLUCOSE BLD STRIP.AUTO-MCNC: 76 MG/DL (ref 65–117)
GLUCOSE BLD STRIP.AUTO-MCNC: 83 MG/DL (ref 65–117)
GLUCOSE BLD STRIP.AUTO-MCNC: 84 MG/DL (ref 65–117)
GLUCOSE SERPL-MCNC: 96 MG/DL (ref 65–100)
HCO3 BLD-SCNC: 39.5 MMOL/L (ref 21–28)
HCO3 BLD-SCNC: 40 MMOL/L (ref 21–28)
HCT VFR BLD AUTO: 31 % (ref 36.6–50.3)
HGB BLD-MCNC: 9.8 G/DL (ref 12.1–17)
MAGNESIUM SERPL-MCNC: 2.3 MG/DL (ref 1.6–2.4)
MCH RBC QN AUTO: 29.9 PG (ref 26–34)
MCHC RBC AUTO-ENTMCNC: 31.6 G/DL (ref 30–36.5)
MCV RBC AUTO: 94.5 FL (ref 80–99)
NRBC # BLD: 1.24 K/UL (ref 0–0.01)
NRBC BLD-RTO: 10.2 PER 100 WBC
O2/TOTAL GAS SETTING VFR VENT: 100 %
O2/TOTAL GAS SETTING VFR VENT: 80 %
PCO2 BLD: 51.8 MMHG (ref 35–48)
PCO2 BLD: 66.4 MMHG (ref 35–48)
PEEP RESPIRATORY: 8 CMH2O
PEEP RESPIRATORY: 8 CMH2O
PH BLD: 7.38 (ref 7.35–7.45)
PH BLD: 7.5 (ref 7.35–7.45)
PLATELET # BLD AUTO: 207 K/UL (ref 150–400)
PMV BLD AUTO: 10.9 FL (ref 8.9–12.9)
PO2 BLD: 144 MMHG (ref 83–108)
PO2 BLD: 236 MMHG (ref 83–108)
POTASSIUM SERPL-SCNC: 3.8 MMOL/L (ref 3.5–5.1)
RBC # BLD AUTO: 3.28 M/UL (ref 4.1–5.7)
SAO2 % BLD: 99.1 % (ref 92–97)
SAO2 % BLD: 99.9 % (ref 92–97)
SERVICE CMNT-IMP: ABNORMAL
SERVICE CMNT-IMP: ABNORMAL
SERVICE CMNT-IMP: NORMAL
SODIUM SERPL-SCNC: 139 MMOL/L (ref 136–145)
SPECIMEN TYPE: ABNORMAL
SPECIMEN TYPE: ABNORMAL
VENTILATION MODE VENT: ABNORMAL
VENTILATION MODE VENT: ABNORMAL
VT SETTING VENT: 345 ML
VT SETTING VENT: 345 ML
WBC # BLD AUTO: 12.1 K/UL (ref 4.1–11.1)

## 2025-04-25 PROCEDURE — 71045 X-RAY EXAM CHEST 1 VIEW: CPT

## 2025-04-25 PROCEDURE — 36600 WITHDRAWAL OF ARTERIAL BLOOD: CPT

## 2025-04-25 PROCEDURE — 83735 ASSAY OF MAGNESIUM: CPT

## 2025-04-25 PROCEDURE — 6360000002 HC RX W HCPCS

## 2025-04-25 PROCEDURE — 82803 BLOOD GASES ANY COMBINATION: CPT

## 2025-04-25 PROCEDURE — 3E033XZ INTRODUCTION OF VASOPRESSOR INTO PERIPHERAL VEIN, PERCUTANEOUS APPROACH: ICD-10-PCS | Performed by: STUDENT IN AN ORGANIZED HEALTH CARE EDUCATION/TRAINING PROGRAM

## 2025-04-25 PROCEDURE — 2000000000 HC ICU R&B

## 2025-04-25 PROCEDURE — 6360000002 HC RX W HCPCS: Performed by: INTERNAL MEDICINE

## 2025-04-25 PROCEDURE — 99231 SBSQ HOSP IP/OBS SF/LOW 25: CPT

## 2025-04-25 PROCEDURE — 2580000003 HC RX 258: Performed by: STUDENT IN AN ORGANIZED HEALTH CARE EDUCATION/TRAINING PROGRAM

## 2025-04-25 PROCEDURE — 87070 CULTURE OTHR SPECIMN AEROBIC: CPT

## 2025-04-25 PROCEDURE — 6370000000 HC RX 637 (ALT 250 FOR IP)

## 2025-04-25 PROCEDURE — 2580000003 HC RX 258

## 2025-04-25 PROCEDURE — 94002 VENT MGMT INPAT INIT DAY: CPT

## 2025-04-25 PROCEDURE — 87205 SMEAR GRAM STAIN: CPT

## 2025-04-25 PROCEDURE — 87186 SC STD MICRODIL/AGAR DIL: CPT

## 2025-04-25 PROCEDURE — 6370000000 HC RX 637 (ALT 250 FOR IP): Performed by: PHYSICIAN ASSISTANT

## 2025-04-25 PROCEDURE — 6360000002 HC RX W HCPCS: Performed by: STUDENT IN AN ORGANIZED HEALTH CARE EDUCATION/TRAINING PROGRAM

## 2025-04-25 PROCEDURE — 6370000000 HC RX 637 (ALT 250 FOR IP): Performed by: NURSE PRACTITIONER

## 2025-04-25 PROCEDURE — 0BH17EZ INSERTION OF ENDOTRACHEAL AIRWAY INTO TRACHEA, VIA NATURAL OR ARTIFICIAL OPENING: ICD-10-PCS | Performed by: STUDENT IN AN ORGANIZED HEALTH CARE EDUCATION/TRAINING PROGRAM

## 2025-04-25 PROCEDURE — 2580000003 HC RX 258: Performed by: NURSE PRACTITIONER

## 2025-04-25 PROCEDURE — 85027 COMPLETE CBC AUTOMATED: CPT

## 2025-04-25 PROCEDURE — 5A1945Z RESPIRATORY VENTILATION, 24-96 CONSECUTIVE HOURS: ICD-10-PCS | Performed by: STUDENT IN AN ORGANIZED HEALTH CARE EDUCATION/TRAINING PROGRAM

## 2025-04-25 PROCEDURE — 94640 AIRWAY INHALATION TREATMENT: CPT

## 2025-04-25 PROCEDURE — 2500000003 HC RX 250 WO HCPCS: Performed by: STUDENT IN AN ORGANIZED HEALTH CARE EDUCATION/TRAINING PROGRAM

## 2025-04-25 PROCEDURE — 82962 GLUCOSE BLOOD TEST: CPT

## 2025-04-25 PROCEDURE — 2500000003 HC RX 250 WO HCPCS

## 2025-04-25 PROCEDURE — 6370000000 HC RX 637 (ALT 250 FOR IP): Performed by: STUDENT IN AN ORGANIZED HEALTH CARE EDUCATION/TRAINING PROGRAM

## 2025-04-25 PROCEDURE — 87077 CULTURE AEROBIC IDENTIFY: CPT

## 2025-04-25 PROCEDURE — 89220 SPUTUM SPECIMEN COLLECTION: CPT

## 2025-04-25 PROCEDURE — 80048 BASIC METABOLIC PNL TOTAL CA: CPT

## 2025-04-25 PROCEDURE — 6360000002 HC RX W HCPCS: Performed by: NURSE PRACTITIONER

## 2025-04-25 PROCEDURE — 0B978ZZ DRAINAGE OF LEFT MAIN BRONCHUS, VIA NATURAL OR ARTIFICIAL OPENING ENDOSCOPIC: ICD-10-PCS | Performed by: STUDENT IN AN ORGANIZED HEALTH CARE EDUCATION/TRAINING PROGRAM

## 2025-04-25 RX ORDER — FENTANYL CITRATE-0.9 % NACL/PF 10 MCG/ML
25-200 PLASTIC BAG, INJECTION (ML) INTRAVENOUS CONTINUOUS
Refills: 0 | Status: DISCONTINUED | OUTPATIENT
Start: 2025-04-25 | End: 2025-04-29

## 2025-04-25 RX ORDER — ETOMIDATE 2 MG/ML
20 INJECTION INTRAVENOUS ONCE
Status: COMPLETED | OUTPATIENT
Start: 2025-04-25 | End: 2025-04-25

## 2025-04-25 RX ORDER — NOREPINEPHRINE BITARTRATE 0.06 MG/ML
1-100 INJECTION, SOLUTION INTRAVENOUS CONTINUOUS
Status: DISCONTINUED | OUTPATIENT
Start: 2025-04-25 | End: 2025-04-29

## 2025-04-25 RX ORDER — MIDAZOLAM HYDROCHLORIDE 2 MG/2ML
4 INJECTION, SOLUTION INTRAMUSCULAR; INTRAVENOUS ONCE
Status: COMPLETED | OUTPATIENT
Start: 2025-04-25 | End: 2025-04-25

## 2025-04-25 RX ORDER — AMIODARONE HYDROCHLORIDE 200 MG/1
400 TABLET ORAL 2 TIMES DAILY
Status: DISCONTINUED | OUTPATIENT
Start: 2025-04-25 | End: 2025-05-01

## 2025-04-25 RX ORDER — EPINEPHRINE 0.1 MG/ML
1 INJECTION INTRAVENOUS ONCE
Status: COMPLETED | OUTPATIENT
Start: 2025-04-25 | End: 2025-04-25

## 2025-04-25 RX ORDER — ROCURONIUM BROMIDE 10 MG/ML
0.6 INJECTION, SOLUTION INTRAVENOUS ONCE
Status: DISCONTINUED | OUTPATIENT
Start: 2025-04-25 | End: 2025-04-27

## 2025-04-25 RX ORDER — PHENYLEPHRINE HCL IN 0.9% NACL 0.4MG/10ML
SYRINGE (ML) INTRAVENOUS
Status: COMPLETED
Start: 2025-04-25 | End: 2025-04-25

## 2025-04-25 RX ORDER — MIDAZOLAM HYDROCHLORIDE 1 MG/ML
INJECTION, SOLUTION INTRAMUSCULAR; INTRAVENOUS
Status: COMPLETED
Start: 2025-04-25 | End: 2025-04-25

## 2025-04-25 RX ORDER — BUMETANIDE 0.25 MG/ML
4 INJECTION, SOLUTION INTRAMUSCULAR; INTRAVENOUS 2 TIMES DAILY
Status: DISCONTINUED | OUTPATIENT
Start: 2025-04-25 | End: 2025-04-29

## 2025-04-25 RX ADMIN — PIPERACILLIN AND TAZOBACTAM 3375 MG: 3; .375 INJECTION, POWDER, LYOPHILIZED, FOR SOLUTION INTRAVENOUS at 04:30

## 2025-04-25 RX ADMIN — ASPIRIN 81 MG: 81 TABLET, CHEWABLE ORAL at 09:12

## 2025-04-25 RX ADMIN — MIDAZOLAM HYDROCHLORIDE 4 MG: 2 INJECTION, SOLUTION INTRAMUSCULAR; INTRAVENOUS at 06:08

## 2025-04-25 RX ADMIN — GUAIFENESIN 200 MG: 200 SOLUTION ORAL at 00:06

## 2025-04-25 RX ADMIN — DEXTROSE 125 ML: 10 SOLUTION INTRAVENOUS at 22:10

## 2025-04-25 RX ADMIN — AMIODARONE HYDROCHLORIDE 1 MG/MIN: 50 INJECTION, SOLUTION INTRAVENOUS at 09:37

## 2025-04-25 RX ADMIN — EPOETIN ALFA 20000 UNITS: 20000 SOLUTION INTRAVENOUS; SUBCUTANEOUS at 16:51

## 2025-04-25 RX ADMIN — ACETAMINOPHEN 1000 MG: 500 TABLET ORAL at 17:10

## 2025-04-25 RX ADMIN — NOREPINEPHRINE BITARTRATE 5 MCG/MIN: 1 INJECTION INTRAVENOUS at 06:13

## 2025-04-25 RX ADMIN — PIPERACILLIN AND TAZOBACTAM 3375 MG: 3; .375 INJECTION, POWDER, LYOPHILIZED, FOR SOLUTION INTRAVENOUS at 16:49

## 2025-04-25 RX ADMIN — PHENYLEPHRINE HYDROCHLORIDE 30 MCG/MIN: 50 INJECTION INTRAVENOUS at 12:39

## 2025-04-25 RX ADMIN — SODIUM CHLORIDE 40 MG: 9 INJECTION INTRAMUSCULAR; INTRAVENOUS; SUBCUTANEOUS at 09:11

## 2025-04-25 RX ADMIN — Medication 2 UNITS: at 00:05

## 2025-04-25 RX ADMIN — MIDAZOLAM 4 MG: 1 INJECTION INTRAMUSCULAR; INTRAVENOUS at 06:08

## 2025-04-25 RX ADMIN — AMIODARONE HYDROCHLORIDE 400 MG: 200 TABLET ORAL at 20:59

## 2025-04-25 RX ADMIN — ARFORMOTEROL TARTRATE 15 MCG: 15 SOLUTION RESPIRATORY (INHALATION) at 19:30

## 2025-04-25 RX ADMIN — ARFORMOTEROL TARTRATE 15 MCG: 15 SOLUTION RESPIRATORY (INHALATION) at 07:30

## 2025-04-25 RX ADMIN — SODIUM CHLORIDE 1500 MG: 0.9 INJECTION, SOLUTION INTRAVENOUS at 10:50

## 2025-04-25 RX ADMIN — GUAIFENESIN 200 MG: 200 SOLUTION ORAL at 04:29

## 2025-04-25 RX ADMIN — CHLORHEXIDINE GLUCONATE 15 ML: 1.2 RINSE ORAL at 21:00

## 2025-04-25 RX ADMIN — Medication 50 MCG/HR: at 06:30

## 2025-04-25 RX ADMIN — ACETAMINOPHEN 1000 MG: 500 TABLET ORAL at 12:00

## 2025-04-25 RX ADMIN — ACETAMINOPHEN 1000 MG: 500 TABLET ORAL at 00:06

## 2025-04-25 RX ADMIN — AMIODARONE HYDROCHLORIDE 200 MG: 200 TABLET ORAL at 09:12

## 2025-04-25 RX ADMIN — CHLORHEXIDINE GLUCONATE 15 ML: 1.2 RINSE ORAL at 09:31

## 2025-04-25 RX ADMIN — AMIODARONE HYDROCHLORIDE 150 MG: 50 INJECTION, SOLUTION INTRAVENOUS at 09:26

## 2025-04-25 RX ADMIN — BUMETANIDE 2 MG/HR: 0.25 INJECTION INTRAMUSCULAR; INTRAVENOUS at 04:27

## 2025-04-25 RX ADMIN — ETOMIDATE 20 MG: 2 INJECTION, SOLUTION INTRAVENOUS at 06:10

## 2025-04-25 RX ADMIN — EPINEPHRINE 1 MG: 0.1 INJECTION INTRAVENOUS at 06:02

## 2025-04-25 RX ADMIN — GUAIFENESIN 200 MG: 200 SOLUTION ORAL at 17:01

## 2025-04-25 RX ADMIN — INSULIN HUMAN 16 UNITS: 100 INJECTION, SUSPENSION SUBCUTANEOUS at 09:11

## 2025-04-25 RX ADMIN — BUMETANIDE 4 MG: 0.25 INJECTION INTRAMUSCULAR; INTRAVENOUS at 09:11

## 2025-04-25 RX ADMIN — BUMETANIDE 4 MG: 0.25 INJECTION INTRAMUSCULAR; INTRAVENOUS at 17:01

## 2025-04-25 RX ADMIN — POTASSIUM BICARBONATE 40 MEQ: 782 TABLET, EFFERVESCENT ORAL at 20:59

## 2025-04-25 RX ADMIN — FINASTERIDE 5 MG: 5 TABLET, FILM COATED ORAL at 21:00

## 2025-04-25 RX ADMIN — ATORVASTATIN CALCIUM 40 MG: 40 TABLET, FILM COATED ORAL at 21:00

## 2025-04-25 RX ADMIN — SODIUM CHLORIDE: 0.9 INJECTION, SOLUTION INTRAVENOUS at 06:36

## 2025-04-25 RX ADMIN — POTASSIUM BICARBONATE 40 MEQ: 782 TABLET, EFFERVESCENT ORAL at 09:11

## 2025-04-25 RX ADMIN — IPRATROPIUM BROMIDE 0.5 MG: 0.5 SOLUTION RESPIRATORY (INHALATION) at 19:30

## 2025-04-25 RX ADMIN — SENNOSIDES AND DOCUSATE SODIUM 1 TABLET: 50; 8.6 TABLET ORAL at 09:12

## 2025-04-25 RX ADMIN — Medication 400 MCG: at 06:00

## 2025-04-25 RX ADMIN — SODIUM CHLORIDE, PRESERVATIVE FREE 10 ML: 5 INJECTION INTRAVENOUS at 09:12

## 2025-04-25 RX ADMIN — SENNOSIDES AND DOCUSATE SODIUM 1 TABLET: 50; 8.6 TABLET ORAL at 21:00

## 2025-04-25 RX ADMIN — HEPARIN SODIUM 5000 UNITS: 5000 INJECTION INTRAVENOUS; SUBCUTANEOUS at 05:37

## 2025-04-25 RX ADMIN — IPRATROPIUM BROMIDE 0.5 MG: 0.5 SOLUTION RESPIRATORY (INHALATION) at 11:23

## 2025-04-25 RX ADMIN — HEPARIN SODIUM 5000 UNITS: 5000 INJECTION INTRAVENOUS; SUBCUTANEOUS at 14:05

## 2025-04-25 RX ADMIN — IPRATROPIUM BROMIDE 0.5 MG: 0.5 SOLUTION RESPIRATORY (INHALATION) at 15:34

## 2025-04-25 RX ADMIN — AMIODARONE HYDROCHLORIDE 0.5 MG/MIN: 50 INJECTION, SOLUTION INTRAVENOUS at 16:50

## 2025-04-25 RX ADMIN — IPRATROPIUM BROMIDE 0.5 MG: 0.5 SOLUTION RESPIRATORY (INHALATION) at 07:30

## 2025-04-25 RX ADMIN — GUAIFENESIN 200 MG: 200 SOLUTION ORAL at 12:00

## 2025-04-25 RX ADMIN — HEPARIN SODIUM 5000 UNITS: 5000 INJECTION INTRAVENOUS; SUBCUTANEOUS at 22:05

## 2025-04-25 RX ADMIN — Medication 100 MCG/HR: at 14:07

## 2025-04-25 ASSESSMENT — PULMONARY FUNCTION TESTS
PIF_VALUE: 27
PIF_VALUE: 27
PIF_VALUE: 32
PIF_VALUE: 25
PIF_VALUE: 30

## 2025-04-25 NOTE — PROGRESS NOTES
0800 Bedside shift change report given to Brooklyn RN (oncoming nurse) by Ramon RN (offgoing nurse). Report included the following information Nurse Handoff Report.     Cardiac Surgery rounding- plan to initiate amio bolus and gtt for rapid A fib; obtain sputum cx; continue ventilator support; bumex gtt stopped per Nephro- bumex pushes ordered.    0815 ABG 7.38/66.4/144/39.5/99.1%. NP Sarai informed.     1145 Patient converted from A fib into NSR. RN to switch pressor support from levophed to neosynephrine per NP.    1930 Bedside shift change report given to RN (oncoming nurse) by Brooklyn RN (offgoing nurse). Report included the following information Nurse Handoff Report.

## 2025-04-25 NOTE — PROGRESS NOTES
2000- Bedside shift change report given to MALVIN Navarro (oncoming nurse) by MALVIN Morales (offgoing nurse). Report included the following information Nurse Handoff Report, Index, Adult Overview, Surgery Report, Intake/Output, MAR, Recent Results, Med Rec Status, and Cardiac Rhythm NSR .     0545- Pt sats noted to me mid 80s. Pt minimally responsive upon assessment, labored breathing. Dr. Sanon called to bedside. RSI performed w/ RT present.     0600- Pt hypotensive s/p intubation. Trevor stick given.     0602- 1mg Epi given.    0606- Levo gtt ordered. 4mg versed given, Dr. Sanon performing bronch at bedside.    0609- Fentanyl ordered.    0630- ABG ordered for 0730. Restraints applied. Tube feeds and bumex gtt on hold.    0800- Bedside shift change report given to MALVIN Barahona (oncoming nurse) by MALVIN Navarro (offgoing nurse). Report included the following information Nurse Handoff Report, Index, Adult Overview, Surgery Report, Intake/Output, MAR, Recent Results, Med Rec Status, and Cardiac Rhythm A fib .

## 2025-04-25 NOTE — PROCEDURES
PROCEDURE NOTE  Date: 4/25/2025   Name: Scooter Dickerson  YOB: 1945    Procedures      Bronchoscopy Procedure Note    Pre-op Diagnosis: Acute hypoxic respiratory failure  Post-op Diagnosis: Thick greenish aspirated material     Anesthesia: Etomidate  Operation: Flexible fiberoptic bronchoscopy, therapeutic suctioning of tracheobronchial tree    Findings: Thick greenish aspirated material  Specimen: None  Complications: None    Procedural Details:  Emergent procedure. A time out was performed prior to the procedure. Elevated peak pressure needed emergent inspection.     The patient has an already established airway in place. The patient is adequately sedated with ICU drips. The bronchoscope was passed through the ET tube and and the tube was confirmed to be in adequate position. Careful inspection of the tracheal lumen was accomplished. Thick greenish material was noted. The scope was sequentially passed into the right mainstem bronchus and all segmental bronchi within the right upper, middle, and lower lobes were examined. Right middle & lower lobe had endobronchial mucus plugging. The process was then repeated on the left side including the left mainstem bronchus and left upper and lower lobes. Left mainstem was suctioned out using saline flushes.     At the conclusion of the procedure was returned to baseline ventilator settings. The patient tolerated the procedure well. Updated family over the phone.       Dr. Antoine Sanon  Intensivist

## 2025-04-25 NOTE — PROGRESS NOTES
Speech-Language Pathology Contact Note    Chart reviewed. Patient intubate this morning for respiratory failure. SLP will defer and follow as able/appropriate.     Thank you,  Pauly Lind M.Ed, CCC-SLP (pronouns: she/her/hers)  Speech-Language Pathologist

## 2025-04-25 NOTE — CARE COORDINATION
Transition of Care Plan:     RUR: 26%  Prior Level of Functioning: Lives alone at [facility] [place]. Rollator for mobility and hx w [facility].   Disposition: (re-intubated) [facility] to IPR vs [facility] following along for medical and functional progress. No auth needed.  If SNF or IPR: Date FOC offered: 4/17  Date FOC received: 4/17  Accepting facility: Pending   Date authorization started with reference number: NA  Follow up appointments: Specialist  DME needed: Defer to rehab  Transportation at discharge: TBD  IM/IMM Medicare/ letter given: 4/16  Is patient a [place] and connected with [facility]? Yes              If yes, was  transfer form completed and [facility] notified? Yes on 4/17 (ph: [phone] ext [phone], fax: [phone]  Caregiver Contact:   Discharge Caregiver contacted prior to discharge?   Care Conference needed?   Barriers to discharge:  Medical-CABG 4/15, intubated, watching Cr    Patient discussed in Cardiac Surgery IDRs. Patient had worsening respiratory status over night and this morning requiring reintubation for 3rd time since surgery.  Patient may need [facility] as a bridge to IPR once medically appropriate and no auth needed. Case management will continue to follow for [facility] needs.    TIFFANY Quinteros CCM  Care Management   Available on [employer] or [phone]

## 2025-04-25 NOTE — DIABETES MGMT
[facility]  PROGRAM FOR DIABETES HEALTH  DIABETES MANAGEMENT CONSULT    Consulted by Provider for advanced nursing evaluation and care for inpatient blood glucose management.    Evaluation and Action Plan   Scooter Dickerson is a 80 yo male with a history of CAD, HTN, HLD, sleep apnea, asthma, arthritis, and blood clots in right leg who presented for scheduled CABG x2 on 4/15. He was previously admitted for CABG eval and workup after having abnormal stress test results in March. The Program for Diabetes Health has been consulted to assist in glycemic management and advanced diabetes management assessment this admission.    Mr. Dickerson has no personal or family history of diabetes with current A1c of 5.4%. He currently does not take any medications for diabetes or has ever in the past. He endorses he eats small portions.    Admission BG 78. He underwent CABG x2 yesterday and insulin infusion was started post op for glycemic control. His 24 hour insulin needs were very low at 13.4 units with hourly rates of 0.1-2.6 units/hour. He will continue on the insulin infusion for another 24 hours. Anticipate he will be able to be transitioned off insulin infusion tomorrow afternoon. Will continue to follow along with you.     4/25- He was reintubated early this morning for respiratory failure and absent right breath sounds. Amiodarone boluses and maintenance dosing has been restarted as well. Anticipate his insulin needs may increase as the effects of stress hyperglycemia and dextrose containing fluids in Amiodarone will impact BG pattern. AM NPH dosing was reduced by 20% due to FBG 83 below target range however, will reintubation status anticipate increasing PM NPH dosing to 20 units Q12hrs. Will place recs for weekend management.     Over the weekend BG management recs:  Continue NPH 16 units Q12hrs; HOLD dose if TF are stopped, paused or discontinued   If FBG is greater than 180 increase by 20% to NPH 20 units Q12hrs   If  Health Management   Nursing Intervention Domain 5250 Decision-making Support   Nursing Interventions Identified diabetes self-management practices impeding diabetes control  Discussed diabetes survival skills related to  Healthy Plate eating plan; given handouts  Role of physical activity in improving insulin sensitivity and action  Procedure for blood glucose monitoring & options for low-cost products  Medications plan at discharge     Billing Code(s)   [] 55988 IP initial hospital care - 40 minutes   [] 82011 IP initial hospital care -55 minutes  [] 78384 IP initial hospital care -75 minutes  []        80215 IP subsequent hospital care - 50 minutes   [] 30430 IP subsequent hospital care - 35 minutes   [x] 28539 IP subsequent hospital care - 25 minutes       Before making these care recommendations, I personally reviewed the hospitalization record, including notes, laboratory & diagnostic data and current medications, and examined the patient at the bedside.  Total minutes: 25 minutes     JENNIFER Hernandez - CNS   Diabetes Clinical Nurse Specialist   Program for Diabetes Health  Access via [employer]

## 2025-04-25 NOTE — PROGRESS NOTES
Cardiac Surgery ICU Progress Note    Admit Date: 4/15/2025  POD:  10 Days Post-Op    Procedure:    4/15/25 with Dr. Brand:   CORONARY ARTERY BYPASS GRAFTING X 2 WITH LIMA (LIMA-LAD, SVG-OM , LESVGH, LIGATION OF LEFT ATRIAL APPENDAGE, DENNIS AND EPIAORTIC U/S BY DR HAGEN       [facility] synopsis:  4/15 POD0: CABG x2/LAAL with Dr Brand. DENNIS with normal biventricular function, EF 55%, no WMA. Transferred to ICU on precedex and insulin gtt with A&V wires and 3 alecia drains (2 med, L pleural). Extubated at 1545 to BiPAP. Overnight maribeth transitioned to norepi.   4/16 POD1: On vaso 0.04, levo 4. A-paced 70's with intermittent capture. Low UOP overnight (200cc). Received albumin x1 and 250ml LR. IV lasix 40mg with minimal response. Consult nephrology: added additional lasix 100mg for diuretic challenge. CXR showing pulmonary edema. ABG showing rising PCO2 65 and pH 7.15. Placed on BiPAP with repeat ABG showing improvement.   4/17 POD 2: Afib around 0200, amio bolus and infusion started. Remains on norepi and vaso. 4L NC this AM, transition to HFNC for ongoing mild hypercapnia. Good response to lasix challenge, Cr up to 2.44. Bumex 2mg IV BID today. PRBC x 1 for Hgb 7.5. Xray with gastric bubble and dilation noted.   4/18 POD 3: Agitation overnight, precedex infusion started. VBG stable, CO2 normal. SB in the 50s this AM, DC amio infusion. Remains on HFNC, pressors off. Continue diuresis, deline. AV wires pulled, plan to pull chest tubes today. Continue ICU level of care. HTN in PM, start Metoprolol   4/19 POD 4: Worsening delirium haldol x 2 overnight and increased precedex. Valproic acid added by ICU. Refusing PO medications, food/drink today. Cr slightly increased with less robust response to diuretics. Start LR 50mL/hr for low UO and no PO intake.   4/20 POD 5: Mentation improved slightly but his respiratory status has worsened, very coarse bilaterally, worsening hypoxia, back on HFNC. Speech consulted, but NPO. Likely will  need dobhoff. LR stopped this AM for concerns of worsening pulmonary edema. Continue bumex, Renal concerned will need dialysis tomorrow. Back in afib in the 90-100s. Re-intubated in the afternoon due to worsening hypoxia. Zosyn started with concerns for aspiration, thick secretions from ETT, resp cx + pseudomonas, zosyn initiated.   POD 6: Intubated, back in SB in the 50s. BP stable. Cr continues to rise, 3.22, UO slightly improved. NGT in place with FWF, consult to Nutrition for TF recommendations. Resp Cx  + GNR.    POD 7: Extubated early afternoon, R sided deficit noted, Code Stroke called but was cancelled as we would not proceed with CTA due to risk to kidneys. Neuro consult and MRI/MRA obtained - no acute infarct.   POD8: Neuro exam improved, pt remains delirious. PT/OT/SLP. Afib - amio bolus and infusion. Increase BB. Increase diuretic - Bumex infusion.   POD 9: Bumex 2mg/h.  Diuril. Tube feeds. Alert. Conversant. Gareth lift OOB.    POD 10: Acute hypoxia overnight, altered mental status, diminished breath sounds right lobe.Emergent re-intubation for bronch, copious greenish material in ETT and lower main airways. Broaden ABX to add vancomycin. Norepi started for hypotension.Transition to bumex BID by Nephrology. Afib this AM, amio bolus and gtt started.    Subjective:   Patient seen with Dr. Mccloud, intubated and sedated, afebrile, Afib in the 120s.      Objective:   Vitals:  Blood pressure (!) 149/50, pulse 64, temperature 99.8 °F (37.7 °C), temperature source Axillary, resp. rate 19, height 1.676 m (5' 6\"), weight 71.5 kg (157 lb 10.1 oz), SpO2 100%.  Temp (24hrs), Av °F (37.2 °C), Min:98.3 °F (36.8 °C), Max:99.8 °F (37.7 °C)      Continuous Infusions:  Norepi 5 mcg/min  Amiodarone 1 mg/hr    Oxygen Flow Rate: O2 Flow Rate (L/min): 2 L/min     Oxygen Delivery Method: O2 Device: Ventilator    ACVC 18  340  8  100%    EKG/Rhythm: Afib 120s      Extubation Date / Time:  @

## 2025-04-25 NOTE — PROGRESS NOTES
[address], [facility] [place] [address]  Phone: [phone]   [phone]    [email or web address]     Nephrology Progress Note    Patient Name : Scooter Dickerson      : 1945     MRN : [id number]  Date of Admission : 4/15/2025  Date of Servive : 25    CC:  Follow up for CRISTINO       Assessment and Plan   CRISTINO on CKD  - ischemic ATN  - cr peaked, UOP stable  - d/c bumex drip  - bumex 4mg IV BID for now  - monitor UOP, daily labs  - no need for RRT at this time    CKD 3B   - baseline Cr 1.5 mg/dl pre op   - 2/2 HTN, DM, smoking     Resp failure:  - 2/2 aspiration PNA, reintubated early this AM ()  - per CCM team    CAD s/p CABG x 2, AMADOU ligation 4/15   Hypotension   - on levo - wean as able     Chronic anemia   Iron def : complete venofer x 3 doses   - continue  VILMA  - transfuse PRN      BPH , hx of TURP 10/24, penile swelling   Osteoporosis   DM-II   A fib : On oral Amio  KARI  Chronic tobacco user     Interval History:  Seen and examined.  Intubated early this AM.  Suspected aspiration PNA.  Good UOP, bumex drip off.  On levo now.  Sedated on the vent.  Renal function has remained stable      Review of Systems: Review of systems not obtained due to patient factors.    Current Medications:   Current Facility-Administered Medications   Medication Dose Route Frequency    rocuronium (ZEMURON) injection 43 mg  0.6 mg/kg IntraVENous Once    norepinephrine (LEVOPHED) 16 mg in sodium chloride 0.9 % 250 mL infusion (premix)  1-100 mcg/min IntraVENous Continuous    fentaNYL (SUBLIMAZE) 1,000 mcg in sodium chloride 0.9% 100 mL infusion   mcg/hr IntraVENous Continuous    insulin NPH (HumuLIN N;NovoLIN N) injection vial 16 Units  16 Units SubCUTAneous Q12H    [Held by provider] metoprolol tartrate (LOPRESSOR) tablet 50 mg  50 mg Oral BID    bumetanide (BUMEX) 12.5 mg in 50 mL infusion  2 mg/hr IntraVENous Continuous    potassium bicarb-citric acid

## 2025-04-25 NOTE — PROGRESS NOTES
Pharmacist Note - Vancomycin Dosing    Consult provided for this 79 y.o. male for indication of aspiration, PNA.  -s/p CABG x 2, AMADOU ligation 4/15   Antibiotic regimen(s): vanc, zosyn  Patient on vancomycin PTA? NO     Recent Labs     25  0536 25  1223 25  2139 25  0431 25  0400   WBC 10.0  --   --  10.7 12.1*   CREATININE 3.35*   < > 3.24* 3.24* 3.28*   BUN 79*   < > 80* 88* 91*    < > = values in this interval not displayed.     Frequency of BMP: qd  Height: 167.6 cm  Weight: 71.5 kg  Est CrCl: 17 ml/min; UO: >1 ml/kg/hr  Temp (24hrs), Av.5 °F (36.9 °C), Min:97.9 °F (36.6 °C), Max:99.7 °F (37.6 °C)    Cultures:   endotracheal: heavy pseudomonas aeruginosa (S Zosyn) - final   endotracheal: in process    MRSA Swab ordered (if applicable)? YES     The plan below is expected to result in a target range of Trough 10-15 mcg/mL    Therapy will be initiated with a loading dose of 1500 mg IV x 1 to be followed by a maintenance dose to be determined daily based on renal function and vancomycin levels. Pharmacy to follow patient daily and order levels / make dose adjustments as appropriate.

## 2025-04-25 NOTE — SIGNIFICANT EVENT
ICU progress note:    I was notified about the sudden onset of hypoxia after patient had a bath. He was altered and was not answering any questions. Using accessory muscles and saturating 75% on non-rebreather. Absent breath sounds on the right side. Patient was intubated for emergent therapeutic bronchoscopy. Elevated peak pressures were noted with tidal volumes of 40-80. Normal plateau pressures. Copious greenish material was noted in the ETT and his lower main airways. Peak pressure improved to 28 after bronchoscopy. Patient already on antibiotics. Updated family over the phone about the possibility of 24-48 hours of invasive ventilator.     Antoine Sanon MD

## 2025-04-25 NOTE — PROGRESS NOTES
Name: Scooter Dicekrson   : 1945   MRN: [id number]   Date: 2025        No chief complaint on file.        HPI:     78 yo M w/ PMH as noted above who presents to CVICU post planned CABG x2 (LIMA-LAD, SVG-OM), and MAADOU ligation. Pt received 2u pRBCs on pump, and DDAVP after the case. VVI paced due to sinus terence <40.       Pt arrives to CVICU in VVI pacing to 80s, intubated, sedated on precedex, insulin gtt. Pt extubated POD 0. Concern for renal function in milvia op period, nephrology following. Developed ICU delirium     Active Problems Being Managed:     Acute respiratory failure  hypotension  ICU delirium   Multivessel CAD s/p CABG x2  PAF  COPD  Hx HTN  PAD  CRISTINO on CKD 3  DM-2  BPH  ANA MARIA      Assessment/Plan:     Intubated @06:00 due to hypoxia. Appears to have aspirated. He had bronch done that suctioned some greenish material which is suspicious for substance used for FEES.      NEUROLOGICAL:      - CT head negative for acute pathology. MRI was also negative. Reviewed Neurology note and appreciate input  - Delirium precautions  - on Seroquel,   - d/c'd  depakoate on  since it was thought to be contributing to his hypoactive delerium.  - PRN anxiolytics       PULMONOLOGY:     - Re- intubated thismorning and vent settings reveiwed. CXR demonstrated RLL infiltrate.  On abx. Will wean FiO2     CARDIOVASCULAR:   - started on norepi due to hypotension most likely due to sedation post intubation  - Amio PO  - Goal MAP greater than 65, SBP less than 130, CI >2, CO >3.5  - Pacer wire management per CT surgery      GASTROINTESTINAL:   - on Pepcid  - continue bowel regimen     RENAL/ELECTROLYTE/FLUIDS:   - Strict ins and outs  - Avoid nephrotoxins, renally dose medications   - Daily renal panel; Cr 3.28   - Nephrology is following and recommended continuing bumex and no need for HD     ENDOCRINE:   - BS in mid 200's  - Glucose goal 100-180  - NPH being adjusted by diabetes mngmt

## 2025-04-25 NOTE — PROGRESS NOTES
Occupational Therapy  04/25/25    Chart reviewed, patient received sedated and on vent. Worsening respiratory status over night and this AM requiring reintubation for 3rd time since surgery. Per ABCDEF protocol, will work with patient when PEEP is 10.0 or less, FIO2 60% or less, and patient is following basic commands. Will follow patient peripherally.      Recommend nursing to complete with patient, as able and per protocol, in order to promote cardiopulmonary systems, maintain strength, endurance and independence:   -bed in chair position or maximize full reverse Trendelenburg position to facilitate upright activity with foot board and non-skid footwear on 3x/day ~30-60 mins each   -ROM during bathing B UEs and LEs  -positioning to prevent contractures and edema.  RASS -1/0/+1 (during SAT) Active ROM  Sitting (bed in chair position)  Standing (reverse Trendelenburg)  ADLs   RASS -3/2 Passive ROM  Sit (bed in chair position)   RASS -5/-4 Passive ROM      Thank you  Arlene Olson, OTR/L

## 2025-04-25 NOTE — PROGRESS NOTES
Physical Therapy 4/25/25    Chart reviewed, patient received sedated and on vent. Worsening respiratory status over night and this AM requiring reintubation for 3rd time since surgery. Per ABCDEF protocol, will work with patient when PEEP is 10.0 or less, FIO2 60% or less, and patient is following basic commands. Will follow patient peripherally.     Recommend nursing to complete with patient, as able and per protocol, in order to promote cardiopulmonary systems, maintain strength, endurance and independence:   -bed in chair position or maximize full reverse Trendelenburg position to facilitate upright activity with foot board and non-skid footwear on 3x/day ~30-60 mins each   -ROM during bathing B UEs and LEs  -positioning to prevent contractures and edema.  RASS -1/0/+1 (during SAT) Active ROM  Sitting (bed in chair position)  Standing (reverse Trendelenburg)  ADLs   RASS -3/2 Passive ROM  Sit (bed in chair position)   RASS -5/-4 Passive ROM       Thank you for your consideration,    Stacy Rodarte, PT, DPT

## 2025-04-25 NOTE — PROCEDURES
PROCEDURE NOTE  Date: 4/25/2025   Name: Scooter Dickerson  YOB: 1945    Procedures    Endotracheal intubation indication: Respiratory Failure, absent right breath sounds    Consent: Emergent procedure. A time out was performed prior to the procedure.     Medications: Etomidate    Procedure details: Medications were administered with adequate sedation achieved. The patient was provided FIO2 of 1.00 pre procedure with an ambu bag. There were no dentures. A video laryngoscope was passed into the oropharynx and the vocal cords were visualized. A 7.5 Kinyarwanda ETT was inserted through the cords. The stylette was removed, the balloon was blown up, and the ETCO2 device was connected with immediate color change. Auscultation revealed bilateral breath sounds and equal expansion. The tube was secured in placed 24 at the upper teeth/gum line. A post procedure x-ray is pending and an ABG will be checked in 30 min. Patient was connected to full mechanical ventilation.    Complications: None at this time      Dr. Antoine Sanon  Intensivist

## 2025-04-26 ENCOUNTER — APPOINTMENT (OUTPATIENT)
Facility: HOSPITAL | Age: 80
End: 2025-04-26
Attending: THORACIC SURGERY (CARDIOTHORACIC VASCULAR SURGERY)
Payer: MEDICARE

## 2025-04-26 LAB
ANION GAP SERPL CALC-SCNC: 6 MMOL/L (ref 2–12)
ARTERIAL PATENCY WRIST A: POSITIVE
BACTERIA SPEC CULT: NORMAL
BACTERIA SPEC CULT: NORMAL
BASE EXCESS BLD CALC-SCNC: 8.2 MMOL/L
BDY SITE: ABNORMAL
BUN SERPL-MCNC: 96 MG/DL (ref 6–20)
BUN/CREAT SERPL: 28 (ref 12–20)
CALCIUM SERPL-MCNC: 8.2 MG/DL (ref 8.5–10.1)
CHLORIDE SERPL-SCNC: 101 MMOL/L (ref 97–108)
CO2 SERPL-SCNC: 33 MMOL/L (ref 21–32)
CREAT SERPL-MCNC: 3.45 MG/DL (ref 0.7–1.3)
ERYTHROCYTE [DISTWIDTH] IN BLOOD BY AUTOMATED COUNT: 22.5 % (ref 11.5–14.5)
GAS FLOW.O2 O2 DELIVERY SYS: ABNORMAL
GAS FLOW.O2 SETTING OXYMISER: 22 BPM
GLUCOSE BLD STRIP.AUTO-MCNC: 112 MG/DL (ref 65–117)
GLUCOSE BLD STRIP.AUTO-MCNC: 113 MG/DL (ref 65–117)
GLUCOSE BLD STRIP.AUTO-MCNC: 121 MG/DL (ref 65–117)
GLUCOSE BLD STRIP.AUTO-MCNC: 148 MG/DL (ref 65–117)
GLUCOSE BLD STRIP.AUTO-MCNC: 65 MG/DL (ref 65–117)
GLUCOSE SERPL-MCNC: 49 MG/DL (ref 65–100)
HCO3 BLD-SCNC: 33.1 MMOL/L (ref 21–28)
HCT VFR BLD AUTO: 30.3 % (ref 36.6–50.3)
HGB BLD-MCNC: 9.6 G/DL (ref 12.1–17)
MAGNESIUM SERPL-MCNC: 2.4 MG/DL (ref 1.6–2.4)
MCH RBC QN AUTO: 30.5 PG (ref 26–34)
MCHC RBC AUTO-ENTMCNC: 31.7 G/DL (ref 30–36.5)
MCV RBC AUTO: 96.2 FL (ref 80–99)
NRBC # BLD: 1.2 K/UL (ref 0–0.01)
NRBC BLD-RTO: 8.2 PER 100 WBC
O2/TOTAL GAS SETTING VFR VENT: 60 %
PCO2 BLD: 47 MMHG (ref 35–48)
PEEP RESPIRATORY: 8 CMH2O
PH BLD: 7.46 (ref 7.35–7.45)
PLATELET # BLD AUTO: 219 K/UL (ref 150–400)
PMV BLD AUTO: 10.2 FL (ref 8.9–12.9)
PO2 BLD: 169 MMHG (ref 83–108)
POTASSIUM SERPL-SCNC: 4.5 MMOL/L (ref 3.5–5.1)
RBC # BLD AUTO: 3.15 M/UL (ref 4.1–5.7)
SAO2 % BLD: 99.6 % (ref 92–97)
SERVICE CMNT-IMP: ABNORMAL
SERVICE CMNT-IMP: ABNORMAL
SERVICE CMNT-IMP: NORMAL
SODIUM SERPL-SCNC: 140 MMOL/L (ref 136–145)
SPECIMEN TYPE: ABNORMAL
VANCOMYCIN SERPL-MCNC: 18.9 UG/ML
VENTILATION MODE VENT: ABNORMAL
VT SETTING VENT: 345 ML
WBC # BLD AUTO: 14.6 K/UL (ref 4.1–11.1)

## 2025-04-26 PROCEDURE — 85027 COMPLETE CBC AUTOMATED: CPT

## 2025-04-26 PROCEDURE — 6370000000 HC RX 637 (ALT 250 FOR IP): Performed by: NURSE PRACTITIONER

## 2025-04-26 PROCEDURE — 6360000002 HC RX W HCPCS: Performed by: STUDENT IN AN ORGANIZED HEALTH CARE EDUCATION/TRAINING PROGRAM

## 2025-04-26 PROCEDURE — 80048 BASIC METABOLIC PNL TOTAL CA: CPT

## 2025-04-26 PROCEDURE — 2580000003 HC RX 258: Performed by: STUDENT IN AN ORGANIZED HEALTH CARE EDUCATION/TRAINING PROGRAM

## 2025-04-26 PROCEDURE — 80202 ASSAY OF VANCOMYCIN: CPT

## 2025-04-26 PROCEDURE — 6360000002 HC RX W HCPCS: Performed by: NURSE PRACTITIONER

## 2025-04-26 PROCEDURE — 2500000003 HC RX 250 WO HCPCS: Performed by: STUDENT IN AN ORGANIZED HEALTH CARE EDUCATION/TRAINING PROGRAM

## 2025-04-26 PROCEDURE — 94003 VENT MGMT INPAT SUBQ DAY: CPT

## 2025-04-26 PROCEDURE — 6370000000 HC RX 637 (ALT 250 FOR IP): Performed by: PHYSICIAN ASSISTANT

## 2025-04-26 PROCEDURE — 82962 GLUCOSE BLOOD TEST: CPT

## 2025-04-26 PROCEDURE — 36600 WITHDRAWAL OF ARTERIAL BLOOD: CPT

## 2025-04-26 PROCEDURE — 6370000000 HC RX 637 (ALT 250 FOR IP)

## 2025-04-26 PROCEDURE — 2580000003 HC RX 258: Performed by: NURSE PRACTITIONER

## 2025-04-26 PROCEDURE — 94640 AIRWAY INHALATION TREATMENT: CPT

## 2025-04-26 PROCEDURE — 2500000003 HC RX 250 WO HCPCS

## 2025-04-26 PROCEDURE — 82803 BLOOD GASES ANY COMBINATION: CPT

## 2025-04-26 PROCEDURE — 6360000002 HC RX W HCPCS: Performed by: INTERNAL MEDICINE

## 2025-04-26 PROCEDURE — 2000000000 HC ICU R&B

## 2025-04-26 PROCEDURE — 71045 X-RAY EXAM CHEST 1 VIEW: CPT

## 2025-04-26 PROCEDURE — 83735 ASSAY OF MAGNESIUM: CPT

## 2025-04-26 RX ADMIN — AMIODARONE HYDROCHLORIDE 400 MG: 200 TABLET ORAL at 20:30

## 2025-04-26 RX ADMIN — INSULIN HUMAN 5 UNITS: 100 INJECTION, SUSPENSION SUBCUTANEOUS at 20:42

## 2025-04-26 RX ADMIN — ATORVASTATIN CALCIUM 40 MG: 40 TABLET, FILM COATED ORAL at 20:30

## 2025-04-26 RX ADMIN — GUAIFENESIN 200 MG: 200 SOLUTION ORAL at 17:23

## 2025-04-26 RX ADMIN — IPRATROPIUM BROMIDE 0.5 MG: 0.5 SOLUTION RESPIRATORY (INHALATION) at 07:25

## 2025-04-26 RX ADMIN — POTASSIUM BICARBONATE 40 MEQ: 782 TABLET, EFFERVESCENT ORAL at 20:29

## 2025-04-26 RX ADMIN — PIPERACILLIN AND TAZOBACTAM 3375 MG: 3; .375 INJECTION, POWDER, LYOPHILIZED, FOR SOLUTION INTRAVENOUS at 05:23

## 2025-04-26 RX ADMIN — ACETAMINOPHEN 1000 MG: 500 TABLET ORAL at 12:03

## 2025-04-26 RX ADMIN — ACETAMINOPHEN 1000 MG: 500 TABLET ORAL at 00:31

## 2025-04-26 RX ADMIN — ASPIRIN 81 MG: 81 TABLET, CHEWABLE ORAL at 08:10

## 2025-04-26 RX ADMIN — CHLOROTHIAZIDE 250 MG: 250 SUSPENSION ORAL at 09:59

## 2025-04-26 RX ADMIN — HEPARIN SODIUM 5000 UNITS: 5000 INJECTION INTRAVENOUS; SUBCUTANEOUS at 13:35

## 2025-04-26 RX ADMIN — IPRATROPIUM BROMIDE 0.5 MG: 0.5 SOLUTION RESPIRATORY (INHALATION) at 15:26

## 2025-04-26 RX ADMIN — BUMETANIDE 4 MG: 0.25 INJECTION INTRAMUSCULAR; INTRAVENOUS at 17:23

## 2025-04-26 RX ADMIN — CHLORHEXIDINE GLUCONATE 15 ML: 1.2 RINSE ORAL at 20:30

## 2025-04-26 RX ADMIN — Medication 75 MCG/HR: at 02:14

## 2025-04-26 RX ADMIN — GUAIFENESIN 200 MG: 200 SOLUTION ORAL at 12:03

## 2025-04-26 RX ADMIN — PHENYLEPHRINE HYDROCHLORIDE 50 MCG/MIN: 50 INJECTION INTRAVENOUS at 04:14

## 2025-04-26 RX ADMIN — GUAIFENESIN 200 MG: 200 SOLUTION ORAL at 05:24

## 2025-04-26 RX ADMIN — SODIUM CHLORIDE 40 MG: 9 INJECTION INTRAMUSCULAR; INTRAVENOUS; SUBCUTANEOUS at 08:11

## 2025-04-26 RX ADMIN — ARFORMOTEROL TARTRATE 15 MCG: 15 SOLUTION RESPIRATORY (INHALATION) at 19:24

## 2025-04-26 RX ADMIN — IPRATROPIUM BROMIDE 0.5 MG: 0.5 SOLUTION RESPIRATORY (INHALATION) at 11:13

## 2025-04-26 RX ADMIN — HEPARIN SODIUM 5000 UNITS: 5000 INJECTION INTRAVENOUS; SUBCUTANEOUS at 21:44

## 2025-04-26 RX ADMIN — SENNOSIDES AND DOCUSATE SODIUM 1 TABLET: 50; 8.6 TABLET ORAL at 20:30

## 2025-04-26 RX ADMIN — CHLOROTHIAZIDE 250 MG: 250 SUSPENSION ORAL at 20:30

## 2025-04-26 RX ADMIN — FINASTERIDE 5 MG: 5 TABLET, FILM COATED ORAL at 20:30

## 2025-04-26 RX ADMIN — HEPARIN SODIUM 5000 UNITS: 5000 INJECTION INTRAVENOUS; SUBCUTANEOUS at 05:33

## 2025-04-26 RX ADMIN — POTASSIUM BICARBONATE 40 MEQ: 782 TABLET, EFFERVESCENT ORAL at 08:10

## 2025-04-26 RX ADMIN — ACETAMINOPHEN 1000 MG: 500 TABLET ORAL at 17:23

## 2025-04-26 RX ADMIN — PIPERACILLIN AND TAZOBACTAM 3375 MG: 3; .375 INJECTION, POWDER, LYOPHILIZED, FOR SOLUTION INTRAVENOUS at 16:30

## 2025-04-26 RX ADMIN — Medication 75 MCG/HR: at 16:10

## 2025-04-26 RX ADMIN — ARFORMOTEROL TARTRATE 15 MCG: 15 SOLUTION RESPIRATORY (INHALATION) at 07:25

## 2025-04-26 RX ADMIN — ACETAMINOPHEN 1000 MG: 500 TABLET ORAL at 05:24

## 2025-04-26 RX ADMIN — BUMETANIDE 4 MG: 0.25 INJECTION INTRAMUSCULAR; INTRAVENOUS at 08:10

## 2025-04-26 RX ADMIN — GUAIFENESIN 200 MG: 200 SOLUTION ORAL at 00:31

## 2025-04-26 RX ADMIN — SODIUM CHLORIDE, PRESERVATIVE FREE 10 ML: 5 INJECTION INTRAVENOUS at 20:30

## 2025-04-26 RX ADMIN — SENNOSIDES AND DOCUSATE SODIUM 1 TABLET: 50; 8.6 TABLET ORAL at 08:11

## 2025-04-26 RX ADMIN — CHLORHEXIDINE GLUCONATE 15 ML: 1.2 RINSE ORAL at 08:11

## 2025-04-26 RX ADMIN — POLYETHYLENE GLYCOL 3350 17 G: 17 POWDER, FOR SOLUTION ORAL at 08:11

## 2025-04-26 RX ADMIN — AMIODARONE HYDROCHLORIDE 400 MG: 200 TABLET ORAL at 08:10

## 2025-04-26 RX ADMIN — SODIUM CHLORIDE, PRESERVATIVE FREE 10 ML: 5 INJECTION INTRAVENOUS at 08:11

## 2025-04-26 RX ADMIN — PHENYLEPHRINE HYDROCHLORIDE 40 MCG/MIN: 50 INJECTION INTRAVENOUS at 22:19

## 2025-04-26 RX ADMIN — IPRATROPIUM BROMIDE 0.5 MG: 0.5 SOLUTION RESPIRATORY (INHALATION) at 19:24

## 2025-04-26 ASSESSMENT — PULMONARY FUNCTION TESTS
PIF_VALUE: 23
PIF_VALUE: 25
PIF_VALUE: 25
PIF_VALUE: 23
PIF_VALUE: 25
PIF_VALUE: 25
PIF_VALUE: 23

## 2025-04-26 NOTE — PROGRESS NOTES
Occupational therapy  04/26/25     Patient currently on caseload, chart reviewed and noted events, patient remains on vent at this time, not appropriate for participation with therapy. Will continue to follow patient and see when able.     Thank you,  Pauly Bernardo OTR/L

## 2025-04-26 NOTE — PROGRESS NOTES
Day #2 of Vancomycin - Level/Dosing Update  Indication:  Possible aspiration PNA, worsening sputum production  - S/P CABG x 2, AMADOU ligation on 4/15  Current regimen:  Dosing by levels, last dose: 1500 mg IV on  @ 1050  Abx regimen:  Zosyn + Vancomycin  ID Following ?: NO  Concomitant nephrotoxic drugs (requires more frequent monitoring): Loop diuretics and Vasopressors  Frequency of BMP?: Daily through     Recent Labs     25  0431 25  0400 25  0443   WBC 10.7 12.1* 14.6*   CREATININE 3.24* 3.28* 3.45*   BUN 88* 91* 96*     Est CrCl: ~10-20 ml/min; UO: 0.8 ml/kg/hr  Temp (24hrs), Av.5 °F (37.5 °C), Min:98.7 °F (37.1 °C), Max:100.8 °F (38.2 °C)    Cultures:    Sputum: heavy Pseudomonas aeruginosa (pan-S, but creeping MICs to several drugs [including Zosyn]), final   Sputum: pending   MRSA screen: pending    MRSA Swab ordered (if applicable)? YES    Goal target range Trough 10-15 mcg/mL    Recent level history:  Date/Time Dose & Interval Measured Level (mcg/mL) Associated AUC/OLESYA Dose Adjustment     @ 0443 1500 mg IV x 1 dose 18.9 (~18 hr post-dose) -- Continue to hold                                         Plan: The random vancomycin level drawn this morning was 18.9 mcg/mL (drawn ~18 hr post-dose), which is above the goal range.  Given the supratherapeutic level and worsening Scr, the plan will be to continue to hold the vancomycin and check another random level with am labs tomorrow.  Pharmacy will continue to monitor patient daily and will make dosage adjustments based upon changing renal function.

## 2025-04-26 NOTE — PROGRESS NOTES
Physical Therapy  04/26/2025    Patient currently on caseload, chart reviewed and noted events, patient remains on vent at this time, not appropriate for participation with therapy. Will continue to follow patient and see when medically appropriate and available for PT.    Thank you,  Sylvia Gandhi, PT, DPT

## 2025-04-26 NOTE — PROGRESS NOTES
Mental Status: He is alert.      Cranial Nerves: No cranial nerve deficit.         Past Medical History:        has a past medical history of Arthritis, Asthma, Atrial fibrillation (HCC), CAD (coronary artery disease), Diabetes mellitus (HCC), History of blood transfusion, Hx of blood clots, Hyperlipidemia, Hypertension, and Sleep apnea.    Past Surgical History:      has a past surgical history that includes Cardiac procedure (N/A, 04/02/2025); Colonoscopy; eye surgery (Bilateral); fracture surgery (Right, 1989); vascular surgery (Right); and Coronary artery bypass graft (N/A, 4/15/2025).      Home Medications:     Prior to Admission medications    Medication Sig Start Date End Date Taking? Authorizing Provider   aspirin 81 MG EC tablet Take 1 tablet by mouth daily 4/10/25  Yes Deb Timmons APRN - NP   albuterol sulfate HFA (PROVENTIL HFA) 108 (90 Base) MCG/ACT inhaler Inhale 2 puffs into the lungs every 6 hours as needed for Wheezing 3/31/25  Yes Ena Henderson APRN - NP   tiotropium-olodaterol (STIOLTO) 2.5-2.5 MCG/ACT AERS Inhale 2 puffs into the lungs daily 3/31/25  Yes Ena Henderson APRN - NP   metoprolol succinate (TOPROL XL) 25 MG extended release tablet Take 1 tablet by mouth daily 2/24/25  Yes Mathieu Painting MD   atorvastatin (LIPITOR) 40 MG tablet Take 1 tablet by mouth daily 2/21/25  Yes Ena Henderson APRN - NP   vitamin D3 (CHOLECALCIFEROL) 25 MCG (1000 UT) TABS tablet Take 2 tablets by mouth daily 2/21/25  Yes Ena Henderson APRN - NP   gabapentin (NEURONTIN) 300 MG capsule Take 2 capsules by mouth in the morning and at bedtime for 180 days. Max Daily Amount: 1,200 mg 2/12/25 8/11/25 Yes Ena Henderson APRN - NP   finasteride (PROSCAR) 5 MG tablet Take 1 tablet by mouth daily 1/24/25  Yes Ena Henderson APRN - NP   amiodarone (CORDARONE) 200 MG tablet Take 2 tablets by mouth 2 times daily for 5 days 4/9/25 4/14/25  Deb Timmons, APRN - NP   rivaroxaban (XARELTO) 15  MG TABS tablet Take 1 tablet by mouth daily (with breakfast)  Patient not taking: Reported on 4/15/2025 4/4/25   Mathieu Painting MD   metFORMIN (GLUCOPHAGE) 1000 MG tablet Take 1 tablet by mouth 2 times daily (with meals) 3/24/25   Joellen Griffin APRN - NP   clopidogrel (PLAVIX) 75 MG tablet Take 1 tablet by mouth daily 3/24/25   Ena Henderson APRN - NP   alendronate (FOSAMAX) 70 MG tablet Take 1 tablet by mouth once a week 1/15/25   Ena Henderson APRN - NP         Allergies/Social/Family History:     Allergies   Allergen Reactions    Eliquis [Apixaban] Itching    Penicillins Rash     Patient screened for any delayed non-IgE-mediated reaction to PCN.        Patient notes the following:    No delayed non-IgE-mediated reaction to PCN               Social History     Tobacco Use    Smoking status: Every Day     Current packs/day: 2.00     Average packs/day: 2.0 packs/day for 60.3 years (120.6 ttl pk-yrs)     Types: Cigarettes     Start date: 1965     Passive exposure: Current    Smokeless tobacco: Never   Substance Use Topics    Alcohol use: Not Currently      Family History   Problem Relation Age of Onset    Diabetes Mother     Cancer Father         THROAT    Anesth Problems Neg Hx          LABS AND  DATA:   Reviewed      Peak airway pressure:      Minute ventilation:        CRITICAL CARE CONSULTANT NOTE  I had a face to face encounter with the patient, reviewed and interpreted patient data including clinical events, labs, images, vital signs, I/O's, and examined patient.  I have discussed the case and the plan and management of the patient's care with the consulting services, the bedside nurses and the respiratory therapist.      NOTE OF PERSONAL INVOLVEMENT IN CARE   This patient has a high probability of imminent, clinically significant deterioration, which requires the highest level of preparedness to intervene urgently. I participated in the decision-making and personally managed or directed

## 2025-04-26 NOTE — PROGRESS NOTES
[address], [place] [address]  Phone: [phone]   [phone]    [email or web address]     Nephrology Progress Note    Patient Name : Scooter Dickerson      : 1945     MRN : [id number]  Date of Admission : 4/15/2025  Date of Servive : 25    CC:  Follow up for CRISTINO       Assessment and Plan   CRISTINO on CKD  - ischemic ATN  - cr peaked, UOP stable  - on PO diuril   - bumex 4mg IV BID for now  - monitor UOP, daily labs  - no need for RRT at this time    CKD 3B   - baseline Cr 1.5 mg/dl pre op   - 2/2 HTN, DM, smoking     Resp failure:  - 2/2 aspiration PNA, reintubated early this AM ()  - per CCM team    CAD s/p CABG x 2, AMADOU ligation 4/15   Hypotension   - on levo - wean as able     Chronic anemia   Iron def : complete venofer x 3 doses   - continue  VILMA  - transfuse PRN      BPH , hx of TURP 10/24, penile swelling   Osteoporosis   DM-II   A fib : On oral Amio  KARI  Chronic tobacco user     Interval History:  Seen and examined.  Intubated  .  Suspected aspiration PNA.  Good UOP, renal fx relativley stable. Good UO. Continue diureitcs. May need RRT       Review of Systems: Review of systems not obtained due to patient factors.    Current Medications:   Current Facility-Administered Medications   Medication Dose Route Frequency    [START ON 2025] Vancomycin random level - due  with am labs.  Thanks!   Other Once    chlorothiazide (DIURIL) 250 MG/5ML suspension 250 mg  250 mg Oral BID    insulin NPH (HumuLIN N;NovoLIN N) injection vial 5 Units  5 Units SubCUTAneous Q12H    rocuronium (ZEMURON) injection 43 mg  0.6 mg/kg IntraVENous Once    norepinephrine (LEVOPHED) 16 mg in sodium chloride 0.9 % 250 mL infusion (premix)  1-100 mcg/min IntraVENous Continuous    fentaNYL (SUBLIMAZE) 1,000 mcg in sodium chloride 0.9% 100 mL infusion   mcg/hr IntraVENous Continuous    bumetanide (BUMEX) injection 4 mg  4 mg IntraVENous

## 2025-04-26 NOTE — PROGRESS NOTES
2000- Report from [place], patient vitals stable, drips dual verified; care assumed.    Uneventful night, levo off, maribeth weaned to 50mcg/mn.    0800- Bedside and Verbal shift change report given to [place] (oncoming nurse) by Yamile (offgoing nurse). Report included the following information Nurse Handoff Report, Intake/Output, MAR, Recent Results, Cardiac Rhythm SB, and Alarm Parameters.

## 2025-04-26 NOTE — PROGRESS NOTES
0800 Bedside shift change report given to [place] RN (oncoming nurse) by Yamiel RN (offgoing nurse). Report included the following information Nurse Handoff Report.     Cardiac Surgery rounding- plan to stop IV amio; initiate diuril; Intensivist decreased fiO2 to 40%.    1930 Bedside shift change report given to Yamile RN (oncoming nurse) by [place] RN (offgoing nurse). Report included the following information Nurse Handoff Report.

## 2025-04-26 NOTE — PROGRESS NOTES
Cardiac Surgery ICU Progress Note    Admit Date: 4/15/2025  POD:  11 Days Post-Op    Procedure:    4/15/25 with Dr. Brand:   CORONARY ARTERY BYPASS GRAFTING X 2 WITH LIMA (LIMA-LAD, SVG-OM , LESVGH, LIGATION OF LEFT ATRIAL APPENDAGE, DENNIS AND EPIAORTIC U/S BY DR HAGEN       [facility] synopsis:  4/15 POD0: CABG x2/LAAL with Dr Brand. DENNIS with normal biventricular function, EF 55%, no WMA. Transferred to ICU on precedex and insulin gtt with A&V wires and 3 alecia drains (2 med, L pleural). Extubated at 1545 to BiPAP. Overnight maribeth transitioned to norepi.   4/16 POD1: On vaso 0.04, levo 4. A-paced 70's with intermittent capture. Low UOP overnight (200cc). Received albumin x1 and 250ml LR. IV lasix 40mg with minimal response. Consult nephrology: added additional lasix 100mg for diuretic challenge. CXR showing pulmonary edema. ABG showing rising PCO2 65 and pH 7.15. Placed on BiPAP with repeat ABG showing improvement.   4/17 POD 2: Afib around 0200, amio bolus and infusion started. Remains on norepi and vaso. 4L NC this AM, transition to HFNC for ongoing mild hypercapnia. Good response to lasix challenge, Cr up to 2.44. Bumex 2mg IV BID today. PRBC x 1 for Hgb 7.5. Xray with gastric bubble and dilation noted.   4/18 POD 3: Agitation overnight, precedex infusion started. VBG stable, CO2 normal. SB in the 50s this AM, DC amio infusion. Remains on HFNC, pressors off. Continue diuresis, deline. AV wires pulled, plan to pull chest tubes today. Continue ICU level of care. HTN in PM, start Metoprolol   4/19 POD 4: Worsening delirium haldol x 2 overnight and increased precedex. Valproic acid added by ICU. Refusing PO medications, food/drink today. Cr slightly increased with less robust response to diuretics. Start LR 50mL/hr for low UO and no PO intake.   4/20 POD 5: Mentation improved slightly but his respiratory status has worsened, very coarse bilaterally, worsening hypoxia, back on HFNC. Speech consulted, but NPO. Likely will  (Final)    Interpretation Summary    Left Ventricle: Low normal left ventricular systolic function with a visually estimated EF of 50 - 55%. Left ventricle size is normal. Normal wall thickness. Mild hypokinesis of the following segments: basal inferior. Normal diastolic function. Borderline Elevated left ventricular filling pressure. Average E/e' ratio is 15.64.    Right Ventricle: Right ventricle size is normal. Unable to assess systolic function.    Mitral Valve: Mild regurgitation with an eccentrically directed jet and may underestimate severity.    Tricuspid Valve: Mild regurgitation. The estimated RVSP is 32 mmHg.    Image quality is fair.    Signed by: Andrew Kim MD on 4/21/2025  4:35 PM    MRI Result (most recent):  MRA HEAD WO CONTRAST 04/23/2025    Narrative  EXAM: MRI BRAIN WO CONTRAST, MRA HEAD WO CONTRAST    CLINICAL HISTORY: Altered mental status, delirium.    INDICATION: Altered mental status.    COMPARISON: NONE    TECHNIQUE: MR examination of the brain includes axial and sagittal T1, coronal  T2, axial T2, axial FLAIR, axial gradient echo, axial DWI.  CONTRAST: None  Next, 3D time-of-flight MRA of the head was performed  FINDINGS:  There is no intracranial mass, hemorrhage or evidence of acute infarction.  Vertebral arteries are codominant. Posterior communicating arteries bilaterally.    Periventricular and scattered foci of increased FLAIR signal intensity at the  coronary atelectasis involving.  The brain architecture is normal. There is no evidence of midline shift or  mass-effect. There are no extra-axial fluid collections. There is no Chiari or  syrinx. The pituitary and infundibulum are grossly unremarkable. There is no  skull base mass. Cerebellopontine angles are grossly unremarkable. The major  intracranial vascular flow-voids are unremarkable. The cavernous sinuses are  symmetric. Optic chiasm and infundibulum grossly unremarkable. Orbits are  grossly symmetric.  Dural venous sinuses

## 2025-04-27 ENCOUNTER — APPOINTMENT (OUTPATIENT)
Facility: HOSPITAL | Age: 80
End: 2025-04-27
Attending: THORACIC SURGERY (CARDIOTHORACIC VASCULAR SURGERY)
Payer: MEDICARE

## 2025-04-27 LAB
ANION GAP SERPL CALC-SCNC: 4 MMOL/L (ref 2–12)
BUN SERPL-MCNC: 97 MG/DL (ref 6–20)
BUN/CREAT SERPL: 29 (ref 12–20)
CALCIUM SERPL-MCNC: 8.1 MG/DL (ref 8.5–10.1)
CHLORIDE SERPL-SCNC: 97 MMOL/L (ref 97–108)
CO2 SERPL-SCNC: 35 MMOL/L (ref 21–32)
CREAT SERPL-MCNC: 3.37 MG/DL (ref 0.7–1.3)
ERYTHROCYTE [DISTWIDTH] IN BLOOD BY AUTOMATED COUNT: 22.8 % (ref 11.5–14.5)
GLUCOSE BLD STRIP.AUTO-MCNC: 103 MG/DL (ref 65–117)
GLUCOSE BLD STRIP.AUTO-MCNC: 105 MG/DL (ref 65–117)
GLUCOSE BLD STRIP.AUTO-MCNC: 108 MG/DL (ref 65–117)
GLUCOSE BLD STRIP.AUTO-MCNC: 128 MG/DL (ref 65–117)
GLUCOSE BLD STRIP.AUTO-MCNC: 147 MG/DL (ref 65–117)
GLUCOSE BLD STRIP.AUTO-MCNC: 98 MG/DL (ref 65–117)
GLUCOSE SERPL-MCNC: 130 MG/DL (ref 65–100)
HCT VFR BLD AUTO: 29 % (ref 36.6–50.3)
HGB BLD-MCNC: 9.3 G/DL (ref 12.1–17)
MAGNESIUM SERPL-MCNC: 2 MG/DL (ref 1.6–2.4)
MCH RBC QN AUTO: 30.6 PG (ref 26–34)
MCHC RBC AUTO-ENTMCNC: 32.1 G/DL (ref 30–36.5)
MCV RBC AUTO: 95.4 FL (ref 80–99)
NRBC # BLD: 0.32 K/UL (ref 0–0.01)
NRBC BLD-RTO: 1.8 PER 100 WBC
PLATELET # BLD AUTO: 172 K/UL (ref 150–400)
PMV BLD AUTO: 10.4 FL (ref 8.9–12.9)
POTASSIUM SERPL-SCNC: 3.8 MMOL/L (ref 3.5–5.1)
RBC # BLD AUTO: 3.04 M/UL (ref 4.1–5.7)
SERVICE CMNT-IMP: ABNORMAL
SERVICE CMNT-IMP: ABNORMAL
SERVICE CMNT-IMP: NORMAL
SODIUM SERPL-SCNC: 136 MMOL/L (ref 136–145)
VANCOMYCIN SERPL-MCNC: 13.7 UG/ML
WBC # BLD AUTO: 17.8 K/UL (ref 4.1–11.1)

## 2025-04-27 PROCEDURE — 85027 COMPLETE CBC AUTOMATED: CPT

## 2025-04-27 PROCEDURE — 6360000002 HC RX W HCPCS: Performed by: NURSE PRACTITIONER

## 2025-04-27 PROCEDURE — 80048 BASIC METABOLIC PNL TOTAL CA: CPT

## 2025-04-27 PROCEDURE — 74018 RADEX ABDOMEN 1 VIEW: CPT

## 2025-04-27 PROCEDURE — 2500000003 HC RX 250 WO HCPCS

## 2025-04-27 PROCEDURE — 6370000000 HC RX 637 (ALT 250 FOR IP)

## 2025-04-27 PROCEDURE — 71045 X-RAY EXAM CHEST 1 VIEW: CPT

## 2025-04-27 PROCEDURE — 2580000003 HC RX 258: Performed by: NURSE PRACTITIONER

## 2025-04-27 PROCEDURE — 74176 CT ABD & PELVIS W/O CONTRAST: CPT

## 2025-04-27 PROCEDURE — 2000000000 HC ICU R&B

## 2025-04-27 PROCEDURE — 2700000000 HC OXYGEN THERAPY PER DAY

## 2025-04-27 PROCEDURE — 6360000002 HC RX W HCPCS: Performed by: STUDENT IN AN ORGANIZED HEALTH CARE EDUCATION/TRAINING PROGRAM

## 2025-04-27 PROCEDURE — 82962 GLUCOSE BLOOD TEST: CPT

## 2025-04-27 PROCEDURE — 2500000003 HC RX 250 WO HCPCS: Performed by: STUDENT IN AN ORGANIZED HEALTH CARE EDUCATION/TRAINING PROGRAM

## 2025-04-27 PROCEDURE — 6370000000 HC RX 637 (ALT 250 FOR IP): Performed by: STUDENT IN AN ORGANIZED HEALTH CARE EDUCATION/TRAINING PROGRAM

## 2025-04-27 PROCEDURE — 6360000002 HC RX W HCPCS: Performed by: INTERNAL MEDICINE

## 2025-04-27 PROCEDURE — 6370000000 HC RX 637 (ALT 250 FOR IP): Performed by: PHYSICIAN ASSISTANT

## 2025-04-27 PROCEDURE — 94640 AIRWAY INHALATION TREATMENT: CPT

## 2025-04-27 PROCEDURE — 6370000000 HC RX 637 (ALT 250 FOR IP): Performed by: NURSE PRACTITIONER

## 2025-04-27 PROCEDURE — 94668 MNPJ CHEST WALL SBSQ: CPT

## 2025-04-27 PROCEDURE — 83735 ASSAY OF MAGNESIUM: CPT

## 2025-04-27 PROCEDURE — 80202 ASSAY OF VANCOMYCIN: CPT

## 2025-04-27 PROCEDURE — 94003 VENT MGMT INPAT SUBQ DAY: CPT

## 2025-04-27 PROCEDURE — 2500000003 HC RX 250 WO HCPCS: Performed by: NURSE PRACTITIONER

## 2025-04-27 PROCEDURE — 2580000003 HC RX 258: Performed by: STUDENT IN AN ORGANIZED HEALTH CARE EDUCATION/TRAINING PROGRAM

## 2025-04-27 RX ORDER — ACETYLCYSTEINE 200 MG/ML
600 SOLUTION ORAL; RESPIRATORY (INHALATION)
Status: COMPLETED | OUTPATIENT
Start: 2025-04-27 | End: 2025-04-30

## 2025-04-27 RX ORDER — EPINEPHRINE 0.1 MG/ML
INJECTION INTRAVENOUS
Status: DISCONTINUED
Start: 2025-04-27 | End: 2025-04-27 | Stop reason: WASHOUT

## 2025-04-27 RX ORDER — AMIODARONE HYDROCHLORIDE 150 MG/3ML
INJECTION, SOLUTION INTRAVENOUS
Status: DISCONTINUED
Start: 2025-04-27 | End: 2025-04-27 | Stop reason: WASHOUT

## 2025-04-27 RX ORDER — INDOMETHACIN 25 MG/1
CAPSULE ORAL
Status: DISCONTINUED
Start: 2025-04-27 | End: 2025-04-27 | Stop reason: WASHOUT

## 2025-04-27 RX ORDER — ACETYLCYSTEINE 200 MG/ML
600 SOLUTION ORAL; RESPIRATORY (INHALATION)
Status: DISCONTINUED | OUTPATIENT
Start: 2025-04-27 | End: 2025-04-27

## 2025-04-27 RX ORDER — ATROPINE SULFATE 0.1 MG/ML
INJECTION INTRAVENOUS
Status: DISCONTINUED
Start: 2025-04-27 | End: 2025-04-27 | Stop reason: WASHOUT

## 2025-04-27 RX ADMIN — SODIUM CHLORIDE, PRESERVATIVE FREE 10 ML: 5 INJECTION INTRAVENOUS at 20:32

## 2025-04-27 RX ADMIN — POTASSIUM BICARBONATE 40 MEQ: 782 TABLET, EFFERVESCENT ORAL at 20:30

## 2025-04-27 RX ADMIN — SENNOSIDES AND DOCUSATE SODIUM 1 TABLET: 50; 8.6 TABLET ORAL at 08:22

## 2025-04-27 RX ADMIN — ACETYLCYSTEINE 600 MG: 200 SOLUTION ORAL; RESPIRATORY (INHALATION) at 19:25

## 2025-04-27 RX ADMIN — SENNOSIDES AND DOCUSATE SODIUM 1 TABLET: 50; 8.6 TABLET ORAL at 20:30

## 2025-04-27 RX ADMIN — INSULIN HUMAN 5 UNITS: 100 INJECTION, SUSPENSION SUBCUTANEOUS at 08:21

## 2025-04-27 RX ADMIN — BUMETANIDE 4 MG: 0.25 INJECTION INTRAMUSCULAR; INTRAVENOUS at 17:11

## 2025-04-27 RX ADMIN — IPRATROPIUM BROMIDE AND ALBUTEROL SULFATE 1 DOSE: 2.5; .5 SOLUTION RESPIRATORY (INHALATION) at 09:27

## 2025-04-27 RX ADMIN — ATORVASTATIN CALCIUM 40 MG: 40 TABLET, FILM COATED ORAL at 20:30

## 2025-04-27 RX ADMIN — PIPERACILLIN AND TAZOBACTAM 3375 MG: 3; .375 INJECTION, POWDER, LYOPHILIZED, FOR SOLUTION INTRAVENOUS at 05:28

## 2025-04-27 RX ADMIN — IPRATROPIUM BROMIDE 0.5 MG: 0.5 SOLUTION RESPIRATORY (INHALATION) at 11:29

## 2025-04-27 RX ADMIN — ACETAMINOPHEN 1000 MG: 500 TABLET ORAL at 11:39

## 2025-04-27 RX ADMIN — BUMETANIDE 4 MG: 0.25 INJECTION INTRAMUSCULAR; INTRAVENOUS at 08:20

## 2025-04-27 RX ADMIN — GUAIFENESIN 200 MG: 200 SOLUTION ORAL at 11:39

## 2025-04-27 RX ADMIN — GUAIFENESIN 200 MG: 200 SOLUTION ORAL at 05:28

## 2025-04-27 RX ADMIN — IPRATROPIUM BROMIDE 0.5 MG: 0.5 SOLUTION RESPIRATORY (INHALATION) at 19:25

## 2025-04-27 RX ADMIN — CHLOROTHIAZIDE SODIUM 250 MG: 500 INJECTION, POWDER, LYOPHILIZED, FOR SOLUTION INTRAVENOUS at 20:30

## 2025-04-27 RX ADMIN — Medication 2 UNITS: at 05:35

## 2025-04-27 RX ADMIN — PIPERACILLIN AND TAZOBACTAM 3375 MG: 3; .375 INJECTION, POWDER, LYOPHILIZED, FOR SOLUTION INTRAVENOUS at 16:34

## 2025-04-27 RX ADMIN — AMIODARONE HYDROCHLORIDE 400 MG: 200 TABLET ORAL at 20:30

## 2025-04-27 RX ADMIN — GUAIFENESIN 200 MG: 200 SOLUTION ORAL at 00:16

## 2025-04-27 RX ADMIN — HEPARIN SODIUM 5000 UNITS: 5000 INJECTION INTRAVENOUS; SUBCUTANEOUS at 22:14

## 2025-04-27 RX ADMIN — SODIUM CHLORIDE 40 MG: 9 INJECTION INTRAMUSCULAR; INTRAVENOUS; SUBCUTANEOUS at 08:21

## 2025-04-27 RX ADMIN — SODIUM CHLORIDE, PRESERVATIVE FREE 10 ML: 5 INJECTION INTRAVENOUS at 08:22

## 2025-04-27 RX ADMIN — AMIODARONE HYDROCHLORIDE 400 MG: 200 TABLET ORAL at 08:20

## 2025-04-27 RX ADMIN — IPRATROPIUM BROMIDE 0.5 MG: 0.5 SOLUTION RESPIRATORY (INHALATION) at 07:50

## 2025-04-27 RX ADMIN — IPRATROPIUM BROMIDE 0.5 MG: 0.5 SOLUTION RESPIRATORY (INHALATION) at 16:21

## 2025-04-27 RX ADMIN — ARFORMOTEROL TARTRATE 15 MCG: 15 SOLUTION RESPIRATORY (INHALATION) at 07:50

## 2025-04-27 RX ADMIN — ARFORMOTEROL TARTRATE 15 MCG: 15 SOLUTION RESPIRATORY (INHALATION) at 19:25

## 2025-04-27 RX ADMIN — ASPIRIN 81 MG: 81 TABLET, CHEWABLE ORAL at 08:19

## 2025-04-27 RX ADMIN — POTASSIUM BICARBONATE 40 MEQ: 782 TABLET, EFFERVESCENT ORAL at 08:20

## 2025-04-27 RX ADMIN — ACETAMINOPHEN 1000 MG: 500 TABLET ORAL at 05:29

## 2025-04-27 RX ADMIN — FINASTERIDE 5 MG: 5 TABLET, FILM COATED ORAL at 20:30

## 2025-04-27 RX ADMIN — CHLOROTHIAZIDE 250 MG: 250 SUSPENSION ORAL at 08:21

## 2025-04-27 RX ADMIN — Medication 75 MCG/HR: at 05:40

## 2025-04-27 RX ADMIN — CHLORHEXIDINE GLUCONATE 15 ML: 1.2 RINSE ORAL at 08:22

## 2025-04-27 RX ADMIN — HEPARIN SODIUM 5000 UNITS: 5000 INJECTION INTRAVENOUS; SUBCUTANEOUS at 05:30

## 2025-04-27 RX ADMIN — HEPARIN SODIUM 5000 UNITS: 5000 INJECTION INTRAVENOUS; SUBCUTANEOUS at 13:39

## 2025-04-27 ASSESSMENT — PULMONARY FUNCTION TESTS
PIF_VALUE: 21
PIF_VALUE: 20

## 2025-04-27 NOTE — PROGRESS NOTES
[address], [place] [address]  Phone: [phone]   [phone]    [email or web address]     Nephrology Progress Note    Patient Name : Scooter Dickerson      : 1945     MRN : [id number]  Date of Admission : 4/15/2025  Date of Servive : 25    CC:  Follow up for CRISTINO       Assessment and Plan   CRISTINO on CKD  - ischemic ATN  - cr peaked, UOP stable  - on PO diuril   - bumex 4mg IV BID for now  - monitor UOP, daily labs  - no need for RRT at this time  - cr stable     CKD 3B   - baseline Cr 1.5 mg/dl pre op   - 2/2 HTN, DM, smoking     Resp failure:  - 2/2 aspiration PNA, reintubated early this AM ()  - per CCM team    CAD s/p CABG x 2, AMADOU ligation 4/15   Hypotension   - on levo - wean as able     Chronic anemia   Iron def : complete venofer x 3 doses   - continue  VILMA  - transfuse PRN      BPH , hx of TURP 10/24, penile swelling   Osteoporosis   DM-II   A fib : On oral Amio  KARI  Chronic tobacco user     Interval History:  Seen and examined.  Intubated  .  Suspected aspiration PNA.  Good UOP, renal fx relativley stable. Good UO 3.4 liters. Continue diureitcs. May need RRT if cr escalates. Possible plan for extubation today noted .         Review of Systems: Review of systems not obtained due to patient factors.    Current Medications:   Current Facility-Administered Medications   Medication Dose Route Frequency    chlorothiazide (DIURIL) 250 mg in sterile water 9 mL injection  250 mg IntraVENous Q12H    insulin NPH (HumuLIN N;NovoLIN N) injection vial 5 Units  5 Units SubCUTAneous Q12H    norepinephrine (LEVOPHED) 16 mg in sodium chloride 0.9 % 250 mL infusion (premix)  1-100 mcg/min IntraVENous Continuous    fentaNYL (SUBLIMAZE) 1,000 mcg in sodium chloride 0.9% 100 mL infusion   mcg/hr IntraVENous Continuous    bumetanide (BUMEX) injection 4 mg  4 mg IntraVENous BID    amiodarone (CORDARONE) 450 mg in dextrose 5 % 250 mL  infusion ([id number])  0.5 mg/min IntraVENous Continuous    phenylephrine (JOY-SYNEPHRINE) 50 mg in sodium chloride 0.9 % 250 mL infusion ([id number])   mcg/min IntraVENous Continuous    amiodarone (CORDARONE) tablet 400 mg  400 mg Oral BID    potassium bicarb-citric acid (EFFER-K) effervescent tablet 40 mEq  40 mEq Oral BID    guaiFENesin (ROBITUSSIN) 100 MG/5ML liquid 200 mg  200 mg Oral Q6H    oxyCODONE (ROXICODONE) immediate release tablet 2.5 mg  2.5 mg Oral Q8H PRN    pantoprazole (PROTONIX) 40 mg in sodium chloride (PF) 0.9 % 10 mL injection  40 mg IntraVENous Daily    piperacillin-tazobactam (ZOSYN) 3,375 mg in sodium chloride 0.9 % 50 mL IVPB (addEASE)  3,375 mg IntraVENous Q12H    aspirin chewable tablet 81 mg  81 mg Oral Daily    insulin lispro (HUMALOG,ADMELOG) injection vial 0-12 Units  0-12 Units SubCUTAneous Q6H    0.9 % sodium chloride infusion   IntraVENous PRN    haloperidol lactate (HALDOL) injection 5 mg  5 mg IntraMUSCular Q6H PRN    heparin (porcine) injection 5,000 Units  5,000 Units SubCUTAneous 3 times per day    epoetin christi (EPOGEN;PROCRIT) injection 20,000 Units  20,000 Units SubCUTAneous Once per day on Monday Wednesday Friday    finasteride (PROSCAR) tablet 5 mg  5 mg Oral Nightly    nicotine (NICODERM CQ) 21 MG/24HR 1 patch  1 patch TransDERmal Daily PRN    melatonin tablet 3 mg  3 mg Oral Nightly PRN    sodium chloride flush 0.9 % injection 5-40 mL  5-40 mL IntraVENous 2 times per day    sodium chloride flush 0.9 % injection 5-40 mL  5-40 mL IntraVENous PRN    ondansetron (ZOFRAN) injection 4 mg  4 mg IntraVENous Q4H PRN    acetaminophen (TYLENOL) tablet 1,000 mg  1,000 mg Oral Q6H    lidocaine 4 % external patch 2 patch  2 patch Topical Daily    hydrALAZINE (APRESOLINE) injection 10 mg  10 mg IntraVENous Q6H PRN    diphenhydrAMINE (BENADRYL) capsule 25 mg  25 mg Oral Nightly PRN    polyethylene glycol (GLYCOLAX) packet 17 g  17 g Oral Daily    sennosides-docusate sodium (SENOKOT-S)

## 2025-04-27 NOTE — PROGRESS NOTES
unremarkable. The major  intracranial vascular flow-voids are unremarkable. The cavernous sinuses are  symmetric. Optic chiasm and infundibulum grossly unremarkable. Orbits are  grossly symmetric.  Dural venous sinuses are grossly patent.  The mastoid air cells are well pneumatized and clear.    A2 and A3 segments are patent. M1 segments are patent. M2 segments demonstrate  symmetric arborization. Bilateral posterior communicating arteries. The P1 and  P2 segments are patent.    Impression  Normal MRI of the brain.  There is no aneurysm, dissection or hemodynamically significant stenosis.    There is mild chronic microvascular ischemic change.  There is no intracranial mass, hemorrhage or evidence of acute infarction.  No acute intracranial process is demonstrated.        Electronically signed by VICTORINO HABIB       Assessment:     Principal Problem:    Disease of cardiovascular system  Active Problems:    CAD in native artery    S/P CABG x 2    Transient hyperglycemia post procedure    Acute hypoxemic respiratory failure (HCC)    On enteral nutrition    Right sided weakness    Stress hyperglycemia  Resolved Problems:    * No resolved hospital problems. *       Plan/Recommendations/Medical Decision Making:     Multiv CAD s/p CABG x 2: AV wires pulled  - Continue ASA and statin  - Holding Metoprolol with hypotension requiring vasopressor    PAF s/p LAAL: PTA amio, Toprol, Xarelto; No residual on post-op DENNIS  - Ongoing Afib post-op, amio bolus and gtt on/off multiple times; Afib this AM, amio bolus and infusion restarted 4/25  - Continue PO Amiodarone, 400mg PO BID  - Maintain Mg > 2.5 and K > 4.0  - Does not need AC, appropriately clipped    Acute Hypotension: 4/25 with re-intubation  - Transition to maribeth for MAP > 65 with ongoing afib issues  - No central line, maribeth ok peripheral     HLD: PTA Atorvastatin 40mg  - Continue Lipitor 40mg daily     HTN: PTA Toprol XL 25mg  - Held as discussed      PVD, PAD: PTA Plavix and  Continue finasteride 5mg      DM2: PTA metformin 1G BID, pre op A1C 5.4  - Diabetes Management consulted  - NPH 5 units Q12 hrs for hypoglycemia - much improved     Delirium: Noted overnight 4/17 to 4/18  - Valproic acid started 4/19, stopped 4/24  - DC Seroquel, QTc prolonged at 512 on 4/20  - Encourage day/night schedules  - Was improving, extubated and alert on 4/24    Right Sided Weakness: Code stroke called 4/22 following extubation d/t noted R sided weakness.   - CT head and MRI revealed no acute infarction  - Neurology consulted but has signed off  - Cont asa and statin     Acute on Chronic Anemia, ANA MARIA: Transfused PRBC x 1 on 4/17. Above transfusion threshold.  - Daily CBC   - Venofer 200mg x 3 doses completed  - Epo MWF     Acute Post Op Pain:  - Scheduled tylenol, lidocaine patches  - PRN oxycodone 2.5 mg Q8 hrs  - Gabapentin discontinued d/t renal function    Osteoporosis: On PTA fosamax and vit D  - Eval closer to DC     Neuropathy: 2/2 DM and PAD. 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  Nutrition:TF via OGT; will need dobhoff if extubated  Activity: OOB all meals and ambulate in halls, encourage I/S   Bowel Regimen: Senokot , Miralax, and PRN dulcolax   GI ppx: PPI IV  DVT ppx: SCDs, SQ heparin (start 4/17)     Dispo: Continue ICU level of care. Plan for extubation today. Intense pulm toileting    Signed By: JENNIFER Vogel - NP

## 2025-04-27 NOTE — PROGRESS NOTES
2000- Report from [place], drips verified, skin checked, vitals stable; care assumed.    Uneventful shift, remains on 40 of maribeth.    0800- Bedside and Verbal shift change report given to [place] (oncoming nurse) by Yamile (offgoing nurse). Report included the following information Nurse Handoff Report, Intake/Output, MAR, Recent Results, Cardiac Rhythm SR, and Alarm Parameters.

## 2025-04-27 NOTE — PROGRESS NOTES
0800 Bedside shift change report given to [place] RN (oncoming nurse) by Yamile RN (offgoing nurse). Report included the following information Nurse Handoff Report.     Cardiac Surgery rounding with Intensivist- plan for SBT; continue current medical management.    0815 Patient FREIRE to command- patient placed on SBT by MD.    0830 Patient in a fib, rate controlled- NP Marissa informed.     1000 NSR    1100 KUB at bedside.     1200 KUB results reviewed by MD- plan for CT today; NPO.    1313 Patient returned from CT to CVI 33.     1430 Dobhoff removed and NG placed- MD confirmed placement on XR- continuous low suction applied. Extubate to high flow per MD.    1510 Patient extubated to heated hi-flow 40L/40% per Intensivist. Patient following all commands, Seth, but will only say orientation to self. MD at bedside- fentanyl STOPPED.    1930 Bedside shift change report given to Yamile COOMBS (oncoming nurse) by [place] RN (offgoing nurse). Report included the following information Nurse Handoff Report.

## 2025-04-27 NOTE — PROGRESS NOTES
CRITICAL CARE PROGRESS NOTE    Name: Scooter Dickerson   : 1945   MRN: [id number]   Date: 2025      ICU Diagnosis      Acute hypoxic Respiratory Failure  Aspiration Pneumonia  Delirium   Coronary Artery Disease  Failure to Thrive    Overnight Events   No events.     ROS negative except as otherwise documented.     A/P   This is a 79 year old male with history of HTN, HLD, CAD, DM, CKD III and COPD (no PFTs) who presented to the hospital on 4/15 for elective CABG - hospital course complicated by delirium and recurrent aspiration PNA.     NEUROLOGICAL:    Intermittent delirium. Exam nonfocal and follows commands. Doubt CVA.   - limit sedating medications  - delirium precautions  - wean fentanyl gtt  - start dex    PULMONOLOGY:   Recurrent aspiration PNA - likely due to mentation and deconditioning. Contribution from pulmonary edema. Mentation and deconditioning is barrier to extubation.   - goal SpO2>=92, pH>=7.30  - daily SBT  - pulmonary toilet  - volume removal as tolerated  - ABX as below  - follow ABG and CXR  - LAMA/LABA  - high risk for trach     CARDIOVASCULAR:   Post elective CABG. TTE with normal RV and LV systolic function. New AFIB.   - MAP > 65  - CTS consulted  - continue ASA  - continue statin  - continue amiodarone  - diurese as tolerated   - AC when able (FIB)    RENAL/ELECTROLYTE:   CRISTINO on CKD. Risk for progression given critical illness.   - goal K>=4, Mg>=2, Phos >3  - daily BMP  - strict I/O's; daily weights  - avoid nephrotoxic medications    ENDOCRINE:   - SSI    ID/MICRO:   Prior pseudomonas VAP. Empiric ABX - risk for MDR organisms.   - continue vancomycin  - continue zosyn   - follow culture data    ICU DAILY CHECKLIST     Code Status: Full  DVT Prophylaxis: SCDs  T/L/D: PIV  SUP: N/A  Diet: advance as tolerated  ABCDEF Bundle/Checklist Completed:Yes  Disposition: Stay in ICU  Multidisciplinary Rounds Completed:  Yes  Patient/Family Updated: Yes    OBJECTIVE:     Blood pressure (!)

## 2025-04-27 NOTE — PROGRESS NOTES
Physical Therapy 4/27/25    Chart reviewed, remainted intubated but not sedated. Per RN, plan for possible extubation today, request to defer until then. Will follow up later today as able/medically appropriate.    Thank you for your consideration,    Stacy Rodarte, PT, DPT

## 2025-04-28 ENCOUNTER — APPOINTMENT (OUTPATIENT)
Facility: HOSPITAL | Age: 80
End: 2025-04-28
Attending: THORACIC SURGERY (CARDIOTHORACIC VASCULAR SURGERY)
Payer: MEDICARE

## 2025-04-28 PROBLEM — J69.0 ASPIRATION PNEUMONIA (HCC): Status: ACTIVE | Noted: 2025-04-28

## 2025-04-28 PROBLEM — D72.829 LEUKOCYTOSIS: Status: ACTIVE | Noted: 2025-04-28

## 2025-04-28 PROBLEM — J95.851 VAP (VENTILATOR-ASSOCIATED PNEUMONIA) (HCC): Status: ACTIVE | Noted: 2025-04-28

## 2025-04-28 LAB
ALBUMIN SERPL-MCNC: 2.5 G/DL (ref 3.5–5)
ALBUMIN/GLOB SERPL: 0.8 (ref 1.1–2.2)
ALP SERPL-CCNC: 108 U/L (ref 45–117)
ALT SERPL-CCNC: 9 U/L (ref 12–78)
ANION GAP SERPL CALC-SCNC: 8 MMOL/L (ref 2–12)
AST SERPL-CCNC: 8 U/L (ref 15–37)
BASOPHILS # BLD: 0 K/UL (ref 0–0.1)
BASOPHILS NFR BLD: 0 % (ref 0–1)
BILIRUB SERPL-MCNC: 0.6 MG/DL (ref 0.2–1)
BUN SERPL-MCNC: 92 MG/DL (ref 6–20)
BUN/CREAT SERPL: 29 (ref 12–20)
CALCIUM SERPL-MCNC: 8.7 MG/DL (ref 8.5–10.1)
CHLORIDE SERPL-SCNC: 96 MMOL/L (ref 97–108)
CO2 SERPL-SCNC: 35 MMOL/L (ref 21–32)
CREAT SERPL-MCNC: 3.13 MG/DL (ref 0.7–1.3)
DIFFERENTIAL METHOD BLD: ABNORMAL
EOSINOPHIL # BLD: 0 K/UL (ref 0–0.4)
EOSINOPHIL NFR BLD: 0 % (ref 0–7)
ERYTHROCYTE [DISTWIDTH] IN BLOOD BY AUTOMATED COUNT: 23.1 % (ref 11.5–14.5)
GLOBULIN SER CALC-MCNC: 3.1 G/DL (ref 2–4)
GLUCOSE BLD STRIP.AUTO-MCNC: 116 MG/DL (ref 65–117)
GLUCOSE BLD STRIP.AUTO-MCNC: 125 MG/DL (ref 65–117)
GLUCOSE BLD STRIP.AUTO-MCNC: 94 MG/DL (ref 65–117)
GLUCOSE SERPL-MCNC: 94 MG/DL (ref 65–100)
HCT VFR BLD AUTO: 29.3 % (ref 36.6–50.3)
HGB BLD-MCNC: 9.3 G/DL (ref 12.1–17)
IMM GRANULOCYTES # BLD AUTO: 0 K/UL
IMM GRANULOCYTES NFR BLD AUTO: 0 %
LYMPHOCYTES # BLD: 0.3 K/UL (ref 0.8–3.5)
LYMPHOCYTES NFR BLD: 2 % (ref 12–49)
MAGNESIUM SERPL-MCNC: 2 MG/DL (ref 1.6–2.4)
MCH RBC QN AUTO: 30.6 PG (ref 26–34)
MCHC RBC AUTO-ENTMCNC: 31.7 G/DL (ref 30–36.5)
MCV RBC AUTO: 96.4 FL (ref 80–99)
MONOCYTES # BLD: 1.22 K/UL (ref 0–1)
MONOCYTES NFR BLD: 8 % (ref 5–13)
NEUTS SEG # BLD: 13.68 K/UL (ref 1.8–8)
NEUTS SEG NFR BLD: 90 % (ref 32–75)
NRBC # BLD: 0.18 K/UL (ref 0–0.01)
NRBC BLD-RTO: 1.2 PER 100 WBC
PLATELET # BLD AUTO: 179 K/UL (ref 150–400)
PMV BLD AUTO: 11 FL (ref 8.9–12.9)
POTASSIUM SERPL-SCNC: 4.4 MMOL/L (ref 3.5–5.1)
PROT SERPL-MCNC: 5.6 G/DL (ref 6.4–8.2)
RBC # BLD AUTO: 3.04 M/UL (ref 4.1–5.7)
RBC MORPH BLD: ABNORMAL
RBC MORPH BLD: ABNORMAL
SERVICE CMNT-IMP: ABNORMAL
SERVICE CMNT-IMP: NORMAL
SERVICE CMNT-IMP: NORMAL
SODIUM SERPL-SCNC: 139 MMOL/L (ref 136–145)
WBC # BLD AUTO: 15.2 K/UL (ref 4.1–11.1)

## 2025-04-28 PROCEDURE — 2500000003 HC RX 250 WO HCPCS

## 2025-04-28 PROCEDURE — P9047 ALBUMIN (HUMAN), 25%, 50ML: HCPCS | Performed by: INTERNAL MEDICINE

## 2025-04-28 PROCEDURE — 6360000002 HC RX W HCPCS: Performed by: INTERNAL MEDICINE

## 2025-04-28 PROCEDURE — 2580000003 HC RX 258: Performed by: STUDENT IN AN ORGANIZED HEALTH CARE EDUCATION/TRAINING PROGRAM

## 2025-04-28 PROCEDURE — 2700000000 HC OXYGEN THERAPY PER DAY

## 2025-04-28 PROCEDURE — 2500000003 HC RX 250 WO HCPCS: Performed by: INTERNAL MEDICINE

## 2025-04-28 PROCEDURE — 82962 GLUCOSE BLOOD TEST: CPT

## 2025-04-28 PROCEDURE — 83735 ASSAY OF MAGNESIUM: CPT

## 2025-04-28 PROCEDURE — 2580000003 HC RX 258: Performed by: NURSE PRACTITIONER

## 2025-04-28 PROCEDURE — 99223 1ST HOSP IP/OBS HIGH 75: CPT | Performed by: INTERNAL MEDICINE

## 2025-04-28 PROCEDURE — 6360000002 HC RX W HCPCS: Performed by: NURSE PRACTITIONER

## 2025-04-28 PROCEDURE — 6370000000 HC RX 637 (ALT 250 FOR IP)

## 2025-04-28 PROCEDURE — 80053 COMPREHEN METABOLIC PANEL: CPT

## 2025-04-28 PROCEDURE — 6360000002 HC RX W HCPCS: Performed by: STUDENT IN AN ORGANIZED HEALTH CARE EDUCATION/TRAINING PROGRAM

## 2025-04-28 PROCEDURE — 94640 AIRWAY INHALATION TREATMENT: CPT

## 2025-04-28 PROCEDURE — 94668 MNPJ CHEST WALL SBSQ: CPT

## 2025-04-28 PROCEDURE — 2000000000 HC ICU R&B

## 2025-04-28 PROCEDURE — 97530 THERAPEUTIC ACTIVITIES: CPT

## 2025-04-28 PROCEDURE — APPSS30 APP SPLIT SHARED TIME 16-30 MINUTES: Performed by: NURSE PRACTITIONER

## 2025-04-28 PROCEDURE — 76604 US EXAM CHEST: CPT

## 2025-04-28 PROCEDURE — 97164 PT RE-EVAL EST PLAN CARE: CPT

## 2025-04-28 PROCEDURE — 97535 SELF CARE MNGMENT TRAINING: CPT

## 2025-04-28 PROCEDURE — 92526 ORAL FUNCTION THERAPY: CPT

## 2025-04-28 PROCEDURE — 97168 OT RE-EVAL EST PLAN CARE: CPT

## 2025-04-28 PROCEDURE — 94760 N-INVAS EAR/PLS OXIMETRY 1: CPT

## 2025-04-28 PROCEDURE — 99231 SBSQ HOSP IP/OBS SF/LOW 25: CPT

## 2025-04-28 PROCEDURE — 6370000000 HC RX 637 (ALT 250 FOR IP): Performed by: PHYSICIAN ASSISTANT

## 2025-04-28 PROCEDURE — 85025 COMPLETE CBC W/AUTO DIFF WBC: CPT

## 2025-04-28 PROCEDURE — 71045 X-RAY EXAM CHEST 1 VIEW: CPT

## 2025-04-28 PROCEDURE — G0545 PR INHERENT VISIT TO INPT: HCPCS | Performed by: INTERNAL MEDICINE

## 2025-04-28 RX ORDER — BISACODYL 10 MG
10 SUPPOSITORY, RECTAL RECTAL ONCE
Status: COMPLETED | OUTPATIENT
Start: 2025-04-28 | End: 2025-04-28

## 2025-04-28 RX ORDER — METOPROLOL TARTRATE 25 MG/1
12.5 TABLET, FILM COATED ORAL 2 TIMES DAILY
Status: DISCONTINUED | OUTPATIENT
Start: 2025-04-28 | End: 2025-04-29

## 2025-04-28 RX ORDER — ASPIRIN 300 MG/1
300 SUPPOSITORY RECTAL ONCE
Status: COMPLETED | OUTPATIENT
Start: 2025-04-28 | End: 2025-04-28

## 2025-04-28 RX ORDER — ALBUMIN (HUMAN) 12.5 G/50ML
25 SOLUTION INTRAVENOUS EVERY 6 HOURS
Status: COMPLETED | OUTPATIENT
Start: 2025-04-28 | End: 2025-04-29

## 2025-04-28 RX ADMIN — ALBUMIN (HUMAN) 25 G: 0.25 INJECTION, SOLUTION INTRAVENOUS at 15:11

## 2025-04-28 RX ADMIN — ACETYLCYSTEINE 600 MG: 200 SOLUTION ORAL; RESPIRATORY (INHALATION) at 19:22

## 2025-04-28 RX ADMIN — ALBUMIN (HUMAN) 25 G: 0.25 INJECTION, SOLUTION INTRAVENOUS at 09:36

## 2025-04-28 RX ADMIN — SODIUM CHLORIDE, PRESERVATIVE FREE 10 ML: 5 INJECTION INTRAVENOUS at 09:28

## 2025-04-28 RX ADMIN — IPRATROPIUM BROMIDE 0.5 MG: 0.5 SOLUTION RESPIRATORY (INHALATION) at 15:15

## 2025-04-28 RX ADMIN — SODIUM CHLORIDE, PRESERVATIVE FREE 10 ML: 5 INJECTION INTRAVENOUS at 20:44

## 2025-04-28 RX ADMIN — PIPERACILLIN AND TAZOBACTAM 3375 MG: 3; .375 INJECTION, POWDER, LYOPHILIZED, FOR SOLUTION INTRAVENOUS at 05:09

## 2025-04-28 RX ADMIN — HEPARIN SODIUM 5000 UNITS: 5000 INJECTION INTRAVENOUS; SUBCUTANEOUS at 13:40

## 2025-04-28 RX ADMIN — ASPIRIN 300 MG: 300 SUPPOSITORY RECTAL at 16:24

## 2025-04-28 RX ADMIN — IPRATROPIUM BROMIDE 0.5 MG: 0.5 SOLUTION RESPIRATORY (INHALATION) at 07:29

## 2025-04-28 RX ADMIN — MEROPENEM 1000 MG: 1 INJECTION INTRAVENOUS at 09:45

## 2025-04-28 RX ADMIN — ARFORMOTEROL TARTRATE 15 MCG: 15 SOLUTION RESPIRATORY (INHALATION) at 19:22

## 2025-04-28 RX ADMIN — ALBUMIN (HUMAN) 25 G: 0.25 INJECTION, SOLUTION INTRAVENOUS at 21:30

## 2025-04-28 RX ADMIN — HEPARIN SODIUM 5000 UNITS: 5000 INJECTION INTRAVENOUS; SUBCUTANEOUS at 21:37

## 2025-04-28 RX ADMIN — MEROPENEM 500 MG: 500 INJECTION INTRAVENOUS at 22:41

## 2025-04-28 RX ADMIN — HEPARIN SODIUM 5000 UNITS: 5000 INJECTION INTRAVENOUS; SUBCUTANEOUS at 05:32

## 2025-04-28 RX ADMIN — IPRATROPIUM BROMIDE 0.5 MG: 0.5 SOLUTION RESPIRATORY (INHALATION) at 19:22

## 2025-04-28 RX ADMIN — CHLOROTHIAZIDE SODIUM 250 MG: 500 INJECTION, POWDER, LYOPHILIZED, FOR SOLUTION INTRAVENOUS at 21:21

## 2025-04-28 RX ADMIN — ACETYLCYSTEINE 600 MG: 200 SOLUTION ORAL; RESPIRATORY (INHALATION) at 07:29

## 2025-04-28 RX ADMIN — BISACODYL 10 MG: 10 SUPPOSITORY RECTAL at 15:08

## 2025-04-28 RX ADMIN — ACETAMINOPHEN 1000 MG: 500 TABLET ORAL at 05:09

## 2025-04-28 RX ADMIN — SODIUM CHLORIDE 40 MG: 9 INJECTION INTRAMUSCULAR; INTRAVENOUS; SUBCUTANEOUS at 09:26

## 2025-04-28 RX ADMIN — ARFORMOTEROL TARTRATE 15 MCG: 15 SOLUTION RESPIRATORY (INHALATION) at 07:29

## 2025-04-28 RX ADMIN — EPOETIN ALFA 20000 UNITS: 20000 SOLUTION INTRAVENOUS; SUBCUTANEOUS at 16:08

## 2025-04-28 ASSESSMENT — PAIN SCALES - GENERAL: PAINLEVEL_OUTOF10: 0

## 2025-04-28 NOTE — PROGRESS NOTES
Comprehensive Nutrition Assessment    Type and Reason for Visit: Reassess    Nutrition Recommendations/Plan:     When medically feasible/ as tolerated, resume TF vs start PO       Malnutrition Assessment:  Malnutrition Status:  Insufficient data (04/21/25 1223)    Context:  Acute Illness     Findings of the 6 clinical characteristics of malnutrition:  Energy Intake:  75% or less of estimated energy requirements for 7 or more days  Weight Loss:  Unable to assess     Body Fat Loss:  Unable to assess     Muscle Mass Loss:  Unable to assess    Fluid Accumulation:  No fluid accumulation     Strength:  Not Performed     Nutrition Assessment:    80 yo male admitted for CABG x 2 (LIMA-LAD, SVG-OM) and AMADOU ligation, presented to CVICU following procedure. Pt s/p 2 U PRBCs and DDAVP. PMH of PAF, COPD, HTN, PAD, CKD3, T2DM, BPH, ANA MARIA.    Extubated to BiPAP on 4/16 but respiratory status worsened pt re-intubated 4/20.  Extubated again 4/22.  R side deficit noted, MRI/MRA without acute infarct.    SLP eval 4/24 --> advanced to pureed/ moderately thick liquids.  Again re-intubated early AM on 4/25 d/t hypoxia (?aspiration), tube feeds initially held.  CXR with worsening pulmonary edema.  CRISTINO on CKD, nephrology following, at risk for needing RRT.  New A fib.  Extubated to high flow on 4/27. Ileus noted, remains off TF with NG to suction.  However, pt pulled NG early AM 4/28.  Nursing has been unable to replace.       4/28: follow-up.  Noted events 4/25 and Diabetes CNS provided good recommendations for insulin with different scenarios. Still, pt hypoglycemic on 4/26, insulin adjustments made. Pt was started back on tube feeds 4/25 and increased slowly over weekend to goal on 4/27.  However, on 4/27, DHT replaced with NG  and placed to suction d/t ileus.  Now without NG and nursing has been unable to replace.    TF when at goal (Nepro @ 40 mL/hr) was providing 880 ml, 1584 kcal, 71 gm pro, 640 ml + 860 ml FWF = 1500 ml total  knowing he was getting the CABG done. She is unsure of a UBW but does not report recent weight loss. # and scaled weight on admission 153#. Previous weights throughout  have been ~130-140#, suspect this is more accurate as weights this admission are bed scale and pt with extremity and generalized edema. Will use 139# from 2025 for EEN.     Valproate recommended to be  from enteral feedings by 2 hours. Therefore, recommend bolus feeds at this time with valproate given 2 hours before/after each feed.     220 ml Nepro QID with 215 ml FWF after each bolus  Provides: 880 ml, 1584 kcal, 71 gm pro, 640 ml + 860 ml FWF = 1500 ml total H2O    VeObserved: 6.84 Liters    Temp (24hrs), Av.1 °F (36.7 °C), Min:97.8 °F (36.6 °C), Max:98.6 °F (37 °C)    Nutritionally Significant Medications:  Albumin, Lipitor, bumex, diuril, epo, SSI, NPH held (5 units BID), merrem, protonix, zosyn, glycolax, effer-K, senokot    Estimated Daily Nutrient Needs:  Energy Requirements Based On: Kcal/kg  Weight Used for Energy Requirements: Usual  Energy (kcal/day): 7612-6912 (25-30 kcal/kg)  Weight Used for Protein Requirements: Usual  Protein (g/day): 75 (1.2 gm/kg - monitoring renal fx)  Method Used for Fluid Requirements: 1 ml/kcal  Fluid (ml/day): 1 mL/kcal or per MD    Nutrition Related Findings:   Edema: Left lower extremity, Right lower extremity, Left upper extremity, Right upper extremity   Edema Generalized: +1, Pitting  RUE Edema: Trace  LUE Edema: Trace  RLE Edema: +1  LLE Edema: +1        Last BM: 25    Wounds:   Wound Type: Surgical Incision    Current Nutrition Therapies:  Diet: NPO  Nutrition Support: TF as above    Anthropometric Measures:  Height: 167.6 cm (5' 6\")  Ideal Body Weight (IBW): 142 lbs (65 kg)    Admission Body Weight: 69.4 kg (153 lb)  Current Body Weight: 67.5 kg (148 lb 13 oz), 104.8 % IBW. Weight Source: Bed scale  Current BMI (kg/m2): 24  Usual Body Weight: 63 kg (139 lb) (standing

## 2025-04-28 NOTE — DIABETES MGMT
[facility]  PROGRAM FOR DIABETES HEALTH  DIABETES MANAGEMENT CONSULT    Consulted by Provider for advanced nursing evaluation and care for inpatient blood glucose management.    Evaluation and Action Plan   Scooter Dickerson is a 78 yo male with a history of CAD, HTN, HLD, sleep apnea, asthma, arthritis, and blood clots in right leg who presented for scheduled CABG x2 on 4/15. He was previously admitted for CABG eval and workup after having abnormal stress test results in March. The Program for Diabetes Health has been consulted to assist in glycemic management and advanced diabetes management assessment this admission.    Mr. Dickerson has no personal or family history of diabetes with current A1c of 5.4%. He currently does not take any medications for diabetes or has ever in the past. He endorses he eats small portions.    Admission BG 78. He underwent CABG x2 yesterday and insulin infusion was started post op for glycemic control. His 24 hour insulin needs were very low at 13.4 units with hourly rates of 0.1-2.6 units/hour. He will continue on the insulin infusion for another 24 hours. Anticipate he will be able to be transitioned off insulin infusion tomorrow afternoon. Will continue to follow along with you.     - FBG 94, slightly below target range. Over the weekend he was extubated and transitioned to HFNC. TF were stopped. Will discontinue NPH dosing and monitor BG pattern for 24 hours to assess for insulin therapy needs. Do not anticipate he will need insulin therapy with his prediabetes status. Will continue to follow along with you.     Blood glucose pattern      Significant diabetes-related events over the past 24-72 hours  A1C 5.4%  Fastin  BG pattern:   Correction: 0 units     Management Rationale Action Plan   Medication   Basal needs Using  units/kg/D based on  Not indicated at this time    Nutritional needs   DISCONTINUE NPH as TF have been stopped     Corrective insulin Using medium

## 2025-04-28 NOTE — PROGRESS NOTES
2000- Report from [place], patient vitals stable, skin checked; care assumed.    Uneventful shift, no Bms, 100-300 ml/ hr urine output, patient did not sleep but was appropriate and oriented X3.    0715- Patient found with HHF off and NG tube pulled out.    0800- Bedside and Verbal shift change report given to Rosi COOMBS (oncoming nurse) by Yamile (offgoing nurse). Report included the following information Nurse Handoff Report, Intake/Output, MAR, Recent Results, Cardiac Rhythm SR, and Alarm Parameters.

## 2025-04-28 NOTE — PROGRESS NOTES
CRITICAL CARE PROGRESS NOTE    Name: Scooter Dickerson   : 1945   MRN: [id number]   Date: 2025      ICU Diagnosis      Acute hypoxic Respiratory Failure  Aspiration Pneumonia  Delirium   Coronary Artery Disease  Failure to Thrive    Overnight Events   No events. Appears comfortable.     ROS negative except as otherwise documented.     A/P   This is a 79 year old male with history of HTN, HLD, CAD, DM, CKD III and COPD (no PFTs) who presented to the hospital on 4/15 for elective CABG - hospital course complicated by delirium and recurrent aspiration PNA.     NEUROLOGICAL:    Intermittent delirium. Exam nonfocal and follows commands. Doubt CVA.   - limit sedating medications  - delirium precautions  - SLP/PT/OT    PULMONOLOGY:   Recurrent aspiration PNA - likely due to mentation and deconditioning. Contribution from pulmonary edema. Mentation and weakness RF for repeat intubation.   - goal SpO2>=92, pH>=7.30  - pulmonary toilet  - volume removal as tolerated  - ABX as below  - follow ABG and CXR  - LAMA/LABA  - high risk for trach     CARDIOVASCULAR:   Post elective CABG. TTE with normal RV and LV systolic function. New AFIB.   - MAP > 65  - CTS consulted  - continue ASA  - continue statin  - continue amiodarone  - diurese as tolerated   - AC when able (FIB)     RENAL/ELECTROLYTE:   CRISTINO on CKD. Risk for progression given critical illness.   - goal K>=4, Mg>=2, Phos >3  - daily BMP  - strict I/O's; daily weights  - avoid nephrotoxic medications    GI:   Mild post operative ileus. CT without obstruction.   - NPO  - bowel regiment  - OOB to chair  - serial KUB    ENDOCRINE:   - SSI    ID/MICRO:   MDR pseudomonas VAP. ID consult for meropenem.   - ID consult  - anticipate Meropenem  - follow culture data    ICU DAILY CHECKLIST     Code Status: Full  DVT Prophylaxis: SCDs  T/L/D: PIV  SUP: N/A  Diet: advance as tolerated  ABCDEF Bundle/Checklist Completed:Yes  Disposition: Stay in ICU  Multidisciplinary Rounds

## 2025-04-28 NOTE — PLAN OF CARE
Problem: Occupational Therapy - Adult  Goal: By Discharge: Performs self-care activities at highest level of function for planned discharge setting.  See evaluation for individualized goals.  Description: FUNCTIONAL STATUS PRIOR TO ADMISSION:  Patient was ambulatory using rollator  Receives Help From: Family, Prior Level of Assist for ADLs: Independent,  ,  ,  ,  ,  , Prior Level of Assist for Homemaking: Needs assistance, Ambulation Assistance: Needs assistance, Prior Level of Assist for Transfers: Independent,       HOME SUPPORT: Patient lived alone and reports being independent with ADLS/IADLS .    Occupational Therapy Goals:  Re-Evaluation s/p re-intubation and code stroke 4/23/2025, Re-Evaluation 4/28/2025 s/p reintubation d/t possible aspiration, cont. All goals  1.  Patient will perform ADLs seated 5 mins without fatigue or LOB with Maximal Assist and Assist x1 within 7 days.   2.  Patient will perform upper body ADLs with Maximal Assist within 7 days.  3.  Patient will perform lower body ADLs with Maximal Assist using AE PRN within 7 days.    4.  Patient will perform gathering ADL items high and low 2/2 with Maximal Assist within 7 days.  5.  Patient will perform BSC transfers with Maximal Assist and Assist x1 within 7 days.  6.  Patient will perform all aspects of toileting with Maximal Assist within 7 days.  7.  Patient will participate in cardiac/sternal upper extremity therapeutic exercise/activities to increase independence with ADLs with supervision/set-up for 5 minutes within 7 days.   8.  Patient will adhere to Move in the Tube precautions during functional tasks with Min cues within 7 days.    Initiated 4/17/2025  1.  Patient will perform ADLs standing 5 mins without fatigue or LOB with Maximal Assist and Assist x1 within 7 days.  2.  Patient will perform upper body ADLs with Minimal Assist within 7 days.  3.  Patient will perform lower body ADLs with Moderate Assist within 7 days.    4.  Patient  Form  AM-PAC Daily Activity - Inpatient   How much help is needed for putting on and taking off regular lower body clothing?: Total  How much help is needed for bathing (which includes washing, rinsing, drying)?: Total  How much help is needed for toileting (which includes using toilet, bedpan, or urinal)?: Total  How much help is needed for putting on and taking off regular upper body clothing?: Total  How much help is needed for taking care of personal grooming?: A Lot  How much help for eating meals?: A Lot  [facility] Inpatient Daily Activity Raw Score: 8  AM-PAC Inpatient ADL T-Scale Score : 22.86  ADL Inpatient CMS 0-100% Score: 85.69  ADL Inpatient CMS G-Code Modifier : CM     Interpretation of Tool:  Represents clinically-significant functional categories (i.e. Activities of daily living).  Cutoff score 39.4 (19) correlates to a good likelihood of discharging home versus a facility  Florence Burch, Frances Sutton, Nik Trevizo, Marlena Kumar, Monico Freeman, Rishi Burch, AM-PAC “6-Clicks” Functional Assessment Scores Predict Acute Care Hospital Discharge Destination, Physical Therapy, Volume 94, Issue 9, 1 September 2014, Pages 6820-0002, [email or web address]       Pain Rating:  Discomfort during movement, did not quantify, primarily in RLE during bed mobility    Pain Intervention(s):   rest    Activity Tolerance:   Fair  and requires rest breaks  Please refer to the flowsheet for vital signs taken during this treatment.    After treatment:   Patient left in no apparent distress sitting up in chair and Call bell within reach    COMMUNICATION/EDUCATION:   The patient's plan of care was discussed with: physical therapist and registered nurse    Patient Education  Education Given To: Patient  Education Provided: Role of Therapy;Precautions;Transfer Training;Energy Conservation;Orientation;Plan of Care;Fall Prevention Strategies  Education Method: Demonstration;Verbal  Barriers to
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
## 2025-04-28 NOTE — PROGRESS NOTES
[address], [address] [place] [address]  Phone: [phone]   [phone]    [email or web address]     Nephrology Progress Note    Patient Name : Scooter Dickerson      : 1945     MRN : [id number]  Date of Admission : 4/15/2025  Date of Servive : 25    CC:  Follow up for CRISTINO       Assessment and Plan   CRISTINO on CKD  - ischemic ATN and GFR trends along expected lines   - great UOP w/ Diuril but not much improvement in last 24 hrs   - given his alkalosis and GI issues, cut back diuretics (diuril to Q24H)  - IV albumin x 4 doses today   - labs daily and replete lytes     CKD 3B   - baseline Cr 1.5 mg/dl pre op   - 2/2 HTN, DM, smoking     Resp failure:  - 2/2 aspiration PNA, reintubated early this AM ()  - extubated to High flow     CAD s/p CABG x 2, AMADOU ligation 4/15   Hypotension   - off pressors      Chronic anemia   Iron def : s/p venofer  - continue  VILMA     BPH , hx of TURP 10/24,   - penile swelling from sosa trauma  --> KEEP SOSA     Osteoporosis   DM-II   A fib : On oral Amio  KARI  Chronic tobacco user     Interval History:  Seen and examined.extubated to high flow. More alert, oriented per nursing staff. > 4L UOP. CXR not much improvement. Colonic ileus noted. Off TF and NGT to suction now         Review of Systems: Review of systems not obtained due to patient factors.    Current Medications:   Current Facility-Administered Medications   Medication Dose Route Frequency    chlorothiazide (DIURIL) 250 mg in sterile water 9 mL injection  250 mg IntraVENous Q12H    acetylcysteine (MUCOMYST) 20 % solution 600 mg  600 mg Inhalation BID RT    insulin NPH (HumuLIN N;NovoLIN N) injection vial 5 Units  5 Units SubCUTAneous Q12H    norepinephrine (LEVOPHED) 16 mg in sodium chloride 0.9 % 250 mL infusion (premix)  1-100 mcg/min IntraVENous Continuous    fentaNYL (SUBLIMAZE) 1,000 mcg in sodium chloride 0.9% 100 mL infusion   mcg/hr

## 2025-04-28 NOTE — PROGRESS NOTES
0800: Bedside and Verbal shift change report given to Rosi RN (oncoming nurse) by Yamile COOMBS (offgoing nurse). Report included the following information Nurse Handoff Report.     0800: Cardiac surgery rounds    0830: 4 attempts by 2 RN s for NGT replacement. Unable to obtain. Pt also in A Fib RVR 100s - BP stable. Updated NYLA Timmons NP.    0943: Consult placed to ID    0945: Dr Kaye spoke to Dr Ordonez regarding abx - verbal order to give Meropenem.    1015: PT/OT at bedside - pt OOB to chair.    1315: Pt hoyered back to bed.    1345: Dr Kaye at bedside updating family.    1530: Dr Kaye and NYLA Timmons NP at bedside - plan to reassess need for NGT tomorrow d/t failed attempts today. Order received for ASA suppository.    2000: Bedside and Verbal shift change report given to Kelin RAMEY RN (oncoming nurse) by Rosi RN (offgoing nurse). Report included the following information Nurse Handoff Report.

## 2025-04-28 NOTE — CONSULTS
Labs     04/28/25  0258   ALT 9*       Microbiology:   [unfilled]    Imaging:   XR CHEST PORTABLE 04/28/2025    Narrative  EXAM:  XR CHEST PORTABLE    INDICATION: Heart surgery. Pulmonary edema.    COMPARISON: 4/27/2025    TECHNIQUE: Portable AP supine chest view at 0412 hours    FINDINGS: The endotracheal tube has been removed. An enteric tube courses off  the inferior aspect of the film below the left hemidiaphragm. The  cardiomediastinal contours are stable.    There are increased mild diffuse interstitial opacities with small pleural  effusions, left greater than right. There is no pneumothorax. The bones and  upper abdomen are stable.    Impression  Increased mild pulmonary edema and decreased small pleural effusions, left  greater than right.      Electronically signed by Steffen Flowers      XR CHEST PORTABLE  Result Date: 4/28/2025  EXAM:  XR CHEST PORTABLE INDICATION: Heart surgery. Pulmonary edema. COMPARISON: 4/27/2025 TECHNIQUE: Portable AP supine chest view at 0412 hours FINDINGS: The endotracheal tube has been removed. An enteric tube courses off the inferior aspect of the film below the left hemidiaphragm. The cardiomediastinal contours are stable. There are increased mild diffuse interstitial opacities with small pleural effusions, left greater than right. There is no pneumothorax. The bones and upper abdomen are stable.     Increased mild pulmonary edema and decreased small pleural effusions, left greater than right. Electronically signed by Steffen Flowers    XR ABDOMEN (KUB) (SINGLE AP VIEW)  Result Date: 4/27/2025  EXAM:  XR ABDOMEN (KUB) (SINGLE AP VIEW) INDICATION: NGT placement COMPARISON: CT 1 hour prior and abdominal plain film 3 hours prior. TECHNIQUE: Supine frontal abdomen (KUB). FINDINGS: Enteric tube traverses expected course to below the diaphragm into the left upper quadrant. There is redemonstration of gaseous distention of colon and small bowel loops with no pneumatosis or free  COMPARISON: February 16, 2025 TECHNIQUE: PA and lateral chest views FINDINGS: The cardiac size is within normal limits. The lungs are deeply aerated and demonstrate no acute pneumonia. New linear opacity in the left base is likely scarring or discoid atelectasis. There is peribronchial cuffing present this is in the perihilar regions and is likely due to smoking history, asthma or chronic bronchitic type process. No pleural effusions. Osseous structures are unremarkable.     1. No acute pneumonia 2. Peribronchial cuffing is present as described above Electronically signed by Darrion Gerber    Vascular ankle brachial index (TREY) PROCEDURE ORDER DONE IN [facility]  Result Date: 4/3/2025    There is evidence of moderate right and left lower extremity arterial obstruction.     Vascular duplex vein mapping lower bilateral  Result Date: 4/3/2025    The right great saphenous vein and left great and small saphenous veins are patent with measurements as listed.   The right small saphenous vein was not mapped due to the presence of thrombus.     Vascular duplex carotid bilateral  Result Date: 4/3/2025    There is evidence of moderate (50-69%) stenosis in the left internal carotid.   There is evidence of mild (less than 50%) stenosis in the right internal carotid artery.   The right and left vertebral artery is patent with a normal antegrade flow pattern.     Cardiac procedure  Result Date: 4/2/2025    Ultrasound-guided right radial artery access.   Angiographically significant distal left main moderately calcified 65% stenosis in the left dominant coronary system.   Recommend consultation/discussion with cardiac surgery regarding possible CABG versus staged evaluation with intravascular ultrasound and/or PressureWire interrogation of the distal left main stenosis with consideration of high risk PCI of the patient is felt to be a poor surgical candidate.       Thank you for the opportunity to participate in the care of this

## 2025-04-28 NOTE — PROGRESS NOTES
NUTRITION brief    Recommendations:     As medically feasible/ tolerated, advance TF back to Nepro @ 40 mL/hr wit 100 mL H2O flushes q 4 hours       Diet: NPO  Nutrition Support: TF via DHT of Nepro @ 20 mL/hr with 100 mL h2o q 4 hours  Nutrition-related meds: reviewed    Follow-up.  SLP eval 4/24 and pt allowed pureed/ moderately thick liquids.  Noted pt receiving TF at goal up until early this AM, but became hypoxic, concern for aspiration and pt required re-intubation.  On levo and mariebth.  Fentanyl for sedation, no propofol.  TF initially turned off, but resumed at 1/2 goal (20 mL/hr) earlier today per CTS.  Labs reviewed.  Per nephrology, renal fx has remained stable. Previously, TF providing just under 1.2 gm pro/ kg.    No need for RRT at this time.   Current TF goal remains appropriate, but if remains on vent, may need to consider increasing protein some.    See full RD assessment from 4/22 for additional details, goals, and monitoring/evaluation.   Estimated Nutrition Needs:   Energy Requirements Based On: Kcal/kg  Weight Used for Energy Requirements: Usual  Energy (kcal/day): 6434-2817 ([employer])  Weight Used for Protein Requirements: Usual  Protein (g/day):  75-95 (1.2-1.5 g/kg)   Method Used for Fluid Requirements: 1 ml/kcal  Fluid (ml/day): 1 mL/kcal or per MD Brooklyn Wilcox, RD  Available via [employer]

## 2025-04-28 NOTE — PROGRESS NOTES
Cardiac Surgery ICU Progress Note    Admit Date: 4/15/2025  POD:  13 Days Post-Op    Procedure:    4/15/25 with Dr. Brand:   CORONARY ARTERY BYPASS GRAFTING X 2 WITH LIMA (LIMA-LAD, SVG-OM , LESVGH, LIGATION OF LEFT ATRIAL APPENDAGE, DENNIS AND EPIAORTIC U/S BY DR HAGEN       [facility] synopsis:  4/15 POD0: CABG x2/LAAL with Dr Brand. DENNIS with normal biventricular function, EF 55%, no WMA. Transferred to ICU on precedex and insulin gtt with A&V wires and 3 alecia drains (2 med, L pleural). Extubated at 1545 to BiPAP. Overnight maribeth transitioned to norepi.   4/16 POD1: On vaso 0.04, levo 4. A-paced 70's with intermittent capture. Low UOP overnight (200cc). Received albumin x1 and 250ml LR. IV lasix 40mg with minimal response. Consult nephrology: added additional lasix 100mg for diuretic challenge. CXR showing pulmonary edema. ABG showing rising PCO2 65 and pH 7.15. Placed on BiPAP with repeat ABG showing improvement.   4/17 POD 2: Afib around 0200, amio bolus and infusion started. Remains on norepi and vaso. 4L NC this AM, transition to HFNC for ongoing mild hypercapnia. Good response to lasix challenge, Cr up to 2.44. Bumex 2mg IV BID today. PRBC x 1 for Hgb 7.5. Xray with gastric bubble and dilation noted.   4/18 POD 3: Agitation overnight, precedex infusion started. VBG stable, CO2 normal. SB in the 50s this AM, DC amio infusion. Remains on HFNC, pressors off. Continue diuresis, deline. AV wires pulled, plan to pull chest tubes today. Continue ICU level of care. HTN in PM, start Metoprolol   4/19 POD 4: Worsening delirium haldol x 2 overnight and increased precedex. Valproic acid added by ICU. Refusing PO medications, food/drink today. Cr slightly increased with less robust response to diuretics. Start LR 50mL/hr for low UO and no PO intake.   4/20 POD 5: Mentation improved slightly but his respiratory status has worsened, very coarse bilaterally, worsening hypoxia, back on HFNC. Speech consulted, but NPO. Likely will

## 2025-04-28 NOTE — PLAN OF CARE
Problem: Physical Therapy - Adult  Goal: By Discharge: Performs mobility at highest level of function for planned discharge setting.  See evaluation for individualized goals.  Description: Physical Therapy Goals  Updated for re-[facility] 4/28/2025 s/p extubation  1.  Patient will move from supine to sit and sit to supine in bed with maximal assistance within 5 day(s).    2.  Patient will perform sit to stand with maximal assistance within 5 day(s).  3.  Patient will transfer from bed to chair and chair to bed with maximal assistance using the least restrictive device within 5 day(s).  4.  Patient will ambulate with moderate assist for 4 feet with the least restrictive device within 5 day(s).   5.  Patient will perform cardiac exercises per protocol with minimal assistance within 5 days.  6.  Patient will verbally recall and functionally demonstrate mindful-based movements (\"move in the tube\") principles without cues within 5 days.     Updated for re-eval 4/23/2025  1.  Patient will move from supine to sit and sit to supine in bed with moderate assistance within 5 day(s).    2.  Patient will perform sit to stand with moderate assistance within 5 day(s).  3.  Patient will transfer from bed to chair and chair to bed with maximal assistance using the least restrictive device within 5 day(s).  4.  Patient will ambulate with moderate assist for 10 feet with the least restrictive device within 5 day(s).   5.  Patient will perform cardiac exercises per protocol with minimal assistance within 5 days.  6.  Patient will verbally recall and functionally demonstrate mindful-based movements (\"move in the tube\") principles without cues within 5 days.     Note: FUNCTIONAL STATUS PRIOR TO ADMISSION: Unclear PLOF as patient was an inconsistent historian, however appears to have had a functional decline leading up to hospital admission.  Reports difficulty with household distance ambulation, utilized rollator for all mobility, but was

## 2025-04-29 ENCOUNTER — APPOINTMENT (OUTPATIENT)
Facility: HOSPITAL | Age: 80
End: 2025-04-29
Attending: THORACIC SURGERY (CARDIOTHORACIC VASCULAR SURGERY)
Payer: MEDICARE

## 2025-04-29 LAB
ANION GAP SERPL CALC-SCNC: 16 MMOL/L (ref 2–12)
ANION GAP SERPL CALC-SCNC: 18 MMOL/L (ref 2–12)
BACTERIA SPEC CULT: ABNORMAL
BUN SERPL-MCNC: 73 MG/DL (ref 6–20)
BUN SERPL-MCNC: 81 MG/DL (ref 6–20)
BUN/CREAT SERPL: 24 (ref 12–20)
BUN/CREAT SERPL: 26 (ref 12–20)
CALCIUM SERPL-MCNC: 9.1 MG/DL (ref 8.5–10.1)
CALCIUM SERPL-MCNC: 9.5 MG/DL (ref 8.5–10.1)
CHLORIDE SERPL-SCNC: 92 MMOL/L (ref 97–108)
CHLORIDE SERPL-SCNC: 94 MMOL/L (ref 97–108)
CO2 SERPL-SCNC: 32 MMOL/L (ref 21–32)
CO2 SERPL-SCNC: 33 MMOL/L (ref 21–32)
CREAT SERPL-MCNC: 3.03 MG/DL (ref 0.7–1.3)
CREAT SERPL-MCNC: 3.12 MG/DL (ref 0.7–1.3)
ERYTHROCYTE [DISTWIDTH] IN BLOOD BY AUTOMATED COUNT: 23.9 % (ref 11.5–14.5)
GLUCOSE BLD STRIP.AUTO-MCNC: 121 MG/DL (ref 65–117)
GLUCOSE BLD STRIP.AUTO-MCNC: 140 MG/DL (ref 65–117)
GLUCOSE BLD STRIP.AUTO-MCNC: 153 MG/DL (ref 65–117)
GLUCOSE SERPL-MCNC: 109 MG/DL (ref 65–100)
GLUCOSE SERPL-MCNC: 110 MG/DL (ref 65–100)
GRAM STN SPEC: ABNORMAL
HCT VFR BLD AUTO: 27.6 % (ref 36.6–50.3)
HGB BLD-MCNC: 8.5 G/DL (ref 12.1–17)
MAGNESIUM SERPL-MCNC: 2.2 MG/DL (ref 1.6–2.4)
MCH RBC QN AUTO: 30.7 PG (ref 26–34)
MCHC RBC AUTO-ENTMCNC: 30.8 G/DL (ref 30–36.5)
MCV RBC AUTO: 99.6 FL (ref 80–99)
NRBC # BLD: 0.25 K/UL (ref 0–0.01)
NRBC BLD-RTO: 2.3 PER 100 WBC
PHOSPHATE SERPL-MCNC: 3.4 MG/DL (ref 2.6–4.7)
PLATELET # BLD AUTO: 215 K/UL (ref 150–400)
PMV BLD AUTO: 10.3 FL (ref 8.9–12.9)
POTASSIUM SERPL-SCNC: 3.4 MMOL/L (ref 3.5–5.1)
POTASSIUM SERPL-SCNC: 3.8 MMOL/L (ref 3.5–5.1)
RBC # BLD AUTO: 2.77 M/UL (ref 4.1–5.7)
SERVICE CMNT-IMP: ABNORMAL
SODIUM SERPL-SCNC: 141 MMOL/L (ref 136–145)
SODIUM SERPL-SCNC: 144 MMOL/L (ref 136–145)
WBC # BLD AUTO: 10.9 K/UL (ref 4.1–11.1)

## 2025-04-29 PROCEDURE — 74018 RADEX ABDOMEN 1 VIEW: CPT

## 2025-04-29 PROCEDURE — 6360000002 HC RX W HCPCS: Performed by: NURSE PRACTITIONER

## 2025-04-29 PROCEDURE — 82962 GLUCOSE BLOOD TEST: CPT

## 2025-04-29 PROCEDURE — 2580000003 HC RX 258: Performed by: NURSE PRACTITIONER

## 2025-04-29 PROCEDURE — 6370000000 HC RX 637 (ALT 250 FOR IP): Performed by: NURSE PRACTITIONER

## 2025-04-29 PROCEDURE — 5A09457 ASSISTANCE WITH RESPIRATORY VENTILATION, 24-96 CONSECUTIVE HOURS, CONTINUOUS POSITIVE AIRWAY PRESSURE: ICD-10-PCS | Performed by: STUDENT IN AN ORGANIZED HEALTH CARE EDUCATION/TRAINING PROGRAM

## 2025-04-29 PROCEDURE — 6360000002 HC RX W HCPCS: Performed by: STUDENT IN AN ORGANIZED HEALTH CARE EDUCATION/TRAINING PROGRAM

## 2025-04-29 PROCEDURE — 80048 BASIC METABOLIC PNL TOTAL CA: CPT

## 2025-04-29 PROCEDURE — 6370000000 HC RX 637 (ALT 250 FOR IP)

## 2025-04-29 PROCEDURE — 94660 CPAP INITIATION&MGMT: CPT

## 2025-04-29 PROCEDURE — 71045 X-RAY EXAM CHEST 1 VIEW: CPT

## 2025-04-29 PROCEDURE — 94640 AIRWAY INHALATION TREATMENT: CPT

## 2025-04-29 PROCEDURE — 2500000003 HC RX 250 WO HCPCS: Performed by: NURSE PRACTITIONER

## 2025-04-29 PROCEDURE — 6370000000 HC RX 637 (ALT 250 FOR IP): Performed by: STUDENT IN AN ORGANIZED HEALTH CARE EDUCATION/TRAINING PROGRAM

## 2025-04-29 PROCEDURE — P9047 ALBUMIN (HUMAN), 25%, 50ML: HCPCS | Performed by: INTERNAL MEDICINE

## 2025-04-29 PROCEDURE — 92526 ORAL FUNCTION THERAPY: CPT

## 2025-04-29 PROCEDURE — 6370000000 HC RX 637 (ALT 250 FOR IP): Performed by: PHYSICIAN ASSISTANT

## 2025-04-29 PROCEDURE — 84100 ASSAY OF PHOSPHORUS: CPT

## 2025-04-29 PROCEDURE — 83735 ASSAY OF MAGNESIUM: CPT

## 2025-04-29 PROCEDURE — 85027 COMPLETE CBC AUTOMATED: CPT

## 2025-04-29 PROCEDURE — 2000000000 HC ICU R&B

## 2025-04-29 PROCEDURE — 2500000003 HC RX 250 WO HCPCS

## 2025-04-29 PROCEDURE — 6360000002 HC RX W HCPCS: Performed by: INTERNAL MEDICINE

## 2025-04-29 PROCEDURE — 94668 MNPJ CHEST WALL SBSQ: CPT

## 2025-04-29 RX ORDER — METOPROLOL TARTRATE 1 MG/ML
2.5 INJECTION, SOLUTION INTRAVENOUS EVERY 6 HOURS
Status: DISCONTINUED | OUTPATIENT
Start: 2025-04-29 | End: 2025-05-01

## 2025-04-29 RX ORDER — ROCURONIUM BROMIDE 10 MG/ML
60 INJECTION, SOLUTION INTRAVENOUS ONCE
Status: DISCONTINUED | OUTPATIENT
Start: 2025-04-29 | End: 2025-05-01

## 2025-04-29 RX ORDER — ETOMIDATE 2 MG/ML
20 INJECTION INTRAVENOUS ONCE
Status: DISCONTINUED | OUTPATIENT
Start: 2025-04-29 | End: 2025-05-01

## 2025-04-29 RX ORDER — ALBUMIN (HUMAN) 12.5 G/50ML
12.5 SOLUTION INTRAVENOUS EVERY 12 HOURS
Status: COMPLETED | OUTPATIENT
Start: 2025-04-29 | End: 2025-04-29

## 2025-04-29 RX ORDER — POTASSIUM CHLORIDE 7.45 MG/ML
10 INJECTION INTRAVENOUS
Status: DISPENSED | OUTPATIENT
Start: 2025-04-29 | End: 2025-04-29

## 2025-04-29 RX ORDER — BUMETANIDE 0.25 MG/ML
2 INJECTION, SOLUTION INTRAMUSCULAR; INTRAVENOUS 2 TIMES DAILY
Status: DISCONTINUED | OUTPATIENT
Start: 2025-04-29 | End: 2025-04-30

## 2025-04-29 RX ORDER — POTASSIUM CHLORIDE 7.45 MG/ML
10 INJECTION INTRAVENOUS ONCE
Status: COMPLETED | OUTPATIENT
Start: 2025-04-29 | End: 2025-04-29

## 2025-04-29 RX ADMIN — BUMETANIDE 2 MG: 0.25 INJECTION INTRAMUSCULAR; INTRAVENOUS at 17:57

## 2025-04-29 RX ADMIN — Medication 2 UNITS: at 06:02

## 2025-04-29 RX ADMIN — ACETYLCYSTEINE 600 MG: 200 SOLUTION ORAL; RESPIRATORY (INHALATION) at 07:45

## 2025-04-29 RX ADMIN — HEPARIN SODIUM 5000 UNITS: 5000 INJECTION INTRAVENOUS; SUBCUTANEOUS at 20:30

## 2025-04-29 RX ADMIN — HYDROMORPHONE HYDROCHLORIDE 0.25 MG: 1 INJECTION, SOLUTION INTRAMUSCULAR; INTRAVENOUS; SUBCUTANEOUS at 17:05

## 2025-04-29 RX ADMIN — IPRATROPIUM BROMIDE 0.5 MG: 0.5 SOLUTION RESPIRATORY (INHALATION) at 19:16

## 2025-04-29 RX ADMIN — IPRATROPIUM BROMIDE 0.5 MG: 0.5 SOLUTION RESPIRATORY (INHALATION) at 07:45

## 2025-04-29 RX ADMIN — AMIODARONE HYDROCHLORIDE 400 MG: 200 TABLET ORAL at 20:29

## 2025-04-29 RX ADMIN — POTASSIUM BICARBONATE 40 MEQ: 782 TABLET, EFFERVESCENT ORAL at 14:58

## 2025-04-29 RX ADMIN — POTASSIUM BICARBONATE 40 MEQ: 782 TABLET, EFFERVESCENT ORAL at 20:29

## 2025-04-29 RX ADMIN — BUMETANIDE 2 MG: 0.25 INJECTION INTRAMUSCULAR; INTRAVENOUS at 09:42

## 2025-04-29 RX ADMIN — ALBUMIN (HUMAN) 12.5 G: 0.25 INJECTION, SOLUTION INTRAVENOUS at 11:23

## 2025-04-29 RX ADMIN — ALBUMIN (HUMAN) 12.5 G: 0.25 INJECTION, SOLUTION INTRAVENOUS at 18:24

## 2025-04-29 RX ADMIN — Medication 2 UNITS: at 17:57

## 2025-04-29 RX ADMIN — POTASSIUM CHLORIDE 10 MEQ: 7.46 INJECTION, SOLUTION INTRAVENOUS at 14:02

## 2025-04-29 RX ADMIN — SODIUM CHLORIDE, PRESERVATIVE FREE 10 ML: 5 INJECTION INTRAVENOUS at 09:43

## 2025-04-29 RX ADMIN — HEPARIN SODIUM 5000 UNITS: 5000 INJECTION INTRAVENOUS; SUBCUTANEOUS at 06:03

## 2025-04-29 RX ADMIN — ALBUMIN (HUMAN) 25 G: 0.25 INJECTION, SOLUTION INTRAVENOUS at 03:12

## 2025-04-29 RX ADMIN — ACETYLCYSTEINE 600 MG: 200 SOLUTION ORAL; RESPIRATORY (INHALATION) at 19:17

## 2025-04-29 RX ADMIN — POTASSIUM CHLORIDE 10 MEQ: 7.46 INJECTION, SOLUTION INTRAVENOUS at 09:44

## 2025-04-29 RX ADMIN — SENNOSIDES AND DOCUSATE SODIUM 1 TABLET: 50; 8.6 TABLET ORAL at 20:30

## 2025-04-29 RX ADMIN — IPRATROPIUM BROMIDE 0.5 MG: 0.5 SOLUTION RESPIRATORY (INHALATION) at 11:54

## 2025-04-29 RX ADMIN — ARFORMOTEROL TARTRATE 15 MCG: 15 SOLUTION RESPIRATORY (INHALATION) at 07:45

## 2025-04-29 RX ADMIN — MEROPENEM 500 MG: 500 INJECTION INTRAVENOUS at 12:38

## 2025-04-29 RX ADMIN — HEPARIN SODIUM 5000 UNITS: 5000 INJECTION INTRAVENOUS; SUBCUTANEOUS at 14:58

## 2025-04-29 RX ADMIN — ARFORMOTEROL TARTRATE 15 MCG: 15 SOLUTION RESPIRATORY (INHALATION) at 19:16

## 2025-04-29 RX ADMIN — METOPROLOL TARTRATE 2.5 MG: 5 INJECTION INTRAVENOUS at 17:18

## 2025-04-29 RX ADMIN — IPRATROPIUM BROMIDE 0.5 MG: 0.5 SOLUTION RESPIRATORY (INHALATION) at 15:56

## 2025-04-29 RX ADMIN — MEROPENEM 500 MG: 500 INJECTION INTRAVENOUS at 21:11

## 2025-04-29 RX ADMIN — FINASTERIDE 5 MG: 5 TABLET, FILM COATED ORAL at 20:30

## 2025-04-29 RX ADMIN — ACETAMINOPHEN 1000 MG: 500 TABLET ORAL at 17:57

## 2025-04-29 RX ADMIN — SODIUM CHLORIDE 40 MG: 9 INJECTION INTRAMUSCULAR; INTRAVENOUS; SUBCUTANEOUS at 09:42

## 2025-04-29 RX ADMIN — METOPROLOL TARTRATE 2.5 MG: 5 INJECTION INTRAVENOUS at 20:30

## 2025-04-29 RX ADMIN — ATORVASTATIN CALCIUM 40 MG: 40 TABLET, FILM COATED ORAL at 20:30

## 2025-04-29 NOTE — PROGRESS NOTES
Occupational Therapy  04/29/25    Chart reviewed and pt with increased WOB requiring BIPAP and increased RR. Per RN pt is pending potential reintubation/trach. OT will defer at this time and follow and see as able.     Thank you,  Vicki Cortez, OTR/L

## 2025-04-29 NOTE — PROGRESS NOTES
Physical Therapy 4/29/25    Chart reviewed, patient with increased WOB requiring BIPAP this AM. Patient with increased RR and discomfort. Pending plan for reintubation/trach, discussion with family pending. Will defer and follow up as medically appropriate.    Thank you for your consideration,    Stacy Rodarte, PT, DPT
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
## 2025-04-29 NOTE — PROGRESS NOTES
2000: Bedside and Verbal shift change report given to MALVIN Neville (oncoming nurse) by MALVIN Aranda (offgoing nurse). Report included the following information Nurse Handoff Report, Index, Surgery Report, Intake/Output, MAR, Recent Results, and Cardiac Rhythm NSR .     0215: Patient respiratory rate increasing to 39-40 breaths/min when previously had been 29-30. Patient currently on heated high flow at 40L and 50%. O2 sats: 99%. Patient appears more lethargic than previously assessed, but no apparent increased work of breathing. Updated Dr. Cruz. Orders to place patient on bipap.     0230: RT at bedside to place patient on bipap.     Shift summary: Patient had two episodes of intermittent a.fib, heart rate between . Self-terminated after 30 mins - 1 hour. Patient stayed on bipap between 0230 - 0600. No difference in respiratory rate. RR between 30-40 breaths per min.     0800: Bedside and Verbal shift change report given to MALVIN Ron (oncoming nurse) by MALVIN Neville (offgoing nurse). Report included the following information Nurse Handoff Report, Index, Surgery Report, Intake/Output, MAR, Recent Results, and Cardiac Rhythm NSR w/ intermittent a.fib .

## 2025-04-29 NOTE — PLAN OF CARE
Brief [facility] Note     I spoke to the patients family (Susie) at bedside. We reviewed the patient's increased WOB and lethargy. I recommended reintubation as bridge to tracheostomy. Extensive discussions - family state that patient had previously expressed a desire to not undergo these procedures. Given his multiple prior intubations, we therefore transitioned to DNR at Susie's request. Continue current medical care. All questions answered. CTS aware.     Adeel Kaye M.D.  Pulmonary and Critical Care Medicine   04/29/25

## 2025-04-29 NOTE — ACP (ADVANCE CARE PLANNING)
Over the last 24 hours Mr. River respiratory status has declined with tachypnea and increasing oxygen requirements. Based on his exam this morning, it was our recommendation that we re-intubate and proceed with trach and PEG. We contacted Mr. River niece, Susie West, to discuss this option.   Susie came to the bedside and attempted to discuss the plan with Mr. Dickerson, however he remains mildly confused and did not have the awareness to engage in the conversation. Ultimately, Susie had spoke to Mr. Dickerson extensively before surgery and he felt strongly about not being placed on life support for a prolonged period of time. We acknowledged Mr. Dickerson would have a long road ahead to recover back to his baseline and ultimately his family feels like that is not what he would want.   They have elected to make the patient DNR. We are in agreement we will continue all current treatment (diuresis, antibiotics, dobhoff with tube feeds, etc.) but that if his respiratory status declines again, we will not re-intubate and we will focus on comfort care for Mr. Dickerson.   Status changed in EPIC.     Sarai Jason NP  Cardiac Surgery

## 2025-04-29 NOTE — PROGRESS NOTES
[address], [address] [place] [address]  Phone: [phone]   [phone]    [email or web address]     Nephrology Progress Note    Patient Name : Scooter Dickerson      : 1945     MRN : [id number]  Date of Admission : 4/15/2025  Date of Servive : 25    CC:  Follow up for CRISTINO       Assessment and Plan   CRISTINO on CKD  - ischemic ATN  - continue Diuresis to keep him net even today   - changed Bumex to 2 mg BID. No diuril   - continue FW through DHT   - still high risk for needing dialysis   - stephane, labs, I.Os    CKD 3B   - baseline Cr 1.5 mg/dl pre op   - 2/2 HTN, DM, smoking     Resp failure:  - 2/2 aspiration PNA, reintubated early this AM ()  - extubated  to High flow/ BIPAP    CAD s/p CABG x 2, AMADOU ligation 4/15   Hypotension   - off pressors      Chronic anemia   Iron def : s/p venofer  - continue  VILMA     BPH , hx of TURP 10/24,   - penile swelling from sosa trauma  --> KEEP SSOA     Osteoporosis   DM-II   A fib : On oral Amio  KARI  Chronic tobacco user     Interval History:  Seen and examined.diuresed well. Needed BIPAP overnight. MS unchanged.   CXR slight better. Noted plans for possible Trach and PEG       Review of Systems: Review of systems not obtained due to patient factors.    Current Medications:   Current Facility-Administered Medications   Medication Dose Route Frequency    chlorothiazide (DIURIL) 250 mg in sterile water 9 mL injection  250 mg IntraVENous Q24H    meropenem (MERREM) 500 mg in sodium chloride 0.9 % 100 mL IVPB (addEASE)  500 mg IntraVENous Q12H    metoprolol tartrate (LOPRESSOR) tablet 12.5 mg  12.5 mg Oral BID    acetylcysteine (MUCOMYST) 20 % solution 600 mg  600 mg Inhalation BID RT    norepinephrine (LEVOPHED) 16 mg in sodium chloride 0.9 % 250 mL infusion (premix)  1-100 mcg/min IntraVENous Continuous    fentaNYL (SUBLIMAZE) 1,000 mcg in sodium chloride 0.9% 100 mL infusion   mcg/hr IntraVENous  400 MG/5ML suspension 30 mL  30 mL Oral Daily PRN    atorvastatin (LIPITOR) tablet 40 mg  40 mg Oral Nightly    potassium chloride 20 mEq/50 mL IVPB (Central Line)  20 mEq IntraVENous PRN    magnesium sulfate 2000 mg in 50 mL IVPB premix  2,000 mg IntraVENous PRN    ipratropium 0.5 mg-albuterol 2.5 mg (DUONEB) nebulizer solution 1 Dose  1 Dose Inhalation Q4H PRN    glucose chewable tablet 16 g  4 tablet Oral PRN    dextrose bolus 10% 125 mL  125 mL IntraVENous PRN    Or    dextrose bolus 10% 250 mL  250 mL IntraVENous PRN    glucagon injection 1 mg  1 mg SubCUTAneous PRN    dextrose 10 % infusion   IntraVENous Continuous PRN    arformoterol tartrate (BROVANA) nebulizer solution 15 mcg  15 mcg Nebulization BID RT    And    ipratropium (ATROVENT) 0.02 % nebulizer solution 0.5 mg  0.5 mg Nebulization 4x Daily RT      Allergies   Allergen Reactions    Eliquis [Apixaban] Itching       Objective:  Vitals:    Vitals:    04/29/25 0300 04/29/25 0400 04/29/25 0500 04/29/25 0600   BP: 136/64 (!) 123/58 (!) 134/56 (!) 116/59   Pulse: (!) 112 87 84 (!) 111   Resp: (!) 40 (!) 33 (!) 34 (!) 41   Temp:       TempSrc:  Axillary     SpO2: 99% 99% 100% 96%   Weight:       Height:           Intake and Output:  04/28 1901 - 04/29 0700  In: 529.7 [I.V.:32.3]  Out: 1250 [Urine:1250]  04/27 0701 - 04/28 1900  In: 936.5 [I.V.:347]  Out: 7195 [Urine:6695]    Physical Examination:  General: Awake, alert  HEENT:           NAD  Neck:  Supple, no mass  Resp:  Coarse BS, R > L  CV:  iregular,  no murmur or rub, 1+ b/l LE edema  GI:  Soft, NT, + BS, slight distension   Neurologic:  Confused   Psych:             unable to assess  :  Elias + , penile swelling     [x]    High complexity decision making was performed  [x]    Patient is at high-risk of decompensation with multiple organ involvement    Lab Data Personally Reviewed: I have reviewed all the pertinent labs, microbiology data and radiology studies during assessment.    Labs:  Recent Labs

## 2025-04-29 NOTE — PROGRESS NOTES
0800: Report received from MALVIN Neville. Patient care assumed.     1000: Pt's family at bedside. Updated on patient status. Dr. Kaye at bedside, Sarai Jason NP at bedside, Aiden Timmons NP at bedside. Orders to transition patient to DNR status.     1005: Sarai Jason at bedside, orders to place DHT.     2000: Bedside and Verbal shift change report given to MALVIN Miller (oncoming nurse) by MALVIN Ron (offgoing nurse). Report included the following information SBAR, Kardex, OR Summary, Intake/Output, MAR, Recent Results, Cardiac Rhythm- NSR , and Alarm Parameters .

## 2025-04-29 NOTE — PROGRESS NOTES
Comprehensive Nutrition Assessment    Type and Reason for Visit: Reassess    Nutrition Recommendations/Plan:     Resume TF via DHT of Nepro, advance to goal of 45 mL/hr with 100 mL h2O flushes q 4 hours       Malnutrition Assessment:  Malnutrition Status:  Insufficient data (04/21/25 1223)    Context:  Acute Illness     Findings of the 6 clinical characteristics of malnutrition:  Energy Intake:  75% or less of estimated energy requirements for 7 or more days  Weight Loss:  Unable to assess     Body Fat Loss:  Unable to assess     Muscle Mass Loss:  Unable to assess    Fluid Accumulation:  No fluid accumulation     Strength:  Not Performed     Nutrition Assessment:    78 yo male admitted for CABG x 2 (LIMA-LAD, SVG-OM) and AMADOU ligation, presented to CVICU following procedure. Pt s/p 2 U PRBCs and DDAVP. PMH of PAF, COPD, HTN, PAD, CKD3, T2DM, BPH, ANA MARIA.    Extubated to BiPAP on 4/16 but respiratory status worsened pt re-intubated 4/20.  Extubated again 4/22.  R side deficit noted, MRI/MRA without acute infarct.    SLP eval 4/24 --> advanced to pureed/ moderately thick liquids.  Again re-intubated early AM on 4/25 d/t hypoxia (?aspiration), tube feeds initially held.  CXR with worsening pulmonary edema.  CRISTINO on CKD, nephrology following, at risk for needing RRT.  New A fib.  Extubated to high flow on 4/27. Ileus noted, remains off TF with NG to suction.  However, pt pulled NG early AM 4/28.  Nursing has been unable to replace.       4/29: received a call from RN that DHT was placed today.  KUB last night demonstrated resolution of ileus.    Pt remains off vent, but on BiPAP.  Now DNR and no plans for reintubation if his respiratory status declines again and will focus on comfort.  Mildly confused.  NPO.   Per nephrology, continue FWF via DHT and continue diuresis to keep pt net even.  Remains high risk for RRT.  K+ is low today, which is not the typical trend.    Discussed resuming previous TF with RN to get it

## 2025-04-29 NOTE — PROGRESS NOTES
CRITICAL CARE PROGRESS NOTE    Name: Scooter Dickerson   : 1945   MRN: [id number]   Date: 2025      ICU Diagnosis      Acute hypoxic Respiratory Failure  Aspiration Pneumonia  Delirium   Coronary Artery Disease  Failure to Thrive    Overnight Events   Decline in respiratory status. Escalated to BiPAP. Transitioned to DNR.     ROS negative except as otherwise documented.     A/P   This is a 79 year old male with history of HTN, HLD, CAD, DM, CKD III and COPD (no PFTs) who presented to the hospital on 4/15 for elective CABG - hospital course complicated by delirium and recurrent aspiration PNA.     NEUROLOGICAL:    Intermittent delirium. Exam nonfocal and follows commands. Doubt CVA.   - limit sedating medications  - delirium precautions  - SLP/PT/OT    PULMONOLOGY:   Recurrent aspiration PNA - likely due to mentation and deconditioning. Contribution from pulmonary edema. Mentation and weakness RF for repeat intubation.   - goal SpO2>=92, pH>=7.30  - pulmonary toilet  - volume removal as tolerated  - ABX as below  - follow ABG and CXR  - LAMA/LABA    CARDIOVASCULAR:   Post elective CABG. TTE with normal RV and LV systolic function. New AFIB.   - MAP > 65  - CTS consulted  - continue ASA  - continue statin  - continue amiodarone  - diurese as tolerated   - AC when able (FIB)     RENAL/ELECTROLYTE:   CRISTINO on CKD. Risk for progression given critical illness.   - goal K>=4, Mg>=2, Phos >3  - daily BMP  - strict I/O's; daily weights  - avoid nephrotoxic medications    GI:   Mild post operative ileus. CT without obstruction. Now improved.   - NPO  - bowel regiment  - OOB to chair  - serial KUB    ENDOCRINE:   - SSI    ID/MICRO:   MDR pseudomonas VAP. ID consult for meropenem.   - ID consult  - continue Meropenem  - follow culture data    ICU DAILY CHECKLIST     Code Status: Full  DVT Prophylaxis: SCDs  T/L/D: PIV  SUP: N/A  Diet: advance as tolerated  ABCDEF Bundle/Checklist Completed:Yes  Disposition: Stay in

## 2025-04-29 NOTE — PROGRESS NOTES
Cardiac Surgery ICU Progress Note    Admit Date: 4/15/2025  POD:  14 Days Post-Op    Procedure:    4/15/25 with Dr. Brand:   CORONARY ARTERY BYPASS GRAFTING X 2 WITH LIMA (LIMA-LAD, SVG-OM , LESVGH, LIGATION OF LEFT ATRIAL APPENDAGE, DENNIS AND EPIAORTIC U/S BY DR HAGEN       [facility] synopsis:  4/15 POD0: CABG x2/LAAL with Dr Brand. DENNIS with normal biventricular function, EF 55%, no WMA. Transferred to ICU on precedex and insulin gtt with A&V wires and 3 alecia drains (2 med, L pleural). Extubated at 1545 to BiPAP. Overnight maribeth transitioned to norepi.   4/16 POD1: On vaso 0.04, levo 4. A-paced 70's with intermittent capture. Low UOP overnight (200cc). Received albumin x1 and 250ml LR. IV lasix 40mg with minimal response. Consult nephrology: added additional lasix 100mg for diuretic challenge. CXR showing pulmonary edema. ABG showing rising PCO2 65 and pH 7.15. Placed on BiPAP with repeat ABG showing improvement.   4/17 POD 2: Afib around 0200, amio bolus and infusion started. Remains on norepi and vaso. 4L NC this AM, transition to HFNC for ongoing mild hypercapnia. Good response to lasix challenge, Cr up to 2.44. Bumex 2mg IV BID today. PRBC x 1 for Hgb 7.5. Xray with gastric bubble and dilation noted.   4/18 POD 3: Agitation overnight, precedex infusion started. VBG stable, CO2 normal. SB in the 50s this AM, DC amio infusion. Remains on HFNC, pressors off. Continue diuresis, deline. AV wires pulled, plan to pull chest tubes today. Continue ICU level of care. HTN in PM, start Metoprolol   4/19 POD 4: Worsening delirium haldol x 2 overnight and increased precedex. Valproic acid added by ICU. Refusing PO medications, food/drink today. Cr slightly increased with less robust response to diuretics. Start LR 50mL/hr for low UO and no PO intake.   4/20 POD 5: Mentation improved slightly but his respiratory status has worsened, very coarse bilaterally, worsening hypoxia, back on HFNC. Speech consulted, but NPO. Likely will  pressures for perfusion  - No acute indication for dialysis currently     BPH: PTA finasteride 5mg  - Continue finasteride 5mg      DM2: PTA metformin 1G BID, pre op A1C 5.4  - Diabetes Management consulted  - SSI    Delirium: Noted overnight 4/17 to 4/18  Improved.  - Valproic acid started 4/19, stopped 4/24  - DC Seroquel, QTc prolonged at 512 on 4/20  - Encourage day/night schedules    Right Sided Weakness: Code stroke called 4/22 following extubation d/t noted R sided weakness.   - CT head and MRI revealed no acute infarction  - Neurology consulted but has signed off  - Cont asa and statin     Acute on Chronic Anemia, ANA MARIA: Transfused PRBC x 1 on 4/17. Above transfusion threshold.  - Daily CBC   - Venofer 200mg x 3 doses completed  - Epo MWF     Acute Post Op Pain:  - Scheduled tylenol, lidocaine patches  - PRN oxycodone 2.5 mg Q8 hrs  - Gabapentin discontinued d/t renal function    Osteoporosis: On PTA fosamax and vit D  - Eval closer to DC     Neuropathy: 2/2 DM and PAD. 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  Improved, KUB 4/29 read as resolved  - Multiple BM's 4/28    Malnutrition:  - Resume TF once enteric access reestablished, attempt dobhoff today (4/29)  - For now, per Cardiac Surgery recommendations, ok for swabs only, no ice chips  - If we go Comfort, would change to PO intake as desired but for now with labored respiratory status would keep NPO  - SLP following     Fluid and electrolytes: Maintain K+ >4 and Mg level >2.4  Nutrition:TF via dobhoff once replaced  Activity: OOB all meals and ambulate in halls, encourage I/S   Bowel Regimen: Senokot , Miralax, and PRN dulcolax   GI ppx: PPI IV  DVT ppx: SCDs, SQ heparin      Dispo: Continue ICU

## 2025-04-30 LAB
ANION GAP SERPL CALC-SCNC: 12 MMOL/L (ref 2–12)
BUN SERPL-MCNC: 69 MG/DL (ref 6–20)
BUN/CREAT SERPL: 24 (ref 12–20)
CALCIUM SERPL-MCNC: 9.2 MG/DL (ref 8.5–10.1)
CHLORIDE SERPL-SCNC: 94 MMOL/L (ref 97–108)
CO2 SERPL-SCNC: 38 MMOL/L (ref 21–32)
CREAT SERPL-MCNC: 2.9 MG/DL (ref 0.7–1.3)
ERYTHROCYTE [DISTWIDTH] IN BLOOD BY AUTOMATED COUNT: 24.9 % (ref 11.5–14.5)
GLUCOSE BLD STRIP.AUTO-MCNC: 250 MG/DL (ref 65–117)
GLUCOSE BLD STRIP.AUTO-MCNC: 273 MG/DL (ref 65–117)
GLUCOSE BLD STRIP.AUTO-MCNC: 296 MG/DL (ref 65–117)
GLUCOSE BLD STRIP.AUTO-MCNC: 307 MG/DL (ref 65–117)
GLUCOSE SERPL-MCNC: 224 MG/DL (ref 65–100)
HCT VFR BLD AUTO: 29.3 % (ref 36.6–50.3)
HGB BLD-MCNC: 9 G/DL (ref 12.1–17)
MAGNESIUM SERPL-MCNC: 2.1 MG/DL (ref 1.6–2.4)
MCH RBC QN AUTO: 30.9 PG (ref 26–34)
MCHC RBC AUTO-ENTMCNC: 30.7 G/DL (ref 30–36.5)
MCV RBC AUTO: 100.7 FL (ref 80–99)
NRBC # BLD: 0.25 K/UL (ref 0–0.01)
NRBC BLD-RTO: 2.5 PER 100 WBC
PLATELET # BLD AUTO: 244 K/UL (ref 150–400)
PMV BLD AUTO: 10.2 FL (ref 8.9–12.9)
POTASSIUM SERPL-SCNC: 3.5 MMOL/L (ref 3.5–5.1)
RBC # BLD AUTO: 2.91 M/UL (ref 4.1–5.7)
SERVICE CMNT-IMP: ABNORMAL
SODIUM SERPL-SCNC: 144 MMOL/L (ref 136–145)
WBC # BLD AUTO: 10.1 K/UL (ref 4.1–11.1)

## 2025-04-30 PROCEDURE — 82962 GLUCOSE BLOOD TEST: CPT

## 2025-04-30 PROCEDURE — 97530 THERAPEUTIC ACTIVITIES: CPT

## 2025-04-30 PROCEDURE — 94640 AIRWAY INHALATION TREATMENT: CPT

## 2025-04-30 PROCEDURE — 99231 SBSQ HOSP IP/OBS SF/LOW 25: CPT

## 2025-04-30 PROCEDURE — 85027 COMPLETE CBC AUTOMATED: CPT

## 2025-04-30 PROCEDURE — 92523 SPEECH SOUND LANG COMPREHEN: CPT

## 2025-04-30 PROCEDURE — 80048 BASIC METABOLIC PNL TOTAL CA: CPT

## 2025-04-30 PROCEDURE — 6360000002 HC RX W HCPCS: Performed by: NURSE PRACTITIONER

## 2025-04-30 PROCEDURE — 94668 MNPJ CHEST WALL SBSQ: CPT

## 2025-04-30 PROCEDURE — 2000000000 HC ICU R&B

## 2025-04-30 PROCEDURE — 6370000000 HC RX 637 (ALT 250 FOR IP): Performed by: STUDENT IN AN ORGANIZED HEALTH CARE EDUCATION/TRAINING PROGRAM

## 2025-04-30 PROCEDURE — 6370000000 HC RX 637 (ALT 250 FOR IP): Performed by: NURSE PRACTITIONER

## 2025-04-30 PROCEDURE — P9047 ALBUMIN (HUMAN), 25%, 50ML: HCPCS | Performed by: INTERNAL MEDICINE

## 2025-04-30 PROCEDURE — 6370000000 HC RX 637 (ALT 250 FOR IP): Performed by: INTERNAL MEDICINE

## 2025-04-30 PROCEDURE — 6370000000 HC RX 637 (ALT 250 FOR IP): Performed by: PHYSICIAN ASSISTANT

## 2025-04-30 PROCEDURE — 2700000000 HC OXYGEN THERAPY PER DAY

## 2025-04-30 PROCEDURE — 83735 ASSAY OF MAGNESIUM: CPT

## 2025-04-30 PROCEDURE — 6370000000 HC RX 637 (ALT 250 FOR IP)

## 2025-04-30 PROCEDURE — 94760 N-INVAS EAR/PLS OXIMETRY 1: CPT

## 2025-04-30 PROCEDURE — 6360000002 HC RX W HCPCS: Performed by: INTERNAL MEDICINE

## 2025-04-30 PROCEDURE — 2500000003 HC RX 250 WO HCPCS

## 2025-04-30 PROCEDURE — 2580000003 HC RX 258: Performed by: NURSE PRACTITIONER

## 2025-04-30 PROCEDURE — 99232 SBSQ HOSP IP/OBS MODERATE 35: CPT | Performed by: INTERNAL MEDICINE

## 2025-04-30 PROCEDURE — 6360000002 HC RX W HCPCS: Performed by: STUDENT IN AN ORGANIZED HEALTH CARE EDUCATION/TRAINING PROGRAM

## 2025-04-30 PROCEDURE — 6360000002 HC RX W HCPCS: Performed by: THORACIC SURGERY (CARDIOTHORACIC VASCULAR SURGERY)

## 2025-04-30 PROCEDURE — 2500000003 HC RX 250 WO HCPCS: Performed by: NURSE PRACTITIONER

## 2025-04-30 PROCEDURE — G0545 PR INHERENT VISIT TO INPT: HCPCS | Performed by: INTERNAL MEDICINE

## 2025-04-30 RX ORDER — POTASSIUM CHLORIDE 7.45 MG/ML
10 INJECTION INTRAVENOUS
Status: DISCONTINUED | OUTPATIENT
Start: 2025-04-30 | End: 2025-04-30

## 2025-04-30 RX ORDER — BUMETANIDE 0.25 MG/ML
1 INJECTION, SOLUTION INTRAMUSCULAR; INTRAVENOUS EVERY 8 HOURS
Status: DISCONTINUED | OUTPATIENT
Start: 2025-04-30 | End: 2025-05-01

## 2025-04-30 RX ORDER — ALBUMIN (HUMAN) 12.5 G/50ML
12.5 SOLUTION INTRAVENOUS ONCE
Status: COMPLETED | OUTPATIENT
Start: 2025-04-30 | End: 2025-04-30

## 2025-04-30 RX ADMIN — ARFORMOTEROL TARTRATE 15 MCG: 15 SOLUTION RESPIRATORY (INHALATION) at 07:39

## 2025-04-30 RX ADMIN — METOPROLOL TARTRATE 2.5 MG: 5 INJECTION INTRAVENOUS at 05:29

## 2025-04-30 RX ADMIN — IPRATROPIUM BROMIDE 0.5 MG: 0.5 SOLUTION RESPIRATORY (INHALATION) at 11:26

## 2025-04-30 RX ADMIN — BUMETANIDE 1 MG: 0.25 INJECTION INTRAMUSCULAR; INTRAVENOUS at 23:09

## 2025-04-30 RX ADMIN — ALBUMIN (HUMAN) 12.5 G: 0.25 INJECTION, SOLUTION INTRAVENOUS at 09:46

## 2025-04-30 RX ADMIN — BUMETANIDE 1 MG: 0.25 INJECTION INTRAMUSCULAR; INTRAVENOUS at 15:11

## 2025-04-30 RX ADMIN — IPRATROPIUM BROMIDE 0.5 MG: 0.5 SOLUTION RESPIRATORY (INHALATION) at 16:06

## 2025-04-30 RX ADMIN — ARFORMOTEROL TARTRATE 15 MCG: 15 SOLUTION RESPIRATORY (INHALATION) at 20:03

## 2025-04-30 RX ADMIN — POTASSIUM BICARBONATE 40 MEQ: 782 TABLET, EFFERVESCENT ORAL at 09:46

## 2025-04-30 RX ADMIN — Medication 6 UNITS: at 06:03

## 2025-04-30 RX ADMIN — ACETAMINOPHEN 1000 MG: 500 TABLET ORAL at 23:09

## 2025-04-30 RX ADMIN — EPOETIN ALFA 20000 UNITS: 20000 SOLUTION INTRAVENOUS; SUBCUTANEOUS at 15:11

## 2025-04-30 RX ADMIN — HEPARIN SODIUM 5000 UNITS: 5000 INJECTION INTRAVENOUS; SUBCUTANEOUS at 21:09

## 2025-04-30 RX ADMIN — ACETYLCYSTEINE 600 MG: 200 SOLUTION ORAL; RESPIRATORY (INHALATION) at 07:39

## 2025-04-30 RX ADMIN — Medication 8 UNITS: at 17:52

## 2025-04-30 RX ADMIN — METOPROLOL TARTRATE 2.5 MG: 5 INJECTION INTRAVENOUS at 09:41

## 2025-04-30 RX ADMIN — HEPARIN SODIUM 5000 UNITS: 5000 INJECTION INTRAVENOUS; SUBCUTANEOUS at 05:30

## 2025-04-30 RX ADMIN — FINASTERIDE 5 MG: 5 TABLET, FILM COATED ORAL at 21:09

## 2025-04-30 RX ADMIN — ATORVASTATIN CALCIUM 40 MG: 40 TABLET, FILM COATED ORAL at 21:09

## 2025-04-30 RX ADMIN — MEROPENEM 500 MG: 500 INJECTION INTRAVENOUS at 21:45

## 2025-04-30 RX ADMIN — HEPARIN SODIUM 5000 UNITS: 5000 INJECTION INTRAVENOUS; SUBCUTANEOUS at 15:11

## 2025-04-30 RX ADMIN — SODIUM CHLORIDE, PRESERVATIVE FREE 10 ML: 5 INJECTION INTRAVENOUS at 09:53

## 2025-04-30 RX ADMIN — SODIUM CHLORIDE, PRESERVATIVE FREE 10 ML: 5 INJECTION INTRAVENOUS at 20:19

## 2025-04-30 RX ADMIN — Medication 3 MG: at 21:09

## 2025-04-30 RX ADMIN — AMIODARONE HYDROCHLORIDE 400 MG: 200 TABLET ORAL at 09:41

## 2025-04-30 RX ADMIN — IPRATROPIUM BROMIDE 0.5 MG: 0.5 SOLUTION RESPIRATORY (INHALATION) at 20:03

## 2025-04-30 RX ADMIN — METOPROLOL TARTRATE 2.5 MG: 5 INJECTION INTRAVENOUS at 21:08

## 2025-04-30 RX ADMIN — MEROPENEM 500 MG: 500 INJECTION INTRAVENOUS at 10:27

## 2025-04-30 RX ADMIN — Medication 6 UNITS: at 23:27

## 2025-04-30 RX ADMIN — POTASSIUM CHLORIDE 10 MEQ: 7.46 INJECTION, SOLUTION INTRAVENOUS at 05:24

## 2025-04-30 RX ADMIN — IPRATROPIUM BROMIDE 0.5 MG: 0.5 SOLUTION RESPIRATORY (INHALATION) at 07:39

## 2025-04-30 RX ADMIN — SODIUM CHLORIDE 40 MG: 9 INJECTION INTRAMUSCULAR; INTRAVENOUS; SUBCUTANEOUS at 09:41

## 2025-04-30 RX ADMIN — SENNOSIDES AND DOCUSATE SODIUM 1 TABLET: 50; 8.6 TABLET ORAL at 21:09

## 2025-04-30 RX ADMIN — INSULIN HUMAN 10 UNITS: 100 INJECTION, SUSPENSION SUBCUTANEOUS at 21:08

## 2025-04-30 RX ADMIN — Medication 6 UNITS: at 12:54

## 2025-04-30 RX ADMIN — ACETAMINOPHEN 1000 MG: 500 TABLET ORAL at 05:29

## 2025-04-30 RX ADMIN — METOPROLOL TARTRATE 2.5 MG: 5 INJECTION INTRAVENOUS at 15:11

## 2025-04-30 RX ADMIN — AMIODARONE HYDROCHLORIDE 400 MG: 200 TABLET ORAL at 21:09

## 2025-04-30 RX ADMIN — POTASSIUM BICARBONATE 40 MEQ: 782 TABLET, EFFERVESCENT ORAL at 15:13

## 2025-04-30 RX ADMIN — ASPIRIN 81 MG: 81 TABLET, CHEWABLE ORAL at 09:41

## 2025-04-30 RX ADMIN — POTASSIUM BICARBONATE 40 MEQ: 782 TABLET, EFFERVESCENT ORAL at 23:09

## 2025-04-30 RX ADMIN — BUMETANIDE 1 MG: 0.25 INJECTION INTRAMUSCULAR; INTRAVENOUS at 09:46

## 2025-04-30 NOTE — PROGRESS NOTES
Spiritual Health History and Assessment/Progress Note  [facility]    Post-Procedural,  ,  ,      Name: Scooter Dickerson MRN: [id number]    Age: 79 y.o.     Sex: male   Language: English   Congregation: Spiritism   Disease of cardiovascular system     Date: 4/30/2025            Total Time Calculated: 5 min              Spiritual Assessment began in [facility] 4 CV INTNSV CARE        Referral/Consult From: Rounding   Encounter Overview/Reason: Post-Procedural  Service Provided For: Patient    Sil, Belief, Meaning:   Patient unable to assess at this time  Family/Friends No family/friends present      Importance and Influence:  Patient unable to assess at this time  Family/Friends No family/friends present    Community:  Patient feels well-supported. Support system includes: Extended family  Family/Friends No family/friends present    Assessment and Plan of Care:     Patient Interventions include: Facilitated expression of thoughts and feelings  Family/Friends Interventions include: No family/friends present    Patient Plan of Care: Spiritual Care available upon further referral  Family/Friends Plan of Care: No family/friends present    Patient seemed alert, but was wearing a Bipap mask when I entered, and it seemed as if it wasn't easy for him to communicate at the moment. I introduced myself, and he reached out and warmly shook my hand. I informed him that captaincy is available to him, and that I'd try to visit him again.     Electronically signed by Ramón Rock, Chaplain Resident, M.Div., on 4/30/2025 at 10:06 AM

## 2025-04-30 NOTE — PROGRESS NOTES
[address], [place] [address]  Phone: [phone]   [phone]    [email or web address]     Nephrology Progress Note    Patient Name : Scooter Dickerson      : 1945     MRN : [id number]  Date of Admission : 4/15/2025  Date of Servive : 25    CC:  Follow up for CRISTINO       Assessment and Plan   CRISTINO on CKD  - ischemic ATN : slowly resolving   - changed Bumex to 1 mg Q8h   - continue current FW flushes  - Keep sosa until he can mobilize more   - daily labs, I.Os    CKD 3B   - baseline Cr 1.5 mg/dl pre op   - 2/2 HTN, DM, smoking     Resp failure:  - 2/2 aspiration PNA, reintubated early this AM ()  - extubated  to High flow/ BIPAP    CAD s/p CABG x 2, AMADOU ligation 4/15   Hypotension   - off pressors      Chronic anemia   Iron def : s/p venofer  - continue  VILMA     BPH , hx of TURP 10/24,   - keep sosa     Osteoporosis   DM-II   A fib : On oral Amio  KARI  Chronic tobacco user     Interval History:  Seen and examined. No CXR today. K low and being replaced IV. Got DHT overnight and tolerating TF. Good UOP, improving renal fx. MS much better, penile/scrotal edema improving       Review of Systems: Review of systems not obtained due to patient factors.    Current Medications:   Current Facility-Administered Medications   Medication Dose Route Frequency    potassium chloride 10 mEq/100 mL IVPB (Peripheral Line)  10 mEq IntraVENous Q1H    bumetanide (BUMEX) injection 2 mg  2 mg IntraVENous BID    potassium bicarb-citric acid (EFFER-K) effervescent tablet 40 mEq  40 mEq Oral Daily    etomidate (AMIDATE) injection 20 mg  20 mg IntraVENous Once    rocuronium (ZEMURON) injection 60 mg  60 mg IntraVENous Once    metoprolol (LOPRESSOR) injection 2.5 mg  2.5 mg IntraVENous Q6H    HYDROmorphone (DILAUDID) injection 0.25 mg  0.25 mg IntraVENous Q4H PRN    meropenem (MERREM) 500 mg in sodium chloride 0.9 % 100 mL IVPB (addEASE)  500 mg

## 2025-04-30 NOTE — PROGRESS NOTES
Met with the niece of Scooter Dickerson, reviewed plan of care and encouraged her to verbalize. She is without questions at this time. Will continue to follow.

## 2025-04-30 NOTE — DIABETES MGMT
Diabetes Discharge Plan   Medication:  A1c is 5.4% with no personal or family history of DM. Do not recommend any medications however he would benefit from some diet changes and increase in activity to help maintain current A1c     Additional orders  Follow up with PCP within 2 weeks of D/C          Initial Presentation   Scooter Dickerson is a 79 y.o. male who presented on 4/15 for scheduled CABG x2     HX:   Past Medical History:   Diagnosis Date    Arthritis     Asthma     Atrial fibrillation (HCC)     CAD (coronary artery disease)     Diabetes mellitus (HCC)     History of blood transfusion 1989    Hx of blood clots     RIGHT LEG    Hyperlipidemia     Hypertension     Sleep apnea     NOT USING CPAP NOW        INITIAL DX: Disease of cardiovascular system [I25.10]  CAD in native artery [I25.10]     Current Treatment     TX: insulin, statin, ASA, levophed, nebulizer, gabapentin,      Hospital Course   Clinical progress has been uncomplicated    Lab Results   Component Value Date    LABA1C 5.4 04/09/2025     Diabetes-related Medical History  Acute complications  Stress hyperglycemia   Neurological complications  Hypoglycemia unawareness  Microvascular disease  none  Macrovascular disease  CAD, HTN, HLD  Other associated conditions     none      Subjective   \" Can you adjust my legs\"     Objective   Physical exam  General Normal weight male  Neuro  HFNC, alert and oriented   Vital Signs   Vitals:    04/30/25 1200   BP: (!) 154/60   Pulse: 84   Resp: (!) 38   Temp: 98.5 °F (36.9 °C)   SpO2: 97%     Skin  Warm and dry.       Laboratory  Recent Labs     04/28/25  0258 04/29/25 0319 04/30/25 0313   WBC 15.2* 10.9 10.1   HGB 9.3* 8.5* 9.0*   HCT 29.3* 27.6* 29.3*   MCV 96.4 99.6* 100.7*    215 244     Recent Labs     04/29/25 0319 04/29/25  1536 04/30/25 0313    144 144   K 3.4* 3.8 3.5   CL 92* 94* 94*   CO2 33* 32 38*   PHOS 3.4  --   --    BUN 81* 73* 69*   CREATININE 3.12* 3.03* 2.90*     Lab
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
## 2025-04-30 NOTE — PROGRESS NOTES
Cardiac Surgery ICU Progress Note    Admit Date: 4/15/2025  POD:  15 Days Post-Op    Procedure:    4/15/25 with Dr. Brand:   CORONARY ARTERY BYPASS GRAFTING X 2 WITH LIMA (LIMA-LAD, SVG-OM , LESVGH, LIGATION OF LEFT ATRIAL APPENDAGE, DENNIS AND EPIAORTIC U/S BY DR HAGEN       [facility] synopsis:  4/15 POD0: CABG x2/LAAL with Dr Brand. DENNIS with normal biventricular function, EF 55%, no WMA. Transferred to ICU on precedex and insulin gtt with A&V wires and 3 alecia drains (2 med, L pleural). Extubated at 1545 to BiPAP. Overnight maribeth transitioned to norepi.   4/16 POD1: On vaso 0.04, levo 4. A-paced 70's with intermittent capture. Low UOP overnight (200cc). Received albumin x1 and 250ml LR. IV lasix 40mg with minimal response. Consult nephrology: added additional lasix 100mg for diuretic challenge. CXR showing pulmonary edema. ABG showing rising PCO2 65 and pH 7.15. Placed on BiPAP with repeat ABG showing improvement.   4/17 POD 2: Afib around 0200, amio bolus and infusion started. Remains on norepi and vaso. 4L NC this AM, transition to HFNC for ongoing mild hypercapnia. Good response to lasix challenge, Cr up to 2.44. Bumex 2mg IV BID today. PRBC x 1 for Hgb 7.5. Xray with gastric bubble and dilation noted.   4/18 POD 3: Agitation overnight, precedex infusion started. VBG stable, CO2 normal. SB in the 50s this AM, DC amio infusion. Remains on HFNC, pressors off. Continue diuresis, deline. AV wires pulled, plan to pull chest tubes today. Continue ICU level of care. HTN in PM, start Metoprolol   4/19 POD 4: Worsening delirium haldol x 2 overnight and increased precedex. Valproic acid added by ICU. Refusing PO medications, food/drink today. Cr slightly increased with less robust response to diuretics. Start LR 50mL/hr for low UO and no PO intake.   4/20 POD 5: Mentation improved slightly but his respiratory status has worsened, very coarse bilaterally, worsening hypoxia, back on HFNC. Speech consulted, but NPO. Likely will  due to lack of enteric access (4/29), will resume once we have access   - Maintain Mg > 2.5 and K > 4.0  - Does not need AC, appropriately clipped  - Initiate Metoprolol IV 2.5mg every 6 hours with hold parameters    Acute Hypotension: Resolved.    HLD: PTA Atorvastatin 40mg  - Continue Lipitor 40mg daily     HTN: PTA Toprol XL 25mg  - Metoprolol as discussed     PVD, PAD: PTA Plavix and ASA  - Pt reports he has had a procedure in the past. Likely fem-pop (from R to L). Follows with Dr. More. Has thrombus in R lesser saphenous vein.   - Continue ASA  - Resume Plavix once off heparin SQ    Carotid Artery Stenosis: Moderate LICA and mild GOPAL stenosis  - Plan to follow with Dr More.  - Plavix when appropriate    Acute Post Op Respiratory Insufficiency with Hypoxia and Hypercarbia, Pulmonary Edema: Pre-op ABG with PaO2 91. R/t atelectasis, fluid overload with resulting pulmonary edema, and inability to manage secretions with delirium and generalized weakness.  - Worsening oxygenation and work of breathing, ultimately safest decision would be to re-intubate but after thorough conversation with family, decision made to change patient's status to DNR  - Cont diuresis per nephrology  - Daily CXR  - Pulm toilet: acapella, IS, cough, deep breathe, ambulate   - If respiratory status decompensates further, we will elect to pursue comfort care    Aspiration PNA:  - During intubation on 4/20, thick secretions noted, Respiratory Culture grew heavy pseudomonas  - Zosyn 4/20- 4/28  - Bronch 4/25, copious greenish material, concern for recurrent aspiration after FEES on 4/24   - Resp Cx sent, + light peudomonas, scant kelbsiella  - ID consulted, meropenem transition to Merrem on 4/28 -     COPD, Tobacco Abuse: PTA albuterol PRN   - Scheduled duonebs, brovana/pulmicort   - Encouraged smoking cessation  - PRN nicoderm     CRISTINO on CKD IIIb: baseline Cr 1.5 mg/dl  - Nephrology consulted. Appreciate recommendations  - Continue Bumex 2mg

## 2025-04-30 NOTE — PROGRESS NOTES
CRITICAL CARE PROGRESS NOTE    Name: Scooter Dickerson   : 1945   MRN: [id number]   Date: 2025      ICU Diagnosis      Acute hypoxic Respiratory Failure  Aspiration Pneumonia  Delirium   Coronary Artery Disease  Failure to Thrive    Overnight Events   Mildly improved tachypnea. Reports he feels improved today.     ROS negative except as otherwise documented.     A/P   This is a 79 year old male with history of HTN, HLD, CAD, DM, CKD III and COPD (no PFTs) who presented to the hospital on 4/15 for elective CABG - hospital course complicated by delirium and recurrent aspiration PNA.     NEUROLOGICAL:    Intermittent delirium. Exam nonfocal and follows commands. Doubt CVA.   - limit sedating medications  - delirium precautions  - SLP/PT/OT    PULMONOLOGY:   Recurrent aspiration PNA - likely due to mentation and deconditioning. Contribution from pulmonary edema. Mentation and weakness RF for repeat intubation.   - goal SpO2>=92, pH>=7.30  - pulmonary toilet  - volume removal as tolerated  - ABX as below  - follow ABG and CXR  - LAMA/LABA    CARDIOVASCULAR:   Post elective CABG. TTE with normal RV and LV systolic function. New AFIB.   - MAP > 65  - CTS consulted  - continue ASA  - continue statin  - continue amiodarone  - diurese as tolerated   - AC when able (FIB)     RENAL/ELECTROLYTE:   CRISTINO on CKD. Risk for progression given critical illness.   - goal K>=4, Mg>=2, Phos >3  - daily BMP  - strict I/O's; daily weights  - avoid nephrotoxic medications    GI:   Mild post operative ileus. CT without obstruction. Now improved.   - NPO  - bowel regiment  - OOB to chair  - serial KUB    ENDOCRINE:   - SSI    ID/MICRO:   MDR pseudomonas VAP. ID consult for meropenem.   - ID consult  - continue Meropenem  - follow culture data    ICU DAILY CHECKLIST     Code Status: Full  DVT Prophylaxis: SCDs  T/L/D: PIV  SUP: N/A  Diet: advance as tolerated  ABCDEF Bundle/Checklist Completed:Yes  Disposition: Stay in

## 2025-04-30 NOTE — PLAN OF CARE
Speech LAnguage Pathology TREATMENT    Patient: Scooter Dickerson (79 y.o. male)  Date: 4/30/2025  Primary Diagnosis: Disease of cardiovascular system [I25.10]  CAD in native artery [I25.10]  Procedure(s) (LRB):  CORONARY ARTERY BYPASS GRAFTING X 2 WITH STEVEN VAUGHAN, LIGATION OF LEFT ATRIAL APPENDAGE, DENNIS AND EPIAORTIC U/S BY DR HAGEN (N/A) 15 Days Post-Op   Precautions:  Fall Risk           Sternal Precautions: Move in the Tube      ASSESSMENT :  Session was to focus on dysphagia and brief cognitive screen, however patient refused EMST and ice chip trials. Oral care was complete nonetheless, and patient participated in cognitive screen via the Orientation-Log with results as below. Patient with cognitive deficits in orientation, attention, memory, and insight. Question baseline cognition prior to admission, clearly there's an underlying sense of humor and large use of sarcasm in this patient, but this ultimately did not appear to impact his presentation on the Orientation-Log. Patient will continue to benefit from SLP intervention for dysphagia and cognition.     Patient will benefit from skilled intervention to address the above impairments.     PLAN :  Recommendations and Planned Interventions:  Diet: NPO with ice chips given after oral hygiene  - Oral hygiene TID, at least, using suction toothbrush kit with oral antiseptic rinse      Recommendations for Expiratory Muscle Strength Training (EMST)  -Complete EMST 75 five reps of five sets five days a week for five weeks (5/5/5/5).       Acute SLP Services: Yes, SLP will continue to follow per plan of care.  Discharge Recommendations: Yes, recommend SLP treatment at next level of care     SUBJECTIVE:   Patient stated, “I don't want it right now.” re: ice    OBJECTIVE:     Past Medical History:   Diagnosis Date    Arthritis     Asthma     Atrial fibrillation (HCC)     CAD (coronary artery disease)     Diabetes mellitus (HCC)     History of blood transfusion 1989    Hx

## 2025-04-30 NOTE — PLAN OF CARE
Problem: Physical Therapy - Adult  Goal: By Discharge: Performs mobility at highest level of function for planned discharge setting.  See evaluation for individualized goals.  Description: Physical Therapy Goals  Updated for re-[facility] 4/28/2025 s/p extubation  1.  Patient will move from supine to sit and sit to supine in bed with maximal assistance within 5 day(s).    2.  Patient will perform sit to stand with maximal assistance within 5 day(s).  3.  Patient will transfer from bed to chair and chair to bed with maximal assistance using the least restrictive device within 5 day(s).  4.  Patient will ambulate with moderate assist for 4 feet with the least restrictive device within 5 day(s).   5.  Patient will perform cardiac exercises per protocol with minimal assistance within 5 days.  6.  Patient will verbally recall and functionally demonstrate mindful-based movements (\"move in the tube\") principles without cues within 5 days.     Updated for re-eval 4/23/2025  1.  Patient will move from supine to sit and sit to supine in bed with moderate assistance within 5 day(s).    2.  Patient will perform sit to stand with moderate assistance within 5 day(s).  3.  Patient will transfer from bed to chair and chair to bed with maximal assistance using the least restrictive device within 5 day(s).  4.  Patient will ambulate with moderate assist for 10 feet with the least restrictive device within 5 day(s).   5.  Patient will perform cardiac exercises per protocol with minimal assistance within 5 days.  6.  Patient will verbally recall and functionally demonstrate mindful-based movements (\"move in the tube\") principles without cues within 5 days.     Note: FUNCTIONAL STATUS PRIOR TO ADMISSION: Unclear PLOF as patient was an inconsistent historian, however appears to have had a functional decline leading up to hospital admission.  Reports difficulty with household distance ambulation, utilized rollator for all mobility, but was  tactile cues required for compliance.    Cardiac diagnosis intervention:  Patient instructed and educated on mindful movement principles based on “Move in The Tube” concept to include maintaining bilateral elbows close to rib cage when performing any load-bearing activity such as getting in/out of bed, pushing up from a chair, opening a door, or lifting a box.  Patient was given a handout with diagrams of each correct/incorrect method of performing each of the above tasks.                                                                                                                                                                                                                                 Activity Tolerance:   Fair , Poor, requires rest breaks, observed shortness of breath on exertion, and desaturates with activity and requires oxygen    After treatment:   Patient left in no apparent distress sitting up in chair, Call bell within reach, and Bed/ chair alarm activated      COMMUNICATION/EDUCATION:   The patient's plan of care was discussed with: occupational therapist and registered nurse    Patient Education  Education Given To: Patient  Education Provided: Role of Therapy;Plan of Care;Mobility Training;Precautions;Orientation;Transfer Training  Education Provided Comments: move in tube precautions; activity progression and pacing  Education Method: Verbal;Demonstration  Barriers to Learning: Cognition  Education Outcome: Continued education needed      Stacy Rodarte PT, DPT  Minutes: 25

## 2025-04-30 NOTE — PLAN OF CARE
Problem: Occupational Therapy - Adult  Goal: By Discharge: Performs self-care activities at highest level of function for planned discharge setting.  See evaluation for individualized goals.  Description: FUNCTIONAL STATUS PRIOR TO ADMISSION:  Patient was ambulatory using rollator  Receives Help From: Family, Prior Level of Assist for ADLs: Independent,  ,  ,  ,  ,  , Prior Level of Assist for Homemaking: Needs assistance, Ambulation Assistance: Needs assistance, Prior Level of Assist for Transfers: Independent,       HOME SUPPORT: Patient lived alone and reports being independent with ADLS/IADLS .    Occupational Therapy Goals:  Re-Evaluation s/p re-intubation and code stroke 4/23/2025, Re-Evaluation 4/28/2025 s/p reintubation d/t possible aspiration, cont. All goals  1.  Patient will perform ADLs seated 5 mins without fatigue or LOB with Maximal Assist and Assist x1 within 7 days.   2.  Patient will perform upper body ADLs with Maximal Assist within 7 days.  3.  Patient will perform lower body ADLs with Maximal Assist using AE PRN within 7 days.    4.  Patient will perform gathering ADL items high and low 2/2 with Maximal Assist within 7 days.  5.  Patient will perform BSC transfers with Maximal Assist and Assist x1 within 7 days.  6.  Patient will perform all aspects of toileting with Maximal Assist within 7 days.  7.  Patient will participate in cardiac/sternal upper extremity therapeutic exercise/activities to increase independence with ADLs with supervision/set-up for 5 minutes within 7 days.   8.  Patient will adhere to Move in the Tube precautions during functional tasks with Min cues within 7 days.    Initiated 4/17/2025  1.  Patient will perform ADLs standing 5 mins without fatigue or LOB with Maximal Assist and Assist x1 within 7 days.  2.  Patient will perform upper body ADLs with Minimal Assist within 7 days.  3.  Patient will perform lower body ADLs with Moderate Assist within 7 days.    4.  Patient  referral.  Vicki Cortez, OT  Minutes: 25

## 2025-05-01 ENCOUNTER — APPOINTMENT (OUTPATIENT)
Facility: HOSPITAL | Age: 80
End: 2025-05-01
Attending: THORACIC SURGERY (CARDIOTHORACIC VASCULAR SURGERY)
Payer: MEDICARE

## 2025-05-01 LAB
ANION GAP SERPL CALC-SCNC: ABNORMAL MMOL/L (ref 2–12)
ARTERIAL PATENCY WRIST A: POSITIVE
BASE EXCESS BLD CALC-SCNC: 20.5 MMOL/L
BDY SITE: ABNORMAL
BUN SERPL-MCNC: 69 MG/DL (ref 6–20)
BUN/CREAT SERPL: 27 (ref 12–20)
CA-I BLD-SCNC: 1.14 MMOL/L (ref 1.12–1.32)
CALCIUM SERPL-MCNC: 9.5 MG/DL (ref 8.5–10.1)
CHLORIDE SERPL-SCNC: 98 MMOL/L (ref 97–108)
CO2 SERPL-SCNC: >45 MMOL/L (ref 21–32)
CREAT SERPL-MCNC: 2.56 MG/DL (ref 0.7–1.3)
ERYTHROCYTE [DISTWIDTH] IN BLOOD BY AUTOMATED COUNT: 25.3 % (ref 11.5–14.5)
GAS FLOW.O2 O2 DELIVERY SYS: ABNORMAL
GLUCOSE BLD STRIP.AUTO-MCNC: 257 MG/DL (ref 65–117)
GLUCOSE BLD STRIP.AUTO-MCNC: 319 MG/DL (ref 65–117)
GLUCOSE SERPL-MCNC: 181 MG/DL (ref 65–100)
HCO3 BLD-SCNC: 47.4 MMOL/L (ref 21–28)
HCT VFR BLD AUTO: 33.3 % (ref 36.6–50.3)
HGB BLD-MCNC: 10.1 G/DL (ref 12.1–17)
IPAP/PIP/HIGH PEEP: 40
MAGNESIUM SERPL-MCNC: 2 MG/DL (ref 1.6–2.4)
MCH RBC QN AUTO: 31.6 PG (ref 26–34)
MCHC RBC AUTO-ENTMCNC: 30.3 G/DL (ref 30–36.5)
MCV RBC AUTO: 104.1 FL (ref 80–99)
NRBC # BLD: 0.41 K/UL (ref 0–0.01)
NRBC BLD-RTO: 2.7 PER 100 WBC
O2/TOTAL GAS SETTING VFR VENT: 40 %
PCO2 BLD: 61.6 MMHG (ref 35–48)
PH BLD: 7.49 (ref 7.35–7.45)
PLATELET # BLD AUTO: 301 K/UL (ref 150–400)
PMV BLD AUTO: 10.6 FL (ref 8.9–12.9)
PO2 BLD: 55 MMHG (ref 83–108)
POTASSIUM SERPL-SCNC: 4.3 MMOL/L (ref 3.5–5.1)
RBC # BLD AUTO: 3.2 M/UL (ref 4.1–5.7)
SAO2 % BLD: 89.2 % (ref 92–97)
SERVICE CMNT-IMP: ABNORMAL
SERVICE CMNT-IMP: ABNORMAL
SODIUM SERPL-SCNC: 147 MMOL/L (ref 136–145)
SPECIMEN TYPE: ABNORMAL
WBC # BLD AUTO: 14.9 K/UL (ref 4.1–11.1)

## 2025-05-01 PROCEDURE — 6360000002 HC RX W HCPCS: Performed by: NURSE PRACTITIONER

## 2025-05-01 PROCEDURE — 94640 AIRWAY INHALATION TREATMENT: CPT

## 2025-05-01 PROCEDURE — 6370000000 HC RX 637 (ALT 250 FOR IP)

## 2025-05-01 PROCEDURE — 6370000000 HC RX 637 (ALT 250 FOR IP): Performed by: STUDENT IN AN ORGANIZED HEALTH CARE EDUCATION/TRAINING PROGRAM

## 2025-05-01 PROCEDURE — 71045 X-RAY EXAM CHEST 1 VIEW: CPT

## 2025-05-01 PROCEDURE — 99223 1ST HOSP IP/OBS HIGH 75: CPT

## 2025-05-01 PROCEDURE — 6360000002 HC RX W HCPCS: Performed by: STUDENT IN AN ORGANIZED HEALTH CARE EDUCATION/TRAINING PROGRAM

## 2025-05-01 PROCEDURE — 2500000003 HC RX 250 WO HCPCS: Performed by: NURSE PRACTITIONER

## 2025-05-01 PROCEDURE — 6370000000 HC RX 637 (ALT 250 FOR IP): Performed by: NURSE PRACTITIONER

## 2025-05-01 PROCEDURE — 82962 GLUCOSE BLOOD TEST: CPT

## 2025-05-01 PROCEDURE — 99231 SBSQ HOSP IP/OBS SF/LOW 25: CPT | Performed by: NURSE PRACTITIONER

## 2025-05-01 PROCEDURE — 2700000000 HC OXYGEN THERAPY PER DAY

## 2025-05-01 PROCEDURE — 36600 WITHDRAWAL OF ARTERIAL BLOOD: CPT

## 2025-05-01 PROCEDURE — 82803 BLOOD GASES ANY COMBINATION: CPT

## 2025-05-01 PROCEDURE — 80048 BASIC METABOLIC PNL TOTAL CA: CPT

## 2025-05-01 PROCEDURE — 2500000003 HC RX 250 WO HCPCS: Performed by: INTERNAL MEDICINE

## 2025-05-01 PROCEDURE — 2500000003 HC RX 250 WO HCPCS

## 2025-05-01 PROCEDURE — G0545 PR INHERENT VISIT TO INPT: HCPCS | Performed by: INTERNAL MEDICINE

## 2025-05-01 PROCEDURE — 2580000003 HC RX 258: Performed by: NURSE PRACTITIONER

## 2025-05-01 PROCEDURE — 83735 ASSAY OF MAGNESIUM: CPT

## 2025-05-01 PROCEDURE — 85027 COMPLETE CBC AUTOMATED: CPT

## 2025-05-01 PROCEDURE — 99231 SBSQ HOSP IP/OBS SF/LOW 25: CPT

## 2025-05-01 PROCEDURE — 99232 SBSQ HOSP IP/OBS MODERATE 35: CPT | Performed by: INTERNAL MEDICINE

## 2025-05-01 PROCEDURE — 6360000002 HC RX W HCPCS: Performed by: INTERNAL MEDICINE

## 2025-05-01 PROCEDURE — 2500000003 HC RX 250 WO HCPCS: Performed by: STUDENT IN AN ORGANIZED HEALTH CARE EDUCATION/TRAINING PROGRAM

## 2025-05-01 PROCEDURE — 6370000000 HC RX 637 (ALT 250 FOR IP): Performed by: INTERNAL MEDICINE

## 2025-05-01 PROCEDURE — 2000000000 HC ICU R&B

## 2025-05-01 PROCEDURE — 94760 N-INVAS EAR/PLS OXIMETRY 1: CPT

## 2025-05-01 RX ORDER — LORAZEPAM 2 MG/ML
1 INJECTION INTRAMUSCULAR EVERY 10 MIN PRN
Status: DISCONTINUED | OUTPATIENT
Start: 2025-05-01 | End: 2025-05-03 | Stop reason: HOSPADM

## 2025-05-01 RX ORDER — GLYCOPYRROLATE 0.2 MG/ML
0.2 INJECTION INTRAMUSCULAR; INTRAVENOUS EVERY 4 HOURS PRN
Status: DISCONTINUED | OUTPATIENT
Start: 2025-05-01 | End: 2025-05-03 | Stop reason: HOSPADM

## 2025-05-01 RX ORDER — BUMETANIDE 0.25 MG/ML
1 INJECTION, SOLUTION INTRAMUSCULAR; INTRAVENOUS 2 TIMES DAILY
Status: DISCONTINUED | OUTPATIENT
Start: 2025-05-01 | End: 2025-05-03 | Stop reason: HOSPADM

## 2025-05-01 RX ORDER — SCOPOLAMINE 1 MG/3D
1 PATCH, EXTENDED RELEASE TRANSDERMAL
Status: DISCONTINUED | OUTPATIENT
Start: 2025-05-01 | End: 2025-05-03 | Stop reason: HOSPADM

## 2025-05-01 RX ORDER — HYDROMORPHONE HYDROCHLORIDE 1 MG/ML
1 INJECTION, SOLUTION INTRAMUSCULAR; INTRAVENOUS; SUBCUTANEOUS EVERY 10 MIN PRN
Status: DISCONTINUED | OUTPATIENT
Start: 2025-05-01 | End: 2025-05-02

## 2025-05-01 RX ORDER — METOPROLOL TARTRATE 25 MG/1
12.5 TABLET, FILM COATED ORAL 2 TIMES DAILY
Status: DISCONTINUED | OUTPATIENT
Start: 2025-05-01 | End: 2025-05-01

## 2025-05-01 RX ADMIN — SODIUM CHLORIDE, PRESERVATIVE FREE 10 ML: 5 INJECTION INTRAVENOUS at 20:24

## 2025-05-01 RX ADMIN — MEROPENEM 500 MG: 500 INJECTION INTRAVENOUS at 10:28

## 2025-05-01 RX ADMIN — BUMETANIDE 1 MG: 0.25 INJECTION INTRAMUSCULAR; INTRAVENOUS at 06:08

## 2025-05-01 RX ADMIN — HEPARIN SODIUM 5000 UNITS: 5000 INJECTION INTRAVENOUS; SUBCUTANEOUS at 06:08

## 2025-05-01 RX ADMIN — Medication 8 UNITS: at 07:30

## 2025-05-01 RX ADMIN — ACETAMINOPHEN 1000 MG: 500 TABLET ORAL at 12:20

## 2025-05-01 RX ADMIN — SENNOSIDES AND DOCUSATE SODIUM 1 TABLET: 50; 8.6 TABLET ORAL at 08:44

## 2025-05-01 RX ADMIN — ACETAZOLAMIDE SODIUM 500 MG: 500 INJECTION, POWDER, LYOPHILIZED, FOR SOLUTION INTRAVENOUS at 08:44

## 2025-05-01 RX ADMIN — POTASSIUM BICARBONATE 40 MEQ: 782 TABLET, EFFERVESCENT ORAL at 08:44

## 2025-05-01 RX ADMIN — METOPROLOL TARTRATE 2.5 MG: 5 INJECTION INTRAVENOUS at 08:44

## 2025-05-01 RX ADMIN — POLYETHYLENE GLYCOL 3350 17 G: 17 POWDER, FOR SOLUTION ORAL at 08:44

## 2025-05-01 RX ADMIN — OXYCODONE 2.5 MG: 5 TABLET ORAL at 03:27

## 2025-05-01 RX ADMIN — SODIUM CHLORIDE 40 MG: 9 INJECTION INTRAMUSCULAR; INTRAVENOUS; SUBCUTANEOUS at 08:44

## 2025-05-01 RX ADMIN — ACETAMINOPHEN 1000 MG: 500 TABLET ORAL at 06:09

## 2025-05-01 RX ADMIN — INSULIN HUMAN 22 UNITS: 100 INJECTION, SUSPENSION SUBCUTANEOUS at 08:44

## 2025-05-01 RX ADMIN — METOPROLOL TARTRATE 2.5 MG: 5 INJECTION INTRAVENOUS at 03:27

## 2025-05-01 RX ADMIN — ARFORMOTEROL TARTRATE 15 MCG: 15 SOLUTION RESPIRATORY (INHALATION) at 08:28

## 2025-05-01 RX ADMIN — Medication 6 UNITS: at 12:20

## 2025-05-01 RX ADMIN — ACETAZOLAMIDE SODIUM 250 MG: 500 INJECTION, POWDER, LYOPHILIZED, FOR SOLUTION INTRAVENOUS at 04:52

## 2025-05-01 RX ADMIN — SODIUM CHLORIDE, PRESERVATIVE FREE 10 ML: 5 INJECTION INTRAVENOUS at 08:56

## 2025-05-01 RX ADMIN — IPRATROPIUM BROMIDE 0.5 MG: 0.5 SOLUTION RESPIRATORY (INHALATION) at 08:28

## 2025-05-01 RX ADMIN — ASPIRIN 81 MG: 81 TABLET, CHEWABLE ORAL at 08:44

## 2025-05-01 RX ADMIN — AMIODARONE HYDROCHLORIDE 400 MG: 200 TABLET ORAL at 08:44

## 2025-05-01 ASSESSMENT — PAIN DESCRIPTION - LOCATION: LOCATION: BACK

## 2025-05-01 ASSESSMENT — PAIN SCALES - GENERAL: PAINLEVEL_OUTOF10: 5

## 2025-05-01 ASSESSMENT — PAIN DESCRIPTION - DESCRIPTORS: DESCRIPTORS: ACHING

## 2025-05-01 NOTE — PROGRESS NOTES
Spiritual Health History and Assessment/Progress Note  [facility]    Grief, Loss, and Adjustments, Family Care,  , Anticipatory Grief,      Name: Scooter Dickerson MRN: [id number]    Age: 79 y.o.     Sex: male   Language: English   Yazidism: Anglican   Disease of cardiovascular system     Date: 5/1/2025            Total Time Calculated: 25 min              Spiritual Assessment continued in [facility] 4 CV INTNSV CARE        Referral/Consult From: Family, Nurse   Encounter Overview/Reason: Grief, Loss, and Adjustments, Family Care  Service Provided For: Family, Patient    Sil, Belief, Meaning:   Patient unable to assess at this time  Family/Friends identify as spiritual      Importance and Influence:  Patient unable to assess at this time  Family/Friends have no beliefs influential to healthcare decision-making identified during this visit    Community:  Patient Other: Mostly Unresponsive; comfort care in process  Family/Friends feel well-supported. Support system includes: Spouse/Partner and Children    Assessment and Plan of Care:   Provided emotional support for pt's nieceTing who is alone at the bedside and tearful. Pt has entered comfort care process after long hospitalization. Offered words of comfort and prayers for strength. Advised of  availability.    Patient Interventions include: Other: Prayer  Family/Friends Interventions include: Facilitated expression of thoughts and feelings, Explored spiritual coping/struggle/distress, and Facilitated life review and/or legacy    Patient Plan of Care: Spiritual Care available upon further referral  Family/Friends Plan of Care: Spiritual Care available upon further referral    Electronically signed by HAILEY Lamb on 5/1/2025 at 4:24 PM  For additional spiritual care, please contact the  on-call at ([phone]).    Kimberly Ahn MDiv, MS, BCC  Staff   Spiritual Health Services

## 2025-05-01 NOTE — PROGRESS NOTES
Occupational Therapy  05/01/25    9:05 am: Chart reviewed and OT spoke with RN and RN requesting OT defer until this afternoon due to increased work of breathing this am.    14:00: OT attempted to see pt again for therapy and pt received semi-fowlers leaning to side on bedrail coughing. OT assisted repositioning to midline and increased HOB and pt verbalized feeling better. OT noted , RR 49, and O2 95% at rest. Palliative consult placed this am. Pt not appropriate for skilled OT treatment at this time and OT will defer and follow up as medically appropriate.    Thank you,  Vicki Cortez, OTR/L

## 2025-05-01 NOTE — CONSULTS
Brief Palliative Medicine Note:     Consult appreciated, pt seen at bedside this morning without family present. Discussed with bedside nursing team. Currently on HFNC, 40L, 40%, increased work of breathing, fatigued.     Call placed to cesilia/Beulah Richards, she is unable to come to bedside until 2:00 pm today, plan to meet with her at bedside at this time. Aware of tenuous/high risk rapid decline state, will call if any acute changes occur in meantime.     Affirmed DNR/DNI on phone.     Vamshi Lopez, APRN - CNP

## 2025-05-01 NOTE — DIABETES MGMT
[facility]  PROGRAM FOR DIABETES HEALTH  DIABETES MANAGEMENT CONSULT    Consulted by Provider for advanced nursing evaluation and care for inpatient blood glucose management.    Evaluation and Action Plan   Scooter Dickerson is a 78 yo male with a history of CAD, HTN, HLD, sleep apnea, asthma, arthritis, and blood clots in right leg who presented for scheduled CABG x2 on 4/15. He was previously admitted for CABG eval and workup after having abnormal stress test results in March. The Program for Diabetes Health has been consulted to assist in glycemic management and advanced diabetes management assessment this admission.    Mr. Dickerson has no personal or family history of diabetes with current A1c of 5.4%. He currently does not take any medications for diabetes or has ever in the past. He endorses he eats small portions.    Admission BG 78. He underwent CABG x2 yesterday and insulin infusion was started post op for glycemic control. His 24 hour insulin needs were very low at 13.4 units with hourly rates of 0.1-2.6 units/hour. He will continue on the insulin infusion for another 24 hours. Anticipate he will be able to be transitioned off insulin infusion tomorrow afternoon. Will continue to follow along with you.     -  likely from TF, will increase carb coverage including correctional insulin needs of   26 units to cover for 150 g of dextrose in TF. Will continue to follow along with you.     Blood glucose pattern      Significant diabetes-related events over the past 24-72 hours  A1C 5.4%  Fastin  BG pattern: 140-153  Correction: 4 units   Nepro @ 40 ml/hr with dextrose 150 g with 1:7 carb ratio (20 units)    Management Rationale Action Plan   Medication   Basal needs Using  units/kg/D based on  Not indicated at this time    Nutritional needs  INCREASE NPH to 22 units Q12hrs to cover 150 g in TF    Corrective insulin Using medium dose sensitivity  Q6hr    Additional orders  NPO ice chips/sips

## 2025-05-01 NOTE — PROGRESS NOTES
Cardiac Surgery ICU Progress Note    Admit Date: 4/15/2025  POD:  16 Days Post-Op    Procedure:    4/15/25 with Dr. Brand:   CORONARY ARTERY BYPASS GRAFTING X 2 WITH LIMA (LIMA-LAD, SVG-OM , LESVGH, LIGATION OF LEFT ATRIAL APPENDAGE, DENNIS AND EPIAORTIC U/S BY DR HAGEN       [facility] synopsis:  4/15 POD0: CABG x2/LAAL with Dr Brand. DENNIS with normal biventricular function, EF 55%, no WMA. Transferred to ICU on precedex and insulin gtt with A&V wires and 3 alecia drains (2 med, L pleural). Extubated at 1545 to BiPAP. Overnight maribeth transitioned to norepi.   4/16 POD1: On vaso 0.04, levo 4. A-paced 70's with intermittent capture. Low UOP overnight (200cc). Received albumin x1 and 250ml LR. IV lasix 40mg with minimal response. Consult nephrology: added additional lasix 100mg for diuretic challenge. CXR showing pulmonary edema. ABG showing rising PCO2 65 and pH 7.15. Placed on BiPAP with repeat ABG showing improvement.   4/17 POD 2: Afib around 0200, amio bolus and infusion started. Remains on norepi and vaso. 4L NC this AM, transition to HFNC for ongoing mild hypercapnia. Good response to lasix challenge, Cr up to 2.44. Bumex 2mg IV BID today. PRBC x 1 for Hgb 7.5. Xray with gastric bubble and dilation noted.   4/18 POD 3: Agitation overnight, precedex infusion started. VBG stable, CO2 normal. SB in the 50s this AM, DC amio infusion. Remains on HFNC, pressors off. Continue diuresis, deline. AV wires pulled, plan to pull chest tubes today. Continue ICU level of care. HTN in PM, start Metoprolol   4/19 POD 4: Worsening delirium haldol x 2 overnight and increased precedex. Valproic acid added by ICU. Refusing PO medications, food/drink today. Cr slightly increased with less robust response to diuretics. Start LR 50mL/hr for low UO and no PO intake.   4/20 POD 5: Mentation improved slightly but his respiratory status has worsened, very coarse bilaterally, worsening hypoxia, back on HFNC. Speech consulted, but NPO. Likely will

## 2025-05-01 NOTE — PROGRESS NOTES
CRITICAL CARE PROGRESS NOTE    Name: Scooter Dickerson   : 1945   MRN: [id number]   Date: 2025      ICU Diagnosis      Acute hypoxic Respiratory Failure  Aspiration Pneumonia  Delirium   Coronary Artery Disease  Failure to Thrive    Overnight Events   Increased WOB. Interactive this AM.     ROS negative except as otherwise documented.     A/P   This is a 79 year old male with history of HTN, HLD, CAD, DM, CKD III and COPD (no PFTs) who presented to the hospital on 4/15 for elective CABG - hospital course complicated by delirium and recurrent aspiration PNA.     NEUROLOGICAL:    Intermittent confusion. Likely delirium or encephalopathy. Exam nonfocal and follows commands. Doubt CVA.   - limit sedating medications  - delirium precautions  - SLP/PT/OT    PULMONOLOGY:   CXR with bibasilar airspace disease. I suspect ongoing aspiration - likely due to mentation and deconditioning. Family decline intubation or trach.   - goal SpO2>=92, pH>=7.30  - pulmonary toilet  - volume removal as tolerated  - ABX as below  - follow ABG and CXR  - LAMA/LABA  - palliative care consult     CARDIOVASCULAR:   Post elective CABG. TTE with normal RV and LV systolic function. New AFIB.   - MAP > 65  - CTS consulted  - continue ASA  - continue statin  - continue amiodarone  - diurese as tolerated   - AC when able (FIB)     RENAL/ELECTROLYTE:   CRISTINO on CKD. Now alkalosis - likely contraction with concurrent respiratory acidosis  - nephrology consulted  - goal K>=4, Mg>=2, Phos >3  - daily BMP  - strict I/O's; daily weights  - avoid nephrotoxic medications    ENDOCRINE:   - SSI    ID/MICRO:   MDR pseudomonas VAP. ID consult for meropenem.   - ID consult  - continue Meropenem  - follow culture data    ICU DAILY CHECKLIST     Code Status: Full  DVT Prophylaxis: SCDs  T/L/D: PIV  SUP: N/A  Diet: advance as tolerated  ABCDEF Bundle/Checklist Completed:Yes  Disposition: Stay in ICU  Multidisciplinary Rounds Completed:  Yes  Patient/Family

## 2025-05-01 NOTE — PROGRESS NOTES
Infectious Disease Progress     INFECTIOUS DISEASE Attending:     I agree with the above infectious disease daily progress note in its entirety as authored by and discussed in detail with the nurse practitioner.   I have reviewed pertinent laboratory studies, microbiology cultures, radiologic reports with review of the consultations and progress notes as appropriate.   I agree with today's subjective findings, physical examination, assessment and plan of care as described above and discussed extensively with the nurse practitioner.       Increased WOB and more somnolent, less conversant on HFNC    Exam: afebrile, anicteric sclerae, on HFNC and increased WOB with accessory muscle usage    Plan:    Continue Meropenem unless transitioned to comfort measures.    Palliative care input appreciated.    Will sign off, please call with questions.    A total time of 20 minutes was spent on today's encounter.  Greater than 50% of the time was spent on the following:  Preparing for visit and chart review.  Obtaining and/or reviewing separately obtained history  Performing a medically appropriate exam and/or evaluation  Counseling and educating a patient/family/caregiver as noted above  Placing relevant orders  Referring and communicating with other professionals (not separately reported)  Independently interpreting results (not separately reported) and communicating results to the patient/family/caregiver  Care coordination (not separately reported) as noted above  Documenting clinical information in the electronic health records (e.g. problem list, visit note) on the day of the encounter      Impression:   CAD  S/p CABG x2 (4/15)  Leukocytosis  Acute respiratory failure due to asp PNA and pulmonary edema  PAF, s/p LAAL  - afebrile, wbc  15.2    Resp cx (4/20)  pseudomonas, (4/25) pseudomonas, klebsiella oxytoca    MRSA nare screen (4/25) negative     Hypertension  Hyperlipidemia  COPD  Hx of tobacco abuse  CRISTINO on CKD  -  chiasm and infundibulum grossly unremarkable. Orbits are grossly symmetric. Dural venous sinuses are grossly patent. The mastoid air cells are well pneumatized and clear. A2 and A3 segments are patent. M1 segments are patent. M2 segments demonstrate symmetric arborization. Bilateral posterior communicating arteries. The P1 and P2 segments are patent.     Normal MRI of the brain. There is no aneurysm, dissection or hemodynamically significant stenosis. There is mild chronic microvascular ischemic change. There is no intracranial mass, hemorrhage or evidence of acute infarction. No acute intracranial process is demonstrated. Electronically signed by VICTORINO HABIB    MRA HEAD WO CONTRAST  Result Date: 4/23/2025  EXAM: MRI BRAIN WO CONTRAST, MRA HEAD WO CONTRAST CLINICAL HISTORY: Altered mental status, delirium. INDICATION: Altered mental status. COMPARISON: NONE TECHNIQUE: MR examination of the brain includes axial and sagittal T1, coronal T2, axial T2, axial FLAIR, axial gradient echo, axial DWI. CONTRAST: None Next, 3D time-of-flight MRA of the head was performed FINDINGS: There is no intracranial mass, hemorrhage or evidence of acute infarction. Vertebral arteries are codominant. Posterior communicating arteries bilaterally. Periventricular and scattered foci of increased FLAIR signal intensity at the coronary atelectasis involving. The brain architecture is normal. There is no evidence of midline shift or mass-effect. There are no extra-axial fluid collections. There is no Chiari or syrinx. The pituitary and infundibulum are grossly unremarkable. There is no skull base mass. Cerebellopontine angles are grossly unremarkable. The major intracranial vascular flow-voids are unremarkable. The cavernous sinuses are symmetric. Optic chiasm and infundibulum grossly unremarkable. Orbits are grossly symmetric. Dural venous sinuses are grossly patent. The mastoid air cells are well pneumatized and clear. A2 and A3 segments are

## 2025-05-01 NOTE — PROGRESS NOTES
Physical Therapy 5/1/25    Chart reviewed, conferred with RN. Per RN increased work of breathing this AM. Now palliative consult placed. Will defer this AM and follow up as medically appropriate.    Thank you for your consideration,    Stacy Rodarte, PT, DPT

## 2025-05-01 NOTE — PROGRESS NOTES
[address], [place] [address]  Phone: [phone]   [phone]    [email or web address]     Nephrology Progress Note    Patient Name : Scooter Dickerson      : 1945     MRN : [id number]  Date of Admission : 4/15/2025  Date of Servive : 25    CC:  Follow up for CRISTINO       Assessment and Plan   CRISTINO on CKD  - ischemic ATN : slowly resolving   - changed Bumex to 1 mg Q12 h  - increased FW flushes   - Keep sosa for now   - poor candidate for dialysis, should avoid if possible   - daily labs, I.Os    Alkalosis   - 2/2 resp acidosis + contraction alkalosis   - has been on high flow for several days   - check ABG   - ordered 2 doses of Diamox, reduced Bumex, increased FW     CKD 3B   - baseline Cr 1.5 mg/dl pre op   - 2/2 HTN, DM, smoking     Resp failure:  - 2/2 aspiration PNA, reintubated early this AM ()  - extubated  to High flow/ BIPAP    CAD s/p CABG x 2, AMADOU ligation 4/15   Hypotension   - off pressors      Chronic anemia   Iron def : s/p venofer  - continue  VILMA     BPH , hx of TURP 10/24,   - keep sosa     Osteoporosis   DM-II   A fib : On oral Amio  KARI  Chronic tobacco user     Interval History:  Seen and examined.rapid worsening of alkalosis, shallow breathing, needed 250 mg diamox for alkalosis. Tolerating TF. Remains on Meropenem per ID. CXR stable       Review of Systems: Review of systems not obtained due to patient factors.    Current Medications:   Current Facility-Administered Medications   Medication Dose Route Frequency    acetaZOLAMIDE (DIAMOX) 500 mg in sterile water 5 mL injection  500 mg IntraVENous Q12H    bumetanide (BUMEX) injection 1 mg  1 mg IntraVENous q8h    potassium bicarb-citric acid (EFFER-K) effervescent tablet 40 mEq  40 mEq Oral BID    insulin NPH (HumuLIN N;NovoLIN N) injection vial 10 Units  10 Units SubCUTAneous Q12H    etomidate (AMIDATE) injection 20 mg  20 mg IntraVENous Once

## 2025-05-01 NOTE — OP NOTE
[address]  [address]                            OPERATIVE REPORT      PATIENT NAME: SARTHAK ARANA             : 1945  MED REC NO: [id number]                       ROOM: [id number]  ACCOUNT NO: [id number]                       ADMIT DATE: 04/15/2025  PROVIDER: Andrés Brand MD    DATE OF SERVICE:  04/15/2025    PREOPERATIVE DIAGNOSES:       1. Ejection fraction 65%.     2. New York Heart Association Class IV acute-on-chronic diastolic heart failure starting about 1 week prior to surgery (note when we saw the patient as an outpatient in the cath lab, we noted that his NT-proBNP was in the 2000 range.  He was having increasing lower extremity edema as well as dyspnea at rest at times.  All consistent with New York Heart Association Class IV acute-on-chronic diastolic heart failure starting about 1 week prior to surgery).     3. Last creatinine before surgery 1.53.     4. Symptoms at admission:        a. Congestive heart failure.        b. Unstable angina.     5. Symptoms at surgery:        a. Congestive heart failure.        b. Unstable angina.     6. Paroxysmal atrial fibrillation.     7. Mild chronic lung disease (note, the patient's FEV1 is 1.37, showing 74% predicted consistent with mild chronic lung disease).     8. Current everyday tobacco smoker.     9. Cerebrovascular disease (note, the patient's left internal carotid artery was 50% to 69%).     10. Peripheral arterial disease (note, the patient's right TREY was 0.52 and left TREY was 0.55, all consistent with peripheral arterial disease).     11. Diabetes mellitus requiring oral medication.     12. Plavix was held 5 days prior to surgery.     13. Hypertension.     14. One-vessel coronary artery disease.     15. 65% left main stenosis.     16. New York Heart Association Class IV acute-on-chronic diastolic heart failure starting about 1 week prior to surgery.    POSTOPERATIVE

## 2025-05-01 NOTE — PROGRESS NOTES
2000: Bedside shift change report given to Dane RN (oncoming nurse) by Koki RN (offgoing nurse). Report included the following information Adult Overview, Surgery Report, Intake/Output, MAR, and Recent Results.

## 2025-05-01 NOTE — PROGRESS NOTES
SLP Contact Note     Chart reviewed. SLP attempted tx session, patient repositioned and ice chip given x2. However, patient baseline tachy at beginning of session (RR 35) and became increasingly tachy with ice chip trials (RR up to 47). As such, tx session d/c'ed. SLP will continue to follow this patient as is medically appropriate.     Thank you,  Pauly Lind M.Ed, CCC-SLP (pronouns: she/her/hers)  Speech-Language Pathologist

## 2025-05-01 NOTE — PROGRESS NOTES
0800- bedside report received. Patient in bed without complaints.    0850- patient work of breathing increasing; RR 44, sats 98%, orthpneic. Patient placed in chair position in bed and stated, \"that's better\". Will inform MD as well.    0915- Sarai NP aware and stated this is his baseline. No orders received.    1000- pallative NP on unit to see patient. She will come back at 1400 to meet with the niece.    1452- spoke to pallative NP. Orders for comfort received.     1545- niece Ting gave permission to start comfort measures. No other family or friends are able to make it. Dobhoff feeding tube removed, hi flow removed and 6 L nasal cannula applied. IV turned off. Will medicate as needed. [place] called for support.    1605- called [facility]. Spoke to Marissa/Val regarding prognosis.     2000- Bedside and Verbal shift change report given to Dane COOMBS (oncoming nurse) by Abdulaziz COOMBS (offgoing nurse). Report included the following information Nurse Handoff Report, Recent Results, and Cardiac Rhythm ST .

## 2025-05-01 NOTE — CONSULTS
Palliative Medicine  Patient Name: Scooter Dickerson  YOB: 1945  MRN: [id number]  Age: 79 y.o.  Gender: male    Date of Initial Consult: 5/1/25  Date of Service: 5/1/2025  Time: 3:14 PM  Provider: JENNIFER Le CNP  Hospital Day: 17  Admit Date: 4/15/2025  Referring Provider: Dr. Brand       Reasons for Consultation:  Goals of Care    HISTORY OF PRESENT ILLNESS (HPI):   Scooter Dickerson is a 79 y.o. male with a past medical history of HTN, paroxysmal afib, CAD, PVD, PAD,  DMII, CKDIII, COPD who was admitted on 4/15/2025 from home for elective CABG. Lengthy hospitalization complicated by ongoing acute hypoxic respiratory failure, secondary to ongoing aspiration PNA, hypoxia, hypercarbia, pulmonary edema. Has been intubated/extubated several times. Also with waxing/waning mentation, delirium. Palliative consulted to assist with goals of care.     Psychosocial: . Had two children - son and daughter, who have both passed. Has a niece, Susie, who has been his primary caregiver over many years. She is medical POA. They live off [facility] in the [facility].       PALLIATIVE DIAGNOSES:    CAD s/p CABG x2  Acute hypoxic respiratory failure  Dysphagia  Aspiration pneumonia   Hypercarbia  Delirium   Increased work of breathing with accessory muscle charlene   Secretions   Palliative medicine encounter  Goals of care   Comfort measures, end of life care     ASSESSMENT AND PLAN:   Chart reviewed prior to seeing pt; including notes, labs, imaging. Pt initially seen at bedside this morning, no family present. Currently requiring HFNC, 40L, 40%. Able to open eyes, nod head yes/no to simple questions. Significant increased work of breathing, respirations in mid 40s.   Call placed to Susie, pt niece and mPOA. Palliative role introduced. She is able to come to bedside ~2:00 PM today.   Met with Susie at bedside. We reflect on his chronic conditions and current acute conditions, his hospital  Medicine ACP notes for further details.    PALLIATIVE ASSESSMENT:      Palliative Performance Scale (PPS):  PPS: 20    ECOG:   ECOG Status : Completely disabled [4]    Modified ESAS:  Modified-[place] Symptom Assessment Scale (ESAS)  Pain Score: No pain  Dyspnea Score: 7    Clinical Pain Assessment (nonverbal scale for severity on nonverbal patients):   Clinical Pain Assessment  Severity: 0  Location: YANN  Character: YANN  Duration: YANN  Factors: YANN  Frequency: YANN       NVPS:  Adult Nonverbal Pain Scale (NVPS)  Face: No particular expression or smile  Activity (Movement): Laid quietly, normal position  Guarding: Lying quietly, no positioning of hands over areas of bod  Physiology (Vital Signs): Stable vital signs  Respiratory: RR > 20 above baseline or 10% decrease SpO2 severe asynchrony with ventilator  NVPS Score : 2    RDOS:  RDOS  Heart rate per minute: greater than 109  Respiratory Rate per Minute: greater than 30  Restlessness:non-purposeful: None  Paradoxical breathing pattern:abdomen moves in on inspiration: Present  Accessory muscle use: rise in clavicle during inspiration: Pronounced rise  Grunting at end-expiration: guttural sound: None  Nasal flaring: involuntary movement of nares: None  Look of fear: None  Total : 8      Vital Signs: Blood pressure (!) 151/94, pulse 99, temperature (!) 100.7 °F (38.2 °C), temperature source Axillary, resp. rate (!) 33, height 1.676 m (5' 6\"), weight 58.7 kg (129 lb 6.6 oz), SpO2 100%.    PHYSICAL ASSESSMENT:   General: [] Oriented x3  [] Well appearing  [] Intubated  []Ill appearing  [x]Other: fatigued, ill appearing, opens eyes to voice briefly   Mental Status: [] Normal mental status exam  [x] Drowsy  [] Confused  []Other:  Cardiovascular: [] Regular rate/rhythm  [] Arrhythmia  [x] Other: tachycardic   Chest: [] Effort normal  []Lungs clear  [] Respiratory distress  [x]Tachypnea  [x] Other: significant increase work of breathing   Abdomen: [] Soft/non-tender  []

## 2025-05-01 NOTE — PROGRESS NOTES
Comprehensive Nutrition Assessment    Type and Reason for Visit: Reassess    Nutrition Recommendations/Plan:     Continue TF via DHT of Nepro @ 45 mL/hr    Continue flushes of 200 mL H2O q 3 hours - adjustments per nephrology    Insulin adjustments per Diabetes (above provides 158-173 gm CHO/day based on 22-24° hang time)       Malnutrition Assessment:  Malnutrition Status:  Insufficient data (04/21/25 1223)    Context:  Acute Illness     Findings of the 6 clinical characteristics of malnutrition:  Energy Intake:  75% or less of estimated energy requirements for 7 or more days  Weight Loss:  Unable to assess     Body Fat Loss:  Unable to assess     Muscle Mass Loss:  Unable to assess    Fluid Accumulation:  No fluid accumulation     Strength:  Not Performed     Nutrition Assessment:    78 yo male admitted for CABG x 2 (LIMA-LAD, SVG-OM) and AMADOU ligation, presented to CVICU following procedure. Pt s/p 2 U PRBCs and DDAVP. PMH of PAF, COPD, HTN, PAD, CKD3, T2DM, BPH, ANA MARIA.    Extubated to BiPAP on 4/16 but respiratory status worsened pt re-intubated 4/20.  Extubated again 4/22.  R side deficit noted, MRI/MRA without acute infarct.    SLP eval 4/24 --> advanced to pureed/ moderately thick liquids.  Again re-intubated early AM on 4/25 d/t hypoxia (?aspiration), tube feeds initially held.  CXR with worsening pulmonary edema.  CRISTINO on CKD, nephrology following, at risk for needing RRT.  New A fib.  Extubated to high flow on 4/27. Ileus noted, remains off TF with NG to suction.  However, pt pulled NG early AM 4/28.  Nursing has been unable to replace.       5/1: follow-up.  Pt with increased WOB and rapid worsening alkalosis.  No plans for intubation or trach.  Poor candidate for dialysis.  Palliative care consulted, plan for meeting this afternoon.  Weight is down significantly from UBW.  Nephrology increased FWF to 200 mL h2o q 3 hours (hypernatremia).  TF meeting EEN at this time and therefore, do not plan to make

## 2025-05-01 NOTE — PLAN OF CARE
Left pleural and mediastinal drains remain in place. Post CABG and left atrial appendage clipping.     Stable interstitial edema. Electronically signed by Eliezer Benitez    XR CHEST PORTABLE  Result Date: 4/17/2025  EXAM: XR CHEST PORTABLE INDICATION: Post op open heart surgery COMPARISON: 4/16/2025 FINDINGS: A portable AP radiograph of the chest was obtained at 410 hours. Tubes and lines are stable.. Median sternotomy changes. Bilateral interstitial and airspace opacities. Cardiomegaly.  The bones and soft tissues are grossly within normal limits.     Bilateral interstitial and airspace opacities without significant change. Electronically signed by Enzo Contreras    US RETROPERITONEAL COMPLETE  Result Date: 4/16/2025  INDICATION: ARF EXAM: US Renal/Retroperitoneum FINDINGS: Renal sonogram shows smooth renal contours, with normal cortical thickness, and normal cortical echogenicity. There is no sonographically apparent solid mass or stone. There is no apparent cyst. Renal lengths: Right 8.7 cm Left    9.2 cm There is no hydronephrosis or caliectasis. The proximal ureters are not dilated. There is no perirenal fluid collection. The bladder is empty. Aorta and common iliac artery origins are obscured by bowel gas. IVC is unremarkable.     No hydronephrosis. Electronically signed by STACY RAI    XR CHEST PORTABLE  Result Date: 4/16/2025  INDICATION:  Post op open heart surgery EXAM: CXR Portable. FINDINGS: Portable chest shows interval extubation and NG tube removal with otherwise stable support lines since yesterday. There is no apparent pneumothorax.  Lungs show new mild right base atelectasis. Heart size is stable. There is no overt pulmonary edema.     New mild right base atelectasis. Electronically signed by STACY RAI    XR CHEST PORTABLE  Result Date: 4/15/2025  EXAM:  XR CHEST PORTABLE INDICATION: Post op open heart surgery COMPARISON: 4/9/2025 TECHNIQUE: portable chest AP view obtained portably at

## 2025-05-02 LAB
GLUCOSE BLD STRIP.AUTO-MCNC: 237 MG/DL (ref 65–117)
SERVICE CMNT-IMP: ABNORMAL

## 2025-05-02 PROCEDURE — 2500000003 HC RX 250 WO HCPCS

## 2025-05-02 PROCEDURE — 99233 SBSQ HOSP IP/OBS HIGH 50: CPT

## 2025-05-02 PROCEDURE — 94760 N-INVAS EAR/PLS OXIMETRY 1: CPT

## 2025-05-02 PROCEDURE — 2700000000 HC OXYGEN THERAPY PER DAY

## 2025-05-02 PROCEDURE — 82962 GLUCOSE BLOOD TEST: CPT

## 2025-05-02 PROCEDURE — 1200000000 HC SEMI PRIVATE

## 2025-05-02 RX ORDER — LORAZEPAM 2 MG/ML
1 CONCENTRATE ORAL
Status: DISCONTINUED | OUTPATIENT
Start: 2025-05-02 | End: 2025-05-03 | Stop reason: HOSPADM

## 2025-05-02 RX ORDER — HYDROMORPHONE HYDROCHLORIDE 1 MG/ML
2 SOLUTION ORAL
Refills: 0 | Status: DISCONTINUED | OUTPATIENT
Start: 2025-05-02 | End: 2025-05-03 | Stop reason: HOSPADM

## 2025-05-02 RX ADMIN — SODIUM CHLORIDE, PRESERVATIVE FREE 10 ML: 5 INJECTION INTRAVENOUS at 22:05

## 2025-05-02 NOTE — PROGRESS NOTES
1100: Palliative in to see patient. Jaya wishes to take him home on hospice Palliative making arrangements to coordinate care and get him home tomorrow. Updated cardiac surgery NP and this plan. It is fine with cardiac surgery providers.    1330: patient got room assignment on 6E. Jaya is aware of move for tonight

## 2025-05-02 NOTE — PROGRESS NOTES
Palliative Medicine  Patient Name: Scooter Dickerson  YOB: 1945  MRN: [id number]  Age: 79 y.o.  Gender: male    Date of Initial Consult: 5/1/25  Date of Service: 5/2/2025  Time: 9:31 AM  Provider: JENNIFER Le CNP  Hospital Day: 18  Admit Date: 4/15/2025  Referring Provider: Dr. Brand       Reasons for Consultation:  Goals of Care    HISTORY OF PRESENT ILLNESS (HPI):   Scooter Dickerson is a 79 y.o. male with a past medical history of HTN, paroxysmal afib, CAD, PVD, PAD,  DMII, CKDIII, COPD who was admitted on 4/15/2025 from home for elective CABG. Lengthy hospitalization complicated by ongoing acute hypoxic respiratory failure, secondary to ongoing aspiration PNA, hypoxia, hypercarbia, pulmonary edema. Has been intubated/extubated several times. Also with waxing/waning mentation, delirium. Palliative consulted to assist with goals of care.     Psychosocial: . Had two children - son and daughter, who have both passed. Has a niece, Susie, who has been his primary caregiver over many years. She is medical POA. They live off [facility] in the [facility].       PALLIATIVE DIAGNOSES:    CAD s/p CABG x2  Acute hypoxic respiratory failure  Dysphagia  Aspiration pneumonia   Hypercarbia  Delirium   Increased work of breathing with accessory muscle charlene   Secretions   Palliative medicine encounter  Goals of care   Comfort measures, end of life care     ASSESSMENT AND PLAN:   Chart reviewed prior to seeing pt; including notes, labs, imaging, interval events since last palliative encounter. Pt seen at bedside, no family present.   See initial consult note (5/1/25) -- pt transitioned to comfort focused care with goal of weaning HFNC in conjunction with symptom meds. Of note, did not receive any symptom meds overnight.   This morning, pt seen ~9:00 AM. Discussed with ICU RNKoki. Susie had just left bedside, stayed the night last night as pt on exam yesterday was excessive fatigued,

## 2025-05-02 NOTE — PLAN OF CARE
Critical Care Medicine Plan of Care    Notified that patient transferred to O. Critical care will sign off. Please reach back out with new questions or concerns.     Adeel Kaye M.D.  Pulmonary and Critical Care Medicine

## 2025-05-02 NOTE — DIABETES MGMT
Scooter Dickerson was placed on CC for the remainder of his stay, all insulin orders have been discontinued as well as POC. Diabetes management team will sign off at this time. Please re-consult us if BG pattern management is needed. Thank you for including us in their care.    JENNIFER Hernandez - CNS   Diabetes Clinical Nurse Specialist   [facility] Diabetes [facility]  Access via SEAT 4a

## 2025-05-02 NOTE — CARE COORDINATION
Spoke with intake ([phone]) at [facility]: they confirmed patient has been accepted, AVS updated  [facility] Phone: [phone] [address] [place] [address]   BLS transport will need to be arranged tomorrow once hospital bed has been delivered.     Transition of Care Plan:    RUR: 25% high  Prior Level of Functioning: [facility] and hx of [facility].   Disposition: home with his niece [address] [facility] [address] and Hospice   NILSON: 5/3/25  If SNF or IPR: Date FOC offered: NA  Follow up appointments: hospice  DME needed: hospital bed and oxygen tubing  Transportation at discharge: BLS (on 4 L O2 NC)  IM/IMM Medicare/ letter given: 4/16/25  Is patient a [place] and connected with [facility]? yes   If yes, was  transfer form completed and VA notified? VA transfer form sent on 4/17  Caregiver Contact: Susie West [phone]  Discharge Caregiver contacted prior to discharge? yes  Care Conference needed? no  Barriers to discharge:  pending hospice acceptance    CM spoke with patient's POA Susie West over the phone, confirmed she is ok with any hospice agency that can accommodate. Would like her uncle to be moved to her house tomorrow on 5/3/25. She states she will go home today to rearrange furniture and would like a hospital bed delivered and needs more O2 tubing. She has 2 oxygenators already at home. CM relayed information to [facility] [phone] who services her area in [facility]. Pending acceptance.     Frances Jaramillo RN, BSN, CM

## 2025-05-02 NOTE — PROGRESS NOTES
Noted plans for comfort care which is appropriate   Signing off  Thank you for allowing us to participate in your patient's care.      Noemy Moctezuma MD  [facility]  Office :[phone]  Fax: [phone]

## 2025-05-02 NOTE — DISCHARGE SUMMARY
Cardiac Surgery Discharge Summary     Patient ID:  Scooter Dickerson  [id number]  79 y.o.  1945    Admit date: 4/15/2025    Discharge date: 5/3/2025     Admitting Physician: Andrés Brand MD     Referring Cardiologist:  Dr. Huddleston/Dr. Painting    PCP:  Ena Henderson APRN - NP    Admitting Diagnoses: CAD    Discharge Diagnoses: CAD s/p CABG    1. Disease of cardiovascular system    2. S/P CABG x 2    3. Acute hypoxemic respiratory failure (HCC)        Discharged Condition:  hospice    Disposition: home hospice    Procedures for this admission:  Procedure(s):  CORONARY ARTERY BYPASS GRAFTING X 2 WITH STEVEN VAUGHAN, LIGATION OF LEFT ATRIAL APPENDAGE, DENNIS AND EPIAORTIC U/S BY DR HAGEN    Discharge Medications:      Medication List        STOP taking these medications      albuterol sulfate  (90 Base) MCG/ACT inhaler  Commonly known as: Proventil HFA     alendronate 70 MG tablet  Commonly known as: FOSAMAX     amiodarone 200 MG tablet  Commonly known as: CORDARONE     aspirin 81 MG EC tablet     atorvastatin 40 MG tablet  Commonly known as: LIPITOR     chlorhexidine 0.12 % solution  Commonly known as: Peridex     clopidogrel 75 MG tablet  Commonly known as: PLAVIX     finasteride 5 MG tablet  Commonly known as: PROSCAR     gabapentin 300 MG capsule  Commonly known as: NEURONTIN     metFORMIN 1000 MG tablet  Commonly known as: GLUCOPHAGE     metoprolol succinate 25 MG extended release tablet  Commonly known as: TOPROL XL     mupirocin 2 % ointment  Commonly known as: BACTROBAN     rivaroxaban 15 MG Tabs tablet  Commonly known as: Xarelto     tiotropium-olodaterol 2.5-2.5 MCG/ACT Aers  Commonly known as: STIOLTO     vitamin D3 25 MCG (1000 UT) Tabs tablet  Commonly known as: CHOLECALCIFEROL              HPI: Copied from H&P dated 4/9/25    Scooter Dickerson is a 79 y.o. male who was referred by Dr. Huddleston (primary cardiologist Dr. Painting) for cardiac surgical evaluation of muliv CAD. Mr. Dickerson has a PMHx

## 2025-05-02 NOTE — PLAN OF CARE
2000: Bedside shift change report given to Dane (oncoming nurse) by Abdulaziz (offgoing nurse). Report included the following information Nurse Handoff Report, MAR, Cardiac Rhythm Atrial fib, and Event Log.     Patient placed on Comfort Care this afternoon. Family at bedside.      2200: Mouth cares, patient denies distress. Afib on monitor, 4L NC. SpO2 99.

## 2025-05-02 NOTE — PROGRESS NOTES
Cardiac Surgery Progress Note    Admit Date: 4/15/2025  POD:  17 Days Post-Op    Procedure:    4/15/25 with Dr. Brnad:   CORONARY ARTERY BYPASS GRAFTING X 2 WITH LIMA (LIMA-LAD, SVG-OM , LESVGH, LIGATION OF LEFT ATRIAL APPENDAGE, DENNIS AND EPIAORTIC U/S BY DR HAGEN       [facility] synopsis:  4/15 POD0: CABG x2/LAAL with Dr Brand. DENNIS with normal biventricular function, EF 55%, no WMA. Transferred to ICU on precedex and insulin gtt with A&V wires and 3 alecia drains (2 med, L pleural). Extubated at 1545 to BiPAP. Overnight maribeth transitioned to norepi.   4/16 POD1: On vaso 0.04, levo 4. A-paced 70's with intermittent capture. Low UOP overnight (200cc). Received albumin x1 and 250ml LR. IV lasix 40mg with minimal response. Consult nephrology: added additional lasix 100mg for diuretic challenge. CXR showing pulmonary edema. ABG showing rising PCO2 65 and pH 7.15. Placed on BiPAP with repeat ABG showing improvement.   4/17 POD 2: Afib around 0200, amio bolus and infusion started. Remains on norepi and vaso. 4L NC this AM, transition to HFNC for ongoing mild hypercapnia. Good response to lasix challenge, Cr up to 2.44. Bumex 2mg IV BID today. PRBC x 1 for Hgb 7.5. Xray with gastric bubble and dilation noted.   4/18 POD 3: Agitation overnight, precedex infusion started. VBG stable, CO2 normal. SB in the 50s this AM, DC amio infusion. Remains on HFNC, pressors off. Continue diuresis, deline. AV wires pulled, plan to pull chest tubes today. Continue ICU level of care. HTN in PM, start Metoprolol   4/19 POD 4: Worsening delirium haldol x 2 overnight and increased precedex. Valproic acid added by ICU. Refusing PO medications, food/drink today. Cr slightly increased with less robust response to diuretics. Start LR 50mL/hr for low UO and no PO intake.   4/20 POD 5: Mentation improved slightly but his respiratory status has worsened, very coarse bilaterally, worsening hypoxia, back on HFNC. Speech consulted, but NPO. Likely will need  or gallop.   Abdomen:   Soft, non-tender. Bowel sounds active. Last BM 4/30; + flatus     Extremities:  No edema. PPP. Warm.   Neurologic:  Gross motor and sensory apparatus intact.      Labs:   Recent Labs     05/01/25  0201   WBC 14.9*   HGB 10.1*   HCT 33.3*      *   K 4.3   BUN 69*     ECHO:  04/21/25    ECHO (TTE) LIMITED (PRN CONTRAST/BUBBLE/STRAIN/3D) 04/21/2025  4:35 PM (Final)    Interpretation Summary    Left Ventricle: Low normal left ventricular systolic function with a visually estimated EF of 50 - 55%. Left ventricle size is normal. Normal wall thickness. Mild hypokinesis of the following segments: basal inferior. Normal diastolic function. Borderline Elevated left ventricular filling pressure. Average E/e' ratio is 15.64.    Right Ventricle: Right ventricle size is normal. Unable to assess systolic function.    Mitral Valve: Mild regurgitation with an eccentrically directed jet and may underestimate severity.    Tricuspid Valve: Mild regurgitation. The estimated RVSP is 32 mmHg.    Image quality is fair.    Signed by: Andrew Kim MD on 4/21/2025  4:35 PM    MRI Result (most recent):  MRA HEAD WO CONTRAST 04/23/2025    Narrative  EXAM: MRI BRAIN WO CONTRAST, MRA HEAD WO CONTRAST    CLINICAL HISTORY: Altered mental status, delirium.    INDICATION: Altered mental status.    COMPARISON: NONE    TECHNIQUE: MR examination of the brain includes axial and sagittal T1, coronal  T2, axial T2, axial FLAIR, axial gradient echo, axial DWI.  CONTRAST: None  Next, 3D time-of-flight MRA of the head was performed  FINDINGS:  There is no intracranial mass, hemorrhage or evidence of acute infarction.  Vertebral arteries are codominant. Posterior communicating arteries bilaterally.    Periventricular and scattered foci of increased FLAIR signal intensity at the  coronary atelectasis involving.  The brain architecture is normal. There is no evidence of midline shift or  mass-effect. There are no

## 2025-05-03 VITALS
HEART RATE: 90 BPM | RESPIRATION RATE: 18 BRPM | BODY MASS INDEX: 20.55 KG/M2 | TEMPERATURE: 99.4 F | WEIGHT: 127.9 LBS | DIASTOLIC BLOOD PRESSURE: 91 MMHG | HEIGHT: 66 IN | OXYGEN SATURATION: 93 % | SYSTOLIC BLOOD PRESSURE: 151 MMHG

## 2025-05-03 PROBLEM — Z51.5 HOSPICE CARE: Status: ACTIVE | Noted: 2025-05-03

## 2025-05-03 PROCEDURE — 2500000003 HC RX 250 WO HCPCS

## 2025-05-03 RX ORDER — MORPHINE SULFATE 100 MG/5ML
10 SOLUTION ORAL
Qty: 30 ML | Refills: 0 | Status: SHIPPED | OUTPATIENT
Start: 2025-05-03 | End: 2025-05-08

## 2025-05-03 RX ORDER — LORAZEPAM 1 MG/1
1 TABLET ORAL
Qty: 5 TABLET | Refills: 0 | Status: SHIPPED | OUTPATIENT
Start: 2025-05-03 | End: 2025-05-08

## 2025-05-03 RX ADMIN — SODIUM CHLORIDE, PRESERVATIVE FREE 10 ML: 5 INJECTION INTRAVENOUS at 10:09

## 2025-05-03 NOTE — PROGRESS NOTES
Name: Scooter Dickerson    MRN: [id number]         : 1945      Patient discharged home with [facility]. At time of discharge the patient was alert and orientated X 1. O2 via nasal cannula @ 3 LPM, no shortness of breath. Patient had no complaints of pain. AVS printed and given to EMS with medications, follow up, signs/symptoms of infection and safety. Patient escorted down stairs with EMT and personal belongings. Patient cesilia West Called to notify her that patient on the way via ambulance.

## 2025-05-03 NOTE — CARE COORDINATION
Transition of Care: Discharged home with [facility]. Shlomo contacted [facility] and spoke to Ele the on call nurse ([phone]) and she confirmed that [facility] DOES NOT deliver hospital beds on the weekends and the bed will be delivered Monday. Shlomo contacted patients cesilia West ([phone]) and informed her. Susie would like the patient discharged today understanding the bed will be delivered at a later date.   Transportation: BLS/[facility] @ 3:30 pm.     Patient is on 4L oxygen and shlomo informed [facility] that the patient needs oxygen. Ele with [facility] will be visiting the patient later this evening. Ele asked about patients medications. Shlomo had Ele call the nurses station. Shlomo confirmed medications will be sent to [facility] in [facility], staff nurse confirmed with Sara Jenesn NP.     [facility] Phone: [phone] [address] [place] [address]

## 2025-05-03 NOTE — PROGRESS NOTES
Cardiac Surgery Progress Note    Admit Date: 4/15/2025  POD:  18 Days Post-Op    Procedure:    4/15/25 with Dr. Brand:   CORONARY ARTERY BYPASS GRAFTING X 2 WITH LIMA (LIMA-LAD, SVG-OM , LESVGH, LIGATION OF LEFT ATRIAL APPENDAGE, DENNIS AND EPIAORTIC U/S BY DR HAGEN       [facility] synopsis:  4/15 POD0: CABG x2/LAAL with Dr Brand. DENNIS with normal biventricular function, EF 55%, no WMA. Transferred to ICU on precedex and insulin gtt with A&V wires and 3 alecia drains (2 med, L pleural). Extubated at 1545 to BiPAP. Overnight maribeth transitioned to norepi.   4/16 POD1: On vaso 0.04, levo 4. A-paced 70's with intermittent capture. Low UOP overnight (200cc). Received albumin x1 and 250ml LR. IV lasix 40mg with minimal response. Consult nephrology: added additional lasix 100mg for diuretic challenge. CXR showing pulmonary edema. ABG showing rising PCO2 65 and pH 7.15. Placed on BiPAP with repeat ABG showing improvement.   4/17 POD 2: Afib around 0200, amio bolus and infusion started. Remains on norepi and vaso. 4L NC this AM, transition to HFNC for ongoing mild hypercapnia. Good response to lasix challenge, Cr up to 2.44. Bumex 2mg IV BID today. PRBC x 1 for Hgb 7.5. Xray with gastric bubble and dilation noted.   4/18 POD 3: Agitation overnight, precedex infusion started. VBG stable, CO2 normal. SB in the 50s this AM, DC amio infusion. Remains on HFNC, pressors off. Continue diuresis, deline. AV wires pulled, plan to pull chest tubes today. Continue ICU level of care. HTN in PM, start Metoprolol   4/19 POD 4: Worsening delirium haldol x 2 overnight and increased precedex. Valproic acid added by ICU. Refusing PO medications, food/drink today. Cr slightly increased with less robust response to diuretics. Start LR 50mL/hr for low UO and no PO intake.   4/20 POD 5: Mentation improved slightly but his respiratory status has worsened, very coarse bilaterally, worsening hypoxia, back on HFNC. Speech consulted, but NPO. Likely will need  edema. PPP. Warm.   Neurologic:  Gross motor and sensory apparatus intact.      Labs:   Recent Labs     05/01/25  0201   WBC 14.9*   HGB 10.1*   HCT 33.3*      *   K 4.3   BUN 69*     ECHO:  04/21/25    ECHO (TTE) LIMITED (PRN CONTRAST/BUBBLE/STRAIN/3D) 04/21/2025  4:35 PM (Final)    Interpretation Summary    Left Ventricle: Low normal left ventricular systolic function with a visually estimated EF of 50 - 55%. Left ventricle size is normal. Normal wall thickness. Mild hypokinesis of the following segments: basal inferior. Normal diastolic function. Borderline Elevated left ventricular filling pressure. Average E/e' ratio is 15.64.    Right Ventricle: Right ventricle size is normal. Unable to assess systolic function.    Mitral Valve: Mild regurgitation with an eccentrically directed jet and may underestimate severity.    Tricuspid Valve: Mild regurgitation. The estimated RVSP is 32 mmHg.    Image quality is fair.    Signed by: Andrew Kim MD on 4/21/2025  4:35 PM    MRI Result (most recent):  MRA HEAD WO CONTRAST 04/23/2025    Narrative  EXAM: MRI BRAIN WO CONTRAST, MRA HEAD WO CONTRAST    CLINICAL HISTORY: Altered mental status, delirium.    INDICATION: Altered mental status.    COMPARISON: NONE    TECHNIQUE: MR examination of the brain includes axial and sagittal T1, coronal  T2, axial T2, axial FLAIR, axial gradient echo, axial DWI.  CONTRAST: None  Next, 3D time-of-flight MRA of the head was performed  FINDINGS:  There is no intracranial mass, hemorrhage or evidence of acute infarction.  Vertebral arteries are codominant. Posterior communicating arteries bilaterally.    Periventricular and scattered foci of increased FLAIR signal intensity at the  coronary atelectasis involving.  The brain architecture is normal. There is no evidence of midline shift or  mass-effect. There are no extra-axial fluid collections. There is no Chiari or  syrinx. The pituitary and infundibulum are grossly

## 2025-05-03 NOTE — PROGRESS NOTES
Spiritual Health History and Assessment/Progress Note  [facility]    Attempted Encounter,  , End of Life,      Name: Scooter Dickerson MRN: [id number]    Age: 79 y.o.     Sex: male   Language: English   Advent: [employer]   Disease of cardiovascular system     Date: 5/3/2025            Total Time Calculated: 15 min              Spiritual Assessment continued in [facility] MED ONCOLOGY        Referral/Consult From: Palliative Care   Encounter Overview/Reason: Attempted Encounter  Service Provided For: Patient not available    Sil, Belief, Meaning:   Patient unable to assess at this time  Family/Friends No family/friends present      Importance and Influence:  Patient unable to assess at this time  Family/Friends No family/friends present    Community:  Patient   Family/Friends No family/friends present    Assessment and Plan of Care:     Patient Interventions include:   Family/Friends Interventions include: No family/friends present    Patient Plan of Care: Spiritual Care available upon further referral  Family/Friends Plan of Care: Spiritual Care available upon further referral    I attempted a follow up visit with Scooter Dickerson. The patient appeared to be sleeping and did not alert. No family present.     Electronically signed by HAILEY NOGUEIRA on 5/3/2025 at 9:46 AM

## (undated) DEVICE — DRAPE SLUSH DISC W44XL66IN ST FOR RND BSIN HUSH SLUSH SYS

## (undated) DEVICE — 6 FOOT DISPOSABLE EXTENSION CABLE WITH SAFE CONNECT / SCREW-DOWN

## (undated) DEVICE — SOLUTION IV 1000ML 140MEQ/L SOD 5MEQ/L K 3MEQ/L MG 27MEQ/L

## (undated) DEVICE — Device: Brand: JELCO

## (undated) DEVICE — KIT BLWR MISTER 5P 15L W/ TBNG SET IRRIG MIST TO IMPROVE

## (undated) DEVICE — SYRINGE MED 10ML LUERLOCK TIP W/O SFTY DISP

## (undated) DEVICE — GLIDESHEATH SLENDER ACCESS KIT: Brand: GLIDESHEATH SLENDER

## (undated) DEVICE — WASTE KIT - ST MARY: Brand: MEDLINE INDUSTRIES, INC.

## (undated) DEVICE — CANNULA SUCT L8.5IN DIA20FR VENT CONN 3/8IN ART MTL CRV TIP

## (undated) DEVICE — RETRACTOR SURG INSRT SUT HLD OCTOBASE

## (undated) DEVICE — SOLUTION IV 500 ML 0.9 NACL INJ EXCEL CONTAINER USP LF

## (undated) DEVICE — SOLUTION IV 1000 ML 0.9 NACL INJ USP EXCEL PLAS CONTAINER

## (undated) DEVICE — BLADE OPHTH 180DEG CUT SURF BLU STR SHRP DBL BVL GRINDLESS

## (undated) DEVICE — AORTIC PUNCHES ARE USED TO CREATE A UNIFORM OPENING IN BLOOD VESSELS DURING CARDIOVASCULAR SURGERY. THE VESSEL GRAFT IS INSERTED INTO THE CREATED OPENING AND SUTURED TO THE VESSEL WALL. AORTIC LANCETS ARE USED TO MAKE A SMALL UNIFORM CUT IN A BLOOD VESSEL TO FACILITATE INSERTION OF AN AORTIC PUNCH.  PUNCHES COME IN VARIOUS LENGTHS, DIAMETERS AND TIP CONFIGURATIONS.: Brand: CLEANCUT ROTATING AORTIC PUNCH

## (undated) DEVICE — CARD SMRT DISP TRANSIT TIME MEDISTEM VERIQ

## (undated) DEVICE — ABSORBABLE COLLAGEN HEMOSTATIC SPONGE, 3IN X 4IN, 5MM THICK: Brand: HELISTAT ® ABSORBABLE COLLAGEN HEMOSTATIC SPONGE

## (undated) DEVICE — TBG INSUFFLATION LUER LOCK: Brand: MEDLINE INDUSTRIES, INC.

## (undated) DEVICE — CATHETER DIAG 5FR L100CM LUMN ID0.047IN JR4 CRV 0 SIDE H

## (undated) DEVICE — SUTURE MONOCRYL + SZ 3-0 L27IN ABSRB UD PS1 L24MM 3/8 CIR REV MCP936H

## (undated) DEVICE — 1000ML,PRESSURE INFUSER W/STOPCOCK: Brand: MEDLINE

## (undated) DEVICE — PROVE COVER: Brand: UNBRANDED

## (undated) DEVICE — BLADE OPHTH KNF D3MM 15DEG CATRCT BLU MICRO-SHARP

## (undated) DEVICE — PRESSURE MONITORING SET: Brand: TRUWAVE

## (undated) DEVICE — KIT,ANTI FOG,W/SPONGE & FLUID,SOFT PACK: Brand: MEDLINE

## (undated) DEVICE — CATHETER DIAG 5FR L100CM LUMN ID0.047IN JL3.5 CRV 0 SIDE H

## (undated) DEVICE — COVER,LIGHT,CAMERA,HARD,1/PK,STRL: Brand: MEDLINE

## (undated) DEVICE — ROYAL SILK SURGICAL GOWN, XL: Brand: CONVERTORS

## (undated) DEVICE — KIT AT-X65 ANGIOTOUCH HAND CONTROLLER

## (undated) DEVICE — SUTURE VICRYL + SZ 2-0 L27IN ABSRB WHT SH 1/2 CIR TAPERCUT VCP417H

## (undated) DEVICE — SUTURE NONABSORBABLE MONOFILAMENT 7-0 BV-1 1X24 IN PROLENE 8702H

## (undated) DEVICE — DRAIN,WOUND,ROUND,24FR,5/16",FULL-FLUTED: Brand: MEDLINE

## (undated) DEVICE — ANGIOGRAPHY KIT

## (undated) DEVICE — SUTURE PROL SZ 5-0 L30IN NONABSORBABLE BLU L13MM RB-2 1/2 8710H

## (undated) DEVICE — OPEN HEART A- RICHMOND: Brand: MEDLINE INDUSTRIES, INC.

## (undated) DEVICE — AVID DUAL STAGE VENOUS DRAINAGE CANNULA: Brand: AVID DUAL STAGE VENOUS DRAINAGE CANNULA

## (undated) DEVICE — BANDAGE COMPR M W6INXL10YD WHT BGE VELC E MTRX HK AND LOOP

## (undated) DEVICE — Device

## (undated) DEVICE — KIT MFLD ISOLATN NACL CNTRST PRT TBNG SPIK W/ PRSS TRNSDUC

## (undated) DEVICE — BANDAGE COMPR ELASTIC 5 YDX6 IN

## (undated) DEVICE — TR BAND RADIAL ARTERY COMPRESSION DEVICE: Brand: TR BAND

## (undated) DEVICE — GOWN,SIRUS,NONRNF,SETINSLV,XL,20/CS: Brand: MEDLINE

## (undated) DEVICE — SUTURE PROL SZ 4-0 L36IN NONABSORBABLE BLU L26MM SH 1/2 CIR 8521H

## (undated) DEVICE — TIDISHIELD TRANSPORT, CONTAINMENT COVER FOR BACK TABLE 4'6" (1.37M) TO 8' (2.43M) IN LENGTH: Brand: TIDISHIELD

## (undated) DEVICE — KIT MED IMAG CNTRST AGNT W/ IOPAMIDOL REUSE

## (undated) DEVICE — SOLUTION IV 1000ML PH 7.4 INJ NRMSOL R

## (undated) DEVICE — SUTURE DEK POLY GRN BR SZ3 0 T 2 2N 6716

## (undated) DEVICE — SYRINGE MED 50ML LUERLOCK TIP

## (undated) DEVICE — WRAP SURG W1.31XL1.34M CARD FOR PT 165-172CM THERMOWRP

## (undated) DEVICE — SYSTEM ENDOSCP VES HARV W/ TOOL CANN SEAL SHT PRT BLNT TIP

## (undated) DEVICE — LEAD PACE L475MM CHNL A OR V MYOCARDIAL STEROID ELUT SIL

## (undated) DEVICE — GLOVE SURG SZ 7.5 L11.2IN THK9.8MIL STRW LTX POLYMER BEAD

## (undated) DEVICE — DRAIN SURG SGL COLL PT TB FOR ATS BG OASIS

## (undated) DEVICE — TEMP PACING WIRE: Brand: MYO/WIRE

## (undated) DEVICE — OPEN HEART B-RICHMOND: Brand: MEDLINE INDUSTRIES, INC.

## (undated) DEVICE — SUTURE PROL SZ 6-0 L24IN NONABSORBABLE BLU L13MM C-1 3/8 8726H

## (undated) DEVICE — SUTURE PROL SZ 8-0 L24IN NONABSORBABLE BLU L6.5MM BV130-5 8732H

## (undated) DEVICE — 40418 TRENDELENBURG ONE-STEP ARM PROTECTORS LARGE (1 PAIR): Brand: 40418 TRENDELENBURG ONE-STEP ARM PROTECTORS LARGE (1 PAIR)

## (undated) DEVICE — DRESSING FOAM W8.7XL9.1IN SAFETAC LAYR SELF ADH MEPILEX

## (undated) DEVICE — TRANSFER BAG 300 ML: Brand: HAEMONETICS

## (undated) DEVICE — SPECIAL PROCEDURE DRAPE 32" X 34": Brand: SPECIAL PROCEDURE DRAPE

## (undated) DEVICE — SYSTEM SKIN CLOSURE 42CM DERMABOND PRINEO

## (undated) DEVICE — PADPRO DEFIBRILLATION/PACING/CARDIOVERSION/MONITORING ELECTRODES, ADULT/CHILD GREATER THAN 10 KG RADIOTRANSPARENT ELECTRODE, PHYSIO-CONTROL QUIK-COMBO (M) 60" (152 CM): Brand: PADPRO

## (undated) DEVICE — CONNECTOR DRNGE 3/8 1/2X3/16X3/16IN BASE L5MM ARM L10-13MM

## (undated) DEVICE — SUTURE SZ 7 L18IN NONABSORBABLE SIL CCS L48MM 1/2 CIR STRNM M655G

## (undated) DEVICE — GLIDESHEATH SLENDER STAINLESS STEEL KIT: Brand: GLIDESHEATH SLENDER

## (undated) DEVICE — X-RAY DETECTABLE SPONGES,16 PLY: Brand: VISTEC

## (undated) DEVICE — SPLINT WR VELC FOAM NEUT POS DISP FOR RAD ART ACC SFT STRP

## (undated) DEVICE — KIT HND CTRL 3 W STPCOCK ROT END 54IN PREM HI PRSS TBNG AT